# Patient Record
Sex: MALE | Race: WHITE | NOT HISPANIC OR LATINO | Employment: OTHER | URBAN - METROPOLITAN AREA
[De-identification: names, ages, dates, MRNs, and addresses within clinical notes are randomized per-mention and may not be internally consistent; named-entity substitution may affect disease eponyms.]

---

## 2017-01-09 ENCOUNTER — ALLSCRIPTS OFFICE VISIT (OUTPATIENT)
Dept: OTHER | Facility: OTHER | Age: 67
End: 2017-01-09

## 2017-02-01 ENCOUNTER — ALLSCRIPTS OFFICE VISIT (OUTPATIENT)
Dept: OTHER | Facility: OTHER | Age: 67
End: 2017-02-01

## 2017-02-01 DIAGNOSIS — R10.13 EPIGASTRIC PAIN: ICD-10-CM

## 2017-02-01 DIAGNOSIS — K76.89 OTHER SPECIFIED DISEASES OF LIVER: ICD-10-CM

## 2017-02-09 ENCOUNTER — HOSPITAL ENCOUNTER (OUTPATIENT)
Dept: RADIOLOGY | Facility: HOSPITAL | Age: 67
Discharge: HOME | End: 2017-02-09
Attending: INTERNAL MEDICINE
Payer: MEDICARE

## 2017-02-09 DIAGNOSIS — R10.13 EPIGASTRIC PAIN: ICD-10-CM

## 2017-02-09 PROCEDURE — 74177 CT ABD & PELVIS W/CONTRAST: CPT

## 2017-02-09 RX ADMIN — IOHEXOL 100 ML: 350 INJECTION, SOLUTION INTRAVENOUS at 13:29

## 2017-02-14 ENCOUNTER — GENERIC CONVERSION - ENCOUNTER (OUTPATIENT)
Dept: OTHER | Facility: OTHER | Age: 67
End: 2017-02-14

## 2017-02-28 RX ORDER — FINASTERIDE 5 MG/1
5 TABLET, FILM COATED ORAL
COMMUNITY
End: 2020-04-21 | Stop reason: SDUPTHER

## 2017-02-28 RX ORDER — PANTOPRAZOLE SODIUM 40 MG/1
40 TABLET, DELAYED RELEASE ORAL DAILY
COMMUNITY
End: 2018-06-20 | Stop reason: ALTCHOICE

## 2017-02-28 RX ORDER — ATORVASTATIN CALCIUM 20 MG/1
20 TABLET, FILM COATED ORAL
COMMUNITY
End: 2018-03-19 | Stop reason: SDUPTHER

## 2017-03-01 ENCOUNTER — ANESTHESIA (OUTPATIENT)
Dept: GASTROENTEROLOGY | Facility: HOSPITAL | Age: 67
End: 2017-03-01
Payer: MEDICARE

## 2017-03-01 ENCOUNTER — HOSPITAL ENCOUNTER (OUTPATIENT)
Facility: HOSPITAL | Age: 67
Setting detail: OUTPATIENT SURGERY
Discharge: HOME | End: 2017-03-01
Attending: INTERNAL MEDICINE | Admitting: INTERNAL MEDICINE
Payer: MEDICARE

## 2017-03-01 ENCOUNTER — ANESTHESIA EVENT (OUTPATIENT)
Dept: GASTROENTEROLOGY | Facility: HOSPITAL | Age: 67
End: 2017-03-01
Payer: MEDICARE

## 2017-03-01 ENCOUNTER — GENERIC CONVERSION - ENCOUNTER (OUTPATIENT)
Dept: OTHER | Facility: OTHER | Age: 67
End: 2017-03-01

## 2017-03-01 VITALS
RESPIRATION RATE: 16 BRPM | HEART RATE: 61 BPM | BODY MASS INDEX: 33.34 KG/M2 | DIASTOLIC BLOOD PRESSURE: 72 MMHG | TEMPERATURE: 98 F | HEIGHT: 68 IN | OXYGEN SATURATION: 96 % | WEIGHT: 220 LBS | SYSTOLIC BLOOD PRESSURE: 137 MMHG

## 2017-03-01 DIAGNOSIS — Z12.11 ENCOUNTER FOR SCREENING FOR MALIGNANT NEOPLASM OF COLON: ICD-10-CM

## 2017-03-01 DIAGNOSIS — R10.13 INTERMITTENT EPIGASTRIC ABDOMINAL PAIN: ICD-10-CM

## 2017-03-01 PROCEDURE — 88342 IMHCHEM/IMCYTCHM 1ST ANTB: CPT | Performed by: INTERNAL MEDICINE

## 2017-03-01 PROCEDURE — 88305 TISSUE EXAM BY PATHOLOGIST: CPT | Performed by: INTERNAL MEDICINE

## 2017-03-01 RX ORDER — SODIUM CHLORIDE 9 MG/ML
50 INJECTION, SOLUTION INTRAVENOUS CONTINUOUS
Status: DISCONTINUED | OUTPATIENT
Start: 2017-03-01 | End: 2017-03-01 | Stop reason: HOSPADM

## 2017-03-01 RX ORDER — PROPOFOL 10 MG/ML
INJECTION, EMULSION INTRAVENOUS AS NEEDED
Status: DISCONTINUED | OUTPATIENT
Start: 2017-03-01 | End: 2017-03-01 | Stop reason: SURG

## 2017-03-01 RX ADMIN — PROPOFOL 50 MG: 10 INJECTION, EMULSION INTRAVENOUS at 09:56

## 2017-03-01 RX ADMIN — PROPOFOL 30 MG: 10 INJECTION, EMULSION INTRAVENOUS at 10:20

## 2017-03-01 RX ADMIN — PROPOFOL 50 MG: 10 INJECTION, EMULSION INTRAVENOUS at 10:02

## 2017-03-01 RX ADMIN — SODIUM CHLORIDE: 0.9 INJECTION, SOLUTION INTRAVENOUS at 10:15

## 2017-03-01 RX ADMIN — PROPOFOL 40 MG: 10 INJECTION, EMULSION INTRAVENOUS at 10:25

## 2017-03-01 RX ADMIN — PROPOFOL 30 MG: 10 INJECTION, EMULSION INTRAVENOUS at 10:15

## 2017-03-01 RX ADMIN — SODIUM CHLORIDE 50 ML/HR: 0.9 INJECTION, SOLUTION INTRAVENOUS at 09:36

## 2017-03-01 RX ADMIN — PROPOFOL 30 MG: 10 INJECTION, EMULSION INTRAVENOUS at 10:10

## 2017-03-01 RX ADMIN — PROPOFOL 100 MG: 10 INJECTION, EMULSION INTRAVENOUS at 09:52

## 2017-03-01 RX ADMIN — PROPOFOL 20 MG: 10 INJECTION, EMULSION INTRAVENOUS at 10:06

## 2017-03-01 RX ADMIN — PROPOFOL 50 MG: 10 INJECTION, EMULSION INTRAVENOUS at 10:05

## 2017-03-10 ENCOUNTER — GENERIC CONVERSION - ENCOUNTER (OUTPATIENT)
Dept: OTHER | Facility: OTHER | Age: 67
End: 2017-03-10

## 2017-03-10 DIAGNOSIS — K85.90 ACUTE PANCREATITIS WITHOUT INFECTION OR NECROSIS: ICD-10-CM

## 2017-03-10 DIAGNOSIS — K91.89 OTHER POSTPROCEDURAL COMPLICATIONS AND DISORDERS OF DIGESTIVE SYSTEM: ICD-10-CM

## 2017-03-10 DIAGNOSIS — R10.13 EPIGASTRIC PAIN: ICD-10-CM

## 2017-03-10 DIAGNOSIS — Z01.818 ENCOUNTER FOR OTHER PREPROCEDURAL EXAMINATION: ICD-10-CM

## 2017-03-10 DIAGNOSIS — Z98.890 OTHER SPECIFIED POSTPROCEDURAL STATES: ICD-10-CM

## 2017-03-10 DIAGNOSIS — K80.20 CALCULUS OF GALLBLADDER WITHOUT CHOLECYSTITIS WITHOUT OBSTRUCTION: ICD-10-CM

## 2017-03-13 ENCOUNTER — ALLSCRIPTS OFFICE VISIT (OUTPATIENT)
Dept: OTHER | Facility: OTHER | Age: 67
End: 2017-03-13

## 2017-03-13 ENCOUNTER — APPOINTMENT (OUTPATIENT)
Dept: LAB | Facility: HOSPITAL | Age: 67
End: 2017-03-13
Attending: INTERNAL MEDICINE
Payer: MEDICARE

## 2017-03-13 ENCOUNTER — TRANSCRIBE ORDERS (OUTPATIENT)
Dept: LAB | Facility: HOSPITAL | Age: 67
End: 2017-03-13

## 2017-03-13 DIAGNOSIS — R10.13 EPIGASTRIC PAIN: ICD-10-CM

## 2017-03-13 PROCEDURE — 83516 IMMUNOASSAY NONANTIBODY: CPT

## 2017-03-13 PROCEDURE — 82784 ASSAY IGA/IGD/IGG/IGM EACH: CPT

## 2017-03-13 PROCEDURE — 86255 FLUORESCENT ANTIBODY SCREEN: CPT

## 2017-03-13 PROCEDURE — 36415 COLL VENOUS BLD VENIPUNCTURE: CPT

## 2017-03-15 LAB
ENDOMYSIUM IGA SER QL: POSITIVE
GLIADIN PEPTIDE IGA SER-ACNC: >133 UNITS (ref 0–19)
GLIADIN PEPTIDE IGG SER-ACNC: 65 UNITS (ref 0–19)
IGA SERPL-MCNC: 346 MG/DL (ref 61–437)
TTG IGA SER-ACNC: >100 U/ML (ref 0–3)
TTG IGG SER-ACNC: <2 U/ML (ref 0–5)

## 2017-03-17 ENCOUNTER — TRANSCRIBE ORDERS (OUTPATIENT)
Dept: LAB | Facility: HOSPITAL | Age: 67
End: 2017-03-17

## 2017-03-17 ENCOUNTER — HOSPITAL ENCOUNTER (OUTPATIENT)
Dept: RADIOLOGY | Facility: HOSPITAL | Age: 67
Discharge: HOME | End: 2017-03-17
Attending: SURGERY
Payer: MEDICARE

## 2017-03-17 ENCOUNTER — LAB (OUTPATIENT)
Dept: LAB | Facility: HOSPITAL | Age: 67
End: 2017-03-17
Attending: SURGERY
Payer: MEDICARE

## 2017-03-17 DIAGNOSIS — K80.20 CALCULUS OF GALLBLADDER WITHOUT CHOLECYSTITIS WITHOUT OBSTRUCTION: ICD-10-CM

## 2017-03-17 DIAGNOSIS — Z01.818 ENCOUNTER FOR OTHER PREPROCEDURAL EXAMINATION: ICD-10-CM

## 2017-03-17 DIAGNOSIS — Z01.818 OTHER SPECIFIED PRE-OPERATIVE EXAMINATION: ICD-10-CM

## 2017-03-17 DIAGNOSIS — K80.20 CALCULUS OF GALLBLADDER WITHOUT CHOLECYSTITIS WITHOUT OBSTRUCTION: Primary | ICD-10-CM

## 2017-03-17 LAB
ABO GROUP BLD: NORMAL
ALBUMIN SERPL BCP-MCNC: 3.7 G/DL (ref 3.5–5)
ALP SERPL-CCNC: 100 U/L (ref 46–116)
ALT SERPL W P-5'-P-CCNC: 37 U/L (ref 12–78)
ANION GAP SERPL CALCULATED.3IONS-SCNC: 7 MMOL/L (ref 4–13)
APTT PPP: 31 SECONDS (ref 24–36)
AST SERPL W P-5'-P-CCNC: 15 U/L (ref 5–45)
ATRIAL RATE: 60 BPM
BASOPHILS # BLD AUTO: 0.02 THOUSANDS/ΜL (ref 0–0.1)
BASOPHILS NFR BLD AUTO: 0 % (ref 0–1)
BILIRUB DIRECT SERPL-MCNC: 0.18 MG/DL (ref 0–0.2)
BILIRUB SERPL-MCNC: 0.7 MG/DL (ref 0.2–1)
BLD GP AB SCN SERPL QL: NEGATIVE
BUN SERPL-MCNC: 19 MG/DL (ref 5–25)
CALCIUM SERPL-MCNC: 9 MG/DL (ref 8.3–10.1)
CHLORIDE SERPL-SCNC: 106 MMOL/L (ref 100–108)
CO2 SERPL-SCNC: 29 MMOL/L (ref 21–32)
CREAT SERPL-MCNC: 0.93 MG/DL (ref 0.6–1.3)
EOSINOPHIL # BLD AUTO: 0.07 THOUSAND/ΜL (ref 0–0.61)
EOSINOPHIL NFR BLD AUTO: 1 % (ref 0–6)
ERYTHROCYTE [DISTWIDTH] IN BLOOD BY AUTOMATED COUNT: 13.2 % (ref 11.6–15.1)
GFR SERPL CREATININE-BSD FRML MDRD: >60 ML/MIN/1.73SQ M
GLUCOSE SERPL-MCNC: 148 MG/DL (ref 65–140)
HCT VFR BLD AUTO: 42.3 % (ref 36.5–49.3)
HGB BLD-MCNC: 14.7 G/DL (ref 12–17)
INR PPP: 1.03 (ref 0.86–1.16)
LYMPHOCYTES # BLD AUTO: 1.99 THOUSANDS/ΜL (ref 0.6–4.47)
LYMPHOCYTES NFR BLD AUTO: 31 % (ref 14–44)
MCH RBC QN AUTO: 31.6 PG (ref 26.8–34.3)
MCHC RBC AUTO-ENTMCNC: 34.8 G/DL (ref 31.4–37.4)
MCV RBC AUTO: 91 FL (ref 82–98)
MONOCYTES # BLD AUTO: 0.54 THOUSAND/ΜL (ref 0.17–1.22)
MONOCYTES NFR BLD AUTO: 8 % (ref 4–12)
NEUTROPHILS # BLD AUTO: 3.85 THOUSANDS/ΜL (ref 1.85–7.62)
NEUTS SEG NFR BLD AUTO: 60 % (ref 43–75)
NRBC BLD AUTO-RTO: 0 /100 WBCS
P AXIS: 30 DEGREES
PLATELET # BLD AUTO: 281 THOUSANDS/UL (ref 149–390)
PMV BLD AUTO: 9.9 FL (ref 8.9–12.7)
POTASSIUM SERPL-SCNC: 4.3 MMOL/L (ref 3.5–5.3)
PR INTERVAL: 164 MS
PROT SERPL-MCNC: 7.4 G/DL (ref 6.4–8.2)
PROTHROMBIN TIME: 13.6 SECONDS (ref 12–14.3)
QRS AXIS: 21 DEGREES
QRSD INTERVAL: 150 MS
QT INTERVAL: 452 MS
QTC INTERVAL: 452 MS
RBC # BLD AUTO: 4.65 MILLION/UL (ref 3.88–5.62)
RH BLD: POSITIVE
SODIUM SERPL-SCNC: 142 MMOL/L (ref 136–145)
T WAVE AXIS: 29 DEGREES
VENTRICULAR RATE: 60 BPM
WBC # BLD AUTO: 6.48 THOUSAND/UL (ref 4.31–10.16)

## 2017-03-17 PROCEDURE — 85730 THROMBOPLASTIN TIME PARTIAL: CPT

## 2017-03-17 PROCEDURE — 82248 BILIRUBIN DIRECT: CPT

## 2017-03-17 PROCEDURE — 85610 PROTHROMBIN TIME: CPT

## 2017-03-17 PROCEDURE — 86900 BLOOD TYPING SEROLOGIC ABO: CPT

## 2017-03-17 PROCEDURE — 71020 HB CHEST X-RAY 2VW FRONTAL&LATL: CPT

## 2017-03-17 PROCEDURE — 86901 BLOOD TYPING SEROLOGIC RH(D): CPT

## 2017-03-17 PROCEDURE — 93005 ELECTROCARDIOGRAM TRACING: CPT

## 2017-03-17 PROCEDURE — 86850 RBC ANTIBODY SCREEN: CPT

## 2017-03-17 PROCEDURE — 85025 COMPLETE CBC W/AUTO DIFF WBC: CPT

## 2017-03-17 PROCEDURE — 36415 COLL VENOUS BLD VENIPUNCTURE: CPT

## 2017-03-17 PROCEDURE — 80053 COMPREHEN METABOLIC PANEL: CPT

## 2017-03-21 ENCOUNTER — GENERIC CONVERSION - ENCOUNTER (OUTPATIENT)
Dept: OTHER | Facility: OTHER | Age: 67
End: 2017-03-21

## 2017-03-22 ENCOUNTER — ANESTHESIA EVENT (OUTPATIENT)
Dept: PERIOP | Facility: HOSPITAL | Age: 67
DRG: 356 | End: 2017-03-22
Payer: MEDICARE

## 2017-03-22 RX ORDER — CHOLECALCIFEROL (VITAMIN D3) 25 MCG
CAPSULE ORAL DAILY
COMMUNITY
End: 2018-09-14 | Stop reason: SDUPTHER

## 2017-03-29 ENCOUNTER — ANESTHESIA (OUTPATIENT)
Dept: PERIOP | Facility: HOSPITAL | Age: 67
DRG: 356 | End: 2017-03-29
Payer: MEDICARE

## 2017-03-29 ENCOUNTER — HOSPITAL ENCOUNTER (OUTPATIENT)
Facility: HOSPITAL | Age: 67
Setting detail: OUTPATIENT SURGERY
Discharge: HOME | DRG: 356 | End: 2017-03-30
Attending: SURGERY | Admitting: SURGERY
Payer: MEDICARE

## 2017-03-29 DIAGNOSIS — K80.20 CALCULUS OF GALLBLADDER WITHOUT CHOLECYSTITIS WITHOUT OBSTRUCTION: ICD-10-CM

## 2017-03-29 PROCEDURE — 0FT44ZZ RESECTION OF GALLBLADDER, PERCUTANEOUS ENDOSCOPIC APPROACH: ICD-10-PCS | Performed by: SURGERY

## 2017-03-29 PROCEDURE — 88304 TISSUE EXAM BY PATHOLOGIST: CPT | Performed by: SURGERY

## 2017-03-29 RX ORDER — BUPIVACAINE HYDROCHLORIDE 5 MG/ML
INJECTION, SOLUTION PERINEURAL AS NEEDED
Status: DISCONTINUED | OUTPATIENT
Start: 2017-03-29 | End: 2017-03-29 | Stop reason: HOSPADM

## 2017-03-29 RX ORDER — OXYCODONE HYDROCHLORIDE 5 MG/1
5 TABLET ORAL EVERY 4 HOURS PRN
Status: DISCONTINUED | OUTPATIENT
Start: 2017-03-29 | End: 2017-03-29

## 2017-03-29 RX ORDER — ATORVASTATIN CALCIUM 20 MG/1
20 TABLET, FILM COATED ORAL
Status: DISCONTINUED | OUTPATIENT
Start: 2017-03-29 | End: 2017-03-30 | Stop reason: HOSPADM

## 2017-03-29 RX ORDER — LIDOCAINE HYDROCHLORIDE 10 MG/ML
INJECTION, SOLUTION INFILTRATION; PERINEURAL AS NEEDED
Status: DISCONTINUED | OUTPATIENT
Start: 2017-03-29 | End: 2017-03-29 | Stop reason: SURG

## 2017-03-29 RX ORDER — ROCURONIUM BROMIDE 10 MG/ML
INJECTION, SOLUTION INTRAVENOUS AS NEEDED
Status: DISCONTINUED | OUTPATIENT
Start: 2017-03-29 | End: 2017-03-29 | Stop reason: SURG

## 2017-03-29 RX ORDER — ONDANSETRON 2 MG/ML
INJECTION INTRAMUSCULAR; INTRAVENOUS AS NEEDED
Status: DISCONTINUED | OUTPATIENT
Start: 2017-03-29 | End: 2017-03-29 | Stop reason: SURG

## 2017-03-29 RX ORDER — HEPARIN SODIUM 5000 [USP'U]/ML
5000 INJECTION, SOLUTION INTRAVENOUS; SUBCUTANEOUS EVERY 8 HOURS SCHEDULED
Status: DISCONTINUED | OUTPATIENT
Start: 2017-03-29 | End: 2017-03-30 | Stop reason: HOSPADM

## 2017-03-29 RX ORDER — SODIUM CHLORIDE, SODIUM LACTATE, POTASSIUM CHLORIDE, CALCIUM CHLORIDE 600; 310; 30; 20 MG/100ML; MG/100ML; MG/100ML; MG/100ML
50 INJECTION, SOLUTION INTRAVENOUS CONTINUOUS
Status: DISCONTINUED | OUTPATIENT
Start: 2017-03-29 | End: 2017-03-29

## 2017-03-29 RX ORDER — MULTIVITAMIN
1 TABLET ORAL DAILY
COMMUNITY

## 2017-03-29 RX ORDER — GLYCOPYRROLATE 0.2 MG/ML
INJECTION INTRAMUSCULAR; INTRAVENOUS AS NEEDED
Status: DISCONTINUED | OUTPATIENT
Start: 2017-03-29 | End: 2017-03-29 | Stop reason: SURG

## 2017-03-29 RX ORDER — ONDANSETRON 2 MG/ML
4 INJECTION INTRAMUSCULAR; INTRAVENOUS EVERY 4 HOURS PRN
Status: DISCONTINUED | OUTPATIENT
Start: 2017-03-29 | End: 2017-03-29 | Stop reason: HOSPADM

## 2017-03-29 RX ORDER — ONDANSETRON 2 MG/ML
4 INJECTION INTRAMUSCULAR; INTRAVENOUS EVERY 6 HOURS PRN
Status: DISCONTINUED | OUTPATIENT
Start: 2017-03-29 | End: 2017-03-30 | Stop reason: HOSPADM

## 2017-03-29 RX ORDER — DEXAMETHASONE SODIUM PHOSPHATE 4 MG/ML
INJECTION, SOLUTION INTRA-ARTICULAR; INTRALESIONAL; INTRAMUSCULAR; INTRAVENOUS; SOFT TISSUE
Status: DISCONTINUED
Start: 2017-03-29 | End: 2017-03-30

## 2017-03-29 RX ORDER — ACETAMINOPHEN 325 MG/1
650 TABLET ORAL EVERY 6 HOURS PRN
Status: DISCONTINUED | OUTPATIENT
Start: 2017-03-29 | End: 2017-03-29

## 2017-03-29 RX ORDER — FINASTERIDE 5 MG/1
5 TABLET, FILM COATED ORAL
Status: DISCONTINUED | OUTPATIENT
Start: 2017-03-29 | End: 2017-03-30 | Stop reason: HOSPADM

## 2017-03-29 RX ORDER — LABETALOL HYDROCHLORIDE 5 MG/ML
10 INJECTION, SOLUTION INTRAVENOUS
Status: COMPLETED | OUTPATIENT
Start: 2017-03-29 | End: 2017-03-29

## 2017-03-29 RX ORDER — PANTOPRAZOLE SODIUM 40 MG/1
40 TABLET, DELAYED RELEASE ORAL DAILY
Status: DISCONTINUED | OUTPATIENT
Start: 2017-03-30 | End: 2017-03-30 | Stop reason: HOSPADM

## 2017-03-29 RX ORDER — FENTANYL CITRATE 50 UG/ML
INJECTION, SOLUTION INTRAMUSCULAR; INTRAVENOUS AS NEEDED
Status: DISCONTINUED | OUTPATIENT
Start: 2017-03-29 | End: 2017-03-29 | Stop reason: SURG

## 2017-03-29 RX ORDER — METOCLOPRAMIDE HYDROCHLORIDE 5 MG/ML
10 INJECTION INTRAMUSCULAR; INTRAVENOUS ONCE
Status: COMPLETED | OUTPATIENT
Start: 2017-03-29 | End: 2017-03-29

## 2017-03-29 RX ORDER — SODIUM CHLORIDE 9 MG/ML
75 INJECTION, SOLUTION INTRAVENOUS CONTINUOUS
Status: DISCONTINUED | OUTPATIENT
Start: 2017-03-29 | End: 2017-03-30

## 2017-03-29 RX ORDER — OXYCODONE HYDROCHLORIDE AND ACETAMINOPHEN 5; 325 MG/1; MG/1
TABLET ORAL
Status: COMPLETED
Start: 2017-03-29 | End: 2017-03-29

## 2017-03-29 RX ORDER — OXYCODONE HYDROCHLORIDE AND ACETAMINOPHEN 5; 325 MG/1; MG/1
1 TABLET ORAL EVERY 4 HOURS PRN
Status: DISCONTINUED | OUTPATIENT
Start: 2017-03-29 | End: 2017-03-30 | Stop reason: HOSPADM

## 2017-03-29 RX ORDER — OXYCODONE HYDROCHLORIDE 10 MG/1
5 TABLET ORAL EVERY 4 HOURS PRN
Qty: 20 TABLET | Refills: 0 | Status: SHIPPED | OUTPATIENT
Start: 2017-03-29 | End: 2017-03-30 | Stop reason: HOSPADM

## 2017-03-29 RX ORDER — FENTANYL CITRATE/PF 50 MCG/ML
50 SYRINGE (ML) INJECTION
Status: DISCONTINUED | OUTPATIENT
Start: 2017-03-29 | End: 2017-03-29 | Stop reason: HOSPADM

## 2017-03-29 RX ORDER — PROPOFOL 10 MG/ML
INJECTION, EMULSION INTRAVENOUS AS NEEDED
Status: DISCONTINUED | OUTPATIENT
Start: 2017-03-29 | End: 2017-03-29 | Stop reason: SURG

## 2017-03-29 RX ORDER — OXYCODONE HYDROCHLORIDE AND ACETAMINOPHEN 5; 325 MG/1; MG/1
2 TABLET ORAL EVERY 4 HOURS PRN
Status: DISCONTINUED | OUTPATIENT
Start: 2017-03-29 | End: 2017-03-30 | Stop reason: HOSPADM

## 2017-03-29 RX ADMIN — FENTANYL CITRATE 25 MCG: 50 INJECTION, SOLUTION INTRAMUSCULAR; INTRAVENOUS at 12:06

## 2017-03-29 RX ADMIN — CEFAZOLIN SODIUM 2000 MG: 2 SOLUTION INTRAVENOUS at 11:20

## 2017-03-29 RX ADMIN — OXYCODONE HYDROCHLORIDE AND ACETAMINOPHEN 2 TABLET: 5; 325 TABLET ORAL at 20:33

## 2017-03-29 RX ADMIN — GLYCOPYRROLATE 0.6 MG: 0.2 INJECTION INTRAMUSCULAR; INTRAVENOUS at 12:33

## 2017-03-29 RX ADMIN — FENTANYL CITRATE 100 MCG: 50 INJECTION, SOLUTION INTRAMUSCULAR; INTRAVENOUS at 11:13

## 2017-03-29 RX ADMIN — ONDANSETRON 4 MG: 2 INJECTION INTRAMUSCULAR; INTRAVENOUS at 12:24

## 2017-03-29 RX ADMIN — LABETALOL HYDROCHLORIDE 10 MG: 5 INJECTION, SOLUTION INTRAVENOUS at 12:53

## 2017-03-29 RX ADMIN — LABETALOL HYDROCHLORIDE 10 MG: 5 INJECTION, SOLUTION INTRAVENOUS at 13:17

## 2017-03-29 RX ADMIN — FENTANYL CITRATE 50 MCG: 50 INJECTION INTRAMUSCULAR; INTRAVENOUS at 13:07

## 2017-03-29 RX ADMIN — OXYCODONE HYDROCHLORIDE AND ACETAMINOPHEN 1 TABLET: 5; 325 TABLET ORAL at 22:46

## 2017-03-29 RX ADMIN — FENTANYL CITRATE 50 MCG: 50 INJECTION, SOLUTION INTRAMUSCULAR; INTRAVENOUS at 12:00

## 2017-03-29 RX ADMIN — ONDANSETRON 4 MG: 2 INJECTION INTRAMUSCULAR; INTRAVENOUS at 12:58

## 2017-03-29 RX ADMIN — NEOSTIGMINE METHYLSULFATE 3.5 MG: 1 INJECTION INTRAMUSCULAR; INTRAVENOUS; SUBCUTANEOUS at 12:33

## 2017-03-29 RX ADMIN — SODIUM CHLORIDE, SODIUM LACTATE, POTASSIUM CHLORIDE, AND CALCIUM CHLORIDE 50 ML/HR: .6; .31; .03; .02 INJECTION, SOLUTION INTRAVENOUS at 09:41

## 2017-03-29 RX ADMIN — ROCURONIUM BROMIDE 10 MG: 10 INJECTION, SOLUTION INTRAVENOUS at 11:32

## 2017-03-29 RX ADMIN — PROPOFOL 200 MG: 10 INJECTION, EMULSION INTRAVENOUS at 11:13

## 2017-03-29 RX ADMIN — GLYCOPYRROLATE 0.1 MG: 0.2 INJECTION INTRAMUSCULAR; INTRAVENOUS at 11:39

## 2017-03-29 RX ADMIN — FENTANYL CITRATE 50 MCG: 50 INJECTION, SOLUTION INTRAMUSCULAR; INTRAVENOUS at 11:32

## 2017-03-29 RX ADMIN — HYDROMORPHONE HYDROCHLORIDE 0.5 MG: 1 INJECTION, SOLUTION INTRAMUSCULAR; INTRAVENOUS; SUBCUTANEOUS at 19:24

## 2017-03-29 RX ADMIN — METRONIDAZOLE 500 MG: 500 INJECTION, SOLUTION INTRAVENOUS at 11:23

## 2017-03-29 RX ADMIN — ATORVASTATIN CALCIUM 20 MG: 20 TABLET, FILM COATED ORAL at 21:05

## 2017-03-29 RX ADMIN — HEPARIN SODIUM 5000 UNITS: 5000 INJECTION, SOLUTION INTRAVENOUS; SUBCUTANEOUS at 22:45

## 2017-03-29 RX ADMIN — FENTANYL CITRATE 50 MCG: 50 INJECTION INTRAMUSCULAR; INTRAVENOUS at 12:57

## 2017-03-29 RX ADMIN — DEXAMETHASONE SODIUM PHOSPHATE 10 MG: 10 INJECTION INTRAMUSCULAR; INTRAVENOUS at 15:47

## 2017-03-29 RX ADMIN — LIDOCAINE HYDROCHLORIDE 50 MG: 10 INJECTION, SOLUTION INFILTRATION; PERINEURAL at 11:13

## 2017-03-29 RX ADMIN — OXYCODONE HYDROCHLORIDE 5 MG: 5 TABLET ORAL at 16:32

## 2017-03-29 RX ADMIN — FENTANYL CITRATE 25 MCG: 50 INJECTION, SOLUTION INTRAMUSCULAR; INTRAVENOUS at 12:10

## 2017-03-29 RX ADMIN — ROCURONIUM BROMIDE 40 MG: 10 INJECTION, SOLUTION INTRAVENOUS at 11:13

## 2017-03-29 RX ADMIN — SODIUM CHLORIDE 75 ML/HR: 0.9 INJECTION, SOLUTION INTRAVENOUS at 21:05

## 2017-03-29 RX ADMIN — METOCLOPRAMIDE 10 MG: 5 INJECTION, SOLUTION INTRAMUSCULAR; INTRAVENOUS at 13:33

## 2017-03-29 RX ADMIN — FINASTERIDE 5 MG: 5 TABLET, FILM COATED ORAL at 21:05

## 2017-03-30 VITALS
WEIGHT: 235 LBS | BODY MASS INDEX: 35.61 KG/M2 | DIASTOLIC BLOOD PRESSURE: 67 MMHG | SYSTOLIC BLOOD PRESSURE: 158 MMHG | OXYGEN SATURATION: 91 % | HEIGHT: 68 IN | RESPIRATION RATE: 20 BRPM | TEMPERATURE: 97.7 F | HEART RATE: 66 BPM

## 2017-03-30 RX ORDER — OXYCODONE HYDROCHLORIDE 5 MG/1
5-10 TABLET ORAL EVERY 4 HOURS PRN
Qty: 36 TABLET | Refills: 0 | Status: SHIPPED | OUTPATIENT
Start: 2017-03-30 | End: 2020-04-21 | Stop reason: ALTCHOICE

## 2017-03-30 RX ORDER — ACETAMINOPHEN 325 MG/1
650 TABLET ORAL EVERY 4 HOURS PRN
Qty: 30 TABLET | Refills: 0 | Status: SHIPPED | OUTPATIENT
Start: 2017-03-30 | End: 2017-04-29

## 2017-03-30 RX ADMIN — PANTOPRAZOLE SODIUM 40 MG: 40 TABLET, DELAYED RELEASE ORAL at 10:21

## 2017-03-30 RX ADMIN — OXYCODONE HYDROCHLORIDE AND ACETAMINOPHEN 1 TABLET: 5; 325 TABLET ORAL at 10:32

## 2017-03-30 RX ADMIN — OXYCODONE HYDROCHLORIDE AND ACETAMINOPHEN 2 TABLET: 5; 325 TABLET ORAL at 03:06

## 2017-03-30 RX ADMIN — OXYCODONE HYDROCHLORIDE AND ACETAMINOPHEN 1 TABLET: 5; 325 TABLET ORAL at 07:02

## 2017-03-30 RX ADMIN — HEPARIN SODIUM 5000 UNITS: 5000 INJECTION, SOLUTION INTRAVENOUS; SUBCUTANEOUS at 07:02

## 2017-03-30 RX ADMIN — HYDROMORPHONE HYDROCHLORIDE 0.5 MG: 1 INJECTION, SOLUTION INTRAMUSCULAR; INTRAVENOUS; SUBCUTANEOUS at 01:01

## 2017-03-31 ENCOUNTER — HOSPITAL ENCOUNTER (INPATIENT)
Facility: HOSPITAL | Age: 67
LOS: 5 days | Discharge: HOME | DRG: 356 | End: 2017-04-05
Attending: SURGERY | Admitting: SURGERY
Payer: MEDICARE

## 2017-03-31 ENCOUNTER — APPOINTMENT (EMERGENCY)
Dept: RADIOLOGY | Facility: HOSPITAL | Age: 67
DRG: 356 | End: 2017-03-31
Payer: MEDICARE

## 2017-03-31 DIAGNOSIS — K83.8 BILE LEAK, POSTOPERATIVE: Primary | ICD-10-CM

## 2017-03-31 DIAGNOSIS — K91.89 BILE LEAK, POSTOPERATIVE: Primary | ICD-10-CM

## 2017-03-31 LAB
ALBUMIN SERPL BCP-MCNC: 3.3 G/DL (ref 3.5–5)
ALP SERPL-CCNC: 109 U/L (ref 46–116)
ALT SERPL W P-5'-P-CCNC: 75 U/L (ref 12–78)
ANION GAP SERPL CALCULATED.3IONS-SCNC: 9 MMOL/L (ref 4–13)
AST SERPL W P-5'-P-CCNC: 43 U/L (ref 5–45)
BACTERIA UR QL AUTO: NORMAL /HPF
BASOPHILS # BLD AUTO: 0.01 THOUSANDS/ΜL (ref 0–0.1)
BASOPHILS NFR BLD AUTO: 0 % (ref 0–1)
BILIRUB SERPL-MCNC: 1.6 MG/DL (ref 0.2–1)
BILIRUB UR QL STRIP: NEGATIVE
BUN SERPL-MCNC: 17 MG/DL (ref 5–25)
CALCIUM SERPL-MCNC: 9.2 MG/DL (ref 8.3–10.1)
CHLORIDE SERPL-SCNC: 97 MMOL/L (ref 100–108)
CLARITY UR: CLEAR
CO2 SERPL-SCNC: 28 MMOL/L (ref 21–32)
COLOR UR: YELLOW
CREAT SERPL-MCNC: 0.94 MG/DL (ref 0.6–1.3)
EOSINOPHIL # BLD AUTO: 0.02 THOUSAND/ΜL (ref 0–0.61)
EOSINOPHIL NFR BLD AUTO: 0 % (ref 0–6)
ERYTHROCYTE [DISTWIDTH] IN BLOOD BY AUTOMATED COUNT: 13 % (ref 11.6–15.1)
GFR SERPL CREATININE-BSD FRML MDRD: >60 ML/MIN/1.73SQ M
GLUCOSE SERPL-MCNC: 157 MG/DL (ref 65–140)
GLUCOSE UR STRIP-MCNC: NEGATIVE MG/DL
HCT VFR BLD AUTO: 41 % (ref 36.5–49.3)
HGB BLD-MCNC: 14.6 G/DL (ref 12–17)
HGB UR QL STRIP.AUTO: ABNORMAL
HYALINE CASTS #/AREA URNS LPF: NORMAL /LPF
KETONES UR STRIP-MCNC: NEGATIVE MG/DL
LEUKOCYTE ESTERASE UR QL STRIP: NEGATIVE
LYMPHOCYTES # BLD AUTO: 1.8 THOUSANDS/ΜL (ref 0.6–4.47)
LYMPHOCYTES NFR BLD AUTO: 11 % (ref 14–44)
MCH RBC QN AUTO: 32.3 PG (ref 26.8–34.3)
MCHC RBC AUTO-ENTMCNC: 35.6 G/DL (ref 31.4–37.4)
MCV RBC AUTO: 91 FL (ref 82–98)
MONOCYTES # BLD AUTO: 1.77 THOUSAND/ΜL (ref 0.17–1.22)
MONOCYTES NFR BLD AUTO: 11 % (ref 4–12)
NEUTROPHILS # BLD AUTO: 12.29 THOUSANDS/ΜL (ref 1.85–7.62)
NEUTS SEG NFR BLD AUTO: 78 % (ref 43–75)
NITRITE UR QL STRIP: NEGATIVE
NON-SQ EPI CELLS URNS QL MICRO: NORMAL /HPF
NRBC BLD AUTO-RTO: 0 /100 WBCS
PH UR STRIP.AUTO: 6 [PH] (ref 4.5–8)
PLATELET # BLD AUTO: 291 THOUSANDS/UL (ref 149–390)
PMV BLD AUTO: 9.5 FL (ref 8.9–12.7)
POTASSIUM SERPL-SCNC: 3.9 MMOL/L (ref 3.5–5.3)
PROT SERPL-MCNC: 7.6 G/DL (ref 6.4–8.2)
PROT UR STRIP-MCNC: NEGATIVE MG/DL
RBC # BLD AUTO: 4.52 MILLION/UL (ref 3.88–5.62)
RBC #/AREA URNS AUTO: NORMAL /HPF
SODIUM SERPL-SCNC: 134 MMOL/L (ref 136–145)
SP GR UR STRIP.AUTO: 1.01 (ref 1–1.03)
UROBILINOGEN UR QL STRIP.AUTO: 1 E.U./DL
WBC # BLD AUTO: 15.95 THOUSAND/UL (ref 4.31–10.16)
WBC #/AREA URNS AUTO: NORMAL /HPF

## 2017-03-31 PROCEDURE — 85025 COMPLETE CBC W/AUTO DIFF WBC: CPT | Performed by: SURGERY

## 2017-03-31 PROCEDURE — 76705 ECHO EXAM OF ABDOMEN: CPT

## 2017-03-31 PROCEDURE — 81001 URINALYSIS AUTO W/SCOPE: CPT

## 2017-03-31 PROCEDURE — 36415 COLL VENOUS BLD VENIPUNCTURE: CPT | Performed by: SURGERY

## 2017-03-31 PROCEDURE — 87086 URINE CULTURE/COLONY COUNT: CPT

## 2017-03-31 PROCEDURE — 80053 COMPREHEN METABOLIC PANEL: CPT | Performed by: SURGERY

## 2017-03-31 PROCEDURE — 99285 EMERGENCY DEPT VISIT HI MDM: CPT

## 2017-03-31 RX ORDER — FINASTERIDE 5 MG/1
5 TABLET, FILM COATED ORAL
Status: DISCONTINUED | OUTPATIENT
Start: 2017-03-31 | End: 2017-04-05 | Stop reason: HOSPADM

## 2017-03-31 RX ORDER — ATORVASTATIN CALCIUM 20 MG/1
20 TABLET, FILM COATED ORAL
Status: DISCONTINUED | OUTPATIENT
Start: 2017-03-31 | End: 2017-04-05 | Stop reason: HOSPADM

## 2017-03-31 RX ORDER — OXYCODONE HYDROCHLORIDE 10 MG/1
10 TABLET ORAL EVERY 4 HOURS PRN
Status: DISCONTINUED | OUTPATIENT
Start: 2017-03-31 | End: 2017-04-05 | Stop reason: HOSPADM

## 2017-03-31 RX ORDER — ONDANSETRON 2 MG/ML
4 INJECTION INTRAMUSCULAR; INTRAVENOUS EVERY 6 HOURS PRN
Status: DISCONTINUED | OUTPATIENT
Start: 2017-03-31 | End: 2017-04-05 | Stop reason: HOSPADM

## 2017-03-31 RX ORDER — HEPARIN SODIUM 5000 [USP'U]/ML
5000 INJECTION, SOLUTION INTRAVENOUS; SUBCUTANEOUS EVERY 8 HOURS SCHEDULED
Status: DISCONTINUED | OUTPATIENT
Start: 2017-03-31 | End: 2017-03-31

## 2017-03-31 RX ORDER — PANTOPRAZOLE SODIUM 40 MG/1
40 TABLET, DELAYED RELEASE ORAL DAILY
Status: DISCONTINUED | OUTPATIENT
Start: 2017-04-01 | End: 2017-04-05 | Stop reason: HOSPADM

## 2017-03-31 RX ORDER — ONDANSETRON 2 MG/ML
4 INJECTION INTRAMUSCULAR; INTRAVENOUS EVERY 6 HOURS PRN
Status: DISCONTINUED | OUTPATIENT
Start: 2017-03-31 | End: 2017-03-31

## 2017-03-31 RX ORDER — ACETAMINOPHEN 325 MG/1
650 TABLET ORAL EVERY 6 HOURS PRN
Status: DISCONTINUED | OUTPATIENT
Start: 2017-03-31 | End: 2017-04-05 | Stop reason: HOSPADM

## 2017-03-31 RX ORDER — SODIUM CHLORIDE 9 MG/ML
100 INJECTION, SOLUTION INTRAVENOUS CONTINUOUS
Status: DISCONTINUED | OUTPATIENT
Start: 2017-03-31 | End: 2017-03-31

## 2017-03-31 RX ORDER — OXYCODONE HYDROCHLORIDE 5 MG/1
5 TABLET ORAL EVERY 4 HOURS PRN
Status: DISCONTINUED | OUTPATIENT
Start: 2017-03-31 | End: 2017-04-05 | Stop reason: HOSPADM

## 2017-03-31 RX ORDER — HEPARIN SODIUM 5000 [USP'U]/ML
5000 INJECTION, SOLUTION INTRAVENOUS; SUBCUTANEOUS EVERY 8 HOURS SCHEDULED
Status: DISCONTINUED | OUTPATIENT
Start: 2017-03-31 | End: 2017-04-05 | Stop reason: HOSPADM

## 2017-03-31 RX ADMIN — FINASTERIDE 5 MG: 5 TABLET, FILM COATED ORAL at 21:06

## 2017-03-31 RX ADMIN — OXYCODONE HYDROCHLORIDE 5 MG: 5 TABLET ORAL at 21:06

## 2017-03-31 RX ADMIN — HEPARIN SODIUM 5000 UNITS: 5000 INJECTION, SOLUTION INTRAVENOUS; SUBCUTANEOUS at 21:10

## 2017-03-31 RX ADMIN — ATORVASTATIN CALCIUM 20 MG: 20 TABLET, FILM COATED ORAL at 21:06

## 2017-04-01 ENCOUNTER — APPOINTMENT (INPATIENT)
Dept: RADIOLOGY | Facility: HOSPITAL | Age: 67
DRG: 356 | End: 2017-04-01
Payer: MEDICARE

## 2017-04-01 PROBLEM — E80.6 HYPERBILIRUBINEMIA: Status: ACTIVE | Noted: 2017-04-01

## 2017-04-01 LAB
ALBUMIN SERPL BCP-MCNC: 2.9 G/DL (ref 3.5–5)
ALP SERPL-CCNC: 108 U/L (ref 46–116)
ALT SERPL W P-5'-P-CCNC: 60 U/L (ref 12–78)
ANION GAP SERPL CALCULATED.3IONS-SCNC: 10 MMOL/L (ref 4–13)
AST SERPL W P-5'-P-CCNC: 33 U/L (ref 5–45)
BACTERIA UR CULT: NORMAL
BASOPHILS # BLD AUTO: 0.01 THOUSANDS/ΜL (ref 0–0.1)
BASOPHILS NFR BLD AUTO: 0 % (ref 0–1)
BILIRUB SERPL-MCNC: 1.68 MG/DL (ref 0.2–1)
BUN SERPL-MCNC: 15 MG/DL (ref 5–25)
CALCIUM SERPL-MCNC: 8.7 MG/DL (ref 8.3–10.1)
CHLORIDE SERPL-SCNC: 99 MMOL/L (ref 100–108)
CO2 SERPL-SCNC: 26 MMOL/L (ref 21–32)
CREAT SERPL-MCNC: 0.81 MG/DL (ref 0.6–1.3)
EOSINOPHIL # BLD AUTO: 0.08 THOUSAND/ΜL (ref 0–0.61)
EOSINOPHIL NFR BLD AUTO: 1 % (ref 0–6)
ERYTHROCYTE [DISTWIDTH] IN BLOOD BY AUTOMATED COUNT: 13 % (ref 11.6–15.1)
GFR SERPL CREATININE-BSD FRML MDRD: >60 ML/MIN/1.73SQ M
GLUCOSE SERPL-MCNC: 125 MG/DL (ref 65–140)
HCT VFR BLD AUTO: 39.3 % (ref 36.5–49.3)
HGB BLD-MCNC: 13.7 G/DL (ref 12–17)
LYMPHOCYTES # BLD AUTO: 1.67 THOUSANDS/ΜL (ref 0.6–4.47)
LYMPHOCYTES NFR BLD AUTO: 16 % (ref 14–44)
MCH RBC QN AUTO: 31.6 PG (ref 26.8–34.3)
MCHC RBC AUTO-ENTMCNC: 34.9 G/DL (ref 31.4–37.4)
MCV RBC AUTO: 91 FL (ref 82–98)
MONOCYTES # BLD AUTO: 1.2 THOUSAND/ΜL (ref 0.17–1.22)
MONOCYTES NFR BLD AUTO: 12 % (ref 4–12)
NEUTROPHILS # BLD AUTO: 7.45 THOUSANDS/ΜL (ref 1.85–7.62)
NEUTS SEG NFR BLD AUTO: 71 % (ref 43–75)
NRBC BLD AUTO-RTO: 0 /100 WBCS
PLATELET # BLD AUTO: 283 THOUSANDS/UL (ref 149–390)
PMV BLD AUTO: 10.2 FL (ref 8.9–12.7)
POTASSIUM SERPL-SCNC: 3.6 MMOL/L (ref 3.5–5.3)
PROT SERPL-MCNC: 7.1 G/DL (ref 6.4–8.2)
RBC # BLD AUTO: 4.34 MILLION/UL (ref 3.88–5.62)
SODIUM SERPL-SCNC: 135 MMOL/L (ref 136–145)
WBC # BLD AUTO: 10.42 THOUSAND/UL (ref 4.31–10.16)

## 2017-04-01 PROCEDURE — A9537 TC99M MEBROFENIN: HCPCS

## 2017-04-01 PROCEDURE — 78226 HEPATOBILIARY SYSTEM IMAGING: CPT

## 2017-04-01 PROCEDURE — 80053 COMPREHEN METABOLIC PANEL: CPT | Performed by: SURGERY

## 2017-04-01 PROCEDURE — 85025 COMPLETE CBC W/AUTO DIFF WBC: CPT | Performed by: SURGERY

## 2017-04-01 RX ORDER — POLYETHYLENE GLYCOL 3350 17 G/17G
17 POWDER, FOR SOLUTION ORAL ONCE
Status: COMPLETED | OUTPATIENT
Start: 2017-04-01 | End: 2017-04-01

## 2017-04-01 RX ORDER — SODIUM CHLORIDE 9 MG/ML
125 INJECTION, SOLUTION INTRAVENOUS CONTINUOUS
Status: DISCONTINUED | OUTPATIENT
Start: 2017-04-01 | End: 2017-04-02

## 2017-04-01 RX ADMIN — HEPARIN SODIUM 5000 UNITS: 5000 INJECTION, SOLUTION INTRAVENOUS; SUBCUTANEOUS at 21:44

## 2017-04-01 RX ADMIN — SODIUM CHLORIDE 125 ML/HR: 0.9 INJECTION, SOLUTION INTRAVENOUS at 10:40

## 2017-04-01 RX ADMIN — HEPARIN SODIUM 5000 UNITS: 5000 INJECTION, SOLUTION INTRAVENOUS; SUBCUTANEOUS at 15:02

## 2017-04-01 RX ADMIN — POLYETHYLENE GLYCOL 3350 17 G: 17 POWDER, FOR SOLUTION ORAL at 22:22

## 2017-04-01 RX ADMIN — HEPARIN SODIUM 5000 UNITS: 5000 INJECTION, SOLUTION INTRAVENOUS; SUBCUTANEOUS at 04:47

## 2017-04-01 RX ADMIN — PANTOPRAZOLE SODIUM 40 MG: 40 TABLET, DELAYED RELEASE ORAL at 10:40

## 2017-04-01 RX ADMIN — OXYCODONE HYDROCHLORIDE 5 MG: 5 TABLET ORAL at 04:48

## 2017-04-01 RX ADMIN — OXYCODONE HYDROCHLORIDE 5 MG: 5 TABLET ORAL at 10:44

## 2017-04-01 RX ADMIN — SODIUM CHLORIDE 125 ML/HR: 0.9 INJECTION, SOLUTION INTRAVENOUS at 21:43

## 2017-04-01 RX ADMIN — ATORVASTATIN CALCIUM 20 MG: 20 TABLET, FILM COATED ORAL at 17:32

## 2017-04-01 RX ADMIN — FINASTERIDE 5 MG: 5 TABLET, FILM COATED ORAL at 17:32

## 2017-04-01 RX ADMIN — OXYCODONE HYDROCHLORIDE 5 MG: 5 TABLET ORAL at 17:36

## 2017-04-02 ENCOUNTER — APPOINTMENT (INPATIENT)
Dept: RADIOLOGY | Facility: HOSPITAL | Age: 67
DRG: 356 | End: 2017-04-02
Payer: MEDICARE

## 2017-04-02 LAB
ALBUMIN SERPL BCP-MCNC: 2.6 G/DL (ref 3.5–5)
ALP SERPL-CCNC: 136 U/L (ref 46–116)
ALT SERPL W P-5'-P-CCNC: 53 U/L (ref 12–78)
ANION GAP SERPL CALCULATED.3IONS-SCNC: 10 MMOL/L (ref 4–13)
AST SERPL W P-5'-P-CCNC: 30 U/L (ref 5–45)
BILIRUB DIRECT SERPL-MCNC: 0.5 MG/DL (ref 0–0.2)
BILIRUB SERPL-MCNC: 1.33 MG/DL (ref 0.2–1)
BUN SERPL-MCNC: 13 MG/DL (ref 5–25)
CALCIUM SERPL-MCNC: 8.8 MG/DL (ref 8.3–10.1)
CHLORIDE SERPL-SCNC: 104 MMOL/L (ref 100–108)
CO2 SERPL-SCNC: 24 MMOL/L (ref 21–32)
CREAT SERPL-MCNC: 0.79 MG/DL (ref 0.6–1.3)
GFR SERPL CREATININE-BSD FRML MDRD: >60 ML/MIN/1.73SQ M
GLUCOSE SERPL-MCNC: 129 MG/DL (ref 65–140)
POTASSIUM SERPL-SCNC: 3.5 MMOL/L (ref 3.5–5.3)
PROT SERPL-MCNC: 6.8 G/DL (ref 6.4–8.2)
SODIUM SERPL-SCNC: 138 MMOL/L (ref 136–145)

## 2017-04-02 PROCEDURE — 80048 BASIC METABOLIC PNL TOTAL CA: CPT | Performed by: SURGERY

## 2017-04-02 PROCEDURE — 74177 CT ABD & PELVIS W/CONTRAST: CPT

## 2017-04-02 PROCEDURE — 80076 HEPATIC FUNCTION PANEL: CPT | Performed by: SURGERY

## 2017-04-02 RX ORDER — DOCUSATE SODIUM 100 MG/1
100 CAPSULE, LIQUID FILLED ORAL 2 TIMES DAILY
Status: DISCONTINUED | OUTPATIENT
Start: 2017-04-02 | End: 2017-04-05 | Stop reason: HOSPADM

## 2017-04-02 RX ORDER — POTASSIUM CHLORIDE 14.9 MG/ML
20 INJECTION INTRAVENOUS ONCE
Status: COMPLETED | OUTPATIENT
Start: 2017-04-02 | End: 2017-04-03

## 2017-04-02 RX ORDER — DEXTROSE, SODIUM CHLORIDE, AND POTASSIUM CHLORIDE 5; .45; .15 G/100ML; G/100ML; G/100ML
125 INJECTION INTRAVENOUS CONTINUOUS
Status: DISCONTINUED | OUTPATIENT
Start: 2017-04-02 | End: 2017-04-02

## 2017-04-02 RX ORDER — SODIUM CHLORIDE 9 MG/ML
125 INJECTION, SOLUTION INTRAVENOUS CONTINUOUS
Status: DISCONTINUED | OUTPATIENT
Start: 2017-04-03 | End: 2017-04-05

## 2017-04-02 RX ORDER — POTASSIUM CHLORIDE 14.9 MG/ML
20 INJECTION INTRAVENOUS
Status: DISPENSED | OUTPATIENT
Start: 2017-04-02 | End: 2017-04-02

## 2017-04-02 RX ADMIN — HEPARIN SODIUM 5000 UNITS: 5000 INJECTION, SOLUTION INTRAVENOUS; SUBCUTANEOUS at 13:31

## 2017-04-02 RX ADMIN — ATORVASTATIN CALCIUM 20 MG: 20 TABLET, FILM COATED ORAL at 17:25

## 2017-04-02 RX ADMIN — FINASTERIDE 5 MG: 5 TABLET, FILM COATED ORAL at 17:25

## 2017-04-02 RX ADMIN — POTASSIUM CHLORIDE 20 MEQ: 200 INJECTION, SOLUTION INTRAVENOUS at 17:25

## 2017-04-02 RX ADMIN — POTASSIUM CHLORIDE 20 MEQ: 200 INJECTION, SOLUTION INTRAVENOUS at 13:16

## 2017-04-02 RX ADMIN — PANTOPRAZOLE SODIUM 40 MG: 40 TABLET, DELAYED RELEASE ORAL at 08:59

## 2017-04-02 RX ADMIN — IOHEXOL 100 ML: 350 INJECTION, SOLUTION INTRAVENOUS at 12:23

## 2017-04-02 RX ADMIN — SODIUM CHLORIDE 125 ML/HR: 0.9 INJECTION, SOLUTION INTRAVENOUS at 06:30

## 2017-04-02 RX ADMIN — HEPARIN SODIUM 5000 UNITS: 5000 INJECTION, SOLUTION INTRAVENOUS; SUBCUTANEOUS at 06:30

## 2017-04-02 RX ADMIN — DOCUSATE SODIUM 100 MG: 100 CAPSULE, LIQUID FILLED ORAL at 17:25

## 2017-04-02 RX ADMIN — HEPARIN SODIUM 5000 UNITS: 5000 INJECTION, SOLUTION INTRAVENOUS; SUBCUTANEOUS at 21:38

## 2017-04-03 ENCOUNTER — APPOINTMENT (INPATIENT)
Dept: RADIOLOGY | Facility: HOSPITAL | Age: 67
DRG: 356 | End: 2017-04-03
Attending: INTERNAL MEDICINE
Payer: MEDICARE

## 2017-04-03 ENCOUNTER — ANESTHESIA (INPATIENT)
Dept: GASTROENTEROLOGY | Facility: HOSPITAL | Age: 67
DRG: 356 | End: 2017-04-03
Payer: MEDICARE

## 2017-04-03 ENCOUNTER — ANESTHESIA EVENT (INPATIENT)
Dept: GASTROENTEROLOGY | Facility: HOSPITAL | Age: 67
DRG: 356 | End: 2017-04-03
Payer: MEDICARE

## 2017-04-03 PROBLEM — K83.9 BILE LEAK: Status: ACTIVE | Noted: 2017-04-03

## 2017-04-03 LAB
ALBUMIN SERPL BCP-MCNC: 2.5 G/DL (ref 3.5–5)
ALP SERPL-CCNC: 202 U/L (ref 46–116)
ALT SERPL W P-5'-P-CCNC: 68 U/L (ref 12–78)
ANION GAP SERPL CALCULATED.3IONS-SCNC: 8 MMOL/L (ref 4–13)
AST SERPL W P-5'-P-CCNC: 44 U/L (ref 5–45)
BASOPHILS # BLD AUTO: 0.02 THOUSANDS/ΜL (ref 0–0.1)
BASOPHILS NFR BLD AUTO: 0 % (ref 0–1)
BILIRUB SERPL-MCNC: 1.07 MG/DL (ref 0.2–1)
BUN SERPL-MCNC: 14 MG/DL (ref 5–25)
CALCIUM SERPL-MCNC: 8.7 MG/DL (ref 8.3–10.1)
CHLORIDE SERPL-SCNC: 107 MMOL/L (ref 100–108)
CO2 SERPL-SCNC: 23 MMOL/L (ref 21–32)
CREAT SERPL-MCNC: 0.83 MG/DL (ref 0.6–1.3)
EOSINOPHIL # BLD AUTO: 0.15 THOUSAND/ΜL (ref 0–0.61)
EOSINOPHIL NFR BLD AUTO: 2 % (ref 0–6)
ERYTHROCYTE [DISTWIDTH] IN BLOOD BY AUTOMATED COUNT: 12.8 % (ref 11.6–15.1)
GFR SERPL CREATININE-BSD FRML MDRD: >60 ML/MIN/1.73SQ M
GLUCOSE SERPL-MCNC: 135 MG/DL (ref 65–140)
HCT VFR BLD AUTO: 36.3 % (ref 36.5–49.3)
HGB BLD-MCNC: 12.8 G/DL (ref 12–17)
LYMPHOCYTES # BLD AUTO: 1.54 THOUSANDS/ΜL (ref 0.6–4.47)
LYMPHOCYTES NFR BLD AUTO: 21 % (ref 14–44)
MCH RBC QN AUTO: 31.9 PG (ref 26.8–34.3)
MCHC RBC AUTO-ENTMCNC: 35.3 G/DL (ref 31.4–37.4)
MCV RBC AUTO: 91 FL (ref 82–98)
MONOCYTES # BLD AUTO: 0.71 THOUSAND/ΜL (ref 0.17–1.22)
MONOCYTES NFR BLD AUTO: 10 % (ref 4–12)
NEUTROPHILS # BLD AUTO: 4.98 THOUSANDS/ΜL (ref 1.85–7.62)
NEUTS SEG NFR BLD AUTO: 67 % (ref 43–75)
NRBC BLD AUTO-RTO: 0 /100 WBCS
PLATELET # BLD AUTO: 315 THOUSANDS/UL (ref 149–390)
PMV BLD AUTO: 9.9 FL (ref 8.9–12.7)
POTASSIUM SERPL-SCNC: 3.6 MMOL/L (ref 3.5–5.3)
PROT SERPL-MCNC: 6.8 G/DL (ref 6.4–8.2)
RBC # BLD AUTO: 4.01 MILLION/UL (ref 3.88–5.62)
SODIUM SERPL-SCNC: 138 MMOL/L (ref 136–145)
WBC # BLD AUTO: 7.44 THOUSAND/UL (ref 4.31–10.16)

## 2017-04-03 PROCEDURE — C2617 STENT, NON-COR, TEM W/O DEL: HCPCS | Performed by: INTERNAL MEDICINE

## 2017-04-03 PROCEDURE — 0F798DZ DILATION OF COMMON BILE DUCT WITH INTRALUMINAL DEVICE, VIA NATURAL OR ARTIFICIAL OPENING ENDOSCOPIC: ICD-10-PCS | Performed by: INTERNAL MEDICINE

## 2017-04-03 PROCEDURE — 74328 X-RAY BILE DUCT ENDOSCOPY: CPT

## 2017-04-03 PROCEDURE — 80053 COMPREHEN METABOLIC PANEL: CPT | Performed by: SURGERY

## 2017-04-03 PROCEDURE — 85025 COMPLETE CBC W/AUTO DIFF WBC: CPT | Performed by: SURGERY

## 2017-04-03 PROCEDURE — C2625 STENT, NON-COR, TEM W/DEL SY: HCPCS | Performed by: INTERNAL MEDICINE

## 2017-04-03 PROCEDURE — C1769 GUIDE WIRE: HCPCS | Performed by: INTERNAL MEDICINE

## 2017-04-03 PROCEDURE — 0F7D8DZ DILATION OF PANCREATIC DUCT WITH INTRALUMINAL DEVICE, VIA NATURAL OR ARTIFICIAL OPENING ENDOSCOPIC: ICD-10-PCS | Performed by: INTERNAL MEDICINE

## 2017-04-03 DEVICE — BILIARY STENT
Type: IMPLANTABLE DEVICE | Site: BILE DUCT | Status: FUNCTIONAL
Brand: ADVANIX™ BILIARY

## 2017-04-03 DEVICE — PANCREATIC STENT
Type: IMPLANTABLE DEVICE | Site: ABDOMEN | Status: FUNCTIONAL
Brand: ADVANIX™ PANCREATIC STENT

## 2017-04-03 RX ORDER — LIDOCAINE HYDROCHLORIDE 10 MG/ML
INJECTION, SOLUTION INFILTRATION; PERINEURAL AS NEEDED
Status: DISCONTINUED | OUTPATIENT
Start: 2017-04-03 | End: 2017-04-03 | Stop reason: SURG

## 2017-04-03 RX ORDER — SUCCINYLCHOLINE CHLORIDE 20 MG/ML
INJECTION INTRAMUSCULAR; INTRAVENOUS AS NEEDED
Status: DISCONTINUED | OUTPATIENT
Start: 2017-04-03 | End: 2017-04-03 | Stop reason: SURG

## 2017-04-03 RX ORDER — PROPOFOL 10 MG/ML
INJECTION, EMULSION INTRAVENOUS AS NEEDED
Status: DISCONTINUED | OUTPATIENT
Start: 2017-04-03 | End: 2017-04-03 | Stop reason: SURG

## 2017-04-03 RX ORDER — ONDANSETRON 2 MG/ML
INJECTION INTRAMUSCULAR; INTRAVENOUS AS NEEDED
Status: DISCONTINUED | OUTPATIENT
Start: 2017-04-03 | End: 2017-04-03 | Stop reason: SURG

## 2017-04-03 RX ADMIN — SODIUM CHLORIDE 125 ML/HR: 0.9 INJECTION, SOLUTION INTRAVENOUS at 00:13

## 2017-04-03 RX ADMIN — SODIUM CHLORIDE 125 ML/HR: 0.9 INJECTION, SOLUTION INTRAVENOUS at 06:15

## 2017-04-03 RX ADMIN — PROPOFOL 200 MG: 10 INJECTION, EMULSION INTRAVENOUS at 10:19

## 2017-04-03 RX ADMIN — ONDANSETRON 4 MG: 2 INJECTION INTRAMUSCULAR; INTRAVENOUS at 10:09

## 2017-04-03 RX ADMIN — SODIUM CHLORIDE 75 ML/HR: 0.9 INJECTION, SOLUTION INTRAVENOUS at 21:26

## 2017-04-03 RX ADMIN — ATORVASTATIN CALCIUM 20 MG: 20 TABLET, FILM COATED ORAL at 17:35

## 2017-04-03 RX ADMIN — SUCCINYLCHOLINE CHLORIDE 100 MG: 20 INJECTION, SOLUTION INTRAMUSCULAR; INTRAVENOUS at 10:19

## 2017-04-03 RX ADMIN — IOHEXOL 16 ML: 240 INJECTION, SOLUTION INTRATHECAL; INTRAVASCULAR; INTRAVENOUS; ORAL at 10:50

## 2017-04-03 RX ADMIN — FINASTERIDE 5 MG: 5 TABLET, FILM COATED ORAL at 17:35

## 2017-04-03 RX ADMIN — HEPARIN SODIUM 5000 UNITS: 5000 INJECTION, SOLUTION INTRAVENOUS; SUBCUTANEOUS at 21:26

## 2017-04-03 RX ADMIN — DOCUSATE SODIUM 100 MG: 100 CAPSULE, LIQUID FILLED ORAL at 17:35

## 2017-04-03 RX ADMIN — SODIUM CHLORIDE 125 ML/HR: 0.9 INJECTION, SOLUTION INTRAVENOUS at 13:20

## 2017-04-03 RX ADMIN — LIDOCAINE HYDROCHLORIDE 25 MG: 10 INJECTION, SOLUTION INFILTRATION; PERINEURAL at 10:19

## 2017-04-04 PROBLEM — K85.90 ACUTE PANCREATITIS: Status: ACTIVE | Noted: 2017-04-04

## 2017-04-04 LAB
ALBUMIN SERPL BCP-MCNC: 2.7 G/DL (ref 3.5–5)
ALP SERPL-CCNC: 250 U/L (ref 46–116)
ALT SERPL W P-5'-P-CCNC: 76 U/L (ref 12–78)
ANION GAP SERPL CALCULATED.3IONS-SCNC: 8 MMOL/L (ref 4–13)
AST SERPL W P-5'-P-CCNC: 44 U/L (ref 5–45)
BASOPHILS # BLD AUTO: 0.01 THOUSANDS/ΜL (ref 0–0.1)
BASOPHILS NFR BLD AUTO: 0 % (ref 0–1)
BILIRUB SERPL-MCNC: 0.94 MG/DL (ref 0.2–1)
BUN SERPL-MCNC: 13 MG/DL (ref 5–25)
CALCIUM SERPL-MCNC: 8.7 MG/DL (ref 8.3–10.1)
CHLORIDE SERPL-SCNC: 105 MMOL/L (ref 100–108)
CO2 SERPL-SCNC: 23 MMOL/L (ref 21–32)
CREAT SERPL-MCNC: 0.78 MG/DL (ref 0.6–1.3)
EOSINOPHIL # BLD AUTO: 0.16 THOUSAND/ΜL (ref 0–0.61)
EOSINOPHIL NFR BLD AUTO: 2 % (ref 0–6)
ERYTHROCYTE [DISTWIDTH] IN BLOOD BY AUTOMATED COUNT: 12.7 % (ref 11.6–15.1)
GFR SERPL CREATININE-BSD FRML MDRD: >60 ML/MIN/1.73SQ M
GLUCOSE SERPL-MCNC: 143 MG/DL (ref 65–140)
HCT VFR BLD AUTO: 38.1 % (ref 36.5–49.3)
HGB BLD-MCNC: 13.5 G/DL (ref 12–17)
LIPASE SERPL-CCNC: 5605 U/L (ref 73–393)
LYMPHOCYTES # BLD AUTO: 1.54 THOUSANDS/ΜL (ref 0.6–4.47)
LYMPHOCYTES NFR BLD AUTO: 15 % (ref 14–44)
MCH RBC QN AUTO: 32.1 PG (ref 26.8–34.3)
MCHC RBC AUTO-ENTMCNC: 35.4 G/DL (ref 31.4–37.4)
MCV RBC AUTO: 91 FL (ref 82–98)
MONOCYTES # BLD AUTO: 0.84 THOUSAND/ΜL (ref 0.17–1.22)
MONOCYTES NFR BLD AUTO: 8 % (ref 4–12)
NEUTROPHILS # BLD AUTO: 7.78 THOUSANDS/ΜL (ref 1.85–7.62)
NEUTS SEG NFR BLD AUTO: 75 % (ref 43–75)
NRBC BLD AUTO-RTO: 0 /100 WBCS
PLATELET # BLD AUTO: 312 THOUSANDS/UL (ref 149–390)
PMV BLD AUTO: 9.7 FL (ref 8.9–12.7)
POTASSIUM SERPL-SCNC: 3.7 MMOL/L (ref 3.5–5.3)
PROT SERPL-MCNC: 7.2 G/DL (ref 6.4–8.2)
RBC # BLD AUTO: 4.21 MILLION/UL (ref 3.88–5.62)
SODIUM SERPL-SCNC: 136 MMOL/L (ref 136–145)
WBC # BLD AUTO: 10.35 THOUSAND/UL (ref 4.31–10.16)

## 2017-04-04 PROCEDURE — 83690 ASSAY OF LIPASE: CPT | Performed by: SURGERY

## 2017-04-04 PROCEDURE — 85025 COMPLETE CBC W/AUTO DIFF WBC: CPT | Performed by: SURGERY

## 2017-04-04 PROCEDURE — 80053 COMPREHEN METABOLIC PANEL: CPT | Performed by: SURGERY

## 2017-04-04 RX ADMIN — HEPARIN SODIUM 5000 UNITS: 5000 INJECTION, SOLUTION INTRAVENOUS; SUBCUTANEOUS at 13:34

## 2017-04-04 RX ADMIN — OXYCODONE HYDROCHLORIDE 5 MG: 5 TABLET ORAL at 19:35

## 2017-04-04 RX ADMIN — FINASTERIDE 5 MG: 5 TABLET, FILM COATED ORAL at 18:31

## 2017-04-04 RX ADMIN — PANTOPRAZOLE SODIUM 40 MG: 40 TABLET, DELAYED RELEASE ORAL at 09:33

## 2017-04-04 RX ADMIN — OXYCODONE HYDROCHLORIDE 10 MG: 10 TABLET ORAL at 00:09

## 2017-04-04 RX ADMIN — OXYCODONE HYDROCHLORIDE 10 MG: 10 TABLET ORAL at 05:47

## 2017-04-04 RX ADMIN — ATORVASTATIN CALCIUM 20 MG: 20 TABLET, FILM COATED ORAL at 18:31

## 2017-04-04 RX ADMIN — OXYCODONE HYDROCHLORIDE 10 MG: 10 TABLET ORAL at 15:37

## 2017-04-04 RX ADMIN — HEPARIN SODIUM 5000 UNITS: 5000 INJECTION, SOLUTION INTRAVENOUS; SUBCUTANEOUS at 21:59

## 2017-04-04 RX ADMIN — HEPARIN SODIUM 5000 UNITS: 5000 INJECTION, SOLUTION INTRAVENOUS; SUBCUTANEOUS at 05:46

## 2017-04-04 RX ADMIN — DOCUSATE SODIUM 100 MG: 100 CAPSULE, LIQUID FILLED ORAL at 09:33

## 2017-04-04 RX ADMIN — OXYCODONE HYDROCHLORIDE 10 MG: 10 TABLET ORAL at 09:33

## 2017-04-04 RX ADMIN — DOCUSATE SODIUM 100 MG: 100 CAPSULE, LIQUID FILLED ORAL at 18:31

## 2017-04-04 RX ADMIN — SODIUM CHLORIDE 125 ML/HR: 0.9 INJECTION, SOLUTION INTRAVENOUS at 19:36

## 2017-04-05 VITALS
SYSTOLIC BLOOD PRESSURE: 156 MMHG | HEART RATE: 83 BPM | OXYGEN SATURATION: 95 % | DIASTOLIC BLOOD PRESSURE: 79 MMHG | BODY MASS INDEX: 33.55 KG/M2 | RESPIRATION RATE: 20 BRPM | WEIGHT: 221.34 LBS | TEMPERATURE: 97.9 F | HEIGHT: 68 IN

## 2017-04-05 LAB
ALBUMIN SERPL BCP-MCNC: 2.5 G/DL (ref 3.5–5)
ALP SERPL-CCNC: 224 U/L (ref 46–116)
ALT SERPL W P-5'-P-CCNC: 59 U/L (ref 12–78)
ANION GAP SERPL CALCULATED.3IONS-SCNC: 11 MMOL/L (ref 4–13)
AST SERPL W P-5'-P-CCNC: 33 U/L (ref 5–45)
BASOPHILS # BLD AUTO: 0.02 THOUSANDS/ΜL (ref 0–0.1)
BASOPHILS NFR BLD AUTO: 0 % (ref 0–1)
BILIRUB SERPL-MCNC: 1.23 MG/DL (ref 0.2–1)
BUN SERPL-MCNC: 12 MG/DL (ref 5–25)
CALCIUM SERPL-MCNC: 8.7 MG/DL (ref 8.3–10.1)
CHLORIDE SERPL-SCNC: 102 MMOL/L (ref 100–108)
CO2 SERPL-SCNC: 22 MMOL/L (ref 21–32)
CREAT SERPL-MCNC: 0.67 MG/DL (ref 0.6–1.3)
EOSINOPHIL # BLD AUTO: 0.16 THOUSAND/ΜL (ref 0–0.61)
EOSINOPHIL NFR BLD AUTO: 1 % (ref 0–6)
ERYTHROCYTE [DISTWIDTH] IN BLOOD BY AUTOMATED COUNT: 12.6 % (ref 11.6–15.1)
GFR SERPL CREATININE-BSD FRML MDRD: >60 ML/MIN/1.73SQ M
GLUCOSE SERPL-MCNC: 87 MG/DL (ref 65–140)
HCT VFR BLD AUTO: 36 % (ref 36.5–49.3)
HGB BLD-MCNC: 12.7 G/DL (ref 12–17)
LIPASE SERPL-CCNC: 1160 U/L (ref 73–393)
LYMPHOCYTES # BLD AUTO: 1.31 THOUSANDS/ΜL (ref 0.6–4.47)
LYMPHOCYTES NFR BLD AUTO: 10 % (ref 14–44)
MCH RBC QN AUTO: 31.6 PG (ref 26.8–34.3)
MCHC RBC AUTO-ENTMCNC: 35.3 G/DL (ref 31.4–37.4)
MCV RBC AUTO: 90 FL (ref 82–98)
MONOCYTES # BLD AUTO: 1.25 THOUSAND/ΜL (ref 0.17–1.22)
MONOCYTES NFR BLD AUTO: 10 % (ref 4–12)
NEUTROPHILS # BLD AUTO: 10.11 THOUSANDS/ΜL (ref 1.85–7.62)
NEUTS SEG NFR BLD AUTO: 79 % (ref 43–75)
NRBC BLD AUTO-RTO: 0 /100 WBCS
PLATELET # BLD AUTO: 296 THOUSANDS/UL (ref 149–390)
PMV BLD AUTO: 9.9 FL (ref 8.9–12.7)
POTASSIUM SERPL-SCNC: 3.4 MMOL/L (ref 3.5–5.3)
PROT SERPL-MCNC: 6.8 G/DL (ref 6.4–8.2)
RBC # BLD AUTO: 4.02 MILLION/UL (ref 3.88–5.62)
SODIUM SERPL-SCNC: 135 MMOL/L (ref 136–145)
WBC # BLD AUTO: 12.89 THOUSAND/UL (ref 4.31–10.16)

## 2017-04-05 PROCEDURE — 85025 COMPLETE CBC W/AUTO DIFF WBC: CPT | Performed by: SURGERY

## 2017-04-05 PROCEDURE — 80053 COMPREHEN METABOLIC PANEL: CPT | Performed by: SURGERY

## 2017-04-05 PROCEDURE — 83690 ASSAY OF LIPASE: CPT | Performed by: SURGERY

## 2017-04-05 RX ORDER — DOCUSATE SODIUM 100 MG/1
100 CAPSULE, LIQUID FILLED ORAL 2 TIMES DAILY
Qty: 60 CAPSULE | Refills: 0 | Status: SHIPPED | OUTPATIENT
Start: 2017-04-05 | End: 2018-06-20 | Stop reason: ALTCHOICE

## 2017-04-05 RX ADMIN — PANTOPRAZOLE SODIUM 40 MG: 40 TABLET, DELAYED RELEASE ORAL at 08:51

## 2017-04-05 RX ADMIN — HEPARIN SODIUM 5000 UNITS: 5000 INJECTION, SOLUTION INTRAVENOUS; SUBCUTANEOUS at 05:53

## 2017-04-05 RX ADMIN — SODIUM CHLORIDE 125 ML/HR: 0.9 INJECTION, SOLUTION INTRAVENOUS at 02:07

## 2017-04-05 RX ADMIN — DOCUSATE SODIUM 100 MG: 100 CAPSULE, LIQUID FILLED ORAL at 08:51

## 2017-04-05 RX ADMIN — ONDANSETRON 4 MG: 2 INJECTION INTRAMUSCULAR; INTRAVENOUS at 08:51

## 2017-04-10 ENCOUNTER — APPOINTMENT (OUTPATIENT)
Dept: NUTRITION | Facility: HOSPITAL | Age: 67
End: 2017-04-10
Payer: MEDICARE

## 2017-04-10 DIAGNOSIS — K90.0 CELIAC DISEASE: ICD-10-CM

## 2017-04-11 ENCOUNTER — TRANSCRIBE ORDERS (OUTPATIENT)
Dept: ADMINISTRATIVE | Facility: HOSPITAL | Age: 67
End: 2017-04-11

## 2017-04-11 ENCOUNTER — ALLSCRIPTS OFFICE VISIT (OUTPATIENT)
Dept: OTHER | Facility: OTHER | Age: 67
End: 2017-04-11

## 2017-04-11 ENCOUNTER — TRANSCRIBE ORDERS (OUTPATIENT)
Dept: LAB | Facility: CLINIC | Age: 67
End: 2017-04-11

## 2017-04-11 ENCOUNTER — APPOINTMENT (OUTPATIENT)
Dept: LAB | Facility: CLINIC | Age: 67
End: 2017-04-11
Payer: MEDICARE

## 2017-04-11 ENCOUNTER — HOSPITAL ENCOUNTER (OUTPATIENT)
Dept: RADIOLOGY | Facility: HOSPITAL | Age: 67
Discharge: HOME | End: 2017-04-11
Payer: MEDICARE

## 2017-04-11 DIAGNOSIS — Z98.890 STATUS POST LAPAROSCOPIC SURGERY: ICD-10-CM

## 2017-04-11 DIAGNOSIS — K85.90 ACUTE PANCREATITIS, UNSPECIFIED COMPLICATION STATUS, UNSPECIFIED PANCREATITIS TYPE: ICD-10-CM

## 2017-04-11 DIAGNOSIS — Z98.890 OTHER SPECIFIED POSTPROCEDURAL STATES: ICD-10-CM

## 2017-04-11 DIAGNOSIS — K85.90 ACUTE PANCREATITIS WITHOUT INFECTION OR NECROSIS: ICD-10-CM

## 2017-04-11 DIAGNOSIS — K80.20 CALCULUS OF GALLBLADDER WITHOUT CHOLECYSTITIS WITHOUT OBSTRUCTION: ICD-10-CM

## 2017-04-11 DIAGNOSIS — K91.89 BILIARY ANASTOMOTIC LEAK: Primary | ICD-10-CM

## 2017-04-11 DIAGNOSIS — K91.89 OTHER POSTPROCEDURAL COMPLICATIONS AND DISORDERS OF DIGESTIVE SYSTEM: ICD-10-CM

## 2017-04-11 LAB
ALBUMIN SERPL BCP-MCNC: 3.4 G/DL (ref 3.5–5)
ALP SERPL-CCNC: 210 U/L (ref 46–116)
ALT SERPL W P-5'-P-CCNC: 48 U/L (ref 12–78)
ANION GAP SERPL CALCULATED.3IONS-SCNC: 8 MMOL/L (ref 4–13)
AST SERPL W P-5'-P-CCNC: 31 U/L (ref 5–45)
BASOPHILS # BLD AUTO: 0.03 THOUSANDS/ΜL (ref 0–0.1)
BASOPHILS NFR BLD AUTO: 0 % (ref 0–1)
BILIRUB SERPL-MCNC: 0.6 MG/DL (ref 0.2–1)
BUN SERPL-MCNC: 22 MG/DL (ref 5–25)
CALCIUM SERPL-MCNC: 9.3 MG/DL (ref 8.3–10.1)
CHLORIDE SERPL-SCNC: 103 MMOL/L (ref 100–108)
CO2 SERPL-SCNC: 30 MMOL/L (ref 21–32)
CREAT SERPL-MCNC: 1.24 MG/DL (ref 0.6–1.3)
EOSINOPHIL # BLD AUTO: 0.14 THOUSAND/ΜL (ref 0–0.61)
EOSINOPHIL NFR BLD AUTO: 2 % (ref 0–6)
ERYTHROCYTE [DISTWIDTH] IN BLOOD BY AUTOMATED COUNT: 12.3 % (ref 11.6–15.1)
GFR SERPL CREATININE-BSD FRML MDRD: 58.1 ML/MIN/1.73SQ M
GLUCOSE SERPL-MCNC: 128 MG/DL (ref 65–140)
HCT VFR BLD AUTO: 40.8 % (ref 36.5–49.3)
HGB BLD-MCNC: 14.1 G/DL (ref 12–17)
LIPASE SERPL-CCNC: 143 U/L (ref 73–393)
LYMPHOCYTES # BLD AUTO: 2.01 THOUSANDS/ΜL (ref 0.6–4.47)
LYMPHOCYTES NFR BLD AUTO: 24 % (ref 14–44)
MCH RBC QN AUTO: 31.3 PG (ref 26.8–34.3)
MCHC RBC AUTO-ENTMCNC: 34.6 G/DL (ref 31.4–37.4)
MCV RBC AUTO: 91 FL (ref 82–98)
MONOCYTES # BLD AUTO: 0.57 THOUSAND/ΜL (ref 0.17–1.22)
MONOCYTES NFR BLD AUTO: 7 % (ref 4–12)
NEUTROPHILS # BLD AUTO: 5.63 THOUSANDS/ΜL (ref 1.85–7.62)
NEUTS SEG NFR BLD AUTO: 67 % (ref 43–75)
PLATELET # BLD AUTO: 465 THOUSANDS/UL (ref 149–390)
PMV BLD AUTO: 9.3 FL (ref 8.9–12.7)
POTASSIUM SERPL-SCNC: 5 MMOL/L (ref 3.5–5.3)
PROT SERPL-MCNC: 8 G/DL (ref 6.4–8.2)
RBC # BLD AUTO: 4.5 MILLION/UL (ref 3.88–5.62)
SODIUM SERPL-SCNC: 141 MMOL/L (ref 136–145)
WBC # BLD AUTO: 8.38 THOUSAND/UL (ref 4.31–10.16)

## 2017-04-11 PROCEDURE — 36415 COLL VENOUS BLD VENIPUNCTURE: CPT

## 2017-04-11 PROCEDURE — 85025 COMPLETE CBC W/AUTO DIFF WBC: CPT

## 2017-04-11 PROCEDURE — 80053 COMPREHEN METABOLIC PANEL: CPT

## 2017-04-11 PROCEDURE — 74000 HB X-RAY EXAM OF ABDOMEN (SINGLE ANTEROPOSTERIOR VIEW): CPT

## 2017-04-11 PROCEDURE — 83690 ASSAY OF LIPASE: CPT

## 2017-04-12 ENCOUNTER — GENERIC CONVERSION - ENCOUNTER (OUTPATIENT)
Dept: OTHER | Facility: OTHER | Age: 67
End: 2017-04-12

## 2017-04-13 ENCOUNTER — ALLSCRIPTS OFFICE VISIT (OUTPATIENT)
Dept: OTHER | Facility: OTHER | Age: 67
End: 2017-04-13

## 2017-04-24 ENCOUNTER — GENERIC CONVERSION - ENCOUNTER (OUTPATIENT)
Dept: OTHER | Facility: OTHER | Age: 67
End: 2017-04-24

## 2017-06-01 DIAGNOSIS — E78.5 HYPERLIPIDEMIA: ICD-10-CM

## 2017-06-01 DIAGNOSIS — E55.9 VITAMIN D DEFICIENCY: ICD-10-CM

## 2017-06-05 ENCOUNTER — HOSPITAL ENCOUNTER (OUTPATIENT)
Facility: HOSPITAL | Age: 67
Setting detail: OUTPATIENT SURGERY
Discharge: HOME | End: 2017-06-05
Attending: INTERNAL MEDICINE | Admitting: INTERNAL MEDICINE
Payer: MEDICARE

## 2017-06-05 ENCOUNTER — ANESTHESIA (OUTPATIENT)
Dept: GASTROENTEROLOGY | Facility: HOSPITAL | Age: 67
End: 2017-06-05
Payer: MEDICARE

## 2017-06-05 ENCOUNTER — GENERIC CONVERSION - ENCOUNTER (OUTPATIENT)
Dept: OTHER | Facility: OTHER | Age: 67
End: 2017-06-05

## 2017-06-05 ENCOUNTER — ANESTHESIA EVENT (OUTPATIENT)
Dept: GASTROENTEROLOGY | Facility: HOSPITAL | Age: 67
End: 2017-06-05
Payer: MEDICARE

## 2017-06-05 VITALS
OXYGEN SATURATION: 100 % | WEIGHT: 204 LBS | SYSTOLIC BLOOD PRESSURE: 158 MMHG | HEIGHT: 68 IN | TEMPERATURE: 97.1 F | RESPIRATION RATE: 18 BRPM | DIASTOLIC BLOOD PRESSURE: 69 MMHG | HEART RATE: 51 BPM | BODY MASS INDEX: 30.92 KG/M2

## 2017-06-05 DIAGNOSIS — K83.8 BILE LEAK, POSTOPERATIVE: ICD-10-CM

## 2017-06-05 DIAGNOSIS — K85.90 ACUTE PANCREATITIS WITHOUT INFECTION OR NECROSIS: ICD-10-CM

## 2017-06-05 DIAGNOSIS — R10.13 EPIGASTRIC PAIN: ICD-10-CM

## 2017-06-05 DIAGNOSIS — Z98.890 OTHER SPECIFIED POSTPROCEDURAL STATES: ICD-10-CM

## 2017-06-05 DIAGNOSIS — K91.89 BILE LEAK, POSTOPERATIVE: ICD-10-CM

## 2017-06-05 PROCEDURE — 88305 TISSUE EXAM BY PATHOLOGIST: CPT | Performed by: INTERNAL MEDICINE

## 2017-06-05 RX ORDER — PROPOFOL 10 MG/ML
INJECTION, EMULSION INTRAVENOUS AS NEEDED
Status: DISCONTINUED | OUTPATIENT
Start: 2017-06-05 | End: 2017-06-05 | Stop reason: SURG

## 2017-06-05 RX ORDER — SODIUM CHLORIDE 9 MG/ML
INJECTION, SOLUTION INTRAVENOUS CONTINUOUS PRN
Status: DISCONTINUED | OUTPATIENT
Start: 2017-06-05 | End: 2017-06-05 | Stop reason: SURG

## 2017-06-05 RX ORDER — SODIUM CHLORIDE 9 MG/ML
125 INJECTION, SOLUTION INTRAVENOUS CONTINUOUS
Status: CANCELLED | OUTPATIENT
Start: 2017-06-05

## 2017-06-05 RX ADMIN — PROPOFOL 30 MG: 10 INJECTION, EMULSION INTRAVENOUS at 14:02

## 2017-06-05 RX ADMIN — SODIUM CHLORIDE: 0.9 INJECTION, SOLUTION INTRAVENOUS at 13:35

## 2017-06-05 RX ADMIN — PROPOFOL 50 MG: 10 INJECTION, EMULSION INTRAVENOUS at 14:00

## 2017-06-05 RX ADMIN — PROPOFOL 50 MG: 10 INJECTION, EMULSION INTRAVENOUS at 13:55

## 2017-06-05 RX ADMIN — PROPOFOL 50 MG: 10 INJECTION, EMULSION INTRAVENOUS at 13:52

## 2017-06-09 ENCOUNTER — GENERIC CONVERSION - ENCOUNTER (OUTPATIENT)
Dept: OTHER | Facility: OTHER | Age: 67
End: 2017-06-09

## 2017-06-19 ENCOUNTER — APPOINTMENT (OUTPATIENT)
Dept: LAB | Facility: CLINIC | Age: 67
End: 2017-06-19
Payer: MEDICARE

## 2017-06-19 ENCOUNTER — ALLSCRIPTS OFFICE VISIT (OUTPATIENT)
Dept: OTHER | Facility: OTHER | Age: 67
End: 2017-06-19

## 2017-06-19 DIAGNOSIS — E55.9 VITAMIN D DEFICIENCY: ICD-10-CM

## 2017-06-19 DIAGNOSIS — E78.5 HYPERLIPIDEMIA: ICD-10-CM

## 2017-06-19 LAB
25(OH)D3 SERPL-MCNC: 20.1 NG/ML (ref 30–100)
ALBUMIN SERPL BCP-MCNC: 3.7 G/DL (ref 3.5–5)
ALP SERPL-CCNC: 101 U/L (ref 46–116)
ALT SERPL W P-5'-P-CCNC: 36 U/L (ref 12–78)
ANION GAP SERPL CALCULATED.3IONS-SCNC: 9 MMOL/L (ref 4–13)
AST SERPL W P-5'-P-CCNC: 20 U/L (ref 5–45)
BILIRUB SERPL-MCNC: 0.6 MG/DL (ref 0.2–1)
BUN SERPL-MCNC: 14 MG/DL (ref 5–25)
CALCIUM SERPL-MCNC: 9.2 MG/DL (ref 8.3–10.1)
CHLORIDE SERPL-SCNC: 103 MMOL/L (ref 100–108)
CHOLEST SERPL-MCNC: 148 MG/DL (ref 50–200)
CO2 SERPL-SCNC: 28 MMOL/L (ref 21–32)
CREAT SERPL-MCNC: 0.91 MG/DL (ref 0.6–1.3)
GFR SERPL CREATININE-BSD FRML MDRD: >60 ML/MIN/1.73SQ M
GLUCOSE P FAST SERPL-MCNC: 170 MG/DL (ref 65–99)
HDLC SERPL-MCNC: 67 MG/DL (ref 40–60)
LDLC SERPL CALC-MCNC: 61 MG/DL (ref 0–100)
POTASSIUM SERPL-SCNC: 4.5 MMOL/L (ref 3.5–5.3)
PROT SERPL-MCNC: 7.6 G/DL (ref 6.4–8.2)
SODIUM SERPL-SCNC: 140 MMOL/L (ref 136–145)
TRIGL SERPL-MCNC: 100 MG/DL
TSH SERPL DL<=0.05 MIU/L-ACNC: 2.48 UIU/ML (ref 0.36–3.74)

## 2017-06-19 PROCEDURE — 36415 COLL VENOUS BLD VENIPUNCTURE: CPT

## 2017-06-19 PROCEDURE — 80053 COMPREHEN METABOLIC PANEL: CPT

## 2017-06-19 PROCEDURE — 84443 ASSAY THYROID STIM HORMONE: CPT

## 2017-06-19 PROCEDURE — 80061 LIPID PANEL: CPT

## 2017-06-19 PROCEDURE — 82306 VITAMIN D 25 HYDROXY: CPT

## 2017-06-21 ENCOUNTER — GENERIC CONVERSION - ENCOUNTER (OUTPATIENT)
Dept: OTHER | Facility: OTHER | Age: 67
End: 2017-06-21

## 2018-01-10 NOTE — RESULT NOTES
Verified Results  * XR ABDOMEN 1 VIEW KUB [id number] 01:29PM Vincent Paiz Order Number: [id number]     Test Name Result Flag Reference   XR ABDOMEN 1 VIEW KUB (Report)     ABDOMEN     INDICATION: Evaluate for stent in CBD  COMPARISON: CT April 2, 2017  VIEWS: AP supine     IMAGES: 2     FINDINGS:     There is a biliary stent noted in place however it has a vertical line adjacent to the lumbar spine on the right  There are multiple surgical clips seen within the right upper quadrant  No abnormal calcifications are seen     Nonobstructive bowel gas pattern  Osseous structures appear intact  IMPRESSION:     The patient's biliary stent is seen within the right upper quadrant however its exact location is unclear  Its position is vertical in orientation  If clinically warranted further evaluation with CT scanning could be performed         Workstation performed: [id number]     Signed by:   Eldon Mtz MD   4/12/17

## 2018-01-10 NOTE — CONSULTS
Assessment    1  Cholelithiasis (224 20) (K34 20)    Discussion/Summary  Discussion Summary:   77 yo M with large 2 5 cm gallstone, epigastric pain  Plan for outpatient laparoscopic cholecystectomy  Discussed increased risk of malignancy with large gallstones  Discussed risks and benefits of surgery, including potential that symptoms may not be improved  Patient is undergoing further GI workup at this time, including tests for celiac disease  Chief Complaint  Chief Complaint Free Text Note Form: epigastric pain      History of Present Illness  HPI: 77 yo M with epigastric pain presents for evaluation of gallstones found on CT  He recently underwent EGD and colonoscopy on 3/1/17 with GI, which showed mild duodenitis  He reports postprandial epigastric pain that is moderately improved with protonix  Is not worse with fatty foods  Denies past abdominal surgeries  Hospital Based Practices Required Assessment:   Pain Assessment   the patient states they do not have pain  (on a scale of 0 to 10, the patient rates the pain at 0 )    Prefered Language is  english  Primary Language is  english  Review of Systems  Complete-Male:   Constitutional: No fever or chills, feels well, no tiredness, no recent weight gain or weight loss, no fever and no chills  Eyes: No complaints of eye pain, no red eyes, no discharge from eyes, no itchy eyes  ENT: no complaints of earache, no hearing loss, no nosebleeds, no nasal discharge, no sore throat, no hoarseness  Cardiovascular: No complaints of slow heart rate, no fast heart rate, no chest pain, no palpitations, no leg claudication, no lower extremity  Respiratory: No complaints of shortness of breath, no wheezing, no cough, no SOB on exertion, no orthopnea or PND     Gastrointestinal: No complaints of abdominal pain, no constipation, no nausea or vomiting, no diarrhea or bloody stools    The patient presents with complaints of mild epigastric abdominal pain, described as sharp  Genitourinary: No complaints of dysuria, no incontinence, no hesitancy, no nocturia, no genital lesion, no testicular pain  Musculoskeletal: No complaints of arthralgia, no myalgias, no joint swelling or stiffness, no limb pain or swelling  Integumentary: No complaints of skin rash or skin lesions, no itching, no skin wound, no dry skin  Neurological: No compliants of headache, no confusion, no convulsions, no numbness or tingling, no dizziness or fainting, no limb weakness, no difficulty walking  Psychiatric: Is not suicidal, no sleep disturbances, no anxiety or depression, no change in personality, no emotional problems  Endocrine: No complaints of proptosis, no hot flashes, no muscle weakness, no erectile dysfunction, no deepening of the voice, no feelings of weakness  ROS Reviewed:   ROS reviewed  Active Problems    1  Abdominal adhesions (568 0) (K66 0)   2  Acute Tear Of Right Rotator Cuff Tendon (726 19)   3  Aftercare following surgery of the musculoskeletal system (V58 78) (Z47 89)   4  Benign prostatic hypertrophy (600 00) (N40 0)   5  Elevated blood pressure reading (796 2) (R03 0)   6  Elevated prostate specific antigen (PSA) (790 93) (R97 20)   7  Gallstones (574 20) (K80 20)   8  History of colon polyps (V12 72) (Z86 010)   9  Hyperlipidemia (272 4) (E78 5)   10  Impacted cerumen of left ear (380 4) (H61 22)   11  Intermittent epigastric abdominal pain (789 06) (R10 13)   12  Liver cyst (573 8) (K76 89)   13  Mitral valve disorder (424 0) (I05 9)   14  Need for pneumococcal vaccination (V03 82) (Z23)   15  Need for prophylactic vaccination and inoculation against influenza (V04 81) (Z23)   16  Obesity (278 00) (E66 9)   17  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)   18  Pulmonary nodule seen on imaging study (793 11) (R91 1)   19  Screening for colon cancer (V76 51) (Z12 11)   20  Skin lesion (709 9) (L98 9)   21   Vitamin D deficiency (268 9) (E55 9)    Past Medical History    1  History of Cough, persistent (786 2) (R05)   2  History of Muscle ache (729 1) (M79 1)   3  Need for prophylactic vaccination and inoculation against influenza (V04 81) (Z23)   4  History of Sarcoidosis (135) (D86 9)  Active Problems And Past Medical History Reviewed: The active problems and past medical history were reviewed and updated today  Surgical History    1  History of Shoulder Surgery  Surgical History Reviewed: The surgical history was reviewed and updated today  Family History  Mother    1  Family history of Diabetes Mellitus (V18 0)   2  Family history of Hypertension (V17 49)   3  Family history of Ovarian Cancer (V16 41)  Father    4  Family history of Diabetes Mellitus (V18 0)   5  Family history of Heart Disease (V17 49)   6  Family history of Hypertension (V15 29)  Sister    9  Family history of Diabetes Mellitus (V18 0)   8  Family history of H  pylori infection   9  Family history of Hypertension (V17 49)  Brother    8  Family history of H  pylori infection    Social History    · Being A Social Drinker   · Daily Coffee Consumption (2  Cups/Day)   · Denied: History of Drug Use   · Marital History - Currently    · Never A Smoker   · Occupation: Retired    Current Meds   1  Atorvastatin Calcium 20 MG Oral Tablet; Take 1 tablet daily as directed; Therapy: 74XWN0729 to (Evaluate:41Drw1151)  Requested for: 80LDQ5720; Last   Rx:30Jan2017 Ordered   2  Finasteride 5 MG Oral Tablet; TAKE ONE TABLET EVERY DAY; Therapy: 50OFG2338 to (Evaluate:70Tku2466) Recorded   3  Pantoprazole Sodium 40 MG Oral Tablet Delayed Release; take 1 tablet every twelve   hours; Therapy: 55YYX9598 to (Lynn Driscoll)  Requested for: 24EUN8370; Last   Rx:30Fup9582 Ordered   4  Pantoprazole Sodium 40 MG Oral Tablet Delayed Release; TAKE 1 TABLET TWICE DAILY   30 MINUTES BEFORE BREAKFAST AND DINNER;    Therapy: 14AAH0660 to (Evaluate:60Zgn6750)  Requested for: 56LAP8205; Last   Rx:53Ubk8910 Ordered   5  Vitamin D3 1000 UNIT Oral Capsule; TAKE 2 CAPSULE Daily; Therapy: 50SHI9776 to (Evaluate:78Dzi8804) Recorded    Allergies    1  No Known Drug Allergies    Vitals  Vital Signs    Recorded: 77OGH6822 09:27AM   Temperature 97 5 F   Heart Rate 72   Systolic 760   Diastolic 80   Height 5 ft 7 in   Weight 227 lb 1 15 oz   BMI Calculated 35 56   BSA Calculated 2 13     Physical Exam    Constitutional   General appearance: No acute distress, well appearing and well nourished  Eyes   Conjunctiva and lids: No swelling, erythema, or discharge  Ears, Nose, Mouth, and Throat   External inspection of ears and nose: Normal     Neck   Supple, symmetric, trachea midline, no masses   Pulmonary   Respiratory effort: No increased work of breathing or signs of respiratory distress  Cardiovascular   Auscultation of heart: Normal rate and rhythm, normal S1 and S2, without murmurs  Abdomen   Abdomen: Non-tender, no masses  No epigastric or RUQ tenderness  Lymphatic   Palpation of lymph nodes in neck: No lymphadenopathy  Musculoskeletal   Gait and station: Normal     Skin   Skin and subcutaneous tissue: Normal without rashes or lesions  Neurologic   Cranial nerves: Cranial nerves 2-12 intact  Results/Data  Diagnostic Studies Reviewed: I personally reviewed the films/images/results in the office today  My interpretation follows  CT Scan Review 2 5 cm calcified gallstone in gallbladder fundus without cholecystitis, sigmoid diverticulosis  Attending Note  Attending Note: Attending Note: I interviewed and examined the patient, the staff discussed the patient on the day of the visit, I discussed the case with the Resident and reviewed the Resident's note, I supervised the Resident, I supervised the procedure performed by the Resident and I agree with the Resident management plan as it was presented to me   Level of Participation: I was present in clinic and examined the patient  Comments/Additional Findings: symptomatic cholelithiasis -- needs lap choly -- rbas explained and patient agrees to proceed  I agree with the Resident's note  Future Appointments    Date/Time Provider Specialty Site   06/19/2017 08:30 AM Tracey Gale MD Internal Medicine [facility] INTERNAL MED   04/05/2017 09:00 AM JENIFFER Salcido   Gastroenterology Adult [facility] 48     Signatures  Electronically signed by : Ramona Pope MD; Mar 13 2017 10:44AM EST                       (Author)

## 2018-01-11 NOTE — RESULT NOTES
Message   *Please call pt's cell phone* -> blood test results are back  overall results look fine -> blood count, urine test, liver/gallbladder & kidney test results look fine  Non-fasting blood sugar is bit elevated at 159  Recommend to reduce sugar/sugary foods in diet, but otherwise continue with current treatment plan  We can discuss blood sugar results further at follow up appt     Verified Results  (1) CBC/PLT/DIFF 88ZYQ5598 10:36AM Yudy Has   TW Order Number: [id number]     Test Name Result Flag Reference   WBC COUNT 6 52 Thousand/uL  4 31-10 16   RBC COUNT 4 70 Million/uL  3 88-5 62   HEMOGLOBIN 14 9 g/dL  12 0-17 0   HEMATOCRIT 42 3 %  36 5-49 3   MCV 90 fL  82-98   MCH 31 7 pg  26 8-34 3   MCHC 35 2 g/dL  31 4-37 4   RDW 12 8 %  11 6-15 1   MPV 9 7 fL  8 9-12 7   PLATELET COUNT 719 Thousands/uL  149-390   NEUTROPHILS RELATIVE PERCENT 61 %  43-75   LYMPHOCYTES RELATIVE PERCENT 28 %  14-44   MONOCYTES RELATIVE PERCENT 9 %  4-12   EOSINOPHILS RELATIVE PERCENT 2 %  0-6   BASOPHILS RELATIVE PERCENT 0 %  0-1   NEUTROPHILS ABSOLUTE COUNT 4 00 Thousands/?L  1 85-7 62   LYMPHOCYTES ABSOLUTE COUNT 1 79 Thousands/?L  0 60-4 47   MONOCYTES ABSOLUTE COUNT 0 61 Thousand/?L  0 17-1 22   EOSINOPHILS ABSOLUTE COUNT 0 10 Thousand/?L  0 00-0 61   BASOPHILS ABSOLUTE COUNT 0 02 Thousands/?L  0 00-0 10   - Patient Instructions: This bloodwork is non-fasting  Please drink two glasses of water morning of bloodwork  - Patient Instructions: This bloodwork is non-fasting  Please drink two glasses of water morning of bloodwork       (1) URINALYSIS w URINE C/S REFLEX (will reflex a microscopy if leukocytes, occult blood, or nitrites are not within normal limits) 01XOU9514 10:36AM Yudy Has   TW Order Number: [id number]     Test Name Result Flag Reference   COLOR Yellow     CLARITY Clear     PH UA 5 5  4 5-8 0   LEUKOCYTE ESTERASE UA Negative  Negative   NITRITE UA Negative  Negative   PROTEIN UA Negative mg/dl  Negative   GLUCOSE UA Negative mg/dl  Negative   KETONES UA Negative mg/dl  Negative   UROBILINOGEN UA 0 2 E U /dl  0 2, 1 0 E U /dl   BILIRUBIN UA Negative  Negative   BLOOD UA Negative  Negative   SPECIFIC GRAVITY UA >=1 030  1 003-1 030     (1) COMPREHENSIVE METABOLIC PANEL 60ALD1120 30:66AJ Pavan [facility] Order Number: [id number]     Test Name Result Flag Reference   GLUCOSE,RANDM 159 mg/dL H    If the patient is fasting, the ADA then defines impaired fasting glucose as > 100 mg/dL and diabetes as > or equal to 123 mg/dL  SODIUM 140 mmol/L  136-145   POTASSIUM 3 8 mmol/L  3 5-5 3   CHLORIDE 104 mmol/L  100-108   CARBON DIOXIDE 26 mmol/L  21-32   ANION GAP (CALC) 10 mmol/L  4-13   BLOOD UREA NITROGEN 25 mg/dL  5-25   CREATININE 0 93 mg/dL  0 60-1 30   Standardized to IDMS reference method   CALCIUM 8 8 mg/dL  8 3-10 1   BILI, TOTAL 0 80 mg/dL  0 20-1 00   ALK PHOSPHATAS 87 U/L     ALT (SGPT) 35 U/L  12-78   AST(SGOT) 23 U/L  5-45   ALBUMIN 3 7 g/dL  3 5-5 0   TOTAL PROTEIN 7 5 g/dL  6 4-8 2   eGFR Non-African American      >60 0 ml/min/1 73sq [facility] Disease Education Program recommendations are as follows:  GFR calculation is accurate only with a steady state creatinine  Chronic Kidney disease less than 60 ml/min/1 73 sq  meters  Kidney failure less than 15 ml/min/1 73 sq  meters

## 2018-01-11 NOTE — RESULT NOTES
Verified Results  (1) COMPREHENSIVE METABOLIC PANEL 02ZGE4358 49:72EB Luisa Rhodes    Order Number: [id number]     Test Name Result Flag Reference   SODIUM 140 mmol/L  136-145   POTASSIUM 4 5 mmol/L  3 5-5 3   CHLORIDE 103 mmol/L  100-108   CARBON DIOXIDE 28 mmol/L  21-32   ANION GAP (CALC) 9 mmol/L  4-13   BLOOD UREA NITROGEN 14 mg/dL  5-25   CREATININE 0 91 mg/dL  0 60-1 30   Standardized to IDMS reference method   CALCIUM 9 2 mg/dL  8 3-10 1   BILI, TOTAL 0 60 mg/dL  0 20-1 00   ALK PHOSPHATAS 101 U/L     ALT (SGPT) 36 U/L  12-78   AST(SGOT) 20 U/L  5-45   ALBUMIN 3 7 g/dL  3 5-5 0   TOTAL PROTEIN 7 6 g/dL  6 4-8 2   eGFR Non-African American      >60 0 ml/min/1 73sq [facility] Disease Education Program recommendations are as follows:  GFR calculation is accurate only with a steady state creatinine  Chronic Kidney disease less than 60 ml/min/1 73 sq  meters  Kidney failure less than 15 ml/min/1 73 sq  meters  GLUCOSE FASTING 170 mg/dL H 65-99     (1) LIPID PANEL, FASTING 19Jun2017 08:54AM Luisa Rhodes    Order Number: [id number]     Test Name Result Flag Reference   CHOLESTEROL 148 mg/dL     HDL,DIRECT 67 mg/dL H 40-60   Specimen collection should occur prior to Metamizole administration due to the potential for falsely depressed results  LDL CHOLESTEROL CALCULATED 61 mg/dL  0-100   - Patient Instructions:  This is a fasting blood test  Water,black tea or black  coffee only after 9:00pm the night before test   Drink 2 glasses of water the morning of test       Triglyceride:         Normal              <150 mg/dl       Borderline High    150-199 mg/dl       High               200-499 mg/dl       Very High          >499 mg/dl  Cholesterol:         Desirable        <200 mg/dl      Borderline High  200-239 mg/dl      High             >239 mg/dl  HDL Cholesterol:        High    >59 mg/dL      Low     <41 mg/dL  LDL CALCULATED:    This screening LDL is a calculated result  It does not have the accuracy of the Direct Measured LDL in the monitoring of patients with hyperlipidemia and/or statin therapy  Direct Measure LDL (MYM028) must be ordered separately in these patients  TRIGLYCERIDES 100 mg/dL  <=150   Specimen collection should occur prior to N-Acetylcysteine or Metamizole administration due to the potential for falsely depressed results  (1) TSH 44CUU7981 08:54AM [facility]   TW Order Number: [id number]     Test Name Result Flag Reference   TSH 2 482 uIU/mL  0 358-3 740   - Patient Instructions: This bloodwork is non-fasting  Please drink two glasses of water morning of bloodwork  Patients undergoing fluorescein dye angiography may retain small amounts of fluorescein in the body for 48-72 hours post procedure  Samples containing fluorescein can produce falsely depressed TSH values  If the patient had this procedure,a specimen should be resubmitted post fluorescein clearance  (1) VITAMIN D 25-HYDROXY 24TKE7369 08:54AM [facility]   TW Order Number: [id number]     Test Name Result Flag Reference   VIT D 25-HYDROX 20 1 ng/mL L 30 0-100 0   This assay is a certified procedure of the CDC Vitamin D Standardization Certification Program (VDSCP)     Deficiency <20ng/ml   Insufficiency 20-30ng/ml   Sufficient  ng/ml     *Patients undergoing fluorescein dye angiography may retain small amounts of fluorescein in the body for 48-72 hours post procedure  Samples containing fluorescein can produce falsely elevated Vitamin D values  If the patient had this procedure, a specimen should be resubmitted post fluorescein clearance

## 2018-01-11 NOTE — RESULT NOTES
Message   EGD is suggestive of [id number] in the duodenum- could be celiac- hwoever will need blood test to confirm this, will order this, there is no bacteria or barretts'  She needs follow up in office in 4-6 weeks after blood tests  Colon: 1 precancerous polyp- however the prep was only fair- therefore will need repeat colonoscopy in 3 years  Verified Results  (1) TISSUE EXAM 05KII7222 09:56AM Ginny Yovani     Test Name Result Flag Reference   LAB AP CASE REPORT (Report)     Surgical Pathology Report             Case: [id number]                   Authorizing Provider: Jacob Gutierrez MD    Collected:      03/01/2017 0956        Ordering Location:   [address] [place] [address]   Received:      03/01/2017 [facility]                               Pathologist:      Yoon Riley MD                              Specimens:  A) - Duodenum, Duodenum bx-cold                                    B) - Stomach, Gastric bx-cold                                     C) - Esophagogastric junction, GE junction bx-cold                           D) - Esophagus, Esophagus at 17cm-r/o inlet patch bx                          E) - Polyp, Colorectal, Ascending colon polyp-cold bx                         F) - Polyp, Colorectal, Descending colon polyp #1-cold bx                       G) - Polyp, Colorectal, Descending colon polyp #2-cold bx                       H) - Polyp, Colorectal, Sigmoid polyp-hot snare   LAB AP FINAL DIAGNOSIS (Report)     A  Duodenum, biopsy:    - Duodenal mucosa with marked villous atrophy, crypt hyperplasia,   expansion of lamina propria by      lymphoplasmacytic infiltrate, and intraepithelial lymphocytosis;   suggestive for celiac disease (Modified     Marsh Classification: Marsh Type 3b-3c)  - No granulomata, infectious organisms, dysplasia or neoplasia   identified     - Correlation with clinical and endoscopic findings recommended      B  Stomach, biopsy:    - Antral and oxyntoantral mucosa with mild chronic inactive gastritis  - No Helicobacter pylori organisms identified on immunostaining     - No intestinal metaplasia, dysplasia or neoplasia identified  C  Esophagogastric junction, biopsy:    - Cardiac gastric and squamous junctional mucosa with mild chronic   inflammation     - No squamous intraepithelial eosinophils noted  - No intestinal metaplasia, dysplasia or neoplasia identified  D  Esophagus at 17 cm, biopsy:    - Predominantly squamous mucosa with single microscopic focus of   partially crushed glandular epithelium     - No squamous intraepithelial eosinophils noted  - No dysplasia or neoplasia identified  Comment: The differential for the glandular epithelium noted would   include esophageal subepithelial glands or inlet patch, however, scant   cellularity and artifactual changes preclude definitive classification  Correlation with clinical and endoscopic findings suggested  E  Ascending colon, polyp:    - Serrated polyp, NOS, fragments     - No high grade dysplasia or carcinoma identified  F  Descending colon, polyp #1:    - Colonic mucosa with lymphoid aggregate consistent with redundant   mucosal fold  - No dysplasia or neoplasia identified  G  Descending colon, polyp #2:    - Colonic mucosa with lymphoid aggregate consistent with redundant   mucosal fold  - No dysplasia or neoplasia identified  H  Sigmoid colon, polyp:    - Hyperplastic polyp     - No dysplasia or carcinoma identified  Electronically signed by Razia Ferguson MD on 3/6/2017 at 2:58 PM   LAB AP SURGICAL ADDITIONAL INFORMATION (Report)     These tests were developed and their performance characteristics   determined by [facility] or [facility]  They may not be cleared or approved by the [place]  Food and   Drug Administration  The FDA has determined that such clearance or   approval is not necessary   These tests are used for clinical purposes  They should not be regarded as investigational or for research  This   laboratory has been approved by [place] 88, designated as a high-complexity   laboratory and is qualified to perform these tests  - Interpretation performed at [facility] [place] [facility]   LAB AP GROSS DESCRIPTION (Report)     A  The specimen is received in formalin, labeled with the patient's name   and hospital number, and is designated duodenal biopsy cold, are   multiple irregularly shaped fragments of tan soft tissue measuring 0 5 x   0 5 x 0 1 cm in aggregate  Entirely submitted  One cassette  B  The specimen is received in formalin, labeled with the patient's name   and hospital number, and is designated gastric biopsy-cold, are multiple   irregularly shaped fragments of tan soft tissue measuring 0 6 x 0 5 x 0 1   cm in aggregate  Entirely submitted  One cassette  C  The specimen is received in formalin, labeled with the patient's name   and hospital number, and is designated GE junction biopsy cold, are   three irregularly shaped fragments of tan soft tissue each measuring 0 2   cm in greatest dimension  Entirely submitted  One cassette  D  The specimen is received in formalin, labeled with the patient's name   and hospital number, and is designated esophagus at 17 cm-rule out inlet   patch biopsy, is a single irregularly shaped fragment of tan soft tissue   measuring 0 6 cm in greatest dimension  Entirely submitted  One cassette  E  The specimen is received in formalin, labeled with the patient's name   and hospital number, and is designated ascending colon polyp-cold   biopsy, are two irregularly shaped fragments of tan soft tissue measuring   0 3 and 0 2 cm in greatest dimension  Entirely submitted  One cassette  F   The specimen is received in formalin, labeled with the patient's name   and hospital number, and is designated ascending colon polyp #1-cold biopsy, are three irregularly shaped fragments of tan soft tissue   measuring 0 5 x 0 3 x 0 1 cm in aggregate  Entirely submitted  One   cassette  G  The specimen is received in formalin, labeled with the patient's name   and hospital number, and is designated descending colon polyp #2-cold   biopsy, are multiple irregularly shaped fragments of tan soft tissue   measuring 0 5 x 0 3 x 0 1 cm in aggregate  Entirely submitted  One   cassette  H  The specimen is received in formalin, labeled with the patient's name   and hospital number, and is designated sigmoid polyp-hot snare, is a   tan-red polypoid structure measuring 0 3 cm in greatest dimension  Entirely submitted  One cassette  Note: The estimated total formalin fixation time based upon information   provided by the submitting clinician and the standard processing schedule   is 18 25 hours  RLR   LAB AP CLINICAL INFORMATION R/O Celiac disease     R/O Celiac disease       Plan  Intermittent epigastric abdominal pain    · (1) CELIAC DISEASE AB PROFILE; Status:Active;  Requested for:10Mar2017;

## 2018-01-11 NOTE — RESULT NOTES
Verified Results  (1) LIPASE 11Apr2017 01:46PM Arash Alberts Order Number: [id number]     Test Name Result Flag Reference   LIPASE 143 u/L       (1) CBC/PLT/DIFF 11Apr2017 01:46PM Arash Alberts Order Number: [id number]     Test Name Result Flag Reference   WBC COUNT 8 38 Thousand/uL  4 31-10 16   RBC COUNT 4 50 Million/uL  3 88-5 62   HEMOGLOBIN 14 1 g/dL  12 0-17 0   HEMATOCRIT 40 8 %  36 5-49 3   MCV 91 fL  82-98   MCH 31 3 pg  26 8-34 3   MCHC 34 6 g/dL  31 4-37 4   RDW 12 3 %  11 6-15 1   MPV 9 3 fL  8 9-12 7   PLATELET COUNT 972 Thousands/uL H 149-390   NEUTROPHILS RELATIVE PERCENT 67 %  43-75   LYMPHOCYTES RELATIVE PERCENT 24 %  14-44   MONOCYTES RELATIVE PERCENT 7 %  4-12   EOSINOPHILS RELATIVE PERCENT 2 %  0-6   BASOPHILS RELATIVE PERCENT 0 %  0-1   NEUTROPHILS ABSOLUTE COUNT 5 63 Thousands/? ??L  1 85-7 62   LYMPHOCYTES ABSOLUTE COUNT 2 01 Thousands/? ??L  0 60-4 47   MONOCYTES ABSOLUTE COUNT 0 57 Thousand/? ??L  0 17-1 22   EOSINOPHILS ABSOLUTE COUNT 0 14 Thousand/? ??L  0 00-0 61   BASOPHILS ABSOLUTE COUNT 0 03 Thousands/? ??L  0 00-0 10   - Patient Instructions: This bloodwork is non-fasting  Please drink two glasses of water morning of bloodwork  - Patient Instructions: This bloodwork is non-fasting  Please drink two glasses of water morning of bloodwork  (1) COMPREHENSIVE METABOLIC PANEL 98KBP8334 89:19MR Arash Alberts Order Number: [id number]     Test Name Result Flag Reference   GLUCOSE,RANDM 128 mg/dL     If the patient is fasting, the ADA then defines impaired fasting glucose as > 100 mg/dL and diabetes as > or equal to 123 mg/dL     SODIUM 141 mmol/L  136-145   POTASSIUM 5 0 mmol/L  3 5-5 3   CHLORIDE 103 mmol/L  100-108   CARBON DIOXIDE 30 mmol/L  21-32   ANION GAP (CALC) 8 mmol/L  4-13   BLOOD UREA NITROGEN 22 mg/dL  5-25   CREATININE 1 24 mg/dL  0 60-1 30   Standardized to IDMS reference method   CALCIUM 9 3 mg/dL 8  3-10 1   BILI, TOTAL 0 60 mg/dL  0 20-1 00   ALK PHOSPHATAS 210 U/L H    ALT (SGPT) 48 U/L  12-78   AST(SGOT) 31 U/L  5-45   ALBUMIN 3 4 g/dL L 3 5-5 0   TOTAL PROTEIN 8 0 g/dL  6 4-8 2   eGFR Non-African American 58 1 ml/min/1 73sq m     [facility] Disease Education Program recommendations are as follows:  GFR calculation is accurate only with a steady state creatinine  Chronic Kidney disease less than 60 ml/min/1 73 sq  meters  Kidney failure less than 15 ml/min/1 73 sq  meters

## 2018-01-13 VITALS
HEIGHT: 67 IN | DIASTOLIC BLOOD PRESSURE: 68 MMHG | HEART RATE: 60 BPM | BODY MASS INDEX: 32.71 KG/M2 | TEMPERATURE: 97.2 F | OXYGEN SATURATION: 98 % | WEIGHT: 208.38 LBS | SYSTOLIC BLOOD PRESSURE: 122 MMHG | RESPIRATION RATE: 18 BRPM

## 2018-01-13 VITALS
HEART RATE: 72 BPM | HEIGHT: 67 IN | BODY MASS INDEX: 35.64 KG/M2 | WEIGHT: 227.07 LBS | SYSTOLIC BLOOD PRESSURE: 130 MMHG | TEMPERATURE: 97.5 F | DIASTOLIC BLOOD PRESSURE: 80 MMHG

## 2018-01-13 VITALS
HEIGHT: 67 IN | DIASTOLIC BLOOD PRESSURE: 76 MMHG | RESPIRATION RATE: 18 BRPM | SYSTOLIC BLOOD PRESSURE: 128 MMHG | OXYGEN SATURATION: 97 % | WEIGHT: 210.25 LBS | BODY MASS INDEX: 33 KG/M2 | HEART RATE: 82 BPM

## 2018-01-13 VITALS
DIASTOLIC BLOOD PRESSURE: 60 MMHG | HEIGHT: 67 IN | BODY MASS INDEX: 33.14 KG/M2 | WEIGHT: 211.13 LBS | SYSTOLIC BLOOD PRESSURE: 104 MMHG

## 2018-01-13 VITALS
SYSTOLIC BLOOD PRESSURE: 136 MMHG | HEIGHT: 67 IN | OXYGEN SATURATION: 98 % | TEMPERATURE: 97.2 F | HEART RATE: 72 BPM | RESPIRATION RATE: 20 BRPM | DIASTOLIC BLOOD PRESSURE: 80 MMHG | WEIGHT: 227.8 LBS | BODY MASS INDEX: 35.75 KG/M2

## 2018-01-13 NOTE — MISCELLANEOUS
Assessment    1  Abnormal celiac antibody panel (795 79) (R89 4)   2  Cholelithiasis (574 20) (K80 20)   3  Bile leak, postoperative (997 49,576 8) (K91 89,K83 8)   4  Pancreatitis, acute (577 0) (K85 90)    Plan  Gallstones    · (1) CBC/PLT/DIFF; Status:Active; Requested for:21Yxc1733;    Perform:[facility]; Due:59Kiy3776; Ordered; For:Gallstones; Ordered By:Sandy Ridley;   · (1) COMPREHENSIVE METABOLIC PANEL; Status:Active; Requested for:57Qka3108;    Perform:[facility]; Due:79Cmg2292; Ordered; For:Gallstones; Ordered By:Sandy Ridley; Hyperlipidemia    · (1) COMPREHENSIVE METABOLIC PANEL; Status:Active; Requested MKS:12QZK4767;    Perform:[facility]; KIW:16IGX5885; Ordered;  Natha Hamper; Ordered By:Sandy Ridley;   · (1) LIPID PANEL, FASTING; Status:Active; Requested MARYCHUY:49AUY6515;    Perform:[facility]; RGX:53QZA2594; Ordered;  Natha Hamper; Ordered By:Sandy Ridley;   · (1) TSH; Status:Active; Requested SSO:70IYV6327;    Perform:[facility]; ADK:93JVI3710; Ordered;  Natha Hamper; Ordered By:Sandy Ridley; Intermittent epigastric abdominal pain    · Pantoprazole Sodium 40 MG Oral Tablet Delayed Release   Rx By: Jacklyn Sher; Dispense: 30 Days ; #:60 Tablet Delayed Release; Refill: 1; For: Intermittent epigastric abdominal pain; BERNARDA = N; Verified Transmission to [address] [place] [address] [address]; Last Updated By: Amos Michael; 12/19/2016 3:54:05 PM   · From  Pantoprazole Sodium 40 MG Oral Tablet Delayed Release take 1 tablet  every twelve hours To Pantoprazole Sodium 40 MG Oral Tablet Delayed Release  (Protonix) TAKE 1 TABLET Daily   Rx By: Amos Michael; Dispense: 30 Days ; #:30 Tablet Delayed Release; Refill: 2; For: Intermittent epigastric abdominal pain; BERNARDA = N; Record  Pancreatitis, acute    · (1) LIPASE; Status:Active;  Requested for:04Ybp8566;    Perform:[facility]; Due: 72UJW9482; Ordered; For:Pancreatitis, acute; Ordered By:Albert Ridley;  Vitamin D deficiency    · (1) VITAMIN D 25-HYDROXY; Status:Active; Requested BZY:93KEN4420;    Perform:[facility]; [facility]:25DIS4042; Ordered; For:Vitamin D deficiency; Ordered By:Albert Ridley;    Discussion/Summary  Discussion Summary:     1  s/p cholecystectomy complicated by bile leak and pancreatitis s/p stent placement  Currently asymptomatic  Repeat x-ray scheduled today, f/u with surgery later this month, stent removal in June  2  Epigastric discomfort, Celiac disease  He will change his diet  Maintain on daily PPI  3  Elevated liver enzymes and WBC, hypokalemia  Repeat labs  Counseling Documentation With [facility]: The patient was counseled regarding diagnostic results, instructions for management, impressions  History of Present Illness  TCM Communication [facility]: The patient is being contacted for follow-up after hospitalization and 4/11/17 @1pm  He was hospitalized at [place]  The date of admission: 3/31/17, date of discharge: 4/5/17  Diagnosis: post op complications  He was discharged to home  Medications reviewed and updated today  He scheduled a follow up appointment  Follow-up appointments with other specialists: General Sx 4/28/17  The patient is currently asymptomatic  Counseling was provided to the patient  Communication performed and completed by FELICITAS 4/6/17   HPI: Mr Tanner Miller is feeling better  Denies any abdominal pain or discomfort, no nausea or vomiting, moving his bowels regularly  His stent is scheduled to be removed in June  He has an x-ray scheduled, to make sure the "small stent popped off "    He feels his epigastric discomfort has not improved, still there occasionally  He is taking Protonix every morning  He went to a nutritionist yesterday, will change is diet  He complaints that all his fingertips have been sore since his surgery   He feels this is getting better, no pain, redness or tenderness  Cholelithiasis, Symptomatic (Brief): The patient is being seen for follow-up of a hospitalization for symptomatic cholelithiasis  Symptoms:  no dyspepsia, no nausea and no vomiting  No associated symptoms are reported  The patient is not currently being treated for this problem  Pancreatitis, Acute (Brief): The patient is being seen for follow-up of a hospitalization for acute pancreatitis  The patient is currently asymptomatic  No associated symptoms are reported  The patient is not currently being treated for this problem  Review of Systems  Complete-Male:   Constitutional: no fever, not feeling poorly and not feeling tired  Cardiovascular: no chest pain, no palpitations and no extremity edema  Respiratory: no shortness of breath and no cough  Gastrointestinal: as noted in HPI, no nausea, no vomiting, no constipation and no diarrhea  Genitourinary: no dysuria and no incontinence  Musculoskeletal: no arthralgias  Integumentary: no skin wound  Neurological: no headache  Endocrine: no muscle weakness  Hematologic/Lymphatic: no tendency for easy bruising  Active Problems    1  Abdominal adhesions (568 0) (K66 0)   2  Abnormal celiac antibody panel (795 79) (R89 4)   3  Acute Tear Of Right Rotator Cuff Tendon (726 19)   4  Adult celiac disease (579 0) (K90 0)   5  Aftercare following surgery of the musculoskeletal system (V58 78) (Z47 89)   6  Benign prostatic hypertrophy (600 00) (N40 0)   7  Bile leak, postoperative (997 49,576 8) (K91 89,K83 8)   8  Cholelithiasis (574 20) (K80 20)   9  Elevated blood pressure reading (796 2) (R03 0)   10  Elevated prostate specific antigen (PSA) (790 93) (R97 20)   11  Gallstones (574 20) (K80 20)   12  History of colon polyps (V12 72) (Z86 010)   13  Hyperlipidemia (272 4) (E78 5)   14  Impacted cerumen of left ear (380 4) (H61 22)   15  Intermittent epigastric abdominal pain (789 06) (R10 13)   16   Liver cyst (573 8) (K76 89)   17  Mitral valve disorder (424 0) (I05 9)   18  Need for pneumococcal vaccination (V03 82) (Z23)   19  Need for prophylactic vaccination and inoculation against influenza (V04 81) (Z23)   20  Obesity (278 00) (E66 9)   21  Pancreatitis, acute (577 0) (K85 90)   22  Preop testing (V72 84) (Z01 818)   23  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)   24  Pulmonary nodule seen on imaging study (793 11) (R91 1)   25  S/P ERCP (V45 89) (Z98 890)   26  Screening for colon cancer (V76 51) (Z12 11)   27  Skin lesion (709 9) (L98 9)   28  Vitamin D deficiency (268 9) (E55 9)    Past Medical History    1  History of Cough, persistent (786 2) (R05)   2  History of Muscle ache (729 1) (M79 1)   3  Need for prophylactic vaccination and inoculation against influenza (V04 81) (Z23)   4  History of Sarcoidosis (135) (D86 9)    Surgical History    1  History of Cholecystectomy Laparoscopic   2  History of Shoulder Surgery    Family History  Mother    1  Family history of Diabetes Mellitus (V18 0)   2  Family history of Hypertension (V17 49)   3  Family history of Ovarian Cancer (V16 41)  Father    4  Family history of Diabetes Mellitus (V18 0)   5  Family history of Heart Disease (V17 49)   6  Family history of Hypertension (V15 29)  Sister    9  Family history of Diabetes Mellitus (V18 0)   8  Family history of H  pylori infection   9  Family history of Hypertension (V17 49)  Brother    8  Family history of H  pylori infection    Social History    · Being A Social Drinker   · Daily Coffee Consumption (2  Cups/Day)   · Denied: History of Drug Use   · Marital History - Currently    · Never A Smoker   · Occupation: Retired  Social History Reviewed: The social history was reviewed and is unchanged  Current Meds   1  Atorvastatin Calcium 20 MG Oral Tablet; Take 1 tablet daily as directed; Therapy: 50GDG6298 to (Evaluate:09Xhb1909)  Requested for: 14LEZ8116; Last   Rx:30Jan2017 Ordered   2   Colace CAPS; Therapy: (Recorded:06Apr2017) to Recorded   3  Finasteride 5 MG Oral Tablet; TAKE ONE TABLET EVERY DAY; Therapy: 90BEF2501 to (Evaluate:46Usq2436) Recorded   4  Pantoprazole Sodium 40 MG Oral Tablet Delayed Release; take 1 tablet every twelve   hours; Therapy: 09WGQ8974 to (Daniel Medrano)  Requested for: 18FRN6636; Last   Rx:76Gzl3245 Ordered   5  Pantoprazole Sodium 40 MG Oral Tablet Delayed Release; TAKE 1 TABLET TWICE DAILY   30 MINUTES BEFORE BREAKFAST AND DINNER; Therapy: 44TBP5246 to (Evaluate:06Hyr1633)  Requested for: 85YLS8192; Last   Rx:75Hdc1049 Ordered   6  Vitamin D3 1000 UNIT Oral Capsule; TAKE 2 CAPSULE Daily; Therapy: 84VAU3454 to (Evaluate:96Pgr2526) Recorded  Medication List Reviewed: The medication list was reviewed and updated today  Allergies    1  No Known Drug Allergies    Vitals  Signs   Recorded: 11Apr2017 12:52PM   Heart Rate: 82  Respiration: 18  Systolic: 553  Diastolic: 76  Height: 5 ft 7 in  Weight: 210 lb 4 00 oz  BMI Calculated: 32 93  BSA Calculated: 2 07  O2 Saturation: 97    Physical Exam    Constitutional   General appearance: No acute distress, well appearing and well nourished  comfortable, clothing appropriate and well hydrated  Head and Face   Head and face: Normal     Eyes   Pupils and irises: Equal, round, reactive to light  Ears, Nose, Mouth, and Throat   External inspection of ears and nose: Normal     Pulmonary   Respiratory effort: No increased work of breathing or signs of respiratory distress  Auscultation of lungs: Clear to auscultation  Cardiovascular   Auscultation of heart: Normal rate and rhythm, normal S1 and S2, no murmurs  Examination of extremities for edema and/or varicosities: Normal     Abdomen   Abdomen: Non-tender, no masses  Bowel sounds were normal  Skin findings: a scar  The abdomen was soft and nontender  no masses palpated  The abdomen was normal to percussion  well healed surgical scars, no discharge, erythema  Musculoskeletal   Gait and station: Normal     Neurologic   Cortical function: Normal mental status  Psychiatric   Orientation to person, place and time: Normal     Mood and affect: Normal        Health Management  History of colon polyps   COLONOSCOPY; every 3 years; Last 31YVK7413; Next Due: 01Mar2020;  Active    Future Appointments    Date/Time Provider Specialty Site   06/19/2017 08:30 AM Supa Ridley MD Internal Medicine [facility] INTERNAL MED   04/28/2017 09:30 AM General Surgery, Schedule  [facility] SURGICAL [facility]     Signatures   Electronically signed by : Joanne Powers MD; Apr 11 2017  1:27PM EST                       (Author)

## 2018-01-13 NOTE — RESULT NOTES
Verified Results  (1) VITAMIN D 25-HYDROXY 77Ikx2588 08:34AM Omer Bhakta    Order Number: [id number]  TW Order Number: [id number]     Test Name Result Flag Reference   VIT D 25-HYDROX 29 7 ng/mL L 30 0-100 0   This assay is a certified procedure of the CDC Vitamin D Standardization Certification Program (VDSCP)     Deficiency <20ng/ml   Insufficiency 20-30ng/ml   Sufficient  ng/ml     *Patients undergoing fluorescein dye angiography may retain small amounts of fluorescein in the body for 48-72 hours post procedure  Samples containing fluorescein can produce falsely elevated Vitamin D values  If the patient had this procedure, a specimen should be resubmitted post fluorescein clearance  (1) COMPREHENSIVE METABOLIC PANEL 12HDQ9434 44:74BR Omer Bhakta    Order Number: [id number]  TW Order Number: [id number] Order Number: [id number] Order Number: [id number]     Test Name Result Flag Reference   GLUCOSE,RANDM 151 mg/dL H    If the patient is fasting, the ADA then defines impaired fasting glucose as > 100 mg/dL and diabetes as > or equal to 123 mg/dL     SODIUM 142 mmol/L  136-145   POTASSIUM 4 4 mmol/L  3 5-5 3   CHLORIDE 105 mmol/L  100-108   CARBON DIOXIDE 30 mmol/L  21-32   ANION GAP (CALC) 7 mmol/L  4-13   BLOOD UREA NITROGEN 22 mg/dL  5-25   CREATININE 0 96 mg/dL  0 60-1 30   Standardized to IDMS reference method   CALCIUM 9 0 mg/dL  8 3-10 1   BILI, TOTAL 0 90 mg/dL  0 20-1 00   ALK PHOSPHATAS 92 U/L     ALT (SGPT) 43 U/L  12-78   AST(SGOT) 22 U/L  5-45   ALBUMIN 3 9 g/dL  3 5-5 0   TOTAL PROTEIN 7 4 g/dL  6 4-8 2   eGFR Non-African American      >60 0 ml/min/1 73sq m   [facility] Disease Education Program recommendations are as follows:  GFR calculation is accurate only with a steady state creatinine  Chronic Kidney disease less than 60 ml/min/1 73 sq  meters  Kidney failure less than 15 ml/min/1 73 sq  meters  (1) LIPID PANEL, FASTING 21Smv3525 08:34AM Ofelia Wu    Order Number: [id number]  TW Order Number: [id number] Order Number: [id number] Order Number: [id number]     Test Name Result Flag Reference   CHOLESTEROL 129 mg/dL     HDL,DIRECT 60 mg/dL  40-60   Specimen collection should occur prior to Metamizole administration due to the potential for falsely depressed results  LDL CHOLESTEROL CALCULATED 52 mg/dL  0-100   Triglyceride:         Normal              <150 mg/dl       Borderline High    150-199 mg/dl       High               200-499 mg/dl       Very High          >499 mg/dl  Cholesterol:         Desirable        <200 mg/dl      Borderline High  200-239 mg/dl      High             >239 mg/dl  HDL Cholesterol:        High    >59 mg/dL      Low     <41 mg/dL  LDL CALCULATED:    This screening LDL is a calculated result  It does not have the accuracy of the Direct Measured LDL in the monitoring of patients with hyperlipidemia and/or statin therapy  Direct Measure LDL (NDB716) must be ordered separately in these patients  TRIGLYCERIDES 86 mg/dL  <=150   Specimen collection should occur prior to N-Acetylcysteine or Metamizole administration due to the potential for falsely depressed results  (1) TSH 22Jul2016 08:34AM Ofelia Wu    Order Number: [id number]  TW Order Number: [id number] Order Number: [id number] Order Number: [id number]     Test Name Result Flag Reference   TSH 1 830 uIU/mL  0 358-3 740   Patients undergoing fluorescein dye angiography may retain small amounts of fluorescein in the body for 48-72 hours post procedure  Samples containing fluorescein can produce falsely depressed TSH values  If the patient had this procedure,a specimen should be resubmitted post fluorescein clearance       (1) CBC/PLT/DIFF 40MDF4609 08:34AM Ofelia Hands    Order Number: [id number]   Order Number: [id number]     Test Name Result Flag Reference   WBC COUNT 5 91 Thousand/uL  4 31-10 16   RBC COUNT 4 71 Million/uL  3 88-5 62   HEMOGLOBIN 14 9 g/dL  12 0-17 0   HEMATOCRIT 42 8 %  36 5-49 3   MCV 91 fL  82-98   MCH 31 6 pg  26 8-34 3   MCHC 34 8 g/dL  31 4-37 4   RDW 12 9 %  11 6-15 1   MPV 9 4 fL  8 9-12 7   PLATELET COUNT 804 Thousands/uL  149-390   NEUTROPHILS RELATIVE PERCENT 58 %  43-75   LYMPHOCYTES RELATIVE PERCENT 33 %  14-44   MONOCYTES RELATIVE PERCENT 7 %  4-12   EOSINOPHILS RELATIVE PERCENT 2 %  0-6   BASOPHILS RELATIVE PERCENT 0 %  0-1   NEUTROPHILS ABSOLUTE COUNT 3 46 Thousands/?L  1 85-7 62   LYMPHOCYTES ABSOLUTE COUNT 1 92 Thousands/?L  0 60-4 47   MONOCYTES ABSOLUTE COUNT 0 42 Thousand/?L  0 17-1 22   EOSINOPHILS ABSOLUTE COUNT 0 09 Thousand/?L  0 00-0 61   BASOPHILS ABSOLUTE COUNT 0 02 Thousands/?L  0 00-0 10

## 2018-01-15 NOTE — RESULT NOTES
Verified Results  (1) TISSUE EXAM 26VLK9873 01:57PM Marlyn Buckner     Test Name Result Flag Reference   LAB AP CASE REPORT (Report)     Surgical Pathology Report             Case: [id number]                   Authorizing Provider: Ryan Jean Batpiste MD      Collected:      06/05/2017 1357        Ordering Location:   [address] [place] [address]   Received:      06/05/2017 [address] [facility]                               Pathologist:      Rena Moore MD                               Specimen:  Duodenum, Duodenum biopsy - history of Celiac disease   LAB AP FINAL DIAGNOSIS (Report)     Duodenum, biopsy:   - Mild to moderate villous blunting, increased intraepithelial   lymphocytes, and increased lamina propria lymphoplasmacytic infiltrate,   consistent with gluten sensitive enteropathy (see comment)    Comment: In a clinical context consistent with gluten sensitive   enteropathy (Celiac Disease), the Modified Marsh grade is 3a-3b  Electronically signed by Rena Moore MD on 6/7/2017 at 9:55 AM   LAB AP SURGICAL ADDITIONAL INFORMATION (Report)     These tests were developed and their performance characteristics   determined by [facility] or [address] [place] [address]  They may not be cleared or approved by the [place]  Food and   Drug Administration  The FDA has determined that such clearance or   approval is not necessary  These tests are used for clinical purposes  They should not be regarded as investigational or for research  This   laboratory has been approved by [place] [address], designated as a high-complexity   laboratory and is qualified to perform these tests  Interpretation performed at [address] [place] [address]   LAB [address] [place] [address] (Report)     A  The specimen is received in formalin, labeled with the patient's name   and hospital number, and is designated duodenum   The specimen consists   of 4 tan-red soft tissue fragments ranging from 0 2-0 4 cm in greatest   dimension  Entirely submitted  One cassette  Note: The estimated total formalin fixation time based upon information   provided by the submitted clinician in the stainer processing schedule is   6 75 hours      AEK

## 2018-01-15 NOTE — PROGRESS NOTES
Chief Complaint  pt here for BP check      History of Present Illness  HPI: pt here for bp re-check    checking bp at home and getting 140s/80s  declines to take bp medication - working on exercising and weight loss  has lots of salt in diet, knows he should cut back    abdominal pains are feeling better with medication/PPI  denies any other complaints      Active Problems    1  Abdominal adhesions (568 0) (K66 0)   2  Acute Tear Of Right Rotator Cuff Tendon (726 19)   3  Aftercare following surgery of the musculoskeletal system (V58 78) (Z47 89)   4  Benign prostatic hypertrophy (600 00) (N40 0)   5  Elevated blood pressure reading (796 2) (R03 0)   6  Elevated prostate specific antigen (PSA) (790 93) (R97 20)   7  Hyperlipidemia (272 4) (E78 5)   8  Impacted cerumen of left ear (380 4) (H61 22)   9  Mitral valve disorder (424 0) (I05 9)   10  Need for pneumococcal vaccination (V03 82) (Z23)   11  Need for prophylactic vaccination and inoculation against influenza (V04 81) (Z23)   12  Obesity (278 00) (E66 9)   13  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)   14  Pulmonary nodule seen on imaging study (793 11) (R91 1)   15  Screening for colon cancer (V76 51) (Z12 11)   16  Skin lesion (709 9) (L98 9)   17  Vitamin D deficiency (268 9) (E55 9)    Current Meds   1  Atorvastatin Calcium 20 MG Oral Tablet; Take 1 tablet daily as directed; Therapy: 78FKB6012 to (Evaluate:29Fhx6269)  Requested for: 75Oqc3846; Last   Rx:55Zhw3345 Ordered   2  Finasteride 5 MG Oral Tablet; TAKE ONE TABLET EVERY DAY; Therapy: 08HRG4482 to (Evaluate:91Lua9480) Recorded   3  Pantoprazole Sodium 40 MG Oral Tablet Delayed Release; TAKE 1 TABLET TWICE   DAILY 30 MINUTES BEFORE BREAKFAST AND DINNER; Therapy: 53GEH4354 to (Summer Spotted)  Requested for: 04KXD4292; Last   Rx:86Sjx9661 Ordered   4  Vitamin D3 1000 UNIT Oral Capsule; TAKE 2 CAPSULE Daily; Therapy: 83PXK2909 to (Evaluate:52Yve8376) Recorded    Allergies    1   No Known Drug Allergies    Vitals  Signs    Heart Rate: 80  Systolic: 645  Diastolic: 82   Heart Rate: 86  Systolic: 228  Diastolic: 72    Assessment    1  Elevated blood pressure reading (796 2) (R03 0)   2  History of Intermittent epigastric abdominal pain (789 06) (R10 13)    Discussion/Summary    Discussed risk/benefit of treatment optiosn for elevated BP - pt declines any/all medication for bp at this time and prefers to focus on lifestyle changes and re-check bp in office in 4 weeks    pt will continue to check bp at home and if has elevated readings, was advised to return to office sooner  Future Appointments    Date/Time Provider Specialty Site   06/19/2017 08:30 AM Stephanie Barahona MD Internal Medicine [facility] INTERNAL MED   02/01/2017 08:30 AM JENIFFER Floyd   Gastroenterology Adult [facility] GASTROENTEROLOGY SPECIAL   02/09/2017 02:00 PM José Chopra, Nurse Schedule  [facility] INTERNAL MED     Signatures   Electronically signed by : Devin Mullins DO; Jan 9 2017  5:20PM EST                       (Author)

## 2018-01-15 NOTE — RESULT NOTES
Message   I called the pt, however got a vm, i left him a message stating that he has gallstones and liver ? cyst- which will need follow up with Ct with liver protocal in the next 2-3 months  Also, will put in referral to surgery in regards to his gallstones  thanks     Verified Results  * CT ABDOMEN PELVIS W CONTRAST [id number] 12:58PM Francois Rahman Order Number: [id number]    - Patient Instructions: To schedule this appointment, please contact Central Scheduling at [phone]  Test Name Result Flag Reference   CT ABDOMEN PELVIS W CONTRAST (Report)     CT ABDOMEN AND PELVIS WITH IV CONTRAST     INDICATION: 70-year-old man with epigastric pain  COMPARISON: None  TECHNIQUE: CT examination of the abdomen and pelvis  Axial, sagittal and coronal reformatted projections were created  This examination, like all CT scans performed in the [facility], was performed utilizing techniques to    minimize radiation dose exposure, including the use of iterative reconstruction and automated exposure control  IV Contrast: iohexol (OMNIPAQUE) 350 MG/ML injection (MULTI-DOSE) 100 mL Note: (SINGLE DOSE/MULTI DOSE) information refers to the container from which the contrast was acquired  Contrast was injected one time intravenously without immediate    complication  Enteric Contrast: Enteric contrast was administered  FINDINGS:     ABDOMEN     LOWER CHEST: No significant abnormalities identified in the lower chest      LIVER/BILIARY TREE: 7 mm low-attenuation mass in the left lobe, most likely a cyst  Liver otherwise normal  Bile ducts normal in caliber  GALLBLADDER: Several large peripherally calcified gallstones, the largest of which is in the fundus, measuring 2 5 cm  Suggestion of small stone near the gallbladder neck  Gallbladder wall normal in thickness  No pericholecystic fluid  SPLEEN: Several small low-attenuation masses, most likely cysts   Spleen otherwise normal      PANCREAS: Atrophic  No mass  ADRENAL GLANDS: Unremarkable  KIDNEYS/URETERS: Several small right renal cysts  No other evidence of mass  No calculus, hydronephrosis or ureterectasis  STOMACH AND BOWEL: Stomach and small intestine normal in appearance  Sigmoid diverticulosis without evidence of diverticulitis  Colon otherwise unremarkable  APPENDIX: No findings to suggest appendicitis  ABDOMINOPELVIC CAVITY: No lymphadenopathy or mass  No ascites  No extraluminal gas  VESSELS: Unremarkable for patient's age  PELVIS     REPRODUCTIVE ORGANS: Prostatomegaly  URINARY BLADDER: Unremarkable  ABDOMINAL WALL/INGUINAL REGIONS: Unremarkable  OSSEOUS STRUCTURES: No acute fracture or destructive osseous lesion  IMPRESSION:     1  Cholelithiasis without evidence of acute cholecystitis  2  If there is clinical concern regarding cholecystitis, consider further evaluation with upper abdominal sonography or radionuclide hepatobiliary scan  3  Sigmoid diverticulosis without evidence of diverticulitis  4  Prostatomegaly  ##sigslh##sigslh     ##fuslh2##fuslh2        Workstation performed: [id number]     Signed by:   Maddison Mccormick MD   2/9/17       Plan  Gallstones    · *[address] [place] [address] Physician Referral  Consult Only: the expectation  is that the referring provider will communicate back to the patient on treatment options  Evaluation and Treatment: the expectation is that the referred to provider will  communicate back to the patient on treatment options    Status: Hold For - Scheduling   Requested for: [id number]  Care Summary provided  : Yes

## 2018-01-16 NOTE — RESULT NOTES
Message   I tried calling the patient multiple times to discuss results of blood work, unable to leave vm  Please inform rylie pt that blood work and bx are suggestive of celiac disease, i will put in nutrition consult and follow up in office in 4-6 weeks  thanks     Verified Results  (1) CELIAC DISEASE AB PROFILE [id number] 11:33AM Ana Santos Order Number: [id number]     Test Name Result Flag Reference   tTG IGG <2 U/mL  0 - 5   Negative        0 - 5                                Weak Positive   6 - 9                                Positive           >9   tTG IGA >100 U/mL H 0 - 3   Negative        0 -  3                                Weak Positive   4 - 10                                Positive           >10   Tissue Transglutaminase (tTG) has been identified   as the endomysial antigen  Studies have demonstr-   ated that endomysial IgA antibodies have over 99%   specificity for gluten sensitive enteropathy     GLIADA >133 units H 0 - 19   Negative                   0 - 19                     Weak Positive             20 - 30                     Moderate to Strong Positive   >30   GLIADG 65 units H 0 - 19   Negative                   0 - 19                     Weak Positive             20 - 30                     Moderate to Strong Positive   >30   ENDOMYSIAL AB IGA Positive A Negative   Performed at:  [facility] [address]  [id number]  : Nereida Braswell MD, Phone:  [phone]    mg/dL  61 - 808

## 2018-01-16 NOTE — RESULT NOTES
Verified Results  CT CHEST W CONTRAST [id number] 07:43PM Sejal Bae     Test Name Result Flag Reference   CT CHEST W CONTRAST (Report)     CT CHEST WITH IV CONTRAST     INDICATION: Lung nodule follow-up     COMPARISON: CT chest 3/3/2015     TECHNIQUE: CT examination of the chest was performed  85 mL of Omnipaque 350 was injected intravenously  Axial, sagittal and coronal reformatted images were submitted for interpretation  Coronal thick section MIP (maximal intensity projection) images    were also created  This examination, like all CT scans performed in the [facility], was performed utilizing techniques to minimize radiation dose exposure, including the use of iterative reconstruction and automated exposure control  FINDINGS:     LUNGS: Lungs are clear  There is no tracheal or endobronchial lesion  Stable 6 mm pulmonary nodule in the anterior aspect of the right middle lobe  No new pulmonary nodule  There is bibasilar dependent atelectasis is  No endobronchial lesions  PLEURA: Unremarkable  HEART/GREAT VESSELS: Unremarkable for patient's age  MEDIASTINUM AND ANTHONY: Unremarkable  CHEST WALL AND LOWER NECK: Unremarkable  VISUALIZED STRUCTURES IN THE UPPER ABDOMEN: Unremarkable  Stable subcentimeter hypodensity left lobe of the liver likely represents a cyst  Again noted is cholelithiasis  OSSEOUS STRUCTURES: No acute fracture  No destructive osseous lesion  IMPRESSION:   1  Stable 6 mm right middle lobe pulmonary nodule  This nodule has demonstrated 12 month stability  According to Fleischner criteria and additional 12 month follow-up study is recommended to confirm stability  2  Incidental note of cholelithiasis       ##fuslh12##fuslh12       Workstation performed: [id number]     Signed by:   Sole Prasad MD   3/11/16

## 2018-01-16 NOTE — RESULT NOTES
Message   let patient know - chest x-ray results are back and lungs appear clear  Chest x-rays are a less detailed image so Mr Amadeo Phoenix previous lung nodule may not be visible on this x-ray but was seen on previous CAT scan(better test)  Recommend to continue with current treatment plan & see lung specialist, thanks     Verified Results  * XR CHEST PA & LATERAL [id number] 10:01AM Harriet Abraham Order Number: [id number]   Performing Comments: 77 y/o M hx of sarcoidosis with persistent cough after treated for bronchitis     Test Name Result Flag Reference   XR CHEST PA & LATERAL (Report)     CHEST - DUAL ENERGY     INDICATION: Sarcoidosis  Cough  COMPARISON: None     VIEWS: PA (including soft tissue/bone algorithms) and lateral ; 4 images     FINDINGS:     The cardiomediastinal silhouette is unremarkable  The lungs are clear  No pleural effusions  Moderate degenerative changes, mid and lower thoracic spine  IMPRESSION:     No active pulmonary disease          Workstation performed: [id number]     Signed by:   Romayne Cross Carnella Pipe, MD   11/15/16

## 2018-01-22 VITALS — HEART RATE: 80 BPM | SYSTOLIC BLOOD PRESSURE: 146 MMHG | DIASTOLIC BLOOD PRESSURE: 82 MMHG

## 2018-03-19 DIAGNOSIS — E78.2 MIXED HYPERLIPIDEMIA: Primary | ICD-10-CM

## 2018-03-19 RX ORDER — ATORVASTATIN CALCIUM 20 MG/1
TABLET, FILM COATED ORAL
Qty: 90 TABLET | Refills: 0 | Status: SHIPPED | OUTPATIENT
Start: 2018-03-19 | End: 2018-05-08 | Stop reason: SDUPTHER

## 2018-04-05 ENCOUNTER — APPOINTMENT (OUTPATIENT)
Dept: LAB | Facility: CLINIC | Age: 68
End: 2018-04-05
Payer: MEDICARE

## 2018-04-05 ENCOUNTER — TRANSCRIBE ORDERS (OUTPATIENT)
Dept: LAB | Facility: CLINIC | Age: 68
End: 2018-04-05

## 2018-04-05 DIAGNOSIS — N13.8 ENLARGED PROSTATE WITH URINARY OBSTRUCTION: ICD-10-CM

## 2018-04-05 DIAGNOSIS — N13.8 ENLARGED PROSTATE WITH URINARY OBSTRUCTION: Primary | ICD-10-CM

## 2018-04-05 DIAGNOSIS — N40.1 ENLARGED PROSTATE WITH URINARY OBSTRUCTION: Primary | ICD-10-CM

## 2018-04-05 DIAGNOSIS — N40.1 ENLARGED PROSTATE WITH URINARY OBSTRUCTION: ICD-10-CM

## 2018-04-05 LAB — PSA SERPL-MCNC: 1.7 NG/ML (ref 0–4)

## 2018-04-05 PROCEDURE — 36415 COLL VENOUS BLD VENIPUNCTURE: CPT

## 2018-04-05 PROCEDURE — G0103 PSA SCREENING: HCPCS

## 2018-05-08 DIAGNOSIS — E78.2 MIXED HYPERLIPIDEMIA: ICD-10-CM

## 2018-05-08 RX ORDER — ATORVASTATIN CALCIUM 20 MG/1
20 TABLET, FILM COATED ORAL DAILY
Qty: 90 TABLET | Refills: 1 | Status: SHIPPED | OUTPATIENT
Start: 2018-05-08 | End: 2019-01-31 | Stop reason: SDUPTHER

## 2018-06-11 ENCOUNTER — TELEPHONE (OUTPATIENT)
Dept: INTERNAL MEDICINE CLINIC | Facility: CLINIC | Age: 68
End: 2018-06-11

## 2018-06-11 DIAGNOSIS — E78.49 OTHER HYPERLIPIDEMIA: ICD-10-CM

## 2018-06-11 DIAGNOSIS — E55.9 VITAMIN D DEFICIENCY: Primary | ICD-10-CM

## 2018-06-15 ENCOUNTER — APPOINTMENT (OUTPATIENT)
Dept: LAB | Facility: CLINIC | Age: 68
End: 2018-06-15
Payer: MEDICARE

## 2018-06-15 PROBLEM — R10.13 DYSPEPSIA: Status: ACTIVE | Noted: 2017-06-23

## 2018-06-15 PROBLEM — K76.89 LIVER CYST: Status: ACTIVE | Noted: 2017-02-24

## 2018-06-15 PROBLEM — K90.0 ADULT CELIAC DISEASE: Status: ACTIVE | Noted: 2017-03-21

## 2018-06-15 LAB
25(OH)D3 SERPL-MCNC: 25.6 NG/ML (ref 30–100)
ALBUMIN SERPL BCP-MCNC: 3.7 G/DL (ref 3.5–5)
ALP SERPL-CCNC: 83 U/L (ref 46–116)
ALT SERPL W P-5'-P-CCNC: 37 U/L (ref 12–78)
ANION GAP SERPL CALCULATED.3IONS-SCNC: 7 MMOL/L (ref 4–13)
AST SERPL W P-5'-P-CCNC: 20 U/L (ref 5–45)
BASOPHILS # BLD AUTO: 0.03 THOUSANDS/ΜL (ref 0–0.1)
BASOPHILS NFR BLD AUTO: 1 % (ref 0–1)
BILIRUB SERPL-MCNC: 0.9 MG/DL (ref 0.2–1)
BUN SERPL-MCNC: 20 MG/DL (ref 5–25)
CALCIUM SERPL-MCNC: 8.6 MG/DL (ref 8.3–10.1)
CHLORIDE SERPL-SCNC: 105 MMOL/L (ref 100–108)
CHOLEST SERPL-MCNC: 143 MG/DL (ref 50–200)
CO2 SERPL-SCNC: 28 MMOL/L (ref 21–32)
CREAT SERPL-MCNC: 0.89 MG/DL (ref 0.6–1.3)
EOSINOPHIL # BLD AUTO: 0.09 THOUSAND/ΜL (ref 0–0.61)
EOSINOPHIL NFR BLD AUTO: 2 % (ref 0–6)
ERYTHROCYTE [DISTWIDTH] IN BLOOD BY AUTOMATED COUNT: 12.8 % (ref 11.6–15.1)
GFR SERPL CREATININE-BSD FRML MDRD: 87 ML/MIN/1.73SQ M
GLUCOSE P FAST SERPL-MCNC: 190 MG/DL (ref 65–99)
HCT VFR BLD AUTO: 41.3 % (ref 36.5–49.3)
HDLC SERPL-MCNC: 67 MG/DL (ref 40–60)
HGB BLD-MCNC: 14.3 G/DL (ref 12–17)
LDLC SERPL CALC-MCNC: 62 MG/DL (ref 0–100)
LYMPHOCYTES # BLD AUTO: 2 THOUSANDS/ΜL (ref 0.6–4.47)
LYMPHOCYTES NFR BLD AUTO: 33 % (ref 14–44)
MCH RBC QN AUTO: 31.8 PG (ref 26.8–34.3)
MCHC RBC AUTO-ENTMCNC: 34.6 G/DL (ref 31.4–37.4)
MCV RBC AUTO: 92 FL (ref 82–98)
MONOCYTES # BLD AUTO: 0.5 THOUSAND/ΜL (ref 0.17–1.22)
MONOCYTES NFR BLD AUTO: 8 % (ref 4–12)
NEUTROPHILS # BLD AUTO: 3.49 THOUSANDS/ΜL (ref 1.85–7.62)
NEUTS SEG NFR BLD AUTO: 57 % (ref 43–75)
NONHDLC SERPL-MCNC: 76 MG/DL
PLATELET # BLD AUTO: 266 THOUSANDS/UL (ref 149–390)
PMV BLD AUTO: 9.2 FL (ref 8.9–12.7)
POTASSIUM SERPL-SCNC: 4.2 MMOL/L (ref 3.5–5.3)
PROT SERPL-MCNC: 7.1 G/DL (ref 6.4–8.2)
RBC # BLD AUTO: 4.5 MILLION/UL (ref 3.88–5.62)
SODIUM SERPL-SCNC: 140 MMOL/L (ref 136–145)
TRIGL SERPL-MCNC: 70 MG/DL
TSH SERPL DL<=0.05 MIU/L-ACNC: 2.61 UIU/ML (ref 0.36–3.74)
WBC # BLD AUTO: 6.11 THOUSAND/UL (ref 4.31–10.16)

## 2018-06-15 PROCEDURE — 85025 COMPLETE CBC W/AUTO DIFF WBC: CPT

## 2018-06-15 PROCEDURE — 80061 LIPID PANEL: CPT

## 2018-06-15 PROCEDURE — 80053 COMPREHEN METABOLIC PANEL: CPT

## 2018-06-15 PROCEDURE — 82306 VITAMIN D 25 HYDROXY: CPT

## 2018-06-15 PROCEDURE — 36415 COLL VENOUS BLD VENIPUNCTURE: CPT

## 2018-06-15 PROCEDURE — 84443 ASSAY THYROID STIM HORMONE: CPT

## 2018-06-20 ENCOUNTER — APPOINTMENT (OUTPATIENT)
Dept: LAB | Facility: CLINIC | Age: 68
End: 2018-06-20
Payer: MEDICARE

## 2018-06-20 ENCOUNTER — TRANSCRIBE ORDERS (OUTPATIENT)
Dept: LAB | Facility: CLINIC | Age: 68
End: 2018-06-20

## 2018-06-20 ENCOUNTER — OFFICE VISIT (OUTPATIENT)
Dept: INTERNAL MEDICINE CLINIC | Facility: CLINIC | Age: 68
End: 2018-06-20
Payer: MEDICARE

## 2018-06-20 VITALS
BODY MASS INDEX: 33.56 KG/M2 | TEMPERATURE: 98.1 F | OXYGEN SATURATION: 95 % | DIASTOLIC BLOOD PRESSURE: 68 MMHG | WEIGHT: 221.4 LBS | SYSTOLIC BLOOD PRESSURE: 130 MMHG | RESPIRATION RATE: 18 BRPM | HEART RATE: 59 BPM | HEIGHT: 68 IN

## 2018-06-20 DIAGNOSIS — R73.01 ABNORMAL FASTING GLUCOSE: ICD-10-CM

## 2018-06-20 DIAGNOSIS — Z00.00 HEALTH MAINTENANCE EXAMINATION: ICD-10-CM

## 2018-06-20 DIAGNOSIS — N40.0 BENIGN PROSTATIC HYPERPLASIA, UNSPECIFIED WHETHER LOWER URINARY TRACT SYMPTOMS PRESENT: ICD-10-CM

## 2018-06-20 DIAGNOSIS — E78.49 OTHER HYPERLIPIDEMIA: ICD-10-CM

## 2018-06-20 DIAGNOSIS — Z23 NEED FOR SHINGLES VACCINE: ICD-10-CM

## 2018-06-20 DIAGNOSIS — M25.50 ARTHRALGIA, UNSPECIFIED JOINT: Primary | ICD-10-CM

## 2018-06-20 DIAGNOSIS — E55.9 VITAMIN D DEFICIENCY: ICD-10-CM

## 2018-06-20 PROBLEM — R10.13 DYSPEPSIA: Status: RESOLVED | Noted: 2017-06-23 | Resolved: 2018-06-20

## 2018-06-20 PROBLEM — K83.9 BILE LEAK: Status: RESOLVED | Noted: 2017-04-03 | Resolved: 2018-06-20

## 2018-06-20 PROBLEM — K85.90 ACUTE PANCREATITIS: Status: RESOLVED | Noted: 2017-04-04 | Resolved: 2018-06-20

## 2018-06-20 LAB
CRP SERPL QL: <3 MG/L
ERYTHROCYTE [SEDIMENTATION RATE] IN BLOOD: 10 MM/HOUR (ref 0–10)
EST. AVERAGE GLUCOSE BLD GHB EST-MCNC: 163 MG/DL
HBA1C MFR BLD: 7.3 % (ref 4.2–6.3)

## 2018-06-20 PROCEDURE — 86430 RHEUMATOID FACTOR TEST QUAL: CPT | Performed by: INTERNAL MEDICINE

## 2018-06-20 PROCEDURE — G0439 PPPS, SUBSEQ VISIT: HCPCS | Performed by: INTERNAL MEDICINE

## 2018-06-20 PROCEDURE — 86431 RHEUMATOID FACTOR QUANT: CPT | Performed by: INTERNAL MEDICINE

## 2018-06-20 PROCEDURE — 99214 OFFICE O/P EST MOD 30 MIN: CPT | Performed by: INTERNAL MEDICINE

## 2018-06-20 PROCEDURE — 36415 COLL VENOUS BLD VENIPUNCTURE: CPT | Performed by: INTERNAL MEDICINE

## 2018-06-20 PROCEDURE — 86200 CCP ANTIBODY: CPT | Performed by: INTERNAL MEDICINE

## 2018-06-20 PROCEDURE — 85652 RBC SED RATE AUTOMATED: CPT | Performed by: INTERNAL MEDICINE

## 2018-06-20 PROCEDURE — 86038 ANTINUCLEAR ANTIBODIES: CPT | Performed by: INTERNAL MEDICINE

## 2018-06-20 PROCEDURE — 83036 HEMOGLOBIN GLYCOSYLATED A1C: CPT | Performed by: INTERNAL MEDICINE

## 2018-06-20 PROCEDURE — 86140 C-REACTIVE PROTEIN: CPT | Performed by: INTERNAL MEDICINE

## 2018-06-20 NOTE — PATIENT INSTRUCTIONS
Basic Carbohydrate Counting   AMBULATORY CARE:   Carbohydrate counting  is a way to plan your meals by counting the amount of carbohydrate in foods  Carbohydrates are the sugars, starches, and fiber found in fruit, grains, vegetables, and milk products  Carbohydrates increase your blood sugar levels  Carbohydrate counting can help you eat the right amount of carbohydrate to keep your blood sugar levels under control  What you need to know about planning meals using carbohydrate counting:  · A dietitian or healthcare provider will help you develop a healthy meal plan that works best for you  You will be taught how much carbohydrate to eat or drink for each meal and snack  Your meal plan will be based on your age, weight, usual food intake, and physical activity level  If you have diabetes, it will also include your blood sugar levels and diabetes medicine  Once you know how much carbohydrate you should eat, you can decide what type of food you want to eat  · You will need to know what foods contain carbohydrate and how much they contain  Keep track of the amount of carbohydrate in meals and snacks in order to follow your meal plan  Do not avoid carbohydrates or skip meals  Your blood sugar may fall too low if you do not eat enough carbohydrate or you skip meals  Foods that contain carbohydrate:   · Breads:  Each serving of food listed below contains about 15 g of carbohydrate   ¨ 1 slice of bread (1 ounce) or 1 flour or corn tortilla (6 inch)    ¨ ½ of a hamburger bun or ¼ of a large bagel (about 1 ounce)    ¨ 1 pancake (about 4 inches across and ¼ inch thick)    · Cereals and grains:  Serving sizes of ready-to-eat cereals vary  Look at the serving size and the total carbohydrate amount listed on the food label  Each serving of food listed below contains about 15 g of carbohydrate       ¨ ¾ cup of dry, unsweetened, ready-to-eat cereal or ¼ cup of low-fat granola     ¨ ½ cup of oatmeal or other cooked cereal ¨ ? cup of cooked rice or pasta    · Starchy vegetables and beans:  Each serving of food listed below contains about 15 g of carbohydrate   ¨ ½ cup of corn, green peas, sweet potatoes, or mashed potatoes    ¨ ¼ of a large baked potato    ¨ ½ cup of beans, lentils, and peas (garbanzo, laws, kidney, white, split, black-eyed)    · Crackers and snacks:  Each serving of food listed below contains about 15 g of carbohydrate   ¨ 3 vikki cracker squares or 8 animal crackers     ¨ 6 saltine-type crackers    ¨ 3 cups of popcorn or ¾ ounce of pretzels, potato chips, or tortilla chips    · Fruit:  Each serving of food listed below contains about 15 g of carbohydrate   ¨ 1 small (4 ounce) piece of fresh fruit or ¾ to 1 cup of fresh fruit    ¨ ½ cup of canned or frozen fruit, packed in natural juice    ¨ ½ cup (4 ounces) of unsweetened fruit juice    ¨ 2 tablespoons of dried fruit    · Desserts or sugary foods:  Each serving of food listed below contains about 15 g of carbohydrate   ¨ 2-inch square unfrosted cake or brownie     ¨ 2 small cookies    ¨ ½ cup of ice cream, frozen yogurt, or nondairy frozen yogurt    ¨ ¼ cup of sherbet or sorbet    ¨ 1 tablespoon of regular syrup, jam, or jelly    ¨ 2 tablespoons of light syrup    · Milk and yogurt:  Foods from the milk group contain about 12 g of carbohydrate per serving  ¨ 1 cup of fat-free or low-fat milk    ¨ 1 cup of soy milk    ¨ ? cup of fat-free, yogurt sweetened with artificial sweetener    · Non-starchy vegetables:  Each serving contains about 5 g of carbohydrate   Three servings of non-starch vegetables count as 1 carbohydrate serving  ¨ ½ cup of cooked vegetables or 1 cup of raw vegetables  This includes beets, broccoli, cabbage, cauliflower, cucumber, mushrooms, tomatoes, and zucchini    ¨ ½ cup of vegetable juice  How to use carbohydrate counting to plan meals:   · Count carbohydrate amounts using serving sizes:      ¨ Pasta dinner example:   You plan to have pasta, tossed salad, and an 8-ounce glass of milk  Your healthcare provider tells you that you may have 4 carbohydrate servings for dinner  One carbohydrate serving of pasta is ? cup  One cup of pasta will equal 3 carbohydrate servings  An 8-ounce glass of milk will count as 1 carbohydrate serving  These amounts of food would equal 4 carbohydrate servings  One cup of tossed salad does not count toward your carbohydrate servings  · Count carbohydrate amounts using food labels:  Find the total amount of carbohydrate in a packaged food by reading the food label  Food labels tell you the serving size of the food and the total carbohydrate amount in each serving  Find the serving size on the food label and then decide how many servings you will eat  Multiply the number of servings you plan to eat by the carbohydrate amount per serving  ¨ Granola bar snack example: Your meal plan allows you to have 2 carbohydrate servings (30 grams) of carbohydrate for a snack  You plan to eat 1 package of granola bars, which contains 2 bars  According to the food label, the serving size of food in this package is 1 bar  Each serving (1 bar) contains 25 grams of carbohydrate  The total amount of carbohydrate in this package of granola bars would be 50 g  Based on your meal plan, you should eat only 1 bar  Follow up with your healthcare provider as directed:  Write down your questions so you remember to ask them during your visits  © 2017 [address] [employer] Information is for End User's use only and may not be sold, redistributed or otherwise used for commercial purposes  All illustrations and images included in CareNotes® are the copyrighted property of [employer] , [employer]  or [employer]  The above information is an  only  It is not intended as medical advice for individual conditions or treatments   Talk to your doctor, nurse or pharmacist before following any medical regimen to see if it is safe and effective for you

## 2018-06-20 NOTE — PROGRESS NOTES
Assessment/Plan:    Abnormal fasting glucose  Fasting glucose was 190, check A1c  Discussed low carb diet  Hyperlipidemia  Lipids at goal, on statin  Benign prostatic hyperplasia  Sees Urology yearly, on finasteride  Vitamin D deficiency  Increase D3 during fall/winter months  Adult celiac disease  On gluten free diet  Off PPI  Diagnoses and all orders for this visit:    Arthralgia, unspecified joint  Comments:  Bilateral hand swelling and pain, screen for RA  Orders:  -     BRIDGETT Screen w/ Reflex to Titer/Pattern  -     C-reactive protein  -     Cyclic citrul peptide antibody, IgG  -     RF Screen w/ Reflex to Titer  -     Sedimentation rate, automated    Need for shingles vaccine  -     Zoster Vac Recomb Adjuvanted 50 MCG SUSR; Inject 1 mL into the shoulder, thigh, or buttocks once for 1 dose Repeat in 2-6 months IM    Abnormal fasting glucose  -     Hemoglobin A1C    Health maintenance examination    Other hyperlipidemia    Benign prostatic hyperplasia, unspecified whether lower urinary tract symptoms present    Vitamin D deficiency      Follow up in 1 year or as needed  Subjective:      Patient ID: Ryne Pérez is a 70 y o  male  Mr  Terrance Cruz is concerned about his sugars  His urologist reports that he has more glucose in his urine in the past 2 years  He has some family members that are prediabetic, non are on medication  He is also concerned of joint pains  He noticed that his knuckles get sore and swollen in the morning, improves later in the day  He also complains of intermittent ankle pain  He thinks he hurt his left shoulder, feels a little bit stiff  He has intermittent neck stiffness, feels his range of motion is not like it was before  Denies any recent falls or trauma  He has not taken or applied any medication for his symptoms  His mother had rheumatoid arthritis and is concerned about this  He was diagnosed with celiac, has been limiting green    However, he noticed there is more sugar and carbohydrates with gluten free bread  The following portions of the patient's history were reviewed and updated as appropriate: allergies, current medications, past medical history, past social history and problem list     Review of Systems   Constitutional: Negative for appetite change and fatigue  HENT: Negative for congestion, hearing loss and postnasal drip  Eyes: Negative  Respiratory: Negative for cough, chest tightness and shortness of breath  Cardiovascular: Negative for chest pain, palpitations and leg swelling  Gastrointestinal: Negative for abdominal pain and constipation  Genitourinary: Negative for dysuria, frequency and urgency  Musculoskeletal: Negative for arthralgias  Skin: Negative for rash and wound  Neurological: Negative for dizziness, numbness and headaches  Hematological: Negative for adenopathy  Does not bruise/bleed easily  Psychiatric/Behavioral: Negative for sleep disturbance  The patient is not nervous/anxious  Objective:      /68   Pulse 59   Temp 98 1 °F (36 7 °C)   Resp 18   Ht 5' 8" (1 727 m)   Wt 100 kg (221 lb 6 4 oz)   SpO2 95%   BMI 33 66 kg/m²          Physical Exam   Constitutional: He is oriented to person, place, and time  He appears well-developed and well-nourished  HENT:   Head: Normocephalic and atraumatic  Mouth/Throat: Mucous membranes are normal    Eyes: Conjunctivae are normal  Pupils are equal, round, and reactive to light  Neck: Neck supple  Cardiovascular: Normal rate, regular rhythm and normal heart sounds  No edema  Pulmonary/Chest: Effort normal  He has no wheezes  He has no rhonchi  Abdominal: Soft  Bowel sounds are normal    Neurological: He is alert and oriented to person, place, and time  Skin: Skin is warm  No rash noted  Psychiatric: He has a normal mood and affect  His behavior is normal    Nursing note and vitals reviewed          Lab results reviewed with patient

## 2018-06-20 NOTE — PROGRESS NOTES
Assessment and Plan:    Problem List Items Addressed This Visit        Endocrine    Abnormal fasting glucose     Fasting glucose was 190, check A1c  Discussed low carb diet  Relevant Orders    Hemoglobin A1C       Genitourinary    Benign prostatic hyperplasia     Sees Urology yearly, on finasteride  Other    Hyperlipidemia     Lipids at goal, on statin  Vitamin D deficiency     Increase D3 during fall/winter months  Other Visit Diagnoses     Arthralgia, unspecified joint    -  Primary    Bilateral hand swelling and pain, screen for RA  Relevant Orders    BRIDGETT Screen w/ Reflex to Titer/Pattern    C-reactive protein (Completed)    Cyclic citrul peptide antibody, IgG    RF Screen w/ Reflex to Titer    Sedimentation rate, automated    Health maintenance examination        Repeat colonoscopy 2022  Need for shingles vaccine        Relevant Medications    Zoster Vac Recomb Adjuvanted 50 MCG SUSR        Health Maintenance Due   Topic Date Due    Hepatitis C Screening  1950    Depression Screening PHQ-9  1950    Fall Risk  04/22/2014    GLAUCOMA SCREENING 67+ YR  04/22/2016         HPI:  Clearance Ez is a 70 y o  male here for his Subsequent Wellness Visit      Patient Active Problem List   Diagnosis    Symptomatic cholelithiasis    Hyperbilirubinemia    Hyperlipidemia    Class 1 obesity due to excess calories without serious comorbidity with body mass index (BMI) of 33 0 to 33 9 in adult    Pulmonary nodule seen on imaging study    Vitamin D deficiency    Mitral valve disorder    Abdominal adhesions    Other specified disorders of rotator cuff syndrome of shoulder and allied disorders    Adult celiac disease    Benign prostatic hyperplasia    Elevated prostate specific antigen (PSA)    Liver cyst    Abnormal fasting glucose     Past Medical History:   Diagnosis Date    Acute pancreatitis     last assessed - [id number]    BPH (benign prostatic hypertrophy)     Celiac disease     current GI w/u to r/o celiac    Cholelithiasis     Elevated blood pressure reading     last assessed - [id number]    Gallstones     last assessed - [id number]    High cholesterol     Hx of mitral valve disorder     Sarcoidosis     1990's Diagnosed 15 years ago by biopsy, not active; last assessed - [id number]     Past Surgical History:   Procedure Laterality Date    ERCP N/A 4/3/2017    Procedure: ENDOSCOPIC RETROGRADE CHOLANGIOPANCREATOGRAPHY (ERCP); Surgeon: Jose Juan Valentin MD;  [facility]; Service:     VT COLONOSCOPY FLX DX W/COLLJ SPEC WHEN PFRMD N/A 3/1/2017    Procedure: EGD AND COLONOSCOPY;  Surgeon: Ladarius Dwyer MD;  [facility]; Service: Gastroenterology    VT ESOPHAGOGASTRODUODENOSCOPY TRANSORAL DIAGNOSTIC N/A 6/5/2017    Procedure: ESOPHAGOGASTRODUODENOSCOPY (EGD); Surgeon: Jose Juan Valentin MD;  [facility];   Service: Gastroenterology    VT LAP,CHOLECYSTECTOMY N/A 3/29/2017    Procedure: CHOLECYSTECTOMY LAPAROSCOPIC; POSSIBLE OPEN;  Surgeon: Ricardo Maria MD;  [facility];  Service: General; complicated by bile leak and pancreatitis s/p stent     ROTATOR CUFF REPAIR Right 04/29/2015    for rotator cuff tear, Onset: 4/14     Family History   Problem Relation Age of Onset    Diabetes Mother         Diabetes Mellitus    Hypertension Mother     Ovarian cancer Mother     Rheumatologic disease Mother     Diabetes Father         Diabetes Mellitus    Heart disease Father     Hypertension Father     Diabetes Sister         Diabetes Mellitus    Other Sister         H  pylori infection    Hypertension Sister     Other Brother         H  pylori infection    Alcohol abuse Neg Hx     Substance Abuse Neg Hx     Mental illness Neg Hx     Depression Neg Hx      History   Smoking Status    Never Smoker   Smokeless Tobacco    Never Used     History   Alcohol Use    Yes     Comment: wine, 3-4x/week; social drinker      History Drug Use No     Comment: Denied history of drug use       Review of Systems   Constitutional: Negative for appetite change and fatigue  HENT: Negative for congestion, hearing loss and postnasal drip  Eyes: Negative  Respiratory: Negative for cough, chest tightness and shortness of breath  Cardiovascular: Negative for chest pain, palpitations and leg swelling  Gastrointestinal: Negative for abdominal pain and constipation  Genitourinary: Negative for dysuria, frequency and urgency  Musculoskeletal: Negative for arthralgias  Skin: Negative for rash and wound  Neurological: Negative for dizziness, numbness and headaches  Hematological: Negative for adenopathy  Does not bruise/bleed easily  Psychiatric/Behavioral: Negative for sleep disturbance  The patient is not nervous/anxious  /68   Pulse 59   Temp 98 1 °F (36 7 °C)   Resp 18   Ht 5' 8" (1 727 m)   Wt 100 kg (221 lb 6 4 oz)   SpO2 95%   BMI 33 66 kg/m²       Current Outpatient Prescriptions   Medication Sig Dispense Refill    atorvastatin (LIPITOR) 20 mg tablet Take 1 tablet (20 mg total) by mouth daily 90 tablet 1    Cholecalciferol (VITAMIN D-3) 1000 units CAPS Take by mouth daily      finasteride (PROSCAR) 5 mg tablet Take 5 mg by mouth daily after dinner        Glucosamine-Chondroitin (MOVE FREE PO) Take 2 tablets by mouth daily      Multiple Vitamin (MULTIVITAMIN) tablet Take 1 tablet by mouth daily      Omega-3 Fatty Acids (OMEGA 3 PO) Take 1 capsule by mouth daily      Zoster Vac Recomb Adjuvanted 50 MCG SUSR Inject 1 mL into the shoulder, thigh, or buttocks once for 1 dose Repeat in 2-6 months IM 1 each 1     No current facility-administered medications for this visit        No Known Allergies  Immunization History   Administered Date(s) Administered    Influenza Split High Dose Preservative Free IM 12/11/2015    Influenza TIV (IM) 12/04/2013, 10/14/2016    Pneumococcal Conjugate 13-Valent 06/11/2015    Pneumococcal Polysaccharide PPV23 06/04/2014    Tdap 03/08/2013    Zoster 02/22/2013       Patient Care Team:  Shelby Zambrano MD as PCP - General  Florence Banuelos MD      Medicare Screening Tests and Risk Assessments:  AWV Clinical     ISAR:   Previous hospitalizations?:  No       Once in a Lifetime Medicare Screening:   AAA screening performed? (if performed, please add date to Health Maintenance):  No       Medicare Screening Tests and Risk Assessment:   AAA Risk Assessment    Age over 72 (males only):  Yes    Osteoporosis Risk Assessment    :  Yes :  No   Age over 48:  Yes Low body weight (<127lbs):  No    PMHX of fractures:  No   HIV Risk Assessment        Drug and Alcohol Use:   Tobacco use    Cigarettes:  never smoker    Tobacco use duration    Tobacco Cessation Readiness    Alcohol use    Alcohol use:  occasional use    Amount of alcohol consumed:  2 per week    Concern about alcohol use:  No    Alcohol Treatment Readiness   Illicit Drug Use    Drug use:  never        Diet & Exercise:   Diet   What is your diet?:  Regular   How many servings a day of the following:   Fruits and Vegetables:  1-2 Meat:  1-2   Whole Grains:  1    Exercise    Do you currently exercise?:  yes    Frequency:  occasional    Type of exercise:  walking   golf        Cognitive Impairment Screening:   Depression screening preformed:  Yes Depression screen score:  0   Cognitive Impairment Screening    Do you have difficulty learning or retaining new information?:  No Do you have difficulty handling new tasks?:  No   Do you have difficulty with reasoning?:  No Do you have difficulty with spatial ability and orientation?:  No   Do you have difficulty with language?:  No Do you have difficulty with behavior?:  No       Functional Ability/Level of Safety:   Hearing    Hearing difficulties:  Yes Bilateral:  slightly decreased   Hearing Impairment Assessment    Current Activities    Status:  unlimited ADL's, unlimited driving, unlimited IADL's, unlimited social activities   Help needed with the folllowing:    Using the phone:  No Transportation:  No   Shopping:  No Preparing Meals:  No   Doing Housework:  No Doing Laundry:  No   Managing Medications:  No Managing Money:  No   ADL    Fall Risk   Have you fallen in the last 12 months?:  No Are you unsteady on your feet?:  No   Injury History       Home Safety:   Are there hazards in your environment?:  No   Home Safety Risk Factors   Unfamilar with surroundings:  No Uneven floors:  No   Stairs or handrail saftey risk:  No Loose rugs:  No   Household clutter:  No Poor household lighting:  No   No grab bars in bathroom:  Yes Further evaluation needed:  No       Advanced Directives:   Advanced Directives    Living Will:  Yes Durable POA for healthcare: Yes   Advanced directive:  Yes    Patient's End of Life Decisions    Reviewed with patient:  Yes        Urinary Incontinence:   Do you have urinary incontinence?:  No    Do you urinate frequently?:  No        Glaucoma:            Provider Screening     Preventative Screening/Counseling:   Cardiovascular Screening/Counseling:   (Labs Q5 years, EKG optional one-time)   General:  Screening Current           Diabetes Screening/Counseling:   (2 tests/year if Pre-Diabetes or 1 test/year if no Diabetes)   General:  Screening Current           Colorectal Cancer Screening/Counseling:   (FOBT Q1 yr; Flex Sig Q4 yrs or Q10 yrs after Screening Colonoscopy; Screening Colonoscpy Q2 yrs High Risk or Q10 yrs Low Risk; Barium Enema Q2 yrs High Risk or Q4 yrs Low Risk)   General:  Screening Current           Prostate Cancer Screening/Counseling:   (Annual)    General:  Screening Current          Breast Cancer Screening/Counseling:   (Baseline Age 28 - 43; Annual Age 36+)         Cervical Cancer Screening/Counseling:   (Annual for High Risk or Childbearing Age with Abnormal Pap in Last 3 yrs;  Every 2 all others)         Osteoporosis Screening/Counseling: (Every 2 Yrs if at risk or more if medically necessary)   General:  Screening Not Indicated Counseling:  Regular Weightbearing Exercise, Calcium and Vitamin D Intake          AAA Screening/Counseling:   (Once per Lifetime with risk factors)     Age over 72 (males only):  Yes          Glaucoma Screening/Counseling:   (Annual)   General:  Screening Current          HIV Screening/Counseling:   (Voluntary; Once annually for high risk OR 3 times for Pregnancy at diagnosis of IUP; 3rd trimester; and at Labor         Hepatitis C Screening:             Immunizations:   Influenza (annual): Influenza UTD This Year   Pneumococcal (Once in a Lifetime):  Lifetime Vaccine Completed   Hepatitis B Series (medium to high risk patients scheduled series):  Vaccine Status Unknown   Zostavax (Medicare D Coverage, Pt >66 yo):  Zostavax Vaccine UTD   TD (Non-Medicare Wellness  Visit required):  Vaccine Status Unknown   Tdap (Non-Medicare Wellness Visit required):   Tdap Vaccine UTD       Other Preventative Couseling (Non-Medicare Wellness Visit Required):   nutrition counseling performed, weight reduction was discussed       Referrals (Non-Medicare Wellness Visit Required):   Urology       Medical Equipment/Suppliers:

## 2018-06-21 LAB
CRYOGLOB RF SER-ACNC: ABNORMAL [IU]/ML
RHEUMATOID FACT SER QL LA: POSITIVE

## 2018-06-22 LAB
CCP IGA+IGG SERPL IA-ACNC: 7 UNITS (ref 0–19)
RYE IGE QN: NEGATIVE

## 2018-06-25 ENCOUNTER — TELEPHONE (OUTPATIENT)
Dept: INTERNAL MEDICINE CLINIC | Facility: CLINIC | Age: 68
End: 2018-06-25

## 2018-06-25 DIAGNOSIS — R76.8 RHEUMATOID FACTOR POSITIVE: Primary | ICD-10-CM

## 2018-06-25 DIAGNOSIS — E11.9 TYPE 2 DIABETES MELLITUS WITHOUT COMPLICATION, WITHOUT LONG-TERM CURRENT USE OF INSULIN (HCC): ICD-10-CM

## 2018-06-25 NOTE — TELEPHONE ENCOUNTER
No rheumatologist in campus, referred to one in [place] area  Can try diet, need to limit carbs (rice, bread, pasta, pizza, potatoes)  Need to start exercising daily  Can send info if you'd like  Would like to recheck labs in a few months, if no improvement, need to start medication  Printed

## 2018-06-25 NOTE — TELEPHONE ENCOUNTER
Lab results: rheumatoid factor is positive  Would like you to see rheumatology    Checked your sugars, you are diabetic  Would like to start medication such as metformin

## 2018-06-25 NOTE — TELEPHONE ENCOUNTER
Patient notified  States he would like referral to Rheumatology (near [address] [place] [address] or [facility])  Patient would like to know if he can start a diet before starting metformin medication  Apple Macias

## 2018-06-27 ENCOUNTER — TELEPHONE (OUTPATIENT)
Dept: INTERNAL MEDICINE CLINIC | Facility: CLINIC | Age: 68
End: 2018-06-27

## 2018-06-27 DIAGNOSIS — E11.9 TYPE 2 DIABETES MELLITUS WITHOUT COMPLICATION, WITHOUT LONG-TERM CURRENT USE OF INSULIN (HCC): Primary | ICD-10-CM

## 2018-06-27 NOTE — TELEPHONE ENCOUNTER
PT  CONTACTED TG BECAUSE HE WANTS NUTRITION CONSELLING FOR DIABETES  WANTS TO TRY DIET BEFORE HE TRIES MED  CAN YOU PUT IN AN ORDER FOR THAT? TG STATES IT IS [facility] NUTRITION COUNSELLING  IF YOU NEED HELP WITH THE ORDER YOU CAN EMAIL TG WAGNER AND SHE WILL SEND YOU A SCREEN SHOT

## 2018-07-13 ENCOUNTER — TELEPHONE (OUTPATIENT)
Dept: INTERNAL MEDICINE CLINIC | Facility: CLINIC | Age: 68
End: 2018-07-13

## 2018-07-13 DIAGNOSIS — R76.8 RHEUMATOID FACTOR POSITIVE: Primary | ICD-10-CM

## 2018-07-13 NOTE — TELEPHONE ENCOUNTER
PT  DIDN'T LIKE THE RATING OF THE TWO RHEUMATOLOGIST YOU GAVE HIM, SO HE SCHEDULED WITH DR Usman Eldrideg  HE WOULD LIKE YOU TO PUT IN A DOCTOR TO DOCTOR REFERRAL FOR HER AND NEEDS HIS LABS AND THE REFERRAL FAXED  [phone] [phone]

## 2018-08-08 ENCOUNTER — CLINICAL SUPPORT (OUTPATIENT)
Dept: NUTRITION | Facility: HOSPITAL | Age: 68
End: 2018-08-08
Payer: MEDICARE

## 2018-08-08 VITALS — BODY MASS INDEX: 32.63 KG/M2 | WEIGHT: 207.9 LBS | HEIGHT: 67 IN

## 2018-08-08 DIAGNOSIS — E11.9 TYPE 2 DIABETES MELLITUS WITHOUT COMPLICATION, WITHOUT LONG-TERM CURRENT USE OF INSULIN (HCC): ICD-10-CM

## 2018-08-08 PROCEDURE — 97802 MEDICAL NUTRITION INDIV IN: CPT

## 2018-08-08 NOTE — PROGRESS NOTES
Initial Nutrition Assessment Form    Patient Name: Royal Merino    YOB: 1950    Sex: Male     Assessment Date: 8/8/2018  Start Time: 10:15am Stop Time: 11:15am Total Minutes: 60min     Data:  Present at session: self   Patient Concerns: " I am here because of my sugar level and I want to have a good quality of life"   Medical Dx/Reason for Referral: E11 9   Past Medical History:   Diagnosis Date    Acute pancreatitis     last assessed - [id number]    BPH (benign prostatic hypertrophy)     Celiac disease     current GI w/u to r/o celiac    Cholelithiasis     Elevated blood pressure reading     last assessed - [id number]    Gallstones     last assessed - [id number]    High cholesterol     Hx of mitral valve disorder     Sarcoidosis     1990's Diagnosed 15 years ago by biopsy, not active; last assessed - [id number]    Diagnosed with T2DM about 6 weeks ago per patient   Current Outpatient Prescriptions   Medication Sig Dispense Refill    atorvastatin (LIPITOR) 20 mg tablet Take 1 tablet (20 mg total) by mouth daily 90 tablet 1    Cholecalciferol (VITAMIN D-3) 1000 units CAPS Take by mouth daily      finasteride (PROSCAR) 5 mg tablet Take 5 mg by mouth daily after dinner        Glucosamine-Chondroitin (MOVE FREE PO) Take 2 tablets by mouth daily      metFORMIN (GLUCOPHAGE) 500 mg tablet Take 1 tablet (500 mg total) by mouth 2 (two) times a day with meals 180 tablet 0    Multiple Vitamin (MULTIVITAMIN) tablet Take 1 tablet by mouth daily      Omega-3 Fatty Acids (OMEGA 3 PO) Take 1 capsule by mouth daily       No current facility-administered medications for this visit        Lab Results   Component Value Date    HGBA1C 7 3 (H) 06/20/2018     Vit D, 25-Hydroxy 30 0 - 100 0 ng/mL 25 6   L         Additional Meds/Supplements: reviewed   Special Learning Needs: None   Height: HC Readings from Last 3 Encounters:   No data found for [facility] [place]       Weight: Wt Readings from Last 3 Encounters:   06/20/18 100 kg (221 lb 6 4 oz)   06/19/17 94 5 kg (208 lb 6 1 oz)   06/05/17 92 5 kg (204 lb)   Estimated body mass index is 33 66 kg/m² as calculated from the following:    Height as of 6/20/18: 5' 8" (1 727 m)  Weight as of 6/20/18: 100 kg (221 lb 6 4 oz)  Recent Weight Change: [x]Yes     []No  Amount: 4lb weight loss in >12 months      Energy Needs: No calculation needed   No Known Allergies    History   Alcohol Use    Yes     Comment: wine, 3-4x/week; social drinker       History   Smoking Status    Never Smoker   Smokeless Tobacco    Never Used       Who shops? spouse   Who cooks? spouse   Exercise: Walking, 2-5x week   Prior Counseling? [x]Yes     []No  When: April 2017 with TANNER OLIVAREZ RD Celiac/Gluten    Why: Celiac/Gluten free        Diet Hx:  Breakfast: Energy Bar (24grm pro, 24grm CHO)  12 oz Coffee and half n half    Lunch:  8oz Iced Tea  4 slices lunch meat(deli ham)         Dinner: 4 Chicken Strips(breaded0 with sauce and mozzarella  Cauliflower Casserole  6oz Red Wine          Snacks: Healthy Choice Ice Cream Bar,water        Nutrition Diagnosis:   Excess carbohydrate intake  related to Food and nutrition related knowledge deficit concerning appropriate amount of carbohydrate intake as  evidenced by Hemoglobin A1C >6%       Medical Nutrition Therapy Intervention:  [x]Individualized Meal Plan 45-60 grm CHO breakfast/lunch, 45-75 CHO grm Dinner, 15-30 CHO grm Snacks []Understanding Lab Values   [x]Basic Pathophysiology of Disease []Food/Medication Interactions   [x]Food Diary []Exercise   [x]Lifestyle/Behavior Modification Techniques []Medication, Mechanism of Action   [x]Label Reading []Self Blood Glucose Monitoring-Pt states was not told to check blood sugars    [x]Weight/BMI Goals-10%(187lb) [x]Other - Provided AND T2DM Nutrition Therapy , Provided Contact information for [address] [place] [address] for DSMT Classes   Other Notes:Noted Vit D <30, reviewed Vitamin D 3 to take 2000 IU daily   Miguel Murphy had nutrition education with this RD 4/2017 for Gluten Free Nutrition Plan and reviewed how to incorporate this into his CHO Counting        Comprehension: []Excellent  []Very Good  [x]Good  []Fair   []Poor    Receptivity: []Excellent  []Very Good  [x]Good  []Fair   []Poor    Expected Compliance: []Excellent  []Very Good  [x]Good  []Fair   []Poor        Goals: 1  Acheive HgbA1C <7 2 in 3 months(11/18)   2  Patient will be able to recall CHO Meal Plan to RD at next encounter   3  Patient will have 1-2 lb weight loss/week until goal weight achieved (187lb)        Labs:  CMP  Lab Results   Component Value Date     06/15/2018    K 4 2 06/15/2018     06/15/2018    CO2 28 06/15/2018    ANIONGAP 7 06/15/2018    BUN 20 06/15/2018    CREATININE 0 89 06/15/2018    GLUCOSE 128 04/11/2017    GLUF 190 (H) 06/15/2018    CALCIUM 8 6 06/15/2018    AST 20 06/15/2018    ALT 37 06/15/2018    ALKPHOS 83 06/15/2018    PROT 7 1 06/15/2018    BILITOT 0 90 06/15/2018    EGFR 87 06/15/2018       BMP  Lab Results   Component Value Date    GLUCOSE 128 04/11/2017    CALCIUM 8 6 06/15/2018     06/15/2018    K 4 2 06/15/2018    CO2 28 06/15/2018     06/15/2018    BUN 20 06/15/2018    CREATININE 0 89 06/15/2018       Lipids  Lab Results   Component Value Date    CHOL 143 06/15/2018    CHOL 148 06/19/2017    CHOL 129 07/22/2016     Lab Results   Component Value Date    HDL 67 (H) 06/15/2018    HDL 67 (H) 06/19/2017    HDL 60 07/22/2016     Lab Results   Component Value Date    LDLCALC 62 06/15/2018    LDLCALC 61 06/19/2017    LDLCALC 52 07/22/2016     Lab Results   Component Value Date    TRIG 70 06/15/2018    TRIG 100 06/19/2017    TRIG 86 07/22/2016     No components found for: CHOLHDL    Hemoglobin A1C  Lab Results   Component Value Date    HGBA1C 7 3 (H) 06/20/2018       Fasting Glucose  Lab Results   Component Value Date    GLUF 190 (H) 06/15/2018       Insulin     Thyroid  No results found for: TSH, H6WBIJN, V4GJHVZ, [facility]    Hepatic Function Panel  Lab Results   Component Value Date    ALT 37 06/15/2018    AST 20 06/15/2018    ALKPHOS 83 06/15/2018    BILITOT 0 90 06/15/2018       Celiac Disease Antibody Panel  Endomysial IgA   Date Value Ref Range Status   03/13/2017 Positive (A) Negative Final     Gliadin IgA   Date Value Ref Range Status   03/13/2017 >133 (H) 0 - 19 units Final     Comment:                        Negative                   0 - 19                     Weak Positive             20 - 30                     Moderate to Strong Positive   >30     Gliadin IgG   Date Value Ref Range Status   03/13/2017 65 (H) 0 - 19 units Final     Comment:                        Negative                   0 - 19                     Weak Positive             20 - 30                     Moderate to Strong Positive   >30     IgA   Date Value Ref Range Status   03/13/2017 346 61 - 437 mg/dL Final     Tissue Transglut Ab IGG   Date Value Ref Range Status   03/13/2017 <2 0 - 5 U/mL Final     Comment:                                   Negative        0 - 5                                Weak Positive   6 - 9                                Positive           >9     TISSUE TRANSGLUTAMINASE IGA   Date Value Ref Range Status   03/13/2017 >100 (H) 0 - 3 U/mL Final     Comment:                                   Negative        0 -  3                                Weak Positive   4 - 10                                Positive           >10   Tissue Transglutaminase (tTG) has been identified   as the endomysial antigen  Studies have demonstr-   ated that endomysial IgA antibodies have over 99%   specificity for gluten sensitive enteropathy        Iron  No results found for: IRON, TIBC, FERRITIN    Vitamins  No results found for: VITAMIN B2   No results found for: NICOTINAMIDE, NICOTINIC ACID   No results found for: VITAMINB6  No results found for: MAIMQXIF89  No results found for: VITB5  No results found for: T8CNRAZX  No results found for: THYROGLB  No results found for: VITAMIN K   No results found for: 25-HYDROXY VIT D   No results found for: [address] [facility], [place] [id number]

## 2018-08-10 DIAGNOSIS — E11.9 TYPE 2 DIABETES MELLITUS WITHOUT COMPLICATION, WITHOUT LONG-TERM CURRENT USE OF INSULIN (HCC): Primary | ICD-10-CM

## 2018-08-13 ENCOUNTER — OFFICE VISIT (OUTPATIENT)
Dept: DIABETES SERVICES | Facility: CLINIC | Age: 68
End: 2018-08-13
Payer: MEDICARE

## 2018-08-13 DIAGNOSIS — E11.9 TYPE 2 DIABETES MELLITUS WITHOUT COMPLICATION, WITHOUT LONG-TERM CURRENT USE OF INSULIN (HCC): ICD-10-CM

## 2018-08-13 DIAGNOSIS — E11.9 DIABETES MELLITUS WITHOUT COMPLICATION (HCC): Primary | ICD-10-CM

## 2018-08-13 PROCEDURE — G0108 DIAB MANAGE TRN  PER INDIV: HCPCS

## 2018-08-13 NOTE — PROGRESS NOTES
Type 2 Diabetes Class Assessment    HPI: Met with Yuki Wells for DSME Initial visit  Geena Crews has Type 2 Diabetes  Diabetes Assessment  Visit Type: Initial visit  Present at Session: patient   Special Learning Needs: No  Barriers to Learning: no barriers    How do you feel about making lifestyle changes at this time? Limiting carbs  How would you rate your current knowledge of diabetes? fair  How confident are you that will be able to take better control of your diabetes?: excellent    How long have you had diabetes? 2 months ago  Have you had diabetes education in the past?: No  Do you have any family members with diabetes?: No  Do you monitor your blood sugar? no  Type of blood sugar monitor:none  How old is your meter?:   How often do you test your blood sugars?:  Do you keep a written record of your blood sugars? Blood sugar log with patient today and reviewed by educator?:   Blood Sugar ranges:    Fasting:    Before meals:   2 hours after meals:   Any financial concerns pertaining to your diabetes supplies, medication or care?: No  Have you ever experienced hypoglycemia?:  No  Have you ever been hospitalized or gone to the ER due to your blood sugars?: No  How do you treat low blood sugars?:  How do you treat high blood sugars? Do you wear a Diabetes I D ?: no  Where do you dispose of your sharps (needles,lancetes)?:    Ht Readings from Last 1 Encounters:   08/08/18 5' 7" (1 702 m)     Wt Readings from Last 3 Encounters:   08/08/18 94 3 kg (207 lb 14 4 oz)   06/20/18 100 kg (221 lb 6 4 oz)   06/19/17 94 5 kg (208 lb 6 1 oz)        There is no height or weight on file to calculate BMI       Lab Results   Component Value Date    HGBA1C 7 3 (H) 06/20/2018       Lab Results   Component Value Date    CHOL 143 06/15/2018    CHOL 148 06/19/2017    CHOL 129 07/22/2016     Lab Results   Component Value Date    HDL 67 (H) 06/15/2018    HDL 67 (H) 06/19/2017    HDL 60 07/22/2016     Lab Results   Component Value Date    LDLCALC 62 06/15/2018    LDLCALC 61 06/19/2017    LDLCALC 52 07/22/2016     Lab Results   Component Value Date    TRIG 70 06/15/2018    TRIG 100 06/19/2017    TRIG 86 07/22/2016     No components found for: CHOLHDL  No results found for: Jack Gutiérreztheresa    Weight Change:Yes yes    Diet Assessment    Do you follow any special diet presently?: Yes  Who cooks at home?:  spouse  Who does the grocery shopping?: spouse   How frequently do you eat out?: 2 -3 times a week    Activity Assessment    Exercise: golfs 2-3 times a week, averages 22 000 miles a week    Lifestyle/Social Assessment    Racial/ethnic group:                                       Primary Language: English  Marital Status:   Education Level: Some College (No Degree)  Work status: Retired  Type of job and hours:   Who lives in your household?: spouse and children  Who is you primary support person(s): spouse   Describe your quality of life currently?: excellent  Any concerns for your safety?: No  Any Sikhism or cultural practices that may affect your diabetes care: No  Do you have a decrease or loss of hearing?: No  Do you have a decrease or loss of vision?: No  When was the last time you had an ophthalmology exam?: 6 months ago  When was the last time you had dental exam?: scheduled in 1 week  Do you check your feet for cracks, sores, debris?: No  When was the last time you had podiatry or foot exam?: never  Last flu shot?: February 2018   Pneumonia shot?: No      The patient's history was reviewed and updated as appropriate: allergies, current medications  Intervention    Diabetes Overview :   Jed Valenzuela was instructed on basic concepts of diabetes, including identifying role of diabetes self management, basic pathophysiology and types of diabetes, A1c and blood sugar targets  Jed Valenzuela has good understanding of material covered  Taking Medications:  Instructed patient on action, side effects, efficacy, prescribed dosage and appropriate timing and frequency of administration of his diabetes medication  Johanna Yan has good understanding of material covered  Monitoring Blood Sugars  Instructions for Meter Teaching- Patient instructed in the following:  Site selection and skin preparation, Loading strips and lancet device, meter activation, obtaining blood sample, test strip and lancet disposal and recording log book entries  Patient has good understanding of material covered and was able to test their own blood sugar in office today  Comments: Gave and instructed on  Contour next one meter  I demonstrated the meter use and application of a control solutions  There were no test strips available  I will request his doctor to sent a script to his pharmacy  Testing frequency: Encouraged pair testing  Test sugars before a meal and 2hr after the same meal, rotating between breakfast, lunch, and dinner  Test sugars twice a day (3 days a week, 7 days a week)  Goal Blood Sugars:   Premeal , even better <110  2hr after a meal <180, even better <140  A1C <7%, even better <6 5%  Hypoglycemia: Instructed patient on definition/risk of hypoglycemia, treatment, causes/symptoms, when to notify provider of lows, prevention of hypoglycemia and exercise precautions  Comments: Max Schaumann understanding of hypoglycemia concepts      Physical Activity: Discussed benefits of physical activity to optimize blood glucose control, encouraged activity at patient is physically able   Always consult a physician prior to starting an exercise program   Comments: Max Schaumann understanding of hypoglycemia concepts        Diabetes Education Record  Johanna Yan received the following handouts: hypo/hyperglycemia, diabetes guidelines       Patient response to instruction    Comprehensionexcellent  Motivationexcellent  Expected Complianceexcellent    Thank you for referring your patient to [facility], it was a pleasure working with them today  Please feel free to call with any questions or concerns      Seema Adler RN  [address] [place] [address],  [address] [facility] [phone]

## 2018-08-13 NOTE — PATIENT INSTRUCTIONS
Attend Living well classes in September  Test glucose at different times before and after meals, fasting and a beddtime  Do every other day

## 2018-08-14 DIAGNOSIS — E11.9 TYPE 2 DIABETES MELLITUS WITHOUT COMPLICATION, WITHOUT LONG-TERM CURRENT USE OF INSULIN (HCC): Primary | ICD-10-CM

## 2018-08-14 RX ORDER — LANCETS
EACH MISCELLANEOUS DAILY
Qty: 100 EACH | Refills: 1 | Status: SHIPPED | OUTPATIENT
Start: 2018-08-14 | End: 2019-06-11 | Stop reason: ALTCHOICE

## 2018-08-18 DIAGNOSIS — E11.9 TYPE 2 DIABETES MELLITUS WITHOUT COMPLICATION, WITHOUT LONG-TERM CURRENT USE OF INSULIN (HCC): ICD-10-CM

## 2018-09-04 ENCOUNTER — OFFICE VISIT (OUTPATIENT)
Dept: DIABETES SERVICES | Facility: CLINIC | Age: 68
End: 2018-09-04
Payer: MEDICARE

## 2018-09-04 DIAGNOSIS — E11.9 DIABETES MELLITUS WITHOUT COMPLICATION (HCC): Primary | ICD-10-CM

## 2018-09-04 PROCEDURE — G0109 DIAB MANAGE TRN IND/GROUP: HCPCS

## 2018-09-05 ENCOUNTER — APPOINTMENT (OUTPATIENT)
Dept: RADIOLOGY | Age: 68
End: 2018-09-05
Payer: MEDICARE

## 2018-09-05 ENCOUNTER — TRANSCRIBE ORDERS (OUTPATIENT)
Dept: ADMINISTRATIVE | Age: 68
End: 2018-09-05

## 2018-09-05 ENCOUNTER — APPOINTMENT (OUTPATIENT)
Dept: LAB | Age: 68
End: 2018-09-05
Payer: MEDICARE

## 2018-09-05 ENCOUNTER — CLINICAL SUPPORT (OUTPATIENT)
Dept: NUTRITION | Facility: HOSPITAL | Age: 68
End: 2018-09-05
Payer: MEDICARE

## 2018-09-05 VITALS — HEIGHT: 68 IN | BODY MASS INDEX: 31.02 KG/M2 | WEIGHT: 204.7 LBS

## 2018-09-05 DIAGNOSIS — E11.9 TYPE 2 DIABETES MELLITUS WITHOUT COMPLICATION, WITHOUT LONG-TERM CURRENT USE OF INSULIN (HCC): ICD-10-CM

## 2018-09-05 DIAGNOSIS — M05.79 SEROPOSITIVE RHEUMATOID ARTHRITIS OF MULTIPLE SITES (HCC): ICD-10-CM

## 2018-09-05 DIAGNOSIS — M05.79 SEROPOSITIVE RHEUMATOID ARTHRITIS OF MULTIPLE SITES (HCC): Primary | ICD-10-CM

## 2018-09-05 LAB
ALBUMIN SERPL BCP-MCNC: 3.9 G/DL (ref 3.5–5)
ALP SERPL-CCNC: 86 U/L (ref 46–116)
ALT SERPL W P-5'-P-CCNC: 29 U/L (ref 12–78)
ANION GAP SERPL CALCULATED.3IONS-SCNC: 8 MMOL/L (ref 4–13)
AST SERPL W P-5'-P-CCNC: 19 U/L (ref 5–45)
BASOPHILS # BLD AUTO: 0.03 THOUSANDS/ΜL (ref 0–0.1)
BASOPHILS NFR BLD AUTO: 1 % (ref 0–1)
BILIRUB SERPL-MCNC: 0.94 MG/DL (ref 0.2–1)
BUN SERPL-MCNC: 24 MG/DL (ref 5–25)
CALCIUM SERPL-MCNC: 9.2 MG/DL (ref 8.3–10.1)
CHLORIDE SERPL-SCNC: 104 MMOL/L (ref 100–108)
CO2 SERPL-SCNC: 26 MMOL/L (ref 21–32)
CREAT SERPL-MCNC: 0.89 MG/DL (ref 0.6–1.3)
CRP SERPL QL: <3 MG/L
EOSINOPHIL # BLD AUTO: 0.08 THOUSAND/ΜL (ref 0–0.61)
EOSINOPHIL NFR BLD AUTO: 2 % (ref 0–6)
ERYTHROCYTE [DISTWIDTH] IN BLOOD BY AUTOMATED COUNT: 12.7 % (ref 11.6–15.1)
GFR SERPL CREATININE-BSD FRML MDRD: 87 ML/MIN/1.73SQ M
GLUCOSE SERPL-MCNC: 158 MG/DL (ref 65–140)
HCT VFR BLD AUTO: 42.1 % (ref 36.5–49.3)
HCV AB SER QL: NORMAL
HGB BLD-MCNC: 14.1 G/DL (ref 12–17)
IMM GRANULOCYTES # BLD AUTO: 0.02 THOUSAND/UL (ref 0–0.2)
IMM GRANULOCYTES NFR BLD AUTO: 0 % (ref 0–2)
LYMPHOCYTES # BLD AUTO: 1.3 THOUSANDS/ΜL (ref 0.6–4.47)
LYMPHOCYTES NFR BLD AUTO: 27 % (ref 14–44)
MCH RBC QN AUTO: 31.5 PG (ref 26.8–34.3)
MCHC RBC AUTO-ENTMCNC: 33.5 G/DL (ref 31.4–37.4)
MCV RBC AUTO: 94 FL (ref 82–98)
MONOCYTES # BLD AUTO: 0.43 THOUSAND/ΜL (ref 0.17–1.22)
MONOCYTES NFR BLD AUTO: 9 % (ref 4–12)
NEUTROPHILS # BLD AUTO: 2.96 THOUSANDS/ΜL (ref 1.85–7.62)
NEUTS SEG NFR BLD AUTO: 61 % (ref 43–75)
NRBC BLD AUTO-RTO: 0 /100 WBCS
PLATELET # BLD AUTO: 276 THOUSANDS/UL (ref 149–390)
PMV BLD AUTO: 10 FL (ref 8.9–12.7)
POTASSIUM SERPL-SCNC: 4.1 MMOL/L (ref 3.5–5.3)
PROT SERPL-MCNC: 8 G/DL (ref 6.4–8.2)
RBC # BLD AUTO: 4.47 MILLION/UL (ref 3.88–5.62)
SODIUM SERPL-SCNC: 138 MMOL/L (ref 136–145)
WBC # BLD AUTO: 4.82 THOUSAND/UL (ref 4.31–10.16)

## 2018-09-05 PROCEDURE — 73130 X-RAY EXAM OF HAND: CPT

## 2018-09-05 PROCEDURE — 86480 TB TEST CELL IMMUN MEASURE: CPT

## 2018-09-05 PROCEDURE — 36415 COLL VENOUS BLD VENIPUNCTURE: CPT

## 2018-09-05 PROCEDURE — 86803 HEPATITIS C AB TEST: CPT

## 2018-09-05 PROCEDURE — 86140 C-REACTIVE PROTEIN: CPT

## 2018-09-05 PROCEDURE — 97803 MED NUTRITION INDIV SUBSEQ: CPT

## 2018-09-05 PROCEDURE — 80053 COMPREHEN METABOLIC PANEL: CPT

## 2018-09-05 PROCEDURE — 86707 HEPATITIS BE ANTIBODY: CPT

## 2018-09-05 PROCEDURE — 85025 COMPLETE CBC W/AUTO DIFF WBC: CPT

## 2018-09-05 PROCEDURE — 73630 X-RAY EXAM OF FOOT: CPT

## 2018-09-05 NOTE — PROGRESS NOTES
Follow-Up Nutrition Assessment Form    Patient Name: Ankita Schmitz    YOB: 1950    Sex: Male      Follow Up Date: 9/5/2018  Start Time: 2:00pm Stop Time: 2:30pm Total Minutes: 30min     Data:  Present at session: self   Parent Concerns: "I am finding myfitnesspal helpful and I am doing better with my carbohydrate portions"   Medical Dx/Reason for Referral: E11 9   Past Medical History:   Diagnosis Date    Acute pancreatitis     last assessed - [id number]    BPH (benign prostatic hypertrophy)     Celiac disease     current GI w/u to r/o celiac    Cholelithiasis     Elevated blood pressure reading     last assessed - [id number]    Gallstones     last assessed - [id number]    High cholesterol     Hx of mitral valve disorder     Sarcoidosis     1990's Diagnosed 15 years ago by biopsy, not active; last assessed - [id number]       Current Outpatient Prescriptions   Medication Sig Dispense Refill    atorvastatin (LIPITOR) 20 mg tablet Take 1 tablet (20 mg total) by mouth daily 90 tablet 1    Cholecalciferol (VITAMIN D-3) 1000 units CAPS Take by mouth daily      finasteride (PROSCAR) 5 mg tablet Take 5 mg by mouth daily after dinner        Glucosamine-Chondroitin (MOVE FREE PO) Take 2 tablets by mouth daily      glucose blood (CONTOUR NEXT TEST) test strip Check sugars daily or as instructed 100 each 1    metFORMIN (GLUCOPHAGE) 500 mg tablet TAKE 1 TABLET BY MOUTH TWO  TIMES DAILY WITH MEALS 180 tablet 0    MICROLET LANCETS MISC by Does not apply route daily 100 each 1    Multiple Vitamin (MULTIVITAMIN) tablet Take 1 tablet by mouth daily      Omega-3 Fatty Acids (OMEGA 3 PO) Take 1 capsule by mouth daily       No current facility-administered medications for this visit           Additional Meds/Supplements: reviewed   Barriers to Learning: None   Labs: HGBA1C 7 3 (H) 06/20/2018        Height: Ht Readings from Last 3 Encounters:   09/05/18 5' 8" (1 727 m)   08/08/18 5' 7" (1 702 m)   06/20/18 5' 8" (1 727 m)      Weight: Wt Readings from Last 3 Encounters:   09/05/18 92 9 kg (204 lb 11 2 oz)   08/08/18 94 3 kg (207 lb 14 4 oz)   06/20/18 100 kg (221 lb 6 4 oz)     Body mass index is 31 12 kg/m²  Wt  Change Since Last Visit: [x]Yes     []No  Amount: -14 lb past 10 weeks      Energy Needs: No calculation needed   Pain Screen: Are you having pain now? No      Goals Achieved:     New Goals: 1  Acheive HgbA1C <7 2 in 3 months(11/18)   2  Patient will limit fat intake to 50 grams daily or less  3 Patient will have 1-2 lb weight loss/week until goal weight achieved (187lb)       Initial PES: Excess carbohydrate intake  related to Food and nutrition related knowledge deficit concerning appropriate amount of carbohydrate intake as  evidenced by Hemoglobin A1C >6%      New PES:N/A No Change      New Problem List:  1  Noted high fat intake 60-90 grams daily via food journal       Assessment:  Valentino Thomson attended DSMT Class as requested   FSBS 135-175 mg/dl  Myfitnesspal reviewed with patient         Medical Nutrition Therapy Intervention:  [x]Individualized Meal Plan-45-60 grm CHO breakfast/lunch, 45-75 CHO grm Dinner, 15-30 CHO grm Snacks []Understanding Lab Values   []Basic Pathophysiology of Disease []Food/Medication Interactions   [x]Food Diary []Exercise   []Lifestyle/Behavior Modification Techniques []Medication, Mechanism of Action   [x]Label Reading [x]Self Blood Glucose Monitoring- FSBS 135-175 mg/dl   Weight Goal- 10%(187lb)    Other Notes:        Comprehension: []Excellent  []Very Good  [x]Good  []Fair   []Poor    Receptivity: []Excellent  []Very Good  [x]Good  []Fair   []Poor    Expected Compliance: []Excellent  []Very Good  [x]Good  []Fair   []Poor      Labs:  CMP  Lab Results   Component Value Date     09/05/2018    K 4 1 09/05/2018     09/05/2018    CO2 26 09/05/2018    ANIONGAP 3 (L) 02/24/2015    BUN 24 09/05/2018    CREATININE 0 89 09/05/2018    GLUCOSE 139 02/24/2015    GLUF 190 (H) 06/15/2018    CALCIUM 9 2 09/05/2018    AST 19 09/05/2018    ALT 29 09/05/2018    ALKPHOS 86 09/05/2018    PROT 7 7 02/24/2015    BILITOT 1 0 02/24/2015    EGFR 87 09/05/2018       BMP  Lab Results   Component Value Date    GLUCOSE 139 02/24/2015    CALCIUM 9 2 09/05/2018     09/05/2018    K 4 1 09/05/2018    CO2 26 09/05/2018     09/05/2018    BUN 24 09/05/2018    CREATININE 0 89 09/05/2018       Lipids  Lab Results   Component Value Date    CHOL 140 02/24/2015    CHOL 140 12/17/2013     Lab Results   Component Value Date    HDL 67 (H) 06/15/2018    HDL 67 (H) 06/19/2017    HDL 60 07/22/2016     Lab Results   Component Value Date    LDLCALC 62 06/15/2018    LDLCALC 61 06/19/2017    LDLCALC 52 07/22/2016     Lab Results   Component Value Date    TRIG 70 06/15/2018    TRIG 100 06/19/2017    TRIG 86 07/22/2016     No components found for: CHOLHDL    Hemoglobin A1C  Lab Results   Component Value Date    HGBA1C 7 3 (H) 06/20/2018       Fasting Glucose  Lab Results   Component Value Date    GLUF 190 (H) 06/15/2018       Insulin     Thyroid  No results found for: TSH, U4KIRSC, O8RJUDC, THYROIDAB    Hepatic Function Panel  Lab Results   Component Value Date    ALT 29 09/05/2018    AST 19 09/05/2018    ALKPHOS 86 09/05/2018    BILITOT 1 0 02/24/2015       Celiac Disease Antibody Panel  Endomysial IgA   Date Value Ref Range Status   03/13/2017 Positive (A) Negative Final     Gliadin IgA   Date Value Ref Range Status   03/13/2017 >133 (H) 0 - 19 units Final     Comment:                        Negative                   0 - 19                     Weak Positive             20 - 30                     Moderate to Strong Positive   >30     Gliadin IgG   Date Value Ref Range Status   03/13/2017 65 (H) 0 - 19 units Final     Comment:                        Negative                   0 - 19                     Weak Positive             20 - 30                     Moderate to Strong Positive   >30     IgA   Date Value Ref Range Status   03/13/2017 346 61 - 437 mg/dL Final     Tissue Transglut Ab IGG   Date Value Ref Range Status   03/13/2017 <2 0 - 5 U/mL Final     Comment:                                   Negative        0 - 5                                Weak Positive   6 - 9                                Positive           >9     TISSUE TRANSGLUTAMINASE IGA   Date Value Ref Range Status   03/13/2017 >100 (H) 0 - 3 U/mL Final     Comment:                                   Negative        0 -  3                                Weak Positive   4 - 10                                Positive           >10   Tissue Transglutaminase (tTG) has been identified   as the endomysial antigen  Studies have demonstr-   ated that endomysial IgA antibodies have over 99%   specificity for gluten sensitive enteropathy        Iron  No results found for: IRON, TIBC, FERRITIN    Vitamins  No results found for: VITAMIN B2   No results found for: NICOTINAMIDE, NICOTINIC ACID   No results found for: VITAMINB6  No results found for: [id number]  No results found for: VITB5  No results found for: E0LURIEY  No results found for: THYROGLB  No results found for: VITAMIN K   No results found for: 25-HYDROXY VIT D   No results found for: VITAMINE     Follow Up October 10,2018    Minnie Floyd  [address] [place] [phone]

## 2018-09-05 NOTE — PROGRESS NOTES
Living Well with Diabetes Group Class #1    Jonas Amanuel attended the Living Well with Diabetes Group Class #1  Topics Covered in class today include: What is diabetes; Types of Diabetes; How Diabetes is diagnosed; Management skills; the role of exercise in blood sugar managements, Home glucose monitoring, and target ranges  Cailin Farley participated in group activities    The patient's history was reviewed and updated as appropriate: allergies, current medications  Lab Results   Component Value Date    HGBA1C 7 3 (H) 06/20/2018       Diabetes Education Record  Oscartarsha Farley was provided Living Well with Diabetes Class #1 book      Patient response to instruction    Comprehensiongood  Motivationgood  Expected Compliancegood    Begin Time: 6 pm  End Time: 8 pm  Referring Provider: Dr Sophy Soto    Thank you for referring your patient to [facility], it was a pleasure working with them today  Please feel free to call with any questions or concerns      Ad Sanchez, RN  [facility] [id number]

## 2018-09-07 LAB
ANNOTATION COMMENT IMP: NORMAL
GAMMA INTERFERON BACKGROUND BLD IA-ACNC: 0.13 IU/ML
HBV E AB SERPL QL IA: NEGATIVE
M TB IFN-G BLD-IMP: NEGATIVE
M TB IFN-G CD4+ BCKGRND COR BLD-ACNC: 0.18 IU/ML
M TB IFN-G CD4+ T-CELLS BLD-ACNC: 0.31 IU/ML
MITOGEN IGNF BLD-ACNC: 7.49 IU/ML
QUANTIFERON-TB GOLD IN TUBE: NORMAL
SERVICE CMNT-IMP: NORMAL

## 2018-09-12 ENCOUNTER — OFFICE VISIT (OUTPATIENT)
Dept: DIABETES SERVICES | Facility: CLINIC | Age: 68
End: 2018-09-12
Payer: MEDICARE

## 2018-09-12 DIAGNOSIS — E11.9 TYPE 2 DIABETES MELLITUS WITHOUT COMPLICATION, WITHOUT LONG-TERM CURRENT USE OF INSULIN (HCC): Primary | ICD-10-CM

## 2018-09-12 PROCEDURE — G0109 DIAB MANAGE TRN IND/GROUP: HCPCS | Performed by: DIETITIAN, REGISTERED

## 2018-09-13 NOTE — PROGRESS NOTES
Living Well with Diabetes Group Class #2    Izabel Nicolas attended the Living Well with Diabetes Group Class #2  During class, Tristian Siddiqi was instructed on the following topics: Macronutrients, Carbohydrate sources, What one serving of carbohydrate equals, effects of diet on blood glucose levels, effect of carbohydrates on blood glucose levels, basics of meal planning: balance, portions, meal times, measurements, reading food labels to determine carbohydrates  Tristian Siddiqi participated in group activities of reading labels together  Tristian Siddiqi will follow up with class #3  Will call with any questions or concerns prior to next session  The patient's history was reviewed and updated as appropriate: allergies, current medications  Lab Results   Component Value Date    HGBA1C 7 3 (H) 06/20/2018       Diabetes Education Record  Tristian Siddiqi was provided Living Well with Diabetes Class #2 book, portions booklet, fiber handout      Patient response to instruction    Comprehensiongood  Motivationgood  Expected Compliancegood    Start- Stop: 1:00-3:00  Total Minutes: 120 Minutes  Group or Individual Instruction: DSMT-G2  Other: Boo Lawler MD    Thank you for referring your patient to [facility], it was a pleasure working with them today  Please feel free to call with any questions or concerns      Zaida Alcocer  [address] [facility], [address] [facility] [phone]

## 2018-09-14 ENCOUNTER — OFFICE VISIT (OUTPATIENT)
Dept: FAMILY MEDICINE CLINIC | Facility: CLINIC | Age: 68
End: 2018-09-14
Payer: MEDICARE

## 2018-09-14 VITALS
SYSTOLIC BLOOD PRESSURE: 120 MMHG | RESPIRATION RATE: 16 BRPM | BODY MASS INDEX: 32 KG/M2 | DIASTOLIC BLOOD PRESSURE: 70 MMHG | HEART RATE: 68 BPM | HEIGHT: 67 IN | WEIGHT: 203.9 LBS

## 2018-09-14 DIAGNOSIS — E11.9 TYPE 2 DIABETES MELLITUS WITHOUT COMPLICATION, WITHOUT LONG-TERM CURRENT USE OF INSULIN (HCC): Primary | ICD-10-CM

## 2018-09-14 DIAGNOSIS — E55.9 VITAMIN D DEFICIENCY: ICD-10-CM

## 2018-09-14 DIAGNOSIS — R76.8 RHEUMATOID FACTOR POSITIVE: ICD-10-CM

## 2018-09-14 DIAGNOSIS — K90.0 ADULT CELIAC DISEASE: ICD-10-CM

## 2018-09-14 DIAGNOSIS — L30.8 OTHER ECZEMA: ICD-10-CM

## 2018-09-14 DIAGNOSIS — E78.2 MIXED HYPERLIPIDEMIA: ICD-10-CM

## 2018-09-14 DIAGNOSIS — N40.1 BENIGN PROSTATIC HYPERPLASIA WITH URINARY FREQUENCY: ICD-10-CM

## 2018-09-14 DIAGNOSIS — Z23 NEED FOR PROPHYLACTIC VACCINATION AND INOCULATION AGAINST INFLUENZA: ICD-10-CM

## 2018-09-14 DIAGNOSIS — R35.0 BENIGN PROSTATIC HYPERPLASIA WITH URINARY FREQUENCY: ICD-10-CM

## 2018-09-14 DIAGNOSIS — R97.20 ELEVATED PROSTATE SPECIFIC ANTIGEN (PSA): ICD-10-CM

## 2018-09-14 PROBLEM — L30.9 ECZEMA: Status: ACTIVE | Noted: 2018-09-14

## 2018-09-14 PROCEDURE — 99205 OFFICE O/P NEW HI 60 MIN: CPT | Performed by: FAMILY MEDICINE

## 2018-09-14 PROCEDURE — G0008 ADMIN INFLUENZA VIRUS VAC: HCPCS

## 2018-09-14 PROCEDURE — 90662 IIV NO PRSV INCREASED AG IM: CPT

## 2018-09-14 RX ORDER — CHOLECALCIFEROL (VITAMIN D3) 25 MCG
CAPSULE ORAL
Refills: 0
Start: 2018-09-14

## 2018-09-14 RX ORDER — BETAMETHASONE DIPROPIONATE 0.5 MG/G
CREAM TOPICAL DAILY
Qty: 30 G | Refills: 1 | Status: SHIPPED | OUTPATIENT
Start: 2018-09-14 | End: 2019-02-22 | Stop reason: SDUPTHER

## 2018-09-14 NOTE — PROGRESS NOTES
ASSESSMENT/PLAN:     type 2 diabetes  Patient currently on metformin 500 twice a day and has been on it less than 3 months   He is currently in diabetic teaching classes  He will continue his excellent weight loss already down 18 lb   I will see him in 2 months and he will have an A1c done prior and we will go from there     celiac disease  Patient now knows that he cannot substitute gluten fee products in for wheat as these are high in simple carbs and will make his sugars go high   Patient had any GI symptoms until the gluten was taking care of  Now he has none     Hyperlipidemia  Patient is on atorvastatin and diet  He also needs this because of his diabetes     Elevated PSA / BPH  Patient follows with Urology  He is on Proscar so we need to double his PSA whenever we see it     Patient has rheumatic factor positive/osteoarthritis  Reviewed with patient all his hand x-rays and foot x-rays none of which show inflammation   He also has gelling in the morning and no signs either clinically or physically of rheumatoid arthritis  He will follow up with rheumatoid tall just most likely not be diagnosed with  Rheumatoid arthritis  I am not really sure     eczema elbows  Patient complaining of this for a few months  He will put dip lean on it every day after washing and once the bumps go away he will not use it except if it comes back      vitamin-D deficiency   Continue under to vitamin-D we will check this value next visit    On the way out patient was reminded about back pain  Ask him to reschedule in a week or so so we can properly check this        Patient to come back in 2 months   A1c urine for microalbumin and vitamin-D prior  Flu shot today       Health Maintenance   Topic Date Due    Diabetic Foot Exam  04/22/1960    DM Eye Exam  04[birth year]22/1959    URINE MICROALBUMIN  04/22/1959    INFLUENZA VACCINE  09/01/2018    HEMOGLOBIN A1C  12/20/2018    Fall Risk  06/20/2019    Depression Screening PHQ  06/20/2019    Medicare Annual Wellness Visit (AWV)  06/20/2019    CRC Screening: Colonoscopy  03/01/2020    DTaP,Tdap,and Td Vaccines (2 - Td) 03/08/2023    Pneumococcal PPSV23/PCV13 65+ Years / Low and Medium Risk  Completed         Problem List as of 9/14/2018 Reviewed: 9/14/2018  9:38 AM by Inez Chou DO    Adult celiac disease    Last Assessment & Plan 6/20/2018 Office Visit Written 6/20/2018  9:02 AM by Pa Rene MD     On gluten free diet  Off PPI  Benign prostatic hyperplasia with urinary frequency    Last Assessment & Plan 6/20/2018 Office Visit Written 6/20/2018  9:01 AM by Pa Rene MD     Sees Urology yearly, on finasteride  Class 1 obesity due to excess calories with serious comorbidity and body mass index (BMI) of 31 0 to 31 9 in adult    Elevated prostate specific antigen (PSA)    Hyperbilirubinemia    Liver cyst    Mitral valve disorder    Mixed hyperlipidemia    Last Assessment & Plan 6/20/2018 Office Visit Written 6/20/2018  9:01 AM by Pa Rene MD     Lipids at goal, on statin  Other specified disorders of rotator cuff syndrome of shoulder and allied disorders    Pulmonary nodule seen on imaging study    Rheumatoid factor positive    Symptomatic cholelithiasis    Type 2 diabetes mellitus without complication, without long-term current use of insulin [facility])    Last Assessment & Plan 6/20/2018 Office Visit Written 6/20/2018  9:01 AM by Pa Rene MD     Fasting glucose was 190, check A1c  Discussed low carb diet  Vitamin D deficiency    Last Assessment & Plan 6/20/2018 Office Visit Written 6/20/2018  9:01 AM by Pa Rene MD     Increase D3 during fall/winter months  Subjective:   Chief Complaint   Patient presents with    Np to be established    Lower back pain and leg pain     Patient is presenting here as a new patient    He was recently diagnosed less than 3 months ago but type 2 diabetes and started on metformin 500 mg twice daily  Once he found his diagnosis he started losing weight on his own and is already down 18 lb! He has celiac disease so he has cut out wheat but he has used the substitute which he soon found out caused his sugars to go high  He has already attended to diabetic class  His 1st A1c was 7 3  Patient had no symptoms of uncontrolled diabetes    Decides to celiac disease which was causing GI distress, now the GI distress is gone since he cut blue and now he also had a positive rheumatoid factor    He has recently seen the rheumatologist who did x-rays and will see him back  Patient has morning stiffness and pain but it only last a few minutes    Patient also follows with Urology for elevated PSA and BPH    He has high cholesterol and is on atorvastatin for that  Also has low vitamin-D intake          patient ID: Barron Juarez is a 70 y o  male  Past Medical History:   Diagnosis Date    Acute pancreatitis     last assessed - [id number]    BPH (benign prostatic hypertrophy)     Celiac disease     current GI w/u to r/o celiac    Cholelithiasis     Elevated blood pressure reading     last assessed - [id number]    Gallstones     last assessed - [id number]    High cholesterol     Hx of mitral valve disorder     Sarcoidosis     1990's Diagnosed 15 years ago by biopsy, not active; last assessed - [id number]     Past Surgical History:   Procedure Laterality Date    ERCP N/A 4/3/2017    Procedure: ENDOSCOPIC RETROGRADE CHOLANGIOPANCREATOGRAPHY (ERCP); Surgeon: Makayla Rosario MD;  [facility]; Service:     TX COLONOSCOPY FLX DX W/COLLJ SPEC WHEN PFRMD N/A 3/1/2017    Procedure: EGD AND COLONOSCOPY;  Surgeon: Anahi Lundberg MD;  [facility]; Service: Gastroenterology    TX ESOPHAGOGASTRODUODENOSCOPY TRANSORAL DIAGNOSTIC N/A 6/5/2017    Procedure: ESOPHAGOGASTRODUODENOSCOPY (EGD); Surgeon: Makayla Rosario MD;  [facility];   Service: Gastroenterology    TX LAP,CHOLECYSTECTOMY N/A 3/29/2017    Procedure: CHOLECYSTECTOMY LAPAROSCOPIC; POSSIBLE OPEN;  Surgeon: Jose Kevin MD;  [facility];  Service: General; complicated by bile leak and pancreatitis s/p stent     ROTATOR CUFF REPAIR Right 04/29/2015    for rotator cuff tear, Onset: 4/14     Family History   Problem Relation Age of Onset    Diabetes Mother         Diabetes Mellitus    Hypertension Mother     Ovarian cancer Mother     Rheumatologic disease Mother     Diabetes Father         Diabetes Mellitus    Heart disease Father     Hypertension Father     Diabetes Sister         Diabetes Mellitus    Other Sister         H  pylori infection    Hypertension Sister     Other Brother         H  pylori infection    Alcohol abuse Neg Hx     Substance Abuse Neg Hx     Mental illness Neg Hx     Depression Neg Hx      Social History   Substance Use Topics    Smoking status: Never Smoker    Smokeless tobacco: Never Used    Alcohol use Yes      Comment: wine, 3-4x/week; social drinker     History   Smoking Status    Never Smoker   Smokeless Tobacco    Never Used        MED LIST WAS REVIEWED AND UPDATED       ROS    Constitutional  No fever chills no fatigue no weight loss no weight gain    Mental status  No anxiety or depression and no sleep disturbances no changes in personality or emotional problems no suicidal or homicidal ideations    Eyes  No eye pain no red eyes no visual disturbances no discharge no eye itch    ENT  No earache no hearing loss nasal discharge no sore throat no hoarseness no postnasal drip     Cardio  No chest pain no palpitations no leg edema no claudication no dyspnea on exertion no nocturnal dyspnea    Respiratory  No shortness of breath or wheeze no cough no orthopnea no dyspnea on exertion no hemoptysis no sputum production    GI  No abdominal pain no nausea no vomiting no diarrhea or constipation no bloody stools no change in bowel habits no change in weight      no dysuria hematuria no pyuria no incontinence no pelvic pain    Musculoskeletal  No myalgias arthralgias no joint swelling or stiffness no limb pain or swelling    Skin  No rashes or lesions no itchiness and no wounds    Neuro  No headache dizziness lightheadedness syncope numbness paresthesias or confusion    Heme  No swollen glands no easy bruising          Objective:      VITALS:  Wt Readings from Last 3 Encounters:   09/14/18 92 5 kg (203 lb 14 4 oz)   09/05/18 92 9 kg (204 lb 11 2 oz)   08/08/18 94 3 kg (207 lb 14 4 oz)     BP Readings from Last 3 Encounters:   09/14/18 120/70   06/20/18 130/68   06/19/17 122/68     Pulse Readings from Last 3 Encounters:   09/14/18 68   06/20/18 59   06/19/17 60     Body mass index is 31 7 kg/m²      Laboratory Results:   Lab Results   Component Value Date    WBC 4 82 09/05/2018    WBC 6 11 06/15/2018    WBC 8 38 04/11/2017    HGB 14 1 09/05/2018    HGB 14 3 06/15/2018    HGB 14 1 04/11/2017    HCT 42 1 09/05/2018    HCT 41 3 06/15/2018    HCT 40 8 04/11/2017    MCV 94 09/05/2018    MCV 92 06/15/2018    MCV 91 04/11/2017     09/05/2018     06/15/2018     (H) 04/11/2017     Lab Results   Component Value Date     09/05/2018     06/15/2018     06/19/2017    K 4 1 09/05/2018    K 4 2 06/15/2018    K 4 5 06/19/2017     09/05/2018     06/15/2018     06/19/2017    CO2 26 09/05/2018    CO2 28 06/15/2018    CO2 28 06/19/2017    BUN 24 09/05/2018    BUN 20 06/15/2018    BUN 14 06/19/2017     Lab Results   Component Value Date    GLUCOSE 139 02/24/2015    ALT 29 09/05/2018    AST 19 09/05/2018    ALKPHOS 86 09/05/2018    EGFR 87 09/05/2018    CALCIUM 9 2 09/05/2018     No results found for: TSH    Lipid Profile:   Lab Results   Component Value Date    CHOL 140 02/24/2015    CHOL 140 12/17/2013     Lab Results   Component Value Date    HDL 67 (H) 06/15/2018    HDL 67 (H) 06/19/2017    HDL 60 07/22/2016     Lab Results   Component Value Date    LDLCALC 62 06/15/2018    LDLCALC 61 06/19/2017    LDLCALC 52 07/22/2016     Lab Results   Component Value Date    TRIG 70 06/15/2018    TRIG 100 06/19/2017    TRIG 86 07/22/2016       Diabetic labs (if applicable)  Lab Results   Component Value Date    HGBA1C 7 3 (H) 06/20/2018     Lab Results   Component Value Date    GLUF 190 (H) 06/15/2018    LDLCALC 62 06/15/2018    CREATININE 0 89 09/05/2018          Physical Exam    Gen  No acute distress well-appearing well-nourished appears stated age    Mental status  Good judgment and insight oriented to time person and place, recent and remote memory intact mood and affect normal cooperative and patient is reasonable    HEENT  PERRLA 3 mm, EOMI without nystagmus, TMs clear, turbinates open pink no exudate, pharynx benign, tongue midline    Neck   supple no masses trachea midline positive click normal carotid upstrokes with no bruits    Cor  Regular rhythm without ectopy or murmur, no S3-S4, normal palpation that is no heave lift or thrill    Vascular  No edema, good pedal pulses    Lungs  CTA bilaterally in no respiratory distress no wheezes rhonchi or rales, normal to palpation no tactile fremitus    Abdomen  Soft, no palpable masses, no hepatosplenomegaly, normal bowel sounds, nontender    Lymphatics  No palpable nodes in the neck, supraclavicular area, axilla, or groin     Musculoskeletal  No clubbing cyanosis or edema muscle tone normal    Skin  no rashes or abnormal appearing lesions    Neuro  Normal ambulation, cranial nerves 2-12 grossly intact, higher functioning with reasoning intact

## 2018-09-14 NOTE — PATIENT INSTRUCTIONS
Summary for today:    1  Finish her diabetes classes    2  Continue metformin 500 with breakfast and 500 with evening meal, this cannot make your sugar go too low    3  Follow with Rheumatology but chances are you do not have rheumatoid arthritis which is really good    4  Please see me for your back problems and week or so    5  Otherwise I will see you in 2 months with nonfasting blood work 1 week prior    6  For her elbows rub the Diprolene cream in once a day after washing and this should clear out the bump should go away  He could stop it when they go away and started when the itch comes back

## 2018-09-17 ENCOUNTER — OFFICE VISIT (OUTPATIENT)
Dept: FAMILY MEDICINE CLINIC | Facility: CLINIC | Age: 68
End: 2018-09-17
Payer: MEDICARE

## 2018-09-17 VITALS
BODY MASS INDEX: 31.97 KG/M2 | WEIGHT: 203.7 LBS | HEART RATE: 72 BPM | RESPIRATION RATE: 16 BRPM | HEIGHT: 67 IN | SYSTOLIC BLOOD PRESSURE: 138 MMHG | DIASTOLIC BLOOD PRESSURE: 70 MMHG

## 2018-09-17 DIAGNOSIS — M54.41 ACUTE RIGHT-SIDED LOW BACK PAIN WITH RIGHT-SIDED SCIATICA: Primary | ICD-10-CM

## 2018-09-17 DIAGNOSIS — Z00.00 MEDICARE ANNUAL WELLNESS VISIT, SUBSEQUENT: ICD-10-CM

## 2018-09-17 PROCEDURE — G0438 PPPS, INITIAL VISIT: HCPCS | Performed by: FAMILY MEDICINE

## 2018-09-17 PROCEDURE — 99214 OFFICE O/P EST MOD 30 MIN: CPT | Performed by: FAMILY MEDICINE

## 2018-09-17 NOTE — PROGRESS NOTES
Assessment and Plan:    Problem List Items Addressed This Visit     None        Health Maintenance Due   Topic Date Due    Diabetic Foot Exam  04/22/1960    DM Eye Exam  04[birth year]22/1960    URINE MICROALBUMIN  04/22/1959         HPI:  Wiley Luis is a 70 y o  male here for his Initial Wellness Visit  Patient Active Problem List   Diagnosis    Symptomatic cholelithiasis    Hyperbilirubinemia    Mixed hyperlipidemia    Class 1 obesity due to excess calories with serious comorbidity and body mass index (BMI) of 31 0 to 31 9 in adult    Pulmonary nodule seen on imaging study    Vitamin D deficiency    Mitral valve disorder    Other specified disorders of rotator cuff syndrome of shoulder and allied disorders    Adult celiac disease    Benign prostatic hyperplasia with urinary frequency    Elevated prostate specific antigen (PSA)    Liver cyst    Type 2 diabetes mellitus without complication, without long-term current use of insulin (HCC)    Rheumatoid factor positive    Eczema     Past Medical History:   Diagnosis Date    Acute pancreatitis     last assessed - [id number]    BPH (benign prostatic hypertrophy)     Celiac disease     current GI w/u to r/o celiac    Cholelithiasis     Elevated blood pressure reading     last assessed - [id number]    Gallstones     last assessed - [id number]    High cholesterol     Hx of mitral valve disorder     Sarcoidosis     1990's Diagnosed 15 years ago by biopsy, not active; last assessed - [id number]     Past Surgical History:   Procedure Laterality Date    ERCP N/A 4/3/2017    Procedure: ENDOSCOPIC RETROGRADE CHOLANGIOPANCREATOGRAPHY (ERCP); Surgeon: Porfirio Pulido MD;  [facility]; Service:     AL COLONOSCOPY FLX DX W/COLLJ SPEC WHEN PFRMD N/A 3/1/2017    Procedure: EGD AND COLONOSCOPY;  Surgeon: Clara Cruz MD;  [facility];   Service: Gastroenterology    AL ESOPHAGOGASTRODUODENOSCOPY TRANSORAL DIAGNOSTIC N/A 6/5/2017    Procedure: ESOPHAGOGASTRODUODENOSCOPY (EGD); Surgeon: Ashwin Julian MD;  [facility];   Service: Gastroenterology    RI LAP,CHOLECYSTECTOMY N/A 3/29/2017    Procedure: CHOLECYSTECTOMY LAPAROSCOPIC; POSSIBLE OPEN;  Surgeon: Lauren Aparicio MD;  [facility];  Service: General; complicated by bile leak and pancreatitis s/p stent     ROTATOR CUFF REPAIR Right 04/29/2015    for rotator cuff tear, Onset: 4/14     Family History   Problem Relation Age of Onset    Diabetes Mother         Diabetes Mellitus    Hypertension Mother     Ovarian cancer Mother     Rheumatologic disease Mother     Diabetes Father         Diabetes Mellitus    Heart disease Father     Hypertension Father     Diabetes Sister         Diabetes Mellitus    Other Sister         H  pylori infection    Hypertension Sister     Other Brother         H  pylori infection    Alcohol abuse Neg Hx     Substance Abuse Neg Hx     Mental illness Neg Hx     Depression Neg Hx      History   Smoking Status    Never Smoker   Smokeless Tobacco    Never Used     History   Alcohol Use    Yes     Comment: wine, 3-4x/week; social drinker      History   Drug Use No     Comment: Denied history of drug use       Current Outpatient Prescriptions   Medication Sig Dispense Refill    atorvastatin (LIPITOR) 20 mg tablet Take 1 tablet (20 mg total) by mouth daily 90 tablet 1    betamethasone dipropionate (DIPROSONE) 0 05 % cream Apply topically daily To your elbows and rub in well 30 g 1    Cholecalciferol (VITAMIN D-3) 1000 units CAPS 2 daily  0    finasteride (PROSCAR) 5 mg tablet Take 5 mg by mouth daily after dinner        Glucosamine-Chondroitin (MOVE FREE PO) Take 2 tablets by mouth daily      glucose blood (CONTOUR NEXT TEST) test strip Check sugars daily or as instructed 100 each 1    metFORMIN (GLUCOPHAGE) 500 mg tablet TAKE 1 TABLET BY MOUTH TWO  TIMES DAILY WITH MEALS 180 tablet 0    MICROLET LANCETS MISC by Does not apply route daily 100 each 1    Multiple Vitamin (MULTIVITAMIN) tablet Take 1 tablet by mouth daily      Omega-3 Fatty Acids (OMEGA 3 PO) Take 1 capsule by mouth daily       No current facility-administered medications for this visit  No Known Allergies  Immunization History   Administered Date(s) Administered    Influenza Split High Dose Preservative Free IM 12/11/2015    Influenza TIV (IM) 12/04/2013, 10/14/2016    Influenza, high dose seasonal 0 5 mL 09/14/2018    Pneumococcal Conjugate 13-Valent 06/11/2015    Pneumococcal Polysaccharide PPV23 06/04/2014    Tdap 03/08/2013    Zoster 02/22/2013       Patient Care Team:  Dulce Maria Banuelos DO as PCP - General (Family Medicine)  MD Dulce Maria Linda DO (Family Medicine)    Medicare Screening Tests and Risk Assessments:  Jonny Sanches is here for his Subsequent Wellness visit  Health Risk Assessment:  Patient rates overall health as very good  Patient feels that their physical health rating is Same  Eyesight was rated as Same  Hearing was rated as Same  Patient feels that their emotional and mental health rating is Same  Pain experienced by patient in the last 7 days has been A lot  Patient's pain rating has been 5/10  Patient states that he has experienced no weight loss or gain in last 6 months  Emotional/Mental Health:  Patient has been feeling nervous/anxious  PHQ-9 Depression Screening:    Frequency of the following problems over the past two weeks:      1  Little interest or pleasure in doing things: 0 - not at all      2  Feeling down, depressed, or hopeless: 0 - not at all  PHQ-2 Score: 0          Broken Bones/Falls: Fall Risk Assessment:    In the past year, patient has experienced: No history of falling in past year          Bladder/Bowel:  Patient has not leaked urine accidently in the last six months  Patient reports no loss of bowel control  Immunizations:  Patient has had a flu vaccination within the last year  Patient has received a pneumonia shot  Patient has received a shingles shot  Patient has received tetanus/diphtheria shot  Home Safety:  Patient does not have trouble with stairs inside or outside of their home  Patient currently reports that there are no safety hazards present in home, working smoke alarms, no working carbon monoxide detectors  Preventative Screenings:   prostate cancer screen performed, colon cancer screen completed, cholesterol screen completed, glaucoma eye exam completed,     Nutrition:  Current diet: Regular and Limited junk food with servings of the following:    Medications:  Patient is currently taking over-the-counter supplements  List of OTC medications includes: Vitamins and minerals  Patient is able to manage medications  Lifestyle Choices:  Patient reports no tobacco use  Patient has not smoked or used tobacco in the past   Patient reports alcohol use  Alcohol use per week: 1-2  Patient drives a vehicle  Patient wears seat belt  Current level of exercise of physical activity described by patient as: yes  Activities of Daily Living:  Can get out of bed by his or her self, able to dress self, able to make own meals, able to do own shopping, able to bathe self, can do own laundry/housekeeping, can manage own money, pay bills and track expenses    Previous Hospitalizations:  No hospitalization or ED visit in past 12 months        Advanced Directives:  Patient has decided on a power of   Patient has spoken to designated power of   Patient has completed advanced directive          Preventative Screening/Counseling:      Cardiovascular:      General: Screening Current          Diabetes:      General: Screening Current          Colorectal Cancer:      General: Screening Current          Prostate Cancer:      General: Screening Current          Osteoporosis:      General: Screening Not Indicated          AAA:      General: Screening Not Indicated          HIV:      General: Screening Not Indicated          Hepatitis C:      General: Screening Not Indicated        Advanced Directives:   Patient has living will for healthcare, End of life assessment reviewed with patient  Additional Comments: Patient will bring it in so we can copy    Immunizations:      Influenza: Influenza UTD This Year      Pneumococcal: Lifetime Vaccine Completed      Shingrix: Risks & Benefits Discussed      Hepatitis B (Low risk patients): Series Not Indicated      Zostavax: Zostavax Vaccine UTD      TD: Risks & Benefits Discussed      TDAP: Risks & Benefits Discussed      Other Preventative Counseling (Non-Medicare):   Fall Prevention

## 2018-09-17 NOTE — PROGRESS NOTES
Assessment/Plan:    Low back pain with right-sided sciatica  Patient will see physical therapy for evaluation and treatment  It is not that bothersome but just coming in the last month  Subjective:   Kim Garcia is a 70 y o male  Chief Complaint   Patient presents with    Right leg pain    Medicare Wellness Visit     For about the last month patient gets pain that seems to change and is never consistent  Is always on his right leg, sometimes it is medial thigh, sometimes when he sits radiates down his back of his thigh to his calf  Sometimes it stabbing sometimes it is numb in the buttocks  Sometimes it is stabbing in the leg  Sometimes he feels weakness when he goes up the steps  He has not been seen for anybody for this  He has no trauma to the area  It never bothers him when he is golfing any golfed a lot    Seems to happen after Iftikhar Gonzalez        Past Medical History:   Diagnosis Date    Acute pancreatitis     last assessed - [id number]    BPH (benign prostatic hypertrophy)     Celiac disease     current GI w/u to r/o celiac    Cholelithiasis     Elevated blood pressure reading     last assessed - [id number]    Gallstones     last assessed - [id number]    High cholesterol     Hx of mitral valve disorder     Sarcoidosis     1990's Diagnosed 15 years ago by biopsy, not active; last assessed - [id number]     Social History   Substance Use Topics    Smoking status: Never Smoker    Smokeless tobacco: Never Used    Alcohol use Yes      Comment: wine, 3-4x/week; social drinker     Family History   Problem Relation Age of Onset    Diabetes Mother         Diabetes Mellitus    Hypertension Mother     Ovarian cancer Mother     Rheumatologic disease Mother     Diabetes Father         Diabetes Mellitus    Heart disease Father     Hypertension Father     Diabetes Sister         Diabetes Mellitus    Other Sister         H  pylori infection    Hypertension Sister     Other Brother         H  pylori infection    Alcohol abuse Neg Hx     Substance Abuse Neg Hx     Mental illness Neg Hx     Depression Neg Hx        MEDICATIONS REVIEWED AND UPDATED    Rest Of 10 Point Review Of System Negative    Objective:    Vitals:    09/17/18 1403   BP: 138/70   Pulse: 72   Resp: 16     Body mass index is 31 9 kg/m²  Physical Exam    General  Patient in no acute distress, well appearing, well nourished and appears stated age    Mental status  Good judgment and insight, oriented to time person and place, recent and remote memory is intact, mood and affect are normal, cooperative, and patient is reasonable      Lumbar spine  Straighten lordosis  Full range of motion  Good heel walking good toe walking  Motor equal bilaterally +5/5  DTRs equal Bless 2/4 no focal sensory signs

## 2018-09-18 ENCOUNTER — OFFICE VISIT (OUTPATIENT)
Dept: DIABETES SERVICES | Facility: CLINIC | Age: 68
End: 2018-09-18
Payer: MEDICARE

## 2018-09-18 DIAGNOSIS — E11.8 TYPE 2 DIABETES MELLITUS WITH COMPLICATION, WITHOUT LONG-TERM CURRENT USE OF INSULIN (HCC): Primary | ICD-10-CM

## 2018-09-18 PROCEDURE — G0109 DIAB MANAGE TRN IND/GROUP: HCPCS | Performed by: DIETITIAN, REGISTERED

## 2018-09-19 ENCOUNTER — EVALUATION (OUTPATIENT)
Dept: PHYSICAL THERAPY | Age: 68
End: 2018-09-19
Payer: MEDICARE

## 2018-09-19 DIAGNOSIS — M54.41 ACUTE RIGHT-SIDED LOW BACK PAIN WITH RIGHT-SIDED SCIATICA: Primary | ICD-10-CM

## 2018-09-19 PROCEDURE — 97161 PT EVAL LOW COMPLEX 20 MIN: CPT | Performed by: PHYSICAL THERAPIST

## 2018-09-19 PROCEDURE — G8991 OTHER PT/OT GOAL STATUS: HCPCS | Performed by: PHYSICAL THERAPIST

## 2018-09-19 PROCEDURE — G8990 OTHER PT/OT CURRENT STATUS: HCPCS | Performed by: PHYSICAL THERAPIST

## 2018-09-19 NOTE — PROGRESS NOTES
PT Evaluation     Today's date: 2018  Patient name: Armando Berry  : 1950  MRN: [id number]  Referring provider: Elieser Durbin DO  Dx:   Encounter Diagnosis     ICD-10-CM    1  Acute right-sided low back pain with right-sided sciatica M54 41                   Assessment  Impairments: abnormal or restricted ROM, impaired physical strength, lacks appropriate home exercise program, pain with function and poor posture   Functional limitations: IADLs; Recreational activitiesPatient presents with symptom irritability no  Assessment details: Patient is a 70 y o  y/o male referred to PT by Dr Ole Cox with a dx of low back pain with sciatica  Patient reports onset of pain complaints occurred approximately 1 month ago for no apparent reason  The pain complaints are aggravated by prolonged sitting and forward benidng  He denies bowel and bladder abnormalities and displays no evidence of neurologically mediated weakness  No other referral appears necessary at this time  Understanding of Dx/Px/POC: good   Prognosis: good    Goals  Short Term goals - 4 weeks  1  Patient will be independent and compliant with a HEP  2   Patient will report a 50% decrease in pain complaints  Long Term goals - 8 weeks  1  Patient will report elimination of pain complaints  2   Patient will return to all IADLs without restriction  3   Patient will return to all recreational activities without restriction      Plan  Patient would benefit from: skilled physical therapy  Referral necessary: No  Planned modality interventions: cryotherapy and traction  Planned therapy interventions: joint mobilization, manual therapy, patient education, postural training, neuromuscular re-education, therapeutic exercise and home exercise program  Frequency: 2x week  Duration in visits: 8  Duration in weeks: 4  Plan of Care beginning date: 2018  Plan of Care expiration date: 10/17/2018  Treatment plan discussed with: patient        Subjective Evaluation    History of Present Illness  Date of onset: 2018  Mechanism of injury: Insidious onset of R LB/LE pain to calf  Not a recurrent problem   Quality of life: good    Pain  Current pain ratin  At best pain ratin  At worst pain ratin  Location: R LB/LE L5/S1 pattern  Quality: discomfort  Relieving factors: change in position (Walking)  Aggravating factors: sitting  Progression: no change    Social Support    Employment status: not working  Treatments  Previous treatment: medication  Current treatment: physical therapy  Patient Goals  Patient goals for therapy: independence with ADLs/IADLs, increased strength, return to sport/leisure activities, decreased pain and increased motion          Objective     Special Questions  Positive for disturbed sleep  Negative for night pain, bladder dysfunction, bowel dysfunction and saddle (S4) numbness    Neurological Testing     Sensation     Lumbar   Left   Intact: light touch    Right   Intact: light touch    Reflexes   Left   Clonus sign: negative    Right   Clonus sign: negative    Active Range of Motion     Lumbar   Flexion: Active lumbar flexion: 50% limited  Extension: Active lumbar extension: 50% limited  with pain    Additional Active Range of Motion Details  R side glide 25% limited    Strength/Myotome Testing     Left Hip   Planes of Motion   Flexion: 5    Right Hip   Planes of Motion   Flexion: 5    Left Knee   Flexion: 5  Extension: 5    Right Knee   Flexion: 4  Extension: 5    Left Ankle/Foot   Dorsiflexion: 5  Plantar flexion: 5    Right Ankle/Foot   Dorsiflexion: 5  Plantar flexion: 5    Tests       Thoracic   Negative slump  Lumbar   Negative slump  Left   Negative crossed SLR and passive SLR  Right   Negative crossed SLR and passive SLR  Right Pelvic Girdle/Sacrum   Negative: sacrum compression, gapping and sacral spring         Flowsheet Rows      Most Recent Value   PT/OT G-Codes   Current Score  57   Projected Score  70   FOTO information reviewed  Yes   Assessment Type  Evaluation   G code set  Other PT/OT Primary   Other PT Primary Current Status ()  CK   Other PT Primary Goal Status ()  CJ          Precautions: R RTC repair    Daily Treatment Diary     Manual                                                                                   Exercise Diary              Treadmill             R side glide             Press up hips shifted L                                                                                                                                                                                                                                              Modalities

## 2018-09-20 NOTE — PROGRESS NOTES
Living Well with Diabetes Group Class #3    Lucian Buck attended the Living Well with Diabetes Group Class #3  During class, Flores Arnold was instructed on the following topics: Oral and injectable medications, short term complications of diabetes, long term complications of diabetes, prevention of complications, foot care, sick day management, stress management, and traveling with diabetes  Sergogia Arnold participated in group activities  Flores Arnold will follow up with class #4  Will call with any questions or concerns prior to next session  The patient's history was reviewed and updated as appropriate: allergies, current medications  Lab Results   Component Value Date    HGBA1C 7 3 (H) 06/20/2018       Diabetes Education Record  Flores Arnold was provided Living Well with Diabetes Class #3 book      Patient response to instruction    Comprehensionvery good  Motivationvery good  Expected Compliancevery good    Begin Time: 6pm  End Time: 8pm  Referring Provider:Dr Darcel Brunner MD  Thank you for referring your patient to [facility], it was a pleasure working with them today  Please feel free to call with any questions or concerns      Huey Thao  [address] [address]

## 2018-09-20 NOTE — PATIENT INSTRUCTIONS
Class Assessment AVS    You are scheduled to attend Living Well with Diabetes Classes startinpm at [address]  Please bring a copy of your blood sugar log and pen with you to class  Testing frequency: as per PCP    Goal Blood Sugars:   Premeal , even better <110  2hr after a meal <180, even better <140  A1C <7%, even better <6 5%  Thank you for coming to the [facility] for education today  Please feel free to call with any questions or concerns      Huey Thao  [address] [facility] [address]

## 2018-09-24 ENCOUNTER — OFFICE VISIT (OUTPATIENT)
Dept: PHYSICAL THERAPY | Age: 68
End: 2018-09-24
Payer: MEDICARE

## 2018-09-24 DIAGNOSIS — M54.41 ACUTE RIGHT-SIDED LOW BACK PAIN WITH RIGHT-SIDED SCIATICA: Primary | ICD-10-CM

## 2018-09-24 PROCEDURE — 97110 THERAPEUTIC EXERCISES: CPT | Performed by: PHYSICAL THERAPIST

## 2018-09-24 PROCEDURE — 97140 MANUAL THERAPY 1/> REGIONS: CPT | Performed by: PHYSICAL THERAPIST

## 2018-09-24 NOTE — PROGRESS NOTES
Daily Note     Today's date: 2018  Patient name: Cam Bosworth  : 1950  MRN: [id number]  Referring provider: Kaity Ferrell DO  Dx: No diagnosis found  Subjective: Patient reports he has had less pain in his legs      Objective: See treatment diary below  Precautions none    Specialty Daily Treatment Diary     Manual         R L4/5 upa Gr IV                                           Exercise Diary         Recumbent bike 8m       R side glide 10x2       Press up 10x2                                                                                                                                                   Modalities                                        Assessment: Tolerated treatment well  Patient exhibited good technique with therapeutic exercises      Plan: Continue per plan of care

## 2018-09-26 ENCOUNTER — OFFICE VISIT (OUTPATIENT)
Dept: DIABETES SERVICES | Facility: CLINIC | Age: 68
End: 2018-09-26
Payer: MEDICARE

## 2018-09-26 DIAGNOSIS — E11.9 TYPE 2 DIABETES MELLITUS WITHOUT COMPLICATION, WITHOUT LONG-TERM CURRENT USE OF INSULIN (HCC): Primary | ICD-10-CM

## 2018-09-26 PROCEDURE — G0109 DIAB MANAGE TRN IND/GROUP: HCPCS | Performed by: DIETITIAN, REGISTERED

## 2018-09-28 ENCOUNTER — APPOINTMENT (OUTPATIENT)
Dept: LAB | Age: 68
End: 2018-09-28
Payer: MEDICARE

## 2018-09-28 ENCOUNTER — OFFICE VISIT (OUTPATIENT)
Dept: PHYSICAL THERAPY | Age: 68
End: 2018-09-28
Payer: MEDICARE

## 2018-09-28 DIAGNOSIS — M05.79 SEROPOSITIVE RHEUMATOID ARTHRITIS OF MULTIPLE SITES (HCC): ICD-10-CM

## 2018-09-28 DIAGNOSIS — M54.41 ACUTE RIGHT-SIDED LOW BACK PAIN WITH RIGHT-SIDED SCIATICA: Primary | ICD-10-CM

## 2018-09-28 LAB
HBV CORE AB SER QL: NORMAL
HBV SURFACE AG SER QL: NORMAL

## 2018-09-28 PROCEDURE — 86704 HEP B CORE ANTIBODY TOTAL: CPT

## 2018-09-28 PROCEDURE — 36415 COLL VENOUS BLD VENIPUNCTURE: CPT

## 2018-09-28 PROCEDURE — 97110 THERAPEUTIC EXERCISES: CPT | Performed by: PHYSICAL THERAPIST

## 2018-09-28 PROCEDURE — 87340 HEPATITIS B SURFACE AG IA: CPT

## 2018-09-28 NOTE — PROGRESS NOTES
Daily Note     Today's date: 2018  Patient name: Natasha Parson  : 1950  MRN: [id number]  Referring provider: Isaura Bosch DO  Dx:   Encounter Diagnosis     ICD-10-CM    1  Acute right-sided low back pain with right-sided sciatica M54 41                   Subjective: Reports continued improvement in LB and LE pain complaints  Objective: See treatment diary below  Precautions none    Specialty Daily Treatment Diary     Manual        R L4/5 upa Gr IV Grade IV                                          Exercise Diary        Recumbent bike 8m 12m      R side glide 10x2 10x2      Press up 10x2 10x2      bridges  5s x20      Bridge with march  2x10      Wall squats  5s 2x10                                                                                                                          Modalities                                        Assessment: Able to progress to strengthening without incidence of LB or leg pain  Plan: Continue per plan of care

## 2018-10-08 ENCOUNTER — OFFICE VISIT (OUTPATIENT)
Dept: PHYSICAL THERAPY | Age: 68
End: 2018-10-08
Payer: MEDICARE

## 2018-10-08 DIAGNOSIS — M54.41 ACUTE RIGHT-SIDED LOW BACK PAIN WITH RIGHT-SIDED SCIATICA: Primary | ICD-10-CM

## 2018-10-08 PROCEDURE — 97110 THERAPEUTIC EXERCISES: CPT

## 2018-10-08 NOTE — PROGRESS NOTES
Daily Note     Today's date: 10/8/2018  Patient name: Oscar Ferris  : 1950  MRN: [id number]  Referring provider: Sindi Mcghee DO  Dx:   Encounter Diagnosis     ICD-10-CM    1  Acute right-sided low back pain with right-sided sciatica M54 41                   Subjective: Reports he had bout of R LE pain due to poor sitting from buttock issue while on vacation but pain has improved since return  Does have some R radicular pain present in R LE to start tx      Objective: See treatment diary below  Precautions none    Specialty Daily Treatment Diary     Manual  9/24 9/28 10/8     R L4/5 upa Gr IV Grade IV                                          Exercise Diary  9/24 9/28 10/8     Recumbent bike 8m 12m 12m     R side glide 10x2 10x2 2x10     Press up 10x2 10x2 3x10     bridges  5s x20 [id number]     Bridge with march  2x10 2x10     Wall squats  5s 2x10 2x10     Prone single &alt UE/LE lifts   2x15                                                                                                                 Modalities                                        Assessment: Able to progress to strengthening without incidence of LB or leg pain  Abolished pain felt in posterior leg on R with press ups/mobs    Plan: Continue per plan of care

## 2018-10-10 ENCOUNTER — OFFICE VISIT (OUTPATIENT)
Dept: PHYSICAL THERAPY | Age: 68
End: 2018-10-10
Payer: MEDICARE

## 2018-10-10 ENCOUNTER — CLINICAL SUPPORT (OUTPATIENT)
Dept: NUTRITION | Facility: HOSPITAL | Age: 68
End: 2018-10-10
Payer: MEDICARE

## 2018-10-10 VITALS — HEIGHT: 67 IN | WEIGHT: 200.3 LBS | BODY MASS INDEX: 31.44 KG/M2

## 2018-10-10 DIAGNOSIS — M54.41 ACUTE RIGHT-SIDED LOW BACK PAIN WITH RIGHT-SIDED SCIATICA: Primary | ICD-10-CM

## 2018-10-10 DIAGNOSIS — E11.9 TYPE 2 DIABETES MELLITUS WITHOUT COMPLICATION, WITHOUT LONG-TERM CURRENT USE OF INSULIN (HCC): ICD-10-CM

## 2018-10-10 PROCEDURE — 97803 MED NUTRITION INDIV SUBSEQ: CPT

## 2018-10-10 PROCEDURE — 97110 THERAPEUTIC EXERCISES: CPT

## 2018-10-10 NOTE — PROGRESS NOTES
Follow-Up Nutrition Assessment Form    Patient Name: Rebecca Chino    YOB: 1950    Sex: Male      Follow Up Date: 10/10/2018  Start Time: 9:30am Stop Time: 10:00am Total Minutes: 30min     Data:  Present at session: self   Parent Concerns: "I am enjoying the Diabetes Classes, I find tracking my food very helpful"   Medical Dx/Reason for Referral: E11 9,   (also Gluten Free)   Past Medical History:   Diagnosis Date    Acute pancreatitis     last assessed - [id number]    BPH (benign prostatic hypertrophy)     Celiac disease     current GI w/u to r/o celiac    Cholelithiasis     Elevated blood pressure reading     last assessed - [id number]    Gallstones     last assessed - [id number]    High cholesterol     Hx of mitral valve disorder     Sarcoidosis     1990's Diagnosed 15 years ago by biopsy, not active; last assessed - [id number]       Current Outpatient Prescriptions   Medication Sig Dispense Refill    atorvastatin (LIPITOR) 20 mg tablet Take 1 tablet (20 mg total) by mouth daily 90 tablet 1    betamethasone dipropionate (DIPROSONE) 0 05 % cream Apply topically daily To your elbows and rub in well 30 g 1    Cholecalciferol (VITAMIN D-3) 1000 units CAPS 2 daily  0    finasteride (PROSCAR) 5 mg tablet Take 5 mg by mouth daily after dinner        Glucosamine-Chondroitin (MOVE FREE PO) Take 2 tablets by mouth daily      glucose blood (CONTOUR NEXT TEST) test strip Check sugars daily or as instructed 100 each 1    metFORMIN (GLUCOPHAGE) 500 mg tablet TAKE 1 TABLET BY MOUTH TWO  TIMES DAILY WITH MEALS 180 tablet 0    MICROLET LANCETS MISC by Does not apply route daily 100 each 1    Multiple Vitamin (MULTIVITAMIN) tablet Take 1 tablet by mouth daily      Omega-3 Fatty Acids (OMEGA 3 PO) Take 1 capsule by mouth daily       No current facility-administered medications for this visit           Additional Meds/Supplements: reviewed   Barriers to Learning: None   Labs: Lab Results   Component Value Date    HGBA1C 7 3 (H) 06/20/2018      Height: Ht Readings from Last 3 Encounters:   09/17/18 5' 7" (1 702 m)   09/14/18 5' 7 25" (1 708 m)   09/05/18 5' 8" (1 727 m)      Weight: Wt Readings from Last 3 Encounters:   09/17/18 92 4 kg (203 lb 11 2 oz)   09/14/18 92 5 kg (203 lb 14 4 oz)   09/05/18 92 9 kg (204 lb 11 2 oz)        Wt  Change Since Last Visit: [x]Yes     []No  Amount: -18 lb in 14 weeks      Energy Needs: No calculations performed for this visit   Pain Screen: Are you having pain now? No      Goals Achieved:Continued weight loss, improved FSBS,attendign DSMT Classes     New Goals: 1  Maintain tools for weight loss and carb counting       Initial PES: Excess carbohydrate intake  related to Food and nutrition related knowledge deficit concerning appropriate amount of carbohydrate intake as  evidenced by Hemoglobin A1C >6%      New PES:N/A          Assessment:  Javno Clark has been attending DSMT Classes and states he is learning a lot  He continues to lose weight with goal 187 lb which will be 10% of starting weight  He feels Carb Counting and tracking his food has now become a habit for him  Medical Nutrition Therapy Intervention:  [x]Individualized Meal Plan-45-60 grm CHO breakfast/lunch, 45-75 CHO grm Dinner, 15-30 CHO grm Snacks []Understanding Lab Values   []Basic Pathophysiology of Disease []Food/Medication Interactions   [x]Food Diary-Myfitnesspal []Exercise   []Lifestyle/Behavior Modification Techniques []Medication, Mechanism of Action   []Label Reading [x]Self Blood Glucose Monitoring-uses Contour FSBS Average 6 2, FSBS 115-148     [x]Weight/BMI Goals-10%(187lb []Other -    Other Notes:        Comprehension: []Excellent  [x]Very Good  []Good  []Fair   []Poor    Receptivity: []Excellent  [x]Very Good  []Good  []Fair   []Poor    Expected Compliance: []Excellent  [x]Very Good  []Good  []Fair   []Poor      Labs:  CMP  Lab Results   Component Value Date     09/05/2018    K 4 1 09/05/2018  09/05/2018    CO2 26 09/05/2018    ANIONGAP 3 (L) 02/24/2015    BUN 24 09/05/2018    CREATININE 0 89 09/05/2018    GLUCOSE 139 02/24/2015    GLUF 190 (H) 06/15/2018    CALCIUM 9 2 09/05/2018    AST 19 09/05/2018    ALT 29 09/05/2018    ALKPHOS 86 09/05/2018    PROT 7 7 02/24/2015    BILITOT 1 0 02/24/2015    EGFR 87 09/05/2018       BMP  Lab Results   Component Value Date    GLUCOSE 139 02/24/2015    CALCIUM 9 2 09/05/2018     09/05/2018    K 4 1 09/05/2018    CO2 26 09/05/2018     09/05/2018    BUN 24 09/05/2018    CREATININE 0 89 09/05/2018       Lipids  Lab Results   Component Value Date    CHOL 140 02/24/2015    CHOL 140 12/17/2013     Lab Results   Component Value Date    HDL 67 (H) 06/15/2018    HDL 67 (H) 06/19/2017    HDL 60 07/22/2016     Lab Results   Component Value Date    LDLCALC 62 06/15/2018    LDLCALC 61 06/19/2017    LDLCALC 52 07/22/2016     Lab Results   Component Value Date    TRIG 70 06/15/2018    TRIG 100 06/19/2017    TRIG 86 07/22/2016     No components found for: CHOLHDL    Hemoglobin A1C  Lab Results   Component Value Date    HGBA1C 7 3 (H) 06/20/2018       Fasting Glucose  Lab Results   Component Value Date    GLUF 190 (H) 06/15/2018       Insulin     Thyroid  No results found for: TSH, P7EJGNS, E1ONFMT, THYROIDAB    Hepatic Function Panel  Lab Results   Component Value Date    ALT 29 09/05/2018    AST 19 09/05/2018    ALKPHOS 86 09/05/2018    BILITOT 1 0 02/24/2015       Celiac Disease Antibody Panel  Endomysial IgA   Date Value Ref Range Status   03/13/2017 Positive (A) Negative Final     Gliadin IgA   Date Value Ref Range Status   03/13/2017 >133 (H) 0 - 19 units Final     Comment:                        Negative                   0 - 19                     Weak Positive             20 - 30                     Moderate to Strong Positive   >30     Gliadin IgG   Date Value Ref Range Status   03/13/2017 65 (H) 0 - 19 units Final     Comment: Negative                   0 - 19                     Weak Positive             20 - 30                     Moderate to Strong Positive   >30     IgA   Date Value Ref Range Status   03/13/2017 346 61 - 437 mg/dL Final     Tissue Transglut Ab IGG   Date Value Ref Range Status   03/13/2017 <2 0 - 5 U/mL Final     Comment:                                   Negative        0 - 5                                Weak Positive   6 - 9                                Positive           >9     TISSUE TRANSGLUTAMINASE IGA   Date Value Ref Range Status   03/13/2017 >100 (H) 0 - 3 U/mL Final     Comment:                                   Negative        0 -  3                                Weak Positive   4 - 10                                Positive           >10   Tissue Transglutaminase (tTG) has been identified   as the endomysial antigen  Studies have demonstr-   ated that endomysial IgA antibodies have over 99%   specificity for gluten sensitive enteropathy        Iron  No results found for: IRON, TIBC, FERRITIN    Vitamins  No results found for: VITAMIN B2   No results found for: NICOTINAMIDE, NICOTINIC ACID   No results found for: VITAMINB6  No results found for: [id number]  No results found for: VITB5  No results found for: R4QPPRQR  No results found for: THYROGLB  No results found for: VITAMIN K   No results found for: 25-HYDROXY VIT D   No results found for: [address] [facility] [address]

## 2018-10-10 NOTE — PROGRESS NOTES
Daily Note     Today's date: 10/10/2018  Patient name: Dale Perdomo  : 1950  MRN: [id number]  Referring provider: Bridget Rousseau DO  Dx:   Encounter Diagnosis     ICD-10-CM    1  Acute right-sided low back pain with right-sided sciatica M54 41                   Subjective: Reports no LBP or R LE pain as he has felt better since last tx    Objective: See treatment diary below  Precautions none    Specialty Daily Treatment Diary     Manual  9/24 9/28 10/8 10/10    R L4/5 upa Gr IV Grade IV                                          Exercise Diary  9/24 9/28 10/8 10/10    Recumbent bike 8m 12m 12m 12m    R side glide 10x2 10x2 2x10 2x10    Press up 10x2 10x2 3x10 3x10    bridges  5s x20 [id number]    Bridge with march  2x10 2x10 2x10    Wall squats  5s 2x10 2x10     Prone single &alt UE/LE lifts   2x15 2x15    Supine alt UE/LE lifts    11/# 3x10    Leg press    # 75 3x10                                                                                                Modalities                                        Assessment: Able to progress to strengthening without incidence of LB or leg pain  Will continue to pursue more core strengthening to aid with prevention of further LBP    Plan: Continue per plan of care

## 2018-10-15 ENCOUNTER — OFFICE VISIT (OUTPATIENT)
Dept: PHYSICAL THERAPY | Age: 68
End: 2018-10-15
Payer: MEDICARE

## 2018-10-15 DIAGNOSIS — M54.41 ACUTE RIGHT-SIDED LOW BACK PAIN WITH RIGHT-SIDED SCIATICA: Primary | ICD-10-CM

## 2018-10-15 PROCEDURE — 97110 THERAPEUTIC EXERCISES: CPT | Performed by: PHYSICAL THERAPIST

## 2018-10-15 NOTE — PROGRESS NOTES
Daily Note     Today's date: 10/15/2018  Patient name: Camacho Peres  : 1950  MRN: [id number]  Referring provider: Kendy Rodriguez DO  Dx:   No diagnosis found  Subjective:  Continues to be pain free  Objective: See treatment diary below  Precautions none    Specialty Daily Treatment Diary     Manual  9/24 9/28 10/8 10/10 10/15   R L4/5 upa Gr IV Grade IV   Gr IV                                       Exercise Diary  9/24 9/28 10/8 10/10 10/15   Recumbent bike 8m 12m 12m 12m 12 m   R side glide 10x2 10x2 2x10 2x10 2x10   Press up 10x2 10x2 3x10 3x10 3x10   bridges  5s x20 [id number] 5s x20   Bridge with march  2x10 2x10 2x10 2x10   Wall squats  5s 2x10 2x10  5s 2x10   Prone single &alt UE/LE lifts   2x15 2x15 2x15   Supine alt UE/LE lifts    11/2# 3x10 1 5# 3x10   Leg press    # 75 3x10 75# 3x10                                                                                               Modalities                                        Assessment: If remains consistently improved by next appt will d/c to hEP  Plan: Continue per plan of care

## 2018-10-19 ENCOUNTER — OFFICE VISIT (OUTPATIENT)
Dept: PHYSICAL THERAPY | Age: 68
End: 2018-10-19
Payer: MEDICARE

## 2018-10-19 DIAGNOSIS — M54.41 ACUTE RIGHT-SIDED LOW BACK PAIN WITH RIGHT-SIDED SCIATICA: Primary | ICD-10-CM

## 2018-10-19 PROCEDURE — 97110 THERAPEUTIC EXERCISES: CPT | Performed by: PHYSICAL THERAPIST

## 2018-10-19 PROCEDURE — G8991 OTHER PT/OT GOAL STATUS: HCPCS | Performed by: PHYSICAL THERAPIST

## 2018-10-19 PROCEDURE — G8990 OTHER PT/OT CURRENT STATUS: HCPCS

## 2018-10-19 PROCEDURE — 97140 MANUAL THERAPY 1/> REGIONS: CPT | Performed by: PHYSICAL THERAPIST

## 2018-10-19 PROCEDURE — G8992 OTHER PT/OT  D/C STATUS: HCPCS | Performed by: PHYSICAL THERAPIST

## 2018-10-19 PROCEDURE — G8991 OTHER PT/OT GOAL STATUS: HCPCS

## 2018-10-19 NOTE — PROGRESS NOTES
Discharge summary    Today's date: 10/19/2018  Patient name: Dale Perdomo  : 1950  MRN: [id number]  Referring provider: Bridget Rousseau DO  Dx:   Encounter Diagnosis     ICD-10-CM    1  Acute right-sided low back pain with right-sided sciatica M54 41                   Subjective:  No subjective complaints    Objective: All l/spine ROM has normalized  See treatment diary below  Precautions none    Specialty Daily Treatment Diary     Manual  9/24 9/28 10/8 10/10 10/19   R L4/5 upa Gr IV Grade IV   Gr IV                                       Exercise Diary  9/24 9/28 10/8 10/10 10/1   Recumbent bike 8m 12m 12m 12m 12 m   R side glide 10x2 10x2 2x10 2x10 2x10   Press up 10x2 10x2 3x10 3x10 3x10   bridges  5s x20 [id number] 5sx20    Bridge with march  2x10 2x10 2x10    Wall squats  5s 2x10 2x10     Prone single &alt UE/LE lifts   2x15 2x15    Supine alt UE/LE lifts    # 3x10    Leg press    # 75 3x10                                                                                                Modalities                                        Assessment: All formal PT goals have been achieved  Patient to continue with HEP  Plan: D/C to [facility]

## 2018-10-24 ENCOUNTER — TRANSCRIBE ORDERS (OUTPATIENT)
Dept: ADMINISTRATIVE | Age: 68
End: 2018-10-24

## 2018-10-24 ENCOUNTER — APPOINTMENT (OUTPATIENT)
Dept: LAB | Age: 68
End: 2018-10-24
Payer: MEDICARE

## 2018-10-24 DIAGNOSIS — M05.79 SEROPOSITIVE RHEUMATOID ARTHRITIS OF MULTIPLE SITES (HCC): ICD-10-CM

## 2018-10-24 DIAGNOSIS — M05.79 SEROPOSITIVE RHEUMATOID ARTHRITIS OF MULTIPLE SITES (HCC): Primary | ICD-10-CM

## 2018-10-24 LAB
ALBUMIN SERPL BCP-MCNC: 4.1 G/DL (ref 3.5–5)
ALP SERPL-CCNC: 82 U/L (ref 46–116)
ALT SERPL W P-5'-P-CCNC: 29 U/L (ref 12–78)
ANION GAP SERPL CALCULATED.3IONS-SCNC: 5 MMOL/L (ref 4–13)
AST SERPL W P-5'-P-CCNC: 17 U/L (ref 5–45)
BASOPHILS # BLD AUTO: 0.02 THOUSANDS/ΜL (ref 0–0.1)
BASOPHILS NFR BLD AUTO: 0 % (ref 0–1)
BILIRUB SERPL-MCNC: 0.81 MG/DL (ref 0.2–1)
BUN SERPL-MCNC: 21 MG/DL (ref 5–25)
CALCIUM SERPL-MCNC: 9.4 MG/DL (ref 8.3–10.1)
CHLORIDE SERPL-SCNC: 106 MMOL/L (ref 100–108)
CO2 SERPL-SCNC: 29 MMOL/L (ref 21–32)
CREAT SERPL-MCNC: 0.87 MG/DL (ref 0.6–1.3)
CRP SERPL QL: <3 MG/L
EOSINOPHIL # BLD AUTO: 0.07 THOUSAND/ΜL (ref 0–0.61)
EOSINOPHIL NFR BLD AUTO: 1 % (ref 0–6)
ERYTHROCYTE [DISTWIDTH] IN BLOOD BY AUTOMATED COUNT: 13.1 % (ref 11.6–15.1)
GFR SERPL CREATININE-BSD FRML MDRD: 88 ML/MIN/1.73SQ M
GLUCOSE SERPL-MCNC: 110 MG/DL (ref 65–140)
HCT VFR BLD AUTO: 43.2 % (ref 36.5–49.3)
HGB BLD-MCNC: 14.3 G/DL (ref 12–17)
IMM GRANULOCYTES # BLD AUTO: 0.01 THOUSAND/UL (ref 0–0.2)
IMM GRANULOCYTES NFR BLD AUTO: 0 % (ref 0–2)
LYMPHOCYTES # BLD AUTO: 1.73 THOUSANDS/ΜL (ref 0.6–4.47)
LYMPHOCYTES NFR BLD AUTO: 32 % (ref 14–44)
MCH RBC QN AUTO: 31.5 PG (ref 26.8–34.3)
MCHC RBC AUTO-ENTMCNC: 33.1 G/DL (ref 31.4–37.4)
MCV RBC AUTO: 95 FL (ref 82–98)
MONOCYTES # BLD AUTO: 0.42 THOUSAND/ΜL (ref 0.17–1.22)
MONOCYTES NFR BLD AUTO: 8 % (ref 4–12)
NEUTROPHILS # BLD AUTO: 3.13 THOUSANDS/ΜL (ref 1.85–7.62)
NEUTS SEG NFR BLD AUTO: 59 % (ref 43–75)
NRBC BLD AUTO-RTO: 0 /100 WBCS
PLATELET # BLD AUTO: 296 THOUSANDS/UL (ref 149–390)
PMV BLD AUTO: 9.9 FL (ref 8.9–12.7)
POTASSIUM SERPL-SCNC: 4.9 MMOL/L (ref 3.5–5.3)
PROT SERPL-MCNC: 8 G/DL (ref 6.4–8.2)
RBC # BLD AUTO: 4.54 MILLION/UL (ref 3.88–5.62)
SODIUM SERPL-SCNC: 140 MMOL/L (ref 136–145)
WBC # BLD AUTO: 5.38 THOUSAND/UL (ref 4.31–10.16)

## 2018-10-24 PROCEDURE — 85025 COMPLETE CBC W/AUTO DIFF WBC: CPT

## 2018-10-24 PROCEDURE — 36415 COLL VENOUS BLD VENIPUNCTURE: CPT

## 2018-10-24 PROCEDURE — 80053 COMPREHEN METABOLIC PANEL: CPT

## 2018-10-24 PROCEDURE — 86140 C-REACTIVE PROTEIN: CPT

## 2018-11-03 DIAGNOSIS — E11.9 TYPE 2 DIABETES MELLITUS WITHOUT COMPLICATION, WITHOUT LONG-TERM CURRENT USE OF INSULIN (HCC): ICD-10-CM

## 2018-11-07 ENCOUNTER — CLINICAL SUPPORT (OUTPATIENT)
Dept: NUTRITION | Facility: HOSPITAL | Age: 68
End: 2018-11-07
Payer: MEDICARE

## 2018-11-07 VITALS — WEIGHT: 198.8 LBS | HEIGHT: 67 IN | BODY MASS INDEX: 31.2 KG/M2

## 2018-11-07 DIAGNOSIS — E11.9 TYPE 2 DIABETES MELLITUS WITHOUT COMPLICATION, WITHOUT LONG-TERM CURRENT USE OF INSULIN (HCC): ICD-10-CM

## 2018-11-07 PROCEDURE — 97803 MED NUTRITION INDIV SUBSEQ: CPT

## 2018-11-07 NOTE — PROGRESS NOTES
Follow-Up Nutrition Assessment Form    Patient Name: Varsha Black    YOB: 1950    Sex: Male      Follow Up Date: 11/7/2018  Start Time: 2:00pm Stop Time: 2:30pm Total Minutes: 30min     Data:  Present at session: self   Patient Concerns: "I had a bunch of family functions and was not watching my carb portions"   Medical Dx/Reason for Referral: E11 9   Past Medical History:   Diagnosis Date    Acute pancreatitis     last assessed - [id number]    BPH (benign prostatic hypertrophy)     Celiac disease     current GI w/u to r/o celiac    Cholelithiasis     Elevated blood pressure reading     last assessed - [id number]    Gallstones     last assessed - [id number]    High cholesterol     Hx of mitral valve disorder     Sarcoidosis     1990's Diagnosed 15 years ago by biopsy, not active; last assessed - [id number]    Celiac Disease/Gluten Free   Current Outpatient Prescriptions   Medication Sig Dispense Refill    atorvastatin (LIPITOR) 20 mg tablet Take 1 tablet (20 mg total) by mouth daily 90 tablet 1    betamethasone dipropionate (DIPROSONE) 0 05 % cream Apply topically daily To your elbows and rub in well 30 g 1    Cholecalciferol (VITAMIN D-3) 1000 units CAPS 2 daily  0    finasteride (PROSCAR) 5 mg tablet Take 5 mg by mouth daily after dinner        Glucosamine-Chondroitin (MOVE FREE PO) Take 2 tablets by mouth daily      glucose blood (CONTOUR NEXT TEST) test strip Check sugars daily or as instructed 100 each 1    metFORMIN (GLUCOPHAGE) 500 mg tablet TAKE 1 TABLET BY MOUTH TWO  TIMES DAILY WITH MEALS 180 tablet 0    MICROLET LANCETS MISC by Does not apply route daily 100 each 1    Multiple Vitamin (MULTIVITAMIN) tablet Take 1 tablet by mouth daily      Omega-3 Fatty Acids (OMEGA 3 PO) Take 1 capsule by mouth daily       No current facility-administered medications for this visit           Additional Meds/Supplements: reviewed   Barriers to Learning: None   Labs: Lab Results   Component Value Date     HGBA1C 7 3 (H) 06/20/2018        Height: Ht Readings from Last 3 Encounters:   10/10/18 5' 7" (1 702 m)   09/17/18 5' 7" (1 702 m)   09/14/18 5' 7 25" (1 708 m)      Weight: Wt Readings from Last 3 Encounters:   10/10/18 90 9 kg (200 lb 4 8 oz)   09/17/18 92 4 kg (203 lb 11 2 oz)   09/14/18 92 5 kg (203 lb 14 4 oz)     Estimated body mass index is 31 14 kg/m² as calculated from the following:    Height as of this encounter: 5' 7" (1 702 m)  Weight as of this encounter: 90 2 kg (198 lb 12 8 oz)  Wt  Change Since Last Visit: [x]Yes     []No  Amount: -20 lb in 18 weeks      Energy Needs: No calculations performed for this visit   Pain Screen: Are you having pain now? No      Goals Achieved:Continued weight loss, improved FSBS,attended all scheduled DSMT Classes     New Goals:   1 1lb weight loss/week until achieves 187 lb (10%of initial)  2 Consume 45 gram breakfast daily and pack 15-30 grm CHO snack when planning on strenuous activity in morning    3  Acheive HgBA1C < 7 6 at next blood level  Initial PES: Excess carbohydrate intake  related to Food and nutrition related knowledge deficit concerning appropriate amount of carbohydrate intake as  evidenced by Hemoglobin A1C >6%      New PES:N/A       New Problem List:  1  Holiday eating compliance reviewed  Assessment:  Angelina Vazquez completed all DSMT classes  He describes an event where is felt light headed and ill most likely low blood sugar, when golfing one morning on a warm day  After review it is estimated her only had 15-20 gram CHO breakfast   He had a  KIND bar with him of 21 grm CHO, Discussed with patient need for 45 gram CHO breakfast and continued planning on having a CHO snack especially with any activity planned   He is hopeful to have his HgBA1C improved and is motivated to reach his weight goal      Medical Nutrition Therapy Intervention:  [x]Individualized Meal Plan-45-60 grm CHO breakfast/lunch, 45-75 CHO grm Dinner, 15-30 CHO grm Snacks []Understanding Lab Values   []Basic Pathophysiology of Disease []Food/Medication Interactions   [x]Food Diary-myfitnesspal []Exercise   [x]Lifestyle/Behavior Modification Techniques []Medication, Mechanism of Action   []Label Reading [x]Self Blood Glucose Monitoring-uses Contour FSBS Average 6 2, FSBS 120-134     [x]Weight/BMI Goals-10%(187lb [x]Other - Reviewed planning and Holiday compliance   Other Notes:        Comprehension: []Excellent  [x]Very Good  []Good  []Fair   []Poor    Receptivity: []Excellent  [x]Very Good  []Good  []Fair   []Poor    Expected Compliance: []Excellent  [x]Very Good  []Good  []Fair   []Poor      Labs:  CMP  Lab Results   Component Value Date     02/24/2015    K 4 9 10/24/2018     10/24/2018    CO2 29 10/24/2018    ANIONGAP 3 (L) 02/24/2015    BUN 21 10/24/2018    CREATININE 0 87 10/24/2018    GLUCOSE 139 02/24/2015    GLUF 190 (H) 06/15/2018    CALCIUM 9 4 10/24/2018    AST 17 10/24/2018    ALT 29 10/24/2018    ALKPHOS 82 10/24/2018    PROT 7 7 02/24/2015    BILITOT 1 0 02/24/2015    EGFR 88 10/24/2018       BMP  Lab Results   Component Value Date    GLUCOSE 139 02/24/2015    CALCIUM 9 4 10/24/2018     02/24/2015    K 4 9 10/24/2018    CO2 29 10/24/2018     10/24/2018    BUN 21 10/24/2018    CREATININE 0 87 10/24/2018       Lipids  Lab Results   Component Value Date    CHOL 140 02/24/2015    CHOL 140 12/17/2013     Lab Results   Component Value Date    HDL 67 (H) 06/15/2018    HDL 67 (H) 06/19/2017    HDL 60 07/22/2016     Lab Results   Component Value Date    LDLCALC 62 06/15/2018    LDLCALC 61 06/19/2017    LDLCALC 52 07/22/2016     Lab Results   Component Value Date    TRIG 70 06/15/2018    TRIG 100 06/19/2017    TRIG 86 07/22/2016     No results found for: CHOLHDL    Hemoglobin A1C  Lab Results   Component Value Date    HGBA1C 7 3 (H) 06/20/2018       Fasting Glucose  Lab Results   Component Value Date    GLUF 190 (H) 06/15/2018       Insulin Thyroid  No results found for: TSH, [id number], P1BYUOO, THYROIDAB    Hepatic Function Panel  Lab Results   Component Value Date    ALT 29 10/24/2018    AST 17 10/24/2018    ALKPHOS 82 10/24/2018    BILITOT 1 0 02/24/2015       Celiac Disease Antibody Panel  Endomysial IgA   Date Value Ref Range Status   03/13/2017 Positive (A) Negative Final     Gliadin IgA   Date Value Ref Range Status   03/13/2017 >133 (H) 0 - 19 units Final     Comment:                        Negative                   0 - 19                     Weak Positive             20 - 30                     Moderate to Strong Positive   >30     Gliadin IgG   Date Value Ref Range Status   03/13/2017 65 (H) 0 - 19 units Final     Comment:                        Negative                   0 - 19                     Weak Positive             20 - 30                     Moderate to Strong Positive   >30     IgA   Date Value Ref Range Status   03/13/2017 346 61 - 437 mg/dL Final     TISSUE TRANSGLUTAMINASE IGA   Date Value Ref Range Status   03/13/2017 >100 (H) 0 - 3 U/mL Final     Comment:                                   Negative        0 -  3                                Weak Positive   4 - 10                                Positive           >10   Tissue Transglutaminase (tTG) has been identified   as the endomysial antigen  Studies have demonstr-   ated that endomysial IgA antibodies have over 99%   specificity for gluten sensitive enteropathy        Iron  No results found for: IRON, TIBC, FERRITIN    Vitamins  No results found for: VITAMIN B2   No results found for: NICOTINAMIDE, NICOTINIC ACID   No results found for: VITAMINB6  No results found for: GHOLDXAZ89  No results found for: VITB5  No results found for: Q6MEHLVZ  No results found for: THYROGLB  No results found for: VITAMIN K   No results found for: 25-HYDROXY VIT D   No components found for: [address] [place] [address] [address]  [place] [address]

## 2018-11-09 ENCOUNTER — TRANSCRIBE ORDERS (OUTPATIENT)
Dept: ADMINISTRATIVE | Age: 68
End: 2018-11-09

## 2018-11-09 ENCOUNTER — APPOINTMENT (OUTPATIENT)
Dept: LAB | Age: 68
End: 2018-11-09
Payer: MEDICARE

## 2018-11-09 DIAGNOSIS — E55.9 VITAMIN D DEFICIENCY: ICD-10-CM

## 2018-11-09 DIAGNOSIS — E11.9 TYPE 2 DIABETES MELLITUS WITHOUT COMPLICATION, WITHOUT LONG-TERM CURRENT USE OF INSULIN (HCC): ICD-10-CM

## 2018-11-09 LAB
25(OH)D3 SERPL-MCNC: 32.9 NG/ML (ref 30–100)
CREAT UR-MCNC: 88.8 MG/DL
EST. AVERAGE GLUCOSE BLD GHB EST-MCNC: 126 MG/DL
HBA1C MFR BLD: 6 % (ref 4.2–6.3)
MICROALBUMIN UR-MCNC: <5 MG/L (ref 0–20)
MICROALBUMIN/CREAT 24H UR: <6 MG/G CREATININE (ref 0–30)

## 2018-11-09 PROCEDURE — 36415 COLL VENOUS BLD VENIPUNCTURE: CPT

## 2018-11-09 PROCEDURE — 82570 ASSAY OF URINE CREATININE: CPT

## 2018-11-09 PROCEDURE — 83036 HEMOGLOBIN GLYCOSYLATED A1C: CPT

## 2018-11-09 PROCEDURE — 82306 VITAMIN D 25 HYDROXY: CPT

## 2018-11-09 PROCEDURE — 82043 UR ALBUMIN QUANTITATIVE: CPT

## 2018-11-16 DIAGNOSIS — E78.2 MIXED HYPERLIPIDEMIA: ICD-10-CM

## 2018-11-19 RX ORDER — ATORVASTATIN CALCIUM 20 MG/1
TABLET, FILM COATED ORAL
Qty: 90 TABLET | OUTPATIENT
Start: 2018-11-19

## 2018-12-12 ENCOUNTER — CLINICAL SUPPORT (OUTPATIENT)
Dept: NUTRITION | Facility: HOSPITAL | Age: 68
End: 2018-12-12
Payer: MEDICARE

## 2018-12-12 VITALS — BODY MASS INDEX: 30.35 KG/M2 | HEIGHT: 67 IN | WEIGHT: 193.4 LBS

## 2018-12-12 DIAGNOSIS — E11.9 TYPE 2 DIABETES MELLITUS WITHOUT COMPLICATION, WITHOUT LONG-TERM CURRENT USE OF INSULIN (HCC): ICD-10-CM

## 2018-12-12 PROCEDURE — 97803 MED NUTRITION INDIV SUBSEQ: CPT

## 2018-12-12 NOTE — PROGRESS NOTES
Follow-Up Nutrition Assessment Form    Patient Name: Delmy Soler    YOB: 1950    Sex: Male      Follow Up Date: 12/12/2018  Start Time: 9:25am Stop Time: 9:55am Total Minutes: 30min     Data:  Present at session: self   Patient Concerns: " Do I still have diabetes if my HgbA1c is 6 0"    Medical Dx/Reason for Referral: E11 9 Diabetes   Past Medical History:   Diagnosis Date    Acute pancreatitis     last assessed - [id number]    BPH (benign prostatic hypertrophy)     Celiac disease     current GI w/u to r/o celiac    Cholelithiasis     Elevated blood pressure reading     last assessed - [id number]    Gallstones     last assessed - [id number]    High cholesterol     Hx of mitral valve disorder     Sarcoidosis     1990's Diagnosed 15 years ago by biopsy, not active; last assessed - [id number]    Celiac Disease/Gluten Free   Current Outpatient Prescriptions   Medication Sig Dispense Refill    atorvastatin (LIPITOR) 20 mg tablet Take 1 tablet (20 mg total) by mouth daily 90 tablet 1    betamethasone dipropionate (DIPROSONE) 0 05 % cream Apply topically daily To your elbows and rub in well 30 g 1    Cholecalciferol (VITAMIN D-3) 1000 units CAPS 2 daily  0    finasteride (PROSCAR) 5 mg tablet Take 5 mg by mouth daily after dinner        Glucosamine-Chondroitin (MOVE FREE PO) Take 2 tablets by mouth daily      glucose blood (CONTOUR NEXT TEST) test strip Check sugars daily or as instructed 100 each 1    metFORMIN (GLUCOPHAGE) 500 mg tablet TAKE 1 TABLET BY MOUTH TWO  TIMES DAILY WITH MEALS 180 tablet 0    MICROLET LANCETS MISC by Does not apply route daily 100 each 1    Multiple Vitamin (MULTIVITAMIN) tablet Take 1 tablet by mouth daily      Omega-3 Fatty Acids (OMEGA 3 PO) Take 1 capsule by mouth daily       No current facility-administered medications for this visit           Additional Meds/Supplements: Reviewed, asking if can decrease Metformin-referred to PCP   Barriers to Learning: None   Labs: HgbA1c improved from 6/20/2018 7 7 to current 6 0  Height: Ht Readings from Last 3 Encounters:   11/07/18 5' 7" (1 702 m)   10/10/18 5' 7" (1 702 m)   09/17/18 5' 7" (1 702 m)      Weight: Wt Readings from Last 3 Encounters:   11/07/18 90 2 kg (198 lb 12 8 oz)   10/10/18 90 9 kg (200 lb 4 8 oz)   09/17/18 92 4 kg (203 lb 11 2 oz)     Estimated body mass index is 31 14 kg/m² as calculated from the following:    Height as of 11/7/18: 5' 7" (1 702 m)  Weight as of 11/7/18: 90 2 kg (198 lb 12 8 oz)  Wt  Change Since Last Visit: [x]Yes     []No  Amount: -27 4lb past 5 5 months      Energy Needs: [address] Equation:     Pain Screen: Are you having pain now? No      Goals Achieved:Weight Loss, Improved HgBA1C     New Goals:   1 1lb weight loss/week until achieves personal [id number] BMI <30   2  Consume 45 gram breakfast daily and pack 15-30 grm CHO snack when planning on strenuous activity in morning    3  Maintain HgbA1C 6 0 by next encounter        Initial PES: Excess carbohydrate intake  related to Food and nutrition related knowledge deficit concerning appropriate amount of carbohydrate intake as  evidenced by Hemoglobin A1C >6%      New PES:N/A No Change         Assessment:  Ericka Prajapati is pleased with his weight loss and HgbA1c  Explained that his diabetes diagnoses remioans however he is managing it with compliance to nutrition and metformin  He has been finding food items for his Gluten Free living  He remains focused and wants to achieve a personal weight goal of 180 lb and maintain a HgBA1c of 6 0 or even below       Medical Nutrition Therapy Intervention:  [x]Individualized Meal Plan-45-60 grm CHO breakfast/lunch, 45-75 CHO grm Dinner, 15-30 CHO grm Snacks []Understanding Lab Values   [x]Basic Pathophysiology of Disease []Food/Medication Interactions   [x]Food Diary-myfitnesspal []Exercise   [x]Lifestyle/Behavior Modification Techniques []Medication, Mechanism of Action   []Label Reading []Self Blood Glucose Monitoring   [x]Weight/BMI Goals -Personal Goal BMI [x]Other - support and encouragement   Other Notes:        Comprehension: []Excellent  [x]Very Good  [x]Good  []Fair   []Poor    Receptivity: []Excellent  [x]Very Good  []Good  []Fair   []Poor    Expected Compliance: []Excellent  [x]Very Good  []Good  []Fair   []Poor      Labs:  CMP  Lab Results   Component Value Date     02/24/2015    K 4 9 10/24/2018     10/24/2018    CO2 29 10/24/2018    ANIONGAP 3 (L) 02/24/2015    BUN 21 10/24/2018    CREATININE 0 87 10/24/2018    GLUCOSE 139 02/24/2015    GLUF 190 (H) 06/15/2018    CALCIUM 9 4 10/24/2018    AST 17 10/24/2018    ALT 29 10/24/2018    ALKPHOS 82 10/24/2018    PROT 7 7 02/24/2015    BILITOT 1 0 02/24/2015    EGFR 88 10/24/2018       BMP  Lab Results   Component Value Date    GLUCOSE 139 02/24/2015    CALCIUM 9 4 10/24/2018     02/24/2015    K 4 9 10/24/2018    CO2 29 10/24/2018     10/24/2018    BUN 21 10/24/2018    CREATININE 0 87 10/24/2018       Lipids  Lab Results   Component Value Date    CHOL 140 02/24/2015    CHOL 140 12/17/2013     Lab Results   Component Value Date    HDL 67 (H) 06/15/2018    HDL 67 (H) 06/19/2017    HDL 60 07/22/2016     Lab Results   Component Value Date    LDLCALC 62 06/15/2018    LDLCALC 61 06/19/2017    LDLCALC 52 07/22/2016     Lab Results   Component Value Date    TRIG 70 06/15/2018    TRIG 100 06/19/2017    TRIG 86 07/22/2016     No results found for: CHOLHDL    Hemoglobin A1C  Lab Results   Component Value Date    HGBA1C 6 0 11/09/2018       Fasting Glucose  Lab Results   Component Value Date    GLUF 190 (H) 06/15/2018       Insulin     Thyroid  No results found for: TSH, A4RQVDN, Q9YIFXZ, THYROIDAB    Hepatic Function Panel  Lab Results   Component Value Date    ALT 29 10/24/2018    AST 17 10/24/2018    ALKPHOS 82 10/24/2018    BILITOT 1 0 02/24/2015       Celiac Disease Antibody Panel  Endomysial IgA   Date Value Ref Range Status 03/13/2017 Positive (A) Negative Final     Gliadin IgA   Date Value Ref Range Status   03/13/2017 >133 (H) 0 - 19 units Final     Comment:                        Negative                   0 - 19                     Weak Positive             20 - 30                     Moderate to Strong Positive   >30     Gliadin IgG   Date Value Ref Range Status   03/13/2017 65 (H) 0 - 19 units Final     Comment:                        Negative                   0 - 19                     Weak Positive             20 - 30                     Moderate to Strong Positive   >30     IgA   Date Value Ref Range Status   03/13/2017 346 61 - 437 mg/dL Final     TISSUE TRANSGLUTAMINASE IGA   Date Value Ref Range Status   03/13/2017 >100 (H) 0 - 3 U/mL Final     Comment:                                   Negative        0 -  3                                Weak Positive   4 - 10                                Positive           >10   Tissue Transglutaminase (tTG) has been identified   as the endomysial antigen  Studies have demonstr-   ated that endomysial IgA antibodies have over 99%   specificity for gluten sensitive enteropathy        Iron  No results found for: IRON, TIBC, FERRITIN    Vitamins  No results found for: VITAMIN B2   No results found for: NICOTINAMIDE, NICOTINIC ACID   No results found for: VITAMINB6  No results found for: [id number]  No results found for: VITB5  No results found for: S7XHERYD  No results found for: THYROGLB  No results found for: VITAMIN K   No results found for: 25-HYDROXY VIT D   No components found for: VITAMINE     Follow up March 6, 2019    [address]  [facility] [id number]

## 2018-12-22 LAB
LEFT EYE DIABETIC RETINOPATHY: NORMAL
RIGHT EYE DIABETIC RETINOPATHY: NORMAL

## 2018-12-24 DIAGNOSIS — E78.2 MIXED HYPERLIPIDEMIA: ICD-10-CM

## 2018-12-24 RX ORDER — ATORVASTATIN CALCIUM 20 MG/1
TABLET, FILM COATED ORAL
Qty: 90 TABLET | OUTPATIENT
Start: 2018-12-24

## 2019-01-10 DIAGNOSIS — E11.9 TYPE 2 DIABETES MELLITUS WITHOUT COMPLICATION, WITHOUT LONG-TERM CURRENT USE OF INSULIN (HCC): ICD-10-CM

## 2019-01-31 DIAGNOSIS — E78.2 MIXED HYPERLIPIDEMIA: ICD-10-CM

## 2019-02-01 RX ORDER — ATORVASTATIN CALCIUM 20 MG/1
TABLET, FILM COATED ORAL
Qty: 90 TABLET | Refills: 1 | Status: SHIPPED | OUTPATIENT
Start: 2019-02-01 | End: 2019-06-20 | Stop reason: SDUPTHER

## 2019-02-11 ENCOUNTER — TELEPHONE (OUTPATIENT)
Dept: FAMILY MEDICINE CLINIC | Facility: CLINIC | Age: 69
End: 2019-02-11

## 2019-02-11 DIAGNOSIS — E11.9 TYPE 2 DIABETES MELLITUS WITHOUT COMPLICATION, WITHOUT LONG-TERM CURRENT USE OF INSULIN (HCC): Primary | ICD-10-CM

## 2019-02-11 NOTE — TELEPHONE ENCOUNTER
Angelcarlosning Nieves has an appt with you next week and wants to know if he needs bloodwork prior  Uses [facility]

## 2019-02-14 ENCOUNTER — APPOINTMENT (OUTPATIENT)
Dept: LAB | Age: 69
End: 2019-02-14
Payer: COMMERCIAL

## 2019-02-14 ENCOUNTER — TRANSCRIBE ORDERS (OUTPATIENT)
Dept: ADMINISTRATIVE | Age: 69
End: 2019-02-14

## 2019-02-14 DIAGNOSIS — E11.9 TYPE 2 DIABETES MELLITUS WITHOUT COMPLICATION, WITHOUT LONG-TERM CURRENT USE OF INSULIN (HCC): ICD-10-CM

## 2019-02-14 LAB
EST. AVERAGE GLUCOSE BLD GHB EST-MCNC: 123 MG/DL
HBA1C MFR BLD: 5.9 % (ref 4.2–6.3)

## 2019-02-14 PROCEDURE — 36415 COLL VENOUS BLD VENIPUNCTURE: CPT

## 2019-02-14 PROCEDURE — 83036 HEMOGLOBIN GLYCOSYLATED A1C: CPT

## 2019-02-17 NOTE — PROGRESS NOTES
ASSESSMENT/PLAN:    Type 2 diabetes  Excellent with A1c 5 9  We will take 1 of the metformin soften he will only take in the morning  We will recheck him in 3 months with A1c prior    Positive rheumatoid factor most likely secondary to  History of sarcoid in the past  Nothing more needs to be done with this    Celiac disease  Patient follows diet  Has been train by dietitian    Hyperlipidemia  Patient is on atorvastatin and diet  He also needs this because of his diabetes      Elevated PSA / BPH  Patient follows with Urology  He is on Proscar so we need to double his PSA whenever we see it    Eczema  Continue using steroid cream on elbows as needed      vitamin-D deficiency   Continue under to vitamin-D we will check this value next visit    History of lung nodule  Pap was followed by CT scan with Pulmonary for years  Felt to be left over sarcoid no need to follow anymore       Recheck in 3 months  We will recheck his A1c prior and see how dropping 1 of his metformin does  We will also see if we could drop another metformin     Health Maintenance   Topic Date Due    Diabetic Foot Exam  04/22/1959    BMI: Followup Plan  04/22/1967    HEPATITIS B VACCINES (1 of 3 - Risk 3-dose series) 04/22/1968    HEMOGLOBIN A1C  08/14/2019    Medicare Annual Wellness Visit (AWV)  09/17/2019    Fall Risk  09/19/2019    Depression Screening PHQ  09/19/2019    URINE MICROALBUMIN  11/09/2019    BMI: Adult  12/12/2019    DM Eye Exam  12/22/2019    CRC Screening: Colonoscopy  03/01/2020    DTaP,Tdap,and Td Vaccines (2 - Td) 03/08/2023    Hepatitis C Screening  Completed    INFLUENZA VACCINE  Completed    Pneumococcal PPSV23/PCV13 65+ Years / Low and Medium Risk  Completed         Problem List as of 2/22/2019 Reviewed: 9/19/2018  2:44 PM by Silvina Frazier PT    Adult celiac disease    Last Assessment & Plan 6/20/2018 Office Visit Written 6/20/2018  9:02 AM by Wing Alex MD     On gluten free diet  Off PPI  Benign prostatic hyperplasia with urinary frequency    Last Assessment & Plan 6/20/2018 Office Visit Written 6/20/2018  9:01 AM by Connee Rinne, MD     Sees Urology yearly, on finasteride  Class 1 obesity due to excess calories with serious comorbidity and body mass index (BMI) of 31 0 to 31 9 in adult    Eczema    Elevated prostate specific antigen (PSA)    Hyperbilirubinemia    Liver cyst    Mitral valve disorder    Mixed hyperlipidemia    Last Assessment & Plan 6/20/2018 Office Visit Written 6/20/2018  9:01 AM by Connee Rinne, MD     Lipids at goal, on statin  Other specified disorders of rotator cuff syndrome of shoulder and allied disorders    Pulmonary nodule seen on imaging study    Rheumatoid factor positive    Symptomatic cholelithiasis    Type 2 diabetes mellitus without complication, without long-term current use of insulin [facility])    Last Assessment & Plan 6/20/2018 Office Visit Written 6/20/2018  9:01 AM by Connee Rinne, MD     Fasting glucose was 190, check A1c  Discussed low carb diet  Vitamin D deficiency    Last Assessment & Plan 6/20/2018 Office Visit Written 6/20/2018  9:01 AM by Connee Rinne, MD     Increase D3 during fall/winter months  Subjective:   Chief Complaint   Patient presents with    Benign Prostatic Hypertrophy    Diabetes    Hyperlipidemia    Vitamin D Deficiency     Patient is here for his diabetes recheck  Since last June according patient's scale he is down about 34 lb according to our scale around 20 or more  Patient went to the nutritional list to help with his celiac disease and he is doing great by following her advice  He also with the diabetes classes and finished those and this helped him  His A1c went from a high of 7 3 down to 5 9 today  Since last visit he saw a rheumatologist who he was sent to because of the positive rheumatoid factor    Patient has had sarcoidosis in the past so most likely this is just a co-worker he has from the [employer] and has nothing to do with rheumatoid arthritis  He has elected not to follow up with this person even though she called him positive rheumatoid arthritis, he has absolutely no stigmata of rheumatoid arthritis in his joints or his body    Patient is using the steroid cream for his eczema it is working well  He would like some more        Diabetes   He has type 2 diabetes mellitus  No MedicAlert identification noted  The initial diagnosis of diabetes was made 9 months ago  His disease course has been improving  Pertinent negatives for hypoglycemia include no confusion, dizziness, headaches, hunger, mood changes, nervousness/anxiousness, pallor, seizures, sleepiness, speech difficulty, sweats or tremors  Associated symptoms include weight loss  Pertinent negatives for diabetes include no blurred vision, no chest pain, no fatigue, no foot paresthesias, no foot ulcerations, no polydipsia, no polyphagia, no polyuria, no visual change and no weakness  Pertinent negatives for hypoglycemia complications include no blackouts, no hospitalization, no nocturnal hypoglycemia, no required assistance and no required glucagon injection  Symptoms are improving  Pertinent negatives for diabetic complications include no CVA, heart disease, impotence, nephropathy, peripheral neuropathy, PVD or retinopathy  Risk factors for coronary artery disease include dyslipidemia and family history  Current diabetic treatment includes diet and oral agent (monotherapy)  He is compliant with treatment all of the time  His weight is decreasing steadily  He is following a generally healthy diet  Meal planning includes avoidance of concentrated sweets, calorie counting and carbohydrate counting  He has had a previous visit with a dietitian  He participates in exercise intermittently  He monitors blood glucose at home 1-2 x per week  There is no compliance with monitoring of blood glucose   His home blood glucose trend is decreasing steadily  His highest blood glucose is 140-180 mg/dl  His overall blood glucose range is 110-130 mg/dl  He does not see a podiatrist Eye exam is current  Benign Prostatic Hypertrophy     Hyperlipidemia   Pertinent negatives include no chest pain  patient ID: Varsha Black is a 70 y o  male  Past Medical History:   Diagnosis Date    Acute pancreatitis     last assessed - [id number]    BPH (benign prostatic hypertrophy)     Celiac disease     current GI w/u to r/o celiac    Cholelithiasis     Elevated blood pressure reading     last assessed - [id number]    Gallstones     last assessed - [id number]    High cholesterol     Hx of mitral valve disorder     Sarcoidosis     1990's Diagnosed 15 years ago by biopsy, not active; last assessed - [id number]     Past Surgical History:   Procedure Laterality Date    ERCP N/A 4/3/2017    Procedure: ENDOSCOPIC RETROGRADE CHOLANGIOPANCREATOGRAPHY (ERCP); Surgeon: Niraj Clark MD;  [facility]; Service:     RI COLONOSCOPY FLX DX W/COLLJ SPEC WHEN PFRMD N/A 3/1/2017    Procedure: EGD AND COLONOSCOPY;  Surgeon: Adina Akins MD;  [facility]; Service: Gastroenterology    RI ESOPHAGOGASTRODUODENOSCOPY TRANSORAL DIAGNOSTIC N/A 6/5/2017    Procedure: ESOPHAGOGASTRODUODENOSCOPY (EGD); Surgeon: Niraj Clark MD;  [facility];   Service: Gastroenterology    RI LAP,CHOLECYSTECTOMY N/A 3/29/2017    Procedure: CHOLECYSTECTOMY LAPAROSCOPIC; POSSIBLE OPEN;  Surgeon: Matthew Leyva MD;  [facility];  Service: General; complicated by bile leak and pancreatitis s/p stent     ROTATOR CUFF REPAIR Right 04/29/2015    for rotator cuff tear, Onset: 4/14     Family History   Problem Relation Age of Onset    Diabetes Mother         Diabetes Mellitus    Hypertension Mother     Ovarian cancer Mother     Rheumatologic disease Mother     Diabetes Father         Diabetes Mellitus    Heart disease Father    Ashia Cooper Hypertension Father     Diabetes Sister         Diabetes Mellitus    Other Sister         H  pylori infection    Hypertension Sister     Other Brother         H  pylori infection    Alcohol abuse Neg Hx     Substance Abuse Neg Hx     Mental illness Neg Hx     Depression Neg Hx      Social History     Tobacco Use    Smoking status: Never Smoker    Smokeless tobacco: Never Used   Substance Use Topics    Alcohol use: Yes     Comment: wine, 3-4x/week; social drinker    Drug use: No     Comment: Denied history of drug use     Social History     Tobacco Use   Smoking Status Never Smoker   Smokeless Tobacco Never Used        MED LIST WAS REVIEWED AND UPDATED       ROS    Constitutional  No fever chills no fatigue no weight loss no weight gain    Mental status  No anxiety or depression and no sleep disturbances no changes in personality or emotional problems no suicidal or homicidal ideations    Eyes  No eye pain no red eyes no visual disturbances no discharge no eye itch    ENT  No earache no hearing loss nasal discharge no sore throat no hoarseness no postnasal drip     Cardio  No chest pain no palpitations no leg edema no claudication no dyspnea on exertion no nocturnal dyspnea    Respiratory  No shortness of breath or wheeze no cough no orthopnea no dyspnea on exertion no hemoptysis no sputum production    GI  No abdominal pain no nausea no vomiting no diarrhea or constipation no bloody stools no change in bowel habits no change in weight      no dysuria hematuria no pyuria no incontinence no pelvic pain    Musculoskeletal  No myalgias arthralgias no joint swelling or stiffness no limb pain or swelling    Skin  Diprolene cream takes care of the rash but does not make it go away as we discussed  No rashes or lesions no itchiness and no wounds    Neuro  No headache dizziness lightheadedness syncope numbness paresthesias or confusion    Heme  No swollen glands no easy bruising          Objective:      VITALS:  Wt Readings from Last 3 Encounters:   02/22/19 85 4 kg (188 lb 3 2 oz)   12/12/18 87 7 kg (193 lb 6 4 oz)   11/07/18 90 2 kg (198 lb 12 8 oz)     BP Readings from Last 3 Encounters:   02/22/19 136/60   09/17/18 138/70   09/14/18 120/70     Pulse Readings from Last 3 Encounters:   02/22/19 68   09/17/18 72   09/14/18 68     Body mass index is 29 04 kg/m²      Laboratory Results:   Lab Results   Component Value Date    WBC 5 38 10/24/2018    WBC 4 82 09/05/2018    WBC 6 11 06/15/2018    HGB 14 3 10/24/2018    HGB 14 1 09/05/2018    HGB 14 3 06/15/2018    HCT 43 2 10/24/2018    HCT 42 1 09/05/2018    HCT 41 3 06/15/2018    MCV 95 10/24/2018    MCV 94 09/05/2018    MCV 92 06/15/2018     10/24/2018     09/05/2018     06/15/2018     Lab Results   Component Value Date     02/24/2015     04/17/2014     12/17/2013    K 4 9 10/24/2018    K 4 1 09/05/2018    K 4 2 06/15/2018     10/24/2018     09/05/2018     06/15/2018    CO2 29 10/24/2018    CO2 26 09/05/2018    CO2 28 06/15/2018    BUN 21 10/24/2018    BUN 24 09/05/2018    BUN 20 06/15/2018     Lab Results   Component Value Date    GLUCOSE 139 02/24/2015    ALT 29 10/24/2018    AST 17 10/24/2018    ALKPHOS 82 10/24/2018    EGFR 88 10/24/2018    CALCIUM 9 4 10/24/2018     No results found for: TSH    Lipid Profile:   Lab Results   Component Value Date    CHOL 140 02/24/2015    CHOL 140 12/17/2013     Lab Results   Component Value Date    HDL 67 (H) 06/15/2018    HDL 67 (H) 06/19/2017    HDL 60 07/22/2016     Lab Results   Component Value Date    LDLCALC 62 06/15/2018    LDLCALC 61 06/19/2017    LDLCALC 52 07/22/2016     Lab Results   Component Value Date    TRIG 70 06/15/2018    TRIG 100 06/19/2017    TRIG 86 07/22/2016       Diabetic labs (if applicable)  Lab Results   Component Value Date    HGBA1C 5 9 02/14/2019    HGBA1C 6 0 11/09/2018    HGBA1C 7 3 (H) 06/20/2018     Lab Results   Component Value Date    GLUF 190 (H) 06/15/2018    LDLCALC 62 06/15/2018    CREATININE 0 87 10/24/2018          Physical Exam  Constitutional  Appears healthy, Looks well, Appearance consistent with age    Mental Status  Alert, Oriented, Cooperative, Memory function normal , clean, and reasonable    Neck  No neck mass, No thyromegaly, Good carotid upstrokes bilaterally, trachea midline positive click    Respiratory  Breath sounds normal, No rales, No rhonchi, No wheezing, normal palpation    Cardiac   Regular rhythm without ectopy or murmur no S3-S4, no heave lift or thrill to palpation    Vascular  No leg edema, No pedal edema    Muscular skeletal  No clubbing cyanosis , muscle tone normal    Skin  No appreciable rashes or abnormal appearing lesions    Patient's shoes and socks removed  Right Foot/Ankle   Right Foot Inspection  Skin Exam: skin normal and skin intact no dry skin, no warmth, no callus, no erythema, no maceration, no abnormal color, no pre-ulcer, no ulcer and no callus                            Sensory       Monofilament testing: intact  Vascular    The right DP pulse is 2+  The right PT pulse is 2+  Left Foot/Ankle  Left Foot Inspection  Skin Exam: skin normal and skin intactno dry skin, no warmth, no erythema, no maceration, normal color, no pre-ulcer, no ulcer and no callus                                         Sensory       Monofilament: intact  Vascular    The left DP pulse is 2+  The left PT pulse is 2+  Assign Risk Category:  No deformity present; No loss of protective sensation;  No weak pulses       Risk: 0

## 2019-02-22 ENCOUNTER — OFFICE VISIT (OUTPATIENT)
Dept: FAMILY MEDICINE CLINIC | Facility: CLINIC | Age: 69
End: 2019-02-22
Payer: COMMERCIAL

## 2019-02-22 VITALS
WEIGHT: 188.2 LBS | SYSTOLIC BLOOD PRESSURE: 136 MMHG | BODY MASS INDEX: 28.52 KG/M2 | DIASTOLIC BLOOD PRESSURE: 60 MMHG | RESPIRATION RATE: 16 BRPM | HEIGHT: 68 IN | HEART RATE: 68 BPM

## 2019-02-22 DIAGNOSIS — K90.0 ADULT CELIAC DISEASE: ICD-10-CM

## 2019-02-22 DIAGNOSIS — E11.9 TYPE 2 DIABETES MELLITUS WITHOUT COMPLICATION, WITHOUT LONG-TERM CURRENT USE OF INSULIN (HCC): Primary | ICD-10-CM

## 2019-02-22 DIAGNOSIS — R76.8 RHEUMATOID FACTOR POSITIVE: ICD-10-CM

## 2019-02-22 DIAGNOSIS — R97.20 ELEVATED PROSTATE SPECIFIC ANTIGEN (PSA): ICD-10-CM

## 2019-02-22 DIAGNOSIS — D86.9 SARCOIDOSIS: ICD-10-CM

## 2019-02-22 DIAGNOSIS — R91.1 PULMONARY NODULE SEEN ON IMAGING STUDY: ICD-10-CM

## 2019-02-22 DIAGNOSIS — L30.8 OTHER ECZEMA: ICD-10-CM

## 2019-02-22 DIAGNOSIS — E55.9 VITAMIN D DEFICIENCY: ICD-10-CM

## 2019-02-22 PROBLEM — K80.20 SYMPTOMATIC CHOLELITHIASIS: Status: RESOLVED | Noted: 2017-03-29 | Resolved: 2019-02-22

## 2019-02-22 PROBLEM — E80.6 HYPERBILIRUBINEMIA: Status: RESOLVED | Noted: 2017-04-01 | Resolved: 2019-02-22

## 2019-02-22 PROBLEM — E11.8 TYPE 2 DIABETES MELLITUS WITH COMPLICATION, WITHOUT LONG-TERM CURRENT USE OF INSULIN (HCC): Status: RESOLVED | Noted: 2018-06-20 | Resolved: 2019-02-22

## 2019-02-22 PROBLEM — E11.8 TYPE 2 DIABETES MELLITUS WITH COMPLICATION, WITHOUT LONG-TERM CURRENT USE OF INSULIN (HCC): Status: ACTIVE | Noted: 2018-06-20

## 2019-02-22 PROCEDURE — 1160F RVW MEDS BY RX/DR IN RCRD: CPT | Performed by: FAMILY MEDICINE

## 2019-02-22 PROCEDURE — 99214 OFFICE O/P EST MOD 30 MIN: CPT | Performed by: FAMILY MEDICINE

## 2019-02-22 PROCEDURE — 3008F BODY MASS INDEX DOCD: CPT | Performed by: FAMILY MEDICINE

## 2019-02-22 PROCEDURE — 1036F TOBACCO NON-USER: CPT | Performed by: FAMILY MEDICINE

## 2019-02-22 RX ORDER — BETAMETHASONE DIPROPIONATE 0.5 MG/G
CREAM TOPICAL DAILY
Qty: 45 G | Refills: 3 | Status: SHIPPED | OUTPATIENT
Start: 2019-02-22 | End: 2019-02-22 | Stop reason: SDUPTHER

## 2019-02-22 RX ORDER — BETAMETHASONE DIPROPIONATE 0.5 MG/G
CREAM TOPICAL DAILY
Qty: 90 G | Refills: 3 | Status: SHIPPED | OUTPATIENT
Start: 2019-02-22 | End: 2019-06-25 | Stop reason: HOSPADM

## 2019-02-22 NOTE — PATIENT INSTRUCTIONS
Type 2 diabetes  Excellent with A1c 5 9  We will take 1 of the metformin soften he will only take in the morning  We will recheck him in 3 months with A1c prior    Positive rheumatoid factor most likely secondary to  History of sarcoid in the past  Nothing more needs to be done with this    Celiac disease  Patient follows diet  Has been train by dietitian    Hyperlipidemia  Patient is on atorvastatin and diet  He also needs this because of his diabetes      Elevated PSA / BPH  Patient follows with Urology  He is on Proscar so we need to double his PSA whenever we see it    Eczema  Continue using steroid cream on elbows as needed      vitamin-D deficiency   Continue under to vitamin-D we will check this value next visit    History of lung nodule  Pap was followed by CT scan with Pulmonary for years  Felt to be left over sarcoid no need to follow anymore       Recheck in 3 months  We will recheck his A1c prior and see how dropping 1 of his metformin does  We will also see if we could drop another metformin     Health Maintenance   Topic Date Due    Diabetic Foot Exam  04/22/1959    BMI: Followup Plan  04/22/1967    HEPATITIS B VACCINES (1 of 3 - Risk 3-dose series) 04/22/1968    HEMOGLOBIN A1C  08/14/2019    Medicare Annual Wellness Visit (AWV)  09/17/2019    Fall Risk  09/19/2019    Depression Screening PHQ  09/19/2019    URINE MICROALBUMIN  11/09/2019    BMI: Adult  12/12/2019    DM Eye Exam  12/22/2019    CRC Screening: Colonoscopy  03/01/2020    DTaP,Tdap,and Td Vaccines (2 - Td) 03/08/2023    Hepatitis C Screening  Completed    INFLUENZA VACCINE  Completed    Pneumococcal PPSV23/PCV13 65+ Years / Low and Medium Risk  Completed

## 2019-03-06 ENCOUNTER — CLINICAL SUPPORT (OUTPATIENT)
Dept: NUTRITION | Facility: HOSPITAL | Age: 69
End: 2019-03-06
Payer: COMMERCIAL

## 2019-03-06 VITALS — BODY MASS INDEX: 29.26 KG/M2 | HEIGHT: 67 IN | WEIGHT: 186.4 LBS

## 2019-03-06 DIAGNOSIS — E11.9 TYPE 2 DIABETES MELLITUS WITHOUT COMPLICATION, WITHOUT LONG-TERM CURRENT USE OF INSULIN (HCC): ICD-10-CM

## 2019-03-06 PROCEDURE — 97803 MED NUTRITION INDIV SUBSEQ: CPT

## 2019-03-06 NOTE — PROGRESS NOTES
Follow-Up Nutrition Assessment Form    Patient Name: Nathaniel Meade    YOB: 1950    Sex: Male      Follow Up Date: 3/6/2019  Start Time: 9:30am Stop Time: 10:00am Total Minutes: 30min     Data:  Present at session: self   Patient Concerns: "I think I didn't do so well this time  Holidays and in winter I am not as active"   Medical Dx/Reason for Referral: E11 9    Past Medical History:   Diagnosis Date    Acute pancreatitis     last assessed - [id number]    BPH (benign prostatic hypertrophy)     Celiac disease     current GI w/u to r/o celiac    Cholelithiasis     Elevated blood pressure reading     last assessed - [id number]    Gallstones     last assessed - [id number]    High cholesterol     Hx of mitral valve disorder     Sarcoidosis     1990's Diagnosed 15 years ago by biopsy, not active; last assessed - [id number]    Lab Results   Component Value Date    HGBA1C 5 9 02/14/2019   Celiac Disease/Gluten Free   Current Outpatient Medications   Medication Sig Dispense Refill    atorvastatin (LIPITOR) 20 mg tablet TAKE 1 TABLET BY MOUTH  DAILY 90 tablet 1    betamethasone dipropionate (DIPROSONE) 0 05 % cream Apply topically daily To your elbows and rub in well 90 g 3    Cholecalciferol (VITAMIN D-3) 1000 units CAPS 2 daily  0    finasteride (PROSCAR) 5 mg tablet Take 5 mg by mouth daily after dinner        Glucosamine-Chondroitin (MOVE FREE PO) Take 2 tablets by mouth daily      glucose blood (CONTOUR NEXT TEST) test strip Check sugars daily or as instructed 100 each 1    metFORMIN (GLUCOPHAGE) 500 mg tablet Take 1 tablet (500 mg total) by mouth daily with breakfast 90 tablet     MICROLET LANCETS MISC by Does not apply route daily 100 each 1    Multiple Vitamin (MULTIVITAMIN) tablet Take 1 tablet by mouth daily      Omega-3 Fatty Acids (OMEGA 3 PO) Take 1 capsule by mouth daily       No current facility-administered medications for this visit           Additional Meds/Supplements: Taking 3000 IU Vitamin D3 daily during winter   Barriers to Learning: None   Labs: HGBA1C 5 9 02/14/2019      Height: Ht Readings from Last 3 Encounters:   02/22/19 5' 7 5" (1 715 m)   12/12/18 5' 7" (1 702 m)   11/07/18 5' 7" (1 702 m)      Weight: Wt Readings from Last 3 Encounters:   02/22/19 85 4 kg (188 lb 3 2 oz)   12/12/18 87 7 kg (193 lb 6 4 oz)   11/07/18 90 2 kg (198 lb 12 8 oz)     Estimated body mass index is 29 19 kg/m² as calculated from the following:    Height as of this encounter: 5' 7" (1 702 m)  Weight as of this encounter: 84 6 kg (186 lb 6 4 oz)  Weight for BMI 15=052mc   Wt  Change Since Last Visit: [x]Yes     []No  Amount: -34 6 lb past 7 5 months      Energy Needs: [facility] Equation:   1600kcal   Pain Screen: Are you having pain now? No      Goals Achieved:      New Goals:   1 1lb weight loss/week until achieves personal [id number] lb BMI <30   2  Piero Ingles 5-1lb weight loss until achieve Personal Goal 180 lb or BMI 28   3  Continue to food journal with emphasis on Protein and Carbohydrates at meals and snacks  Initial PES:   Excess carbohydrate intake  related to Food and nutrition related knowledge deficit concerning appropriate amount of carbohydrate intake as  evidenced by Hemoglobin A1C >6%              New PES:N/A No Change      New Problem List:  1  Increase physical activity 10 150 min-200 min daily or at least have a plan in mind by next encounter  Assessment:  Gualbertobridger Jimmy states he is eating according to CHO Counting and not exceeding at meals  He has  had successful outcomes with improving his body weight from being obese to overweight,losing 16% in 7 5 months and improving HgBA1c from 7 3 to 5 9  past 7 5months  He remains motivated  Discussion focused on increasing physical activity for overall health but mostly for weight maintenance overtime       Medical Nutrition Therapy Intervention:  [x]Individualized Meal Plan 1600 kcal, 80 gram Protein []Understanding Lab Values   [x]Basic Pathophysiology of Disease []Food/Medication Interactions   [x]Food Diary-myfitnesspal [x]Exercise-Recommned  150 min-200 min weekly   [x]Lifestyle/Behavior Modification Techniques []Medication, Mechanism of Action   [x]Label Reading-Carbohydrate Grams []Self Blood Glucose Monitoring   [x]Weight/BMI Goals-Personal Goal 180 lb BMI 28 [x]Other - Provided Bibiana Simmons a 7 day Free [employer] for him and his wife     Other Notes:        Comprehension: []Excellent  [x]Very Good  []Good  []Fair   []Poor    Receptivity: []Excellent  [x]Very Good  []Good  []Fair   []Poor    Expected Compliance: []Excellent  [x]Very Good  []Good  []Fair   []Poor      Labs:  CMP  Lab Results   Component Value Date     02/24/2015    K 4 9 10/24/2018     10/24/2018    CO2 29 10/24/2018    ANIONGAP 3 (L) 02/24/2015    BUN 21 10/24/2018    CREATININE 0 87 10/24/2018    GLUCOSE 139 02/24/2015    GLUF 190 (H) 06/15/2018    CALCIUM 9 4 10/24/2018    AST 17 10/24/2018    ALT 29 10/24/2018    ALKPHOS 82 10/24/2018    PROT 7 7 02/24/2015    BILITOT 1 0 02/24/2015    EGFR 88 10/24/2018       BMP  Lab Results   Component Value Date    GLUCOSE 139 02/24/2015    CALCIUM 9 4 10/24/2018     02/24/2015    K 4 9 10/24/2018    CO2 29 10/24/2018     10/24/2018    BUN 21 10/24/2018    CREATININE 0 87 10/24/2018       Lipids  Lab Results   Component Value Date    CHOL 140 02/24/2015    CHOL 140 12/17/2013     Lab Results   Component Value Date    HDL 67 (H) 06/15/2018    HDL 67 (H) 06/19/2017    HDL 60 07/22/2016     Lab Results   Component Value Date    LDLCALC 62 06/15/2018    LDLCALC 61 06/19/2017    LDLCALC 52 07/22/2016     Lab Results   Component Value Date    TRIG 70 06/15/2018    TRIG 100 06/19/2017    TRIG 86 07/22/2016     No results found for: CHOLHDL    Hemoglobin A1C  Lab Results   Component Value Date    HGBA1C 5 9 02/14/2019       Fasting Glucose  Lab Results   Component Value Date    GLUF 190 (H) 06/15/2018       Insulin Thyroid  No results found for: TSH, C0EIMIA, E3HKVMI, THYROIDAB    Hepatic Function Panel  Lab Results   Component Value Date    ALT 29 10/24/2018    AST 17 10/24/2018    ALKPHOS 82 10/24/2018    BILITOT 1 0 02/24/2015       Celiac Disease Antibody Panel  Endomysial IgA   Date Value Ref Range Status   03/13/2017 Positive (A) Negative Final     Gliadin IgA   Date Value Ref Range Status   03/13/2017 >133 (H) 0 - 19 units Final     Comment:                        Negative                   0 - 19                     Weak Positive             20 - 30                     Moderate to Strong Positive   >30     Gliadin IgG   Date Value Ref Range Status   03/13/2017 65 (H) 0 - 19 units Final     Comment:                        Negative                   0 - 19                     Weak Positive             20 - 30                     Moderate to Strong Positive   >30     IgA   Date Value Ref Range Status   03/13/2017 346 61 - 437 mg/dL Final     TISSUE TRANSGLUTAMINASE IGA   Date Value Ref Range Status   03/13/2017 >100 (H) 0 - 3 U/mL Final     Comment:                                   Negative        0 -  3                                Weak Positive   4 - 10                                Positive           >10   Tissue Transglutaminase (tTG) has been identified   as the endomysial antigen  Studies have demonstr-   ated that endomysial IgA antibodies have over 99%   specificity for gluten sensitive enteropathy        Iron  No results found for: IRON, TIBC, FERRITIN    Vitamins  No results found for: VITAMIN B2   No results found for: NICOTINAMIDE, NICOTINIC ACID   No results found for: VITAMINB6  No results found for: [id number]  No results found for: VITB5  No results found for: A6PYKBKU  No results found for: THYROGLB  No results found for: VITAMIN K   No results found for: 25-HYDROXY VIT D   No components found for: [address] [address]

## 2019-03-06 NOTE — PATIENT INSTRUCTIONS
1  Maintain HgbA1c <6 0 mg/dl by May 2019   2   5-1lb weight loss until achieve Personal Goal 180 lb or BMI 28   3  Try the product [employer] Gluten Free Pastas(chickpea,lentil)  4  Continue to food journal with emphasis on Protein and Carbohydrates at meals and snacks  5 Increase physical activity to 150 min-200 min daily or atleast have a plan in mind by next encounter  Hope you can use the [employer]

## 2019-03-07 ENCOUNTER — TELEPHONE (OUTPATIENT)
Dept: FAMILY MEDICINE CLINIC | Facility: CLINIC | Age: 69
End: 2019-03-07

## 2019-03-07 ENCOUNTER — TELEPHONE (OUTPATIENT)
Dept: INTERNAL MEDICINE CLINIC | Facility: CLINIC | Age: 69
End: 2019-03-07

## 2019-03-07 DIAGNOSIS — K90.0 ADULT CELIAC DISEASE: ICD-10-CM

## 2019-03-07 DIAGNOSIS — E55.9 VITAMIN D DEFICIENCY: ICD-10-CM

## 2019-03-07 DIAGNOSIS — E78.2 MIXED HYPERLIPIDEMIA: ICD-10-CM

## 2019-03-07 DIAGNOSIS — E11.65 TYPE 2 DIABETES MELLITUS WITH HYPERGLYCEMIA, WITHOUT LONG-TERM CURRENT USE OF INSULIN (HCC): Primary | ICD-10-CM

## 2019-03-07 NOTE — TELEPHONE ENCOUNTER
Caller is Monica Carter from [facility] nutritional counseling  She is asking for an ambulatory referral put in the system  She states to follow the order that was put in the system by Cristina Hernandez on 8-10-18   Ximena's # [phone]

## 2019-03-07 NOTE — TELEPHONE ENCOUNTER
She needs to tell me exactly what the patient needs on the order for the nutritional counselor please

## 2019-03-08 DIAGNOSIS — E11.9 TYPE 2 DIABETES MELLITUS WITHOUT COMPLICATION, WITHOUT LONG-TERM CURRENT USE OF INSULIN (HCC): Primary | ICD-10-CM

## 2019-03-08 NOTE — TELEPHONE ENCOUNTER
Please enter the referral  I spoke to patient and he feels he is doing well with this and would like a few more visits  If you enter the referral let me know so I can contact Maribell Ryan and let her know patient will continue

## 2019-03-08 NOTE — TELEPHONE ENCOUNTER
Hi Dr Antony Tellez,  Medicare does cover 2 hours of nutrition education for Diabetes a year  Through nutrition counseling in 2018, Michele Sanchez was able to have positive outcomes  We can still see the patient with his diabetes as T2DM not requiring medication  He is very motivated and invested in managing his DM with diet and exercise  If you reconsider please let me know  I will speak with Michele Sanchez since we had future encounters scheduled  Thank you

## 2019-03-08 NOTE — TELEPHONE ENCOUNTER
I am sorry Yolanda Pritchard, but I do not understand why you need a referral     His A1c is 5 9 so he no longer has diabetes  I am taking ways medications   I did not order nutritional education for him as it certainly does not seem appropriate at this time

## 2019-03-08 NOTE — TELEPHONE ENCOUNTER
Carlos First, would you please call patient and ask him if he wants to see the nutritional list   If he does all just do the referral

## 2019-05-31 ENCOUNTER — TRANSCRIBE ORDERS (OUTPATIENT)
Dept: ADMINISTRATIVE | Age: 69
End: 2019-05-31

## 2019-05-31 ENCOUNTER — APPOINTMENT (OUTPATIENT)
Dept: LAB | Age: 69
End: 2019-05-31
Payer: COMMERCIAL

## 2019-05-31 DIAGNOSIS — E11.9 TYPE 2 DIABETES MELLITUS WITHOUT COMPLICATION, WITHOUT LONG-TERM CURRENT USE OF INSULIN (HCC): ICD-10-CM

## 2019-05-31 LAB
EST. AVERAGE GLUCOSE BLD GHB EST-MCNC: 114 MG/DL
HBA1C MFR BLD: 5.6 % (ref 4.2–6.3)

## 2019-05-31 PROCEDURE — 83036 HEMOGLOBIN GLYCOSYLATED A1C: CPT

## 2019-05-31 PROCEDURE — 36415 COLL VENOUS BLD VENIPUNCTURE: CPT

## 2019-06-11 ENCOUNTER — OFFICE VISIT (OUTPATIENT)
Dept: FAMILY MEDICINE CLINIC | Facility: CLINIC | Age: 69
End: 2019-06-11
Payer: COMMERCIAL

## 2019-06-11 VITALS
WEIGHT: 184.1 LBS | DIASTOLIC BLOOD PRESSURE: 60 MMHG | HEART RATE: 68 BPM | HEIGHT: 68 IN | RESPIRATION RATE: 16 BRPM | BODY MASS INDEX: 27.9 KG/M2 | SYSTOLIC BLOOD PRESSURE: 118 MMHG

## 2019-06-11 DIAGNOSIS — E11.9 TYPE 2 DIABETES MELLITUS WITHOUT COMPLICATION, WITHOUT LONG-TERM CURRENT USE OF INSULIN (HCC): Primary | ICD-10-CM

## 2019-06-11 DIAGNOSIS — E78.2 MIXED HYPERLIPIDEMIA: ICD-10-CM

## 2019-06-11 DIAGNOSIS — N40.1 BENIGN PROSTATIC HYPERPLASIA WITH URINARY FREQUENCY: ICD-10-CM

## 2019-06-11 DIAGNOSIS — R35.0 BENIGN PROSTATIC HYPERPLASIA WITH URINARY FREQUENCY: ICD-10-CM

## 2019-06-11 DIAGNOSIS — K90.0 ADULT CELIAC DISEASE: ICD-10-CM

## 2019-06-11 DIAGNOSIS — R76.8 RHEUMATOID FACTOR POSITIVE: ICD-10-CM

## 2019-06-11 DIAGNOSIS — D86.9 SARCOIDOSIS: ICD-10-CM

## 2019-06-11 DIAGNOSIS — E55.9 VITAMIN D DEFICIENCY: ICD-10-CM

## 2019-06-11 PROCEDURE — 3725F SCREEN DEPRESSION PERFORMED: CPT | Performed by: FAMILY MEDICINE

## 2019-06-11 PROCEDURE — 99214 OFFICE O/P EST MOD 30 MIN: CPT | Performed by: FAMILY MEDICINE

## 2019-06-14 ENCOUNTER — CLINICAL SUPPORT (OUTPATIENT)
Dept: NUTRITION | Facility: HOSPITAL | Age: 69
End: 2019-06-14
Payer: COMMERCIAL

## 2019-06-14 DIAGNOSIS — E11.65 TYPE 2 DIABETES MELLITUS WITH HYPERGLYCEMIA, WITHOUT LONG-TERM CURRENT USE OF INSULIN (HCC): ICD-10-CM

## 2019-06-14 DIAGNOSIS — K90.0 ADULT CELIAC DISEASE: ICD-10-CM

## 2019-06-14 DIAGNOSIS — E78.2 MIXED HYPERLIPIDEMIA: ICD-10-CM

## 2019-06-14 PROCEDURE — 97803 MED NUTRITION INDIV SUBSEQ: CPT

## 2019-06-20 DIAGNOSIS — E78.2 MIXED HYPERLIPIDEMIA: ICD-10-CM

## 2019-06-20 RX ORDER — ATORVASTATIN CALCIUM 20 MG/1
TABLET, FILM COATED ORAL
Qty: 90 TABLET | Refills: 1 | Status: SHIPPED | OUTPATIENT
Start: 2019-06-20 | End: 2020-01-06

## 2019-06-22 ENCOUNTER — APPOINTMENT (EMERGENCY)
Dept: CT IMAGING | Facility: HOSPITAL | Age: 69
DRG: 864 | End: 2019-06-22
Payer: COMMERCIAL

## 2019-06-22 ENCOUNTER — APPOINTMENT (OUTPATIENT)
Dept: RADIOLOGY | Age: 69
DRG: 864 | End: 2019-06-22
Payer: COMMERCIAL

## 2019-06-22 ENCOUNTER — HOSPITAL ENCOUNTER (INPATIENT)
Facility: HOSPITAL | Age: 69
LOS: 3 days | Discharge: HOME | DRG: 864 | End: 2019-06-25
Attending: EMERGENCY MEDICINE | Admitting: INTERNAL MEDICINE
Payer: COMMERCIAL

## 2019-06-22 ENCOUNTER — APPOINTMENT (EMERGENCY)
Dept: RADIOLOGY | Facility: HOSPITAL | Age: 69
DRG: 864 | End: 2019-06-22
Payer: COMMERCIAL

## 2019-06-22 ENCOUNTER — OFFICE VISIT (OUTPATIENT)
Dept: URGENT CARE | Age: 69
End: 2019-06-22
Payer: COMMERCIAL

## 2019-06-22 VITALS
DIASTOLIC BLOOD PRESSURE: 58 MMHG | HEIGHT: 68 IN | RESPIRATION RATE: 18 BRPM | SYSTOLIC BLOOD PRESSURE: 126 MMHG | WEIGHT: 180 LBS | OXYGEN SATURATION: 98 % | TEMPERATURE: 100.2 F | HEART RATE: 80 BPM | BODY MASS INDEX: 27.28 KG/M2

## 2019-06-22 DIAGNOSIS — R50.9 FUO (FEVER OF UNKNOWN ORIGIN): ICD-10-CM

## 2019-06-22 DIAGNOSIS — R50.9 FEVER, UNSPECIFIED FEVER CAUSE: Primary | ICD-10-CM

## 2019-06-22 DIAGNOSIS — R50.9 FEVER, UNSPECIFIED FEVER CAUSE: ICD-10-CM

## 2019-06-22 DIAGNOSIS — R50.9 FEVER: Primary | ICD-10-CM

## 2019-06-22 LAB
ALBUMIN SERPL BCP-MCNC: 3.2 G/DL (ref 3.5–5)
ALP SERPL-CCNC: 210 U/L (ref 46–116)
ALT SERPL W P-5'-P-CCNC: 143 U/L (ref 12–78)
ANION GAP SERPL CALCULATED.3IONS-SCNC: 8 MMOL/L (ref 4–13)
APTT PPP: 36 SECONDS (ref 26–38)
AST SERPL W P-5'-P-CCNC: 102 U/L (ref 5–45)
BACTERIA UR QL AUTO: ABNORMAL /HPF
BASOPHILS # BLD AUTO: 0.02 THOUSANDS/ΜL (ref 0–0.1)
BASOPHILS NFR BLD AUTO: 0 % (ref 0–1)
BILIRUB SERPL-MCNC: 1.3 MG/DL (ref 0.2–1)
BILIRUB UR QL STRIP: ABNORMAL
BUN SERPL-MCNC: 26 MG/DL (ref 5–25)
CALCIUM SERPL-MCNC: 8.9 MG/DL (ref 8.3–10.1)
CHLORIDE SERPL-SCNC: 99 MMOL/L (ref 100–108)
CLARITY UR: CLEAR
CO2 SERPL-SCNC: 27 MMOL/L (ref 21–32)
COLOR UR: YELLOW
CREAT SERPL-MCNC: 1.11 MG/DL (ref 0.6–1.3)
EOSINOPHIL # BLD AUTO: 0 THOUSAND/ΜL (ref 0–0.61)
EOSINOPHIL NFR BLD AUTO: 0 % (ref 0–6)
ERYTHROCYTE [DISTWIDTH] IN BLOOD BY AUTOMATED COUNT: 12.1 % (ref 11.6–15.1)
GFR SERPL CREATININE-BSD FRML MDRD: 67 ML/MIN/1.73SQ M
GLUCOSE SERPL-MCNC: 151 MG/DL (ref 65–140)
GLUCOSE UR STRIP-MCNC: NEGATIVE MG/DL
HCT VFR BLD AUTO: 38.6 % (ref 36.5–49.3)
HGB BLD-MCNC: 13.6 G/DL (ref 12–17)
HGB UR QL STRIP.AUTO: ABNORMAL
IMM GRANULOCYTES # BLD AUTO: 0.05 THOUSAND/UL (ref 0–0.2)
IMM GRANULOCYTES NFR BLD AUTO: 1 % (ref 0–2)
INR PPP: 1.27 (ref 0.86–1.17)
KETONES UR STRIP-MCNC: NEGATIVE MG/DL
LACTATE SERPL-SCNC: 1.5 MMOL/L (ref 0.5–2)
LACTATE SERPL-SCNC: 2.3 MMOL/L (ref 0.5–2)
LEUKOCYTE ESTERASE UR QL STRIP: ABNORMAL
LYMPHOCYTES # BLD AUTO: 0.88 THOUSANDS/ΜL (ref 0.6–4.47)
LYMPHOCYTES NFR BLD AUTO: 9 % (ref 14–44)
MCH RBC QN AUTO: 33.4 PG (ref 26.8–34.3)
MCHC RBC AUTO-ENTMCNC: 35.2 G/DL (ref 31.4–37.4)
MCV RBC AUTO: 95 FL (ref 82–98)
MONOCYTES # BLD AUTO: 0.82 THOUSAND/ΜL (ref 0.17–1.22)
MONOCYTES NFR BLD AUTO: 9 % (ref 4–12)
NEUTROPHILS # BLD AUTO: 7.91 THOUSANDS/ΜL (ref 1.85–7.62)
NEUTS SEG NFR BLD AUTO: 81 % (ref 43–75)
NITRITE UR QL STRIP: NEGATIVE
NON-SQ EPI CELLS URNS QL MICRO: ABNORMAL /HPF
NRBC BLD AUTO-RTO: 0 /100 WBCS
PH UR STRIP.AUTO: 5.5 [PH]
PLATELET # BLD AUTO: 218 THOUSANDS/UL (ref 149–390)
PMV BLD AUTO: 9.7 FL (ref 8.9–12.7)
POTASSIUM SERPL-SCNC: 4.5 MMOL/L (ref 3.5–5.3)
PROT SERPL-MCNC: 7.5 G/DL (ref 6.4–8.2)
PROT UR STRIP-MCNC: ABNORMAL MG/DL
PROTHROMBIN TIME: 15.5 SECONDS (ref 11.8–14.2)
RBC # BLD AUTO: 4.07 MILLION/UL (ref 3.88–5.62)
RBC #/AREA URNS AUTO: ABNORMAL /HPF
SL AMB  POCT GLUCOSE, UA: ABNORMAL
SL AMB LEUKOCYTE ESTERASE,UA: ABNORMAL
SL AMB POCT BILIRUBIN,UA: ABNORMAL
SL AMB POCT BLOOD,UA: ABNORMAL
SL AMB POCT CLARITY,UA: CLEAR
SL AMB POCT COLOR,UA: YELLOW
SL AMB POCT KETONES,UA: ABNORMAL
SL AMB POCT NITRITE,UA: ABNORMAL
SL AMB POCT PH,UA: 6
SL AMB POCT SPECIFIC GRAVITY,UA: 1.02
SL AMB POCT URINE PROTEIN: ABNORMAL
SL AMB POCT UROBILINOGEN: 0.2
SODIUM SERPL-SCNC: 134 MMOL/L (ref 136–145)
SP GR UR STRIP.AUTO: 1.02 (ref 1–1.03)
UROBILINOGEN UR QL STRIP.AUTO: 2 E.U./DL
WBC # BLD AUTO: 9.68 THOUSAND/UL (ref 4.31–10.16)
WBC #/AREA URNS AUTO: ABNORMAL /HPF

## 2019-06-22 PROCEDURE — 96365 THER/PROPH/DIAG IV INF INIT: CPT

## 2019-06-22 PROCEDURE — 83605 ASSAY OF LACTIC ACID: CPT | Performed by: EMERGENCY MEDICINE

## 2019-06-22 PROCEDURE — 87476 LYME DIS DNA AMP PROBE: CPT | Performed by: EMERGENCY MEDICINE

## 2019-06-22 PROCEDURE — 87040 BLOOD CULTURE FOR BACTERIA: CPT | Performed by: EMERGENCY MEDICINE

## 2019-06-22 PROCEDURE — 93005 ELECTROCARDIOGRAM TRACING: CPT

## 2019-06-22 PROCEDURE — 71046 X-RAY EXAM CHEST 2 VIEWS: CPT

## 2019-06-22 PROCEDURE — 99213 OFFICE O/P EST LOW 20 MIN: CPT | Performed by: FAMILY MEDICINE

## 2019-06-22 PROCEDURE — 80053 COMPREHEN METABOLIC PANEL: CPT | Performed by: EMERGENCY MEDICINE

## 2019-06-22 PROCEDURE — 85610 PROTHROMBIN TIME: CPT | Performed by: EMERGENCY MEDICINE

## 2019-06-22 PROCEDURE — 81001 URINALYSIS AUTO W/SCOPE: CPT | Performed by: EMERGENCY MEDICINE

## 2019-06-22 PROCEDURE — 99223 1ST HOSP IP/OBS HIGH 75: CPT | Performed by: INTERNAL MEDICINE

## 2019-06-22 PROCEDURE — 81002 URINALYSIS NONAUTO W/O SCOPE: CPT | Performed by: NURSE PRACTITIONER

## 2019-06-22 PROCEDURE — 36415 COLL VENOUS BLD VENIPUNCTURE: CPT | Performed by: EMERGENCY MEDICINE

## 2019-06-22 PROCEDURE — 99285 EMERGENCY DEPT VISIT HI MDM: CPT

## 2019-06-22 PROCEDURE — 99284 EMERGENCY DEPT VISIT MOD MDM: CPT | Performed by: EMERGENCY MEDICINE

## 2019-06-22 PROCEDURE — 85025 COMPLETE CBC W/AUTO DIFF WBC: CPT | Performed by: EMERGENCY MEDICINE

## 2019-06-22 PROCEDURE — 84145 PROCALCITONIN (PCT): CPT | Performed by: EMERGENCY MEDICINE

## 2019-06-22 PROCEDURE — S9083 URGENT CARE CENTER GLOBAL: HCPCS | Performed by: FAMILY MEDICINE

## 2019-06-22 PROCEDURE — 96361 HYDRATE IV INFUSION ADD-ON: CPT

## 2019-06-22 PROCEDURE — 74177 CT ABD & PELVIS W/CONTRAST: CPT

## 2019-06-22 PROCEDURE — 85730 THROMBOPLASTIN TIME PARTIAL: CPT | Performed by: EMERGENCY MEDICINE

## 2019-06-22 PROCEDURE — 86308 HETEROPHILE ANTIBODY SCREEN: CPT | Performed by: EMERGENCY MEDICINE

## 2019-06-22 RX ORDER — FINASTERIDE 5 MG/1
5 TABLET, FILM COATED ORAL
Status: DISCONTINUED | OUTPATIENT
Start: 2019-06-22 | End: 2019-06-25 | Stop reason: HOSPADM

## 2019-06-22 RX ORDER — 0.9 % SODIUM CHLORIDE 0.9 %
3 VIAL (ML) INJECTION AS NEEDED
Status: DISCONTINUED | OUTPATIENT
Start: 2019-06-22 | End: 2019-06-25 | Stop reason: HOSPADM

## 2019-06-22 RX ORDER — ACETAMINOPHEN 325 MG/1
650 TABLET ORAL ONCE
Status: COMPLETED | OUTPATIENT
Start: 2019-06-22 | End: 2019-06-22

## 2019-06-22 RX ORDER — CHLORAL HYDRATE 500 MG
1000 CAPSULE ORAL DAILY
Status: DISCONTINUED | OUTPATIENT
Start: 2019-06-23 | End: 2019-06-25 | Stop reason: HOSPADM

## 2019-06-22 RX ORDER — HEPARIN SODIUM 5000 [USP'U]/ML
5000 INJECTION, SOLUTION INTRAVENOUS; SUBCUTANEOUS EVERY 8 HOURS SCHEDULED
Status: DISCONTINUED | OUTPATIENT
Start: 2019-06-22 | End: 2019-06-25 | Stop reason: HOSPADM

## 2019-06-22 RX ORDER — ATORVASTATIN CALCIUM 20 MG/1
20 TABLET, FILM COATED ORAL DAILY
Status: DISCONTINUED | OUTPATIENT
Start: 2019-06-23 | End: 2019-06-25 | Stop reason: HOSPADM

## 2019-06-22 RX ADMIN — HEPARIN SODIUM 5000 UNITS: 5000 INJECTION INTRAVENOUS; SUBCUTANEOUS at 21:27

## 2019-06-22 RX ADMIN — SODIUM CHLORIDE 1000 ML: 0.9 INJECTION, SOLUTION INTRAVENOUS at 15:07

## 2019-06-22 RX ADMIN — IBUPROFEN 200 MG: 100 SUSPENSION ORAL at 21:26

## 2019-06-22 RX ADMIN — CEFTRIAXONE SODIUM 2000 MG: 10 INJECTION, POWDER, FOR SOLUTION INTRAVENOUS at 15:50

## 2019-06-22 RX ADMIN — SODIUM CHLORIDE 1000 ML: 0.9 INJECTION, SOLUTION INTRAVENOUS at 16:30

## 2019-06-22 RX ADMIN — SODIUM CHLORIDE 1000 ML: 0.9 INJECTION, SOLUTION INTRAVENOUS at 14:30

## 2019-06-22 RX ADMIN — IOHEXOL 100 ML: 350 INJECTION, SOLUTION INTRAVENOUS at 17:20

## 2019-06-22 RX ADMIN — ACETAMINOPHEN 650 MG: 325 TABLET, FILM COATED ORAL at 17:31

## 2019-06-22 NOTE — ED PROVIDER NOTES
History  Chief Complaint   Patient presents with    Fever - 9 weeks to 74 years     Patient c/o having fever x 4 days; highest 100 2  Sent in by urgent care "because they can't find out what's wrong " Bodyaches present  29-year-old gentleman comes in for 4 days of fever  Patient states that temperature was 100 2 at [facility] this morning  Was not taking at home but felt warm and cold had chills and all over body aches  Patient states that 4 days ago when he was working out at [employer] he just felt tired and could not finish his full workout that he does 4 times a week  He has been much more tired and has an upset stomach but no vomiting or diarrhea  Patient denies any previous similar episodes  Patient had a chest x-ray at urgent care that was within normal limits any urine dip that showed blood and leuks  Patient was sent here for further evaluation  Patient denies any UTI symptoms      History provided by:  Parent   used: No    Fever - 9 weeks to 74 years   Max temp prior to arrival:  100 2  Temp source:  Oral  Severity:  Moderate  Onset quality:  Sudden  Duration:  4 days  Timing:  Constant  Progression:  Waxing and waning  Chronicity:  New  Ineffective treatments:  None tried  Associated symptoms: chills, myalgias and nausea    Associated symptoms: no chest pain, no confusion, no congestion, no cough, no headaches, no rash, no rhinorrhea and no vomiting    Risk factors: no contaminated food, no occupational exposure, no recent travel and no sick contacts        Prior to Admission Medications   Prescriptions Last Dose Informant Patient Reported? Taking?    Cholecalciferol (VITAMIN D-3) 1000 units CAPS   No No   Si daily   Glucosamine-Chondroitin (MOVE FREE PO)  Self Yes No   Sig: Take 2 tablets by mouth daily   Multiple Vitamin (MULTIVITAMIN) tablet  Self Yes No   Sig: Take 1 tablet by mouth daily   Omega-3 Fatty Acids (OMEGA 3 PO)  Self Yes No   Sig: Take 1 capsule by mouth daily   atorvastatin (LIPITOR) 20 mg tablet   No Yes   Sig: TAKE 1 TABLET BY MOUTH  DAILY   betamethasone dipropionate (DIPROSONE) 0 05 % cream   No No   Sig: Apply topically daily To your elbows and rub in well   finasteride (PROSCAR) 5 mg tablet  Self Yes No   Sig: Take 5 mg by mouth daily after dinner        Facility-Administered Medications: None       Past Medical History:   Diagnosis Date    Acute pancreatitis     last assessed - [id number]    BPH (benign prostatic hypertrophy)     Celiac disease     current GI w/u to r/o celiac    Cholelithiasis     Elevated blood pressure reading     last assessed - [id number]    Gallstones     last assessed - [id number]    High cholesterol     Hx of mitral valve disorder     Sarcoidosis     1990's Diagnosed 15 years ago by biopsy, not active; last assessed - [id number]       Past Surgical History:   Procedure Laterality Date    ERCP N/A 4/3/2017    Procedure: ENDOSCOPIC RETROGRADE CHOLANGIOPANCREATOGRAPHY (ERCP); Surgeon: Ana M Somers MD;  [facility]: [facility]; Service:     DE COLONOSCOPY FLX DX W/COLLJ SPEC WHEN PFRMD N/A 3/1/2017    Procedure: EGD AND COLONOSCOPY;  Surgeon: Ramon May MD;  [facility]; Service: Gastroenterology    DE ESOPHAGOGASTRODUODENOSCOPY TRANSORAL DIAGNOSTIC N/A 6/5/2017    Procedure: ESOPHAGOGASTRODUODENOSCOPY (EGD); Surgeon: Ana M Somers MD;  [facility];   Service: Gastroenterology    DE LAP,CHOLECYSTECTOMY N/A 3/29/2017    Procedure: CHOLECYSTECTOMY LAPAROSCOPIC; POSSIBLE OPEN;  Surgeon: Clemencia Luna MD;  [facility];  Service: General; complicated by bile leak and pancreatitis s/p stent     ROTATOR CUFF REPAIR Right 04/29/2015    for rotator cuff tear, Onset: 4/14       Family History   Problem Relation Age of Onset    Diabetes Mother         Diabetes Mellitus    Hypertension Mother     Ovarian cancer Mother     Rheumatologic disease Mother     Diabetes Father         Diabetes Mellitus    Heart disease Father     Hypertension Father     Diabetes Sister         Diabetes Mellitus    Other Sister         H  pylori infection    Hypertension Sister     Other Brother         H  pylori infection    Alcohol abuse Neg Hx     Substance Abuse Neg Hx     Mental illness Neg Hx     Depression Neg Hx      I have reviewed and agree with the history as documented  Social History     Tobacco Use    Smoking status: Never Smoker    Smokeless tobacco: Never Used   Substance Use Topics    Alcohol use: Yes     Comment: wine, 3-4x/week; social drinker    Drug use: No     Comment: Denied history of drug use        Review of Systems   Constitutional: Positive for chills and fever  Negative for activity change and appetite change  HENT: Negative for congestion, facial swelling and rhinorrhea  Eyes: Negative for discharge and redness  Respiratory: Negative for cough and wheezing  Cardiovascular: Negative for chest pain and leg swelling  Gastrointestinal: Positive for nausea  Negative for abdominal distention, abdominal pain, blood in stool and vomiting  Endocrine: Negative for cold intolerance and polydipsia  Genitourinary: Negative for difficulty urinating and hematuria  Musculoskeletal: Positive for myalgias  Negative for arthralgias and gait problem  Skin: Negative for color change and rash  Allergic/Immunologic: Negative for food allergies and immunocompromised state  Neurological: Negative for dizziness, seizures and headaches  Hematological: Negative for adenopathy  Does not bruise/bleed easily  Psychiatric/Behavioral: Negative for agitation, confusion and decreased concentration  All other systems reviewed and are negative  Physical Exam  Physical Exam   Constitutional: He is oriented to person, place, and time  He appears well-developed and well-nourished  Non-toxic appearance  HENT:   Head: Normocephalic and atraumatic     Right Ear: Tympanic membrane normal    Left Ear: Tympanic membrane normal    Nose: Nose normal    Mouth/Throat: Oropharynx is clear and moist    Eyes: Pupils are equal, round, and reactive to light  Conjunctivae, EOM and lids are normal    Neck: Trachea normal and normal range of motion  Neck supple  No JVD present  Carotid bruit is not present  Cardiovascular: Normal rate, regular rhythm, normal heart sounds and intact distal pulses  No extrasystoles are present  Pulmonary/Chest: Effort normal  He has no decreased breath sounds  He has no wheezes  He has no rhonchi  He has no rales  He exhibits no tenderness and no deformity  Abdominal: Soft  Bowel sounds are normal  There is no tenderness  There is no rebound, no guarding and no CVA tenderness  Musculoskeletal:        Right shoulder: He exhibits normal range of motion, no tenderness, no swelling and no deformity  Cervical back: Normal  He exhibits normal range of motion, no tenderness, no bony tenderness and no deformity  Lymphadenopathy:     He has no cervical adenopathy  He has no axillary adenopathy  Neurological: He is alert and oriented to person, place, and time  He has normal strength and normal reflexes  No cranial nerve deficit or sensory deficit  He displays a negative Romberg sign  Skin: Skin is warm and dry  Psychiatric: He has a normal mood and affect  His speech is normal and behavior is normal  Judgment and thought content normal  Cognition and memory are normal    Nursing note and vitals reviewed        Vital Signs  ED Triage Vitals   Temperature Pulse Respirations Blood Pressure SpO2   06/22/19 1349 06/22/19 1348 06/22/19 1348 06/22/19 1348 06/22/19 1348   99 9 °F (37 7 °C) 78 18 160/68 98 %      Temp Source Heart Rate Source Patient Position - Orthostatic VS BP Location FiO2 (%)   06/22/19 1349 06/22/19 1348 06/22/19 1348 06/22/19 1348 --   Oral Monitor Sitting Left arm       Pain Score       06/22/19 1348       No Pain           Vitals:    06/22/19 1500 06/22/19 1600 06/22/19 1700 06/22/19 1715   BP: 139/99 147/66  155/70   Pulse: 84 82 82    Patient Position - Orthostatic VS:  Sitting           Visual Acuity      ED Medications  Medications   sodium chloride (PF) 0 9 % injection 3 mL (has no administration in time range)   sodium chloride 0 9 % bolus 1,000 mL (0 mL Intravenous Stopped 6/22/19 1545)     Followed by   sodium chloride 0 9 % bolus 1,000 mL (0 mL Intravenous Stopped 6/22/19 1555)     Followed by   sodium chloride 0 9 % bolus 1,000 mL (1,000 mL Intravenous New Bag 6/22/19 1630)   cefTRIAXone (ROCEPHIN) 2,000 mg in dextrose 5 % 50 mL IVPB (0 mg Intravenous Stopped 6/22/19 1701)   acetaminophen (TYLENOL) tablet 650 mg (650 mg Oral Given 6/22/19 1731)   iohexol (OMNIPAQUE) 350 MG/ML injection (MULTI-DOSE) 100 mL (100 mL Intravenous Given 6/22/19 1720)       Diagnostic Studies  Results Reviewed     Procedure Component Value Units Date/Time    Lactic Acid x2 [[id number]]  (Normal) Collected:  06/22/19 1650    Lab Status:  Final result Specimen:  Blood from Arm, Right Updated:  06/22/19 1718     LACTIC ACID 1 5 mmol/L     Narrative:       Result may be elevated if tourniquet was used during collection  Mononucleosis screen [[id number]] Collected:  06/22/19 1650    Lab Status: In process Specimen:  Blood from Arm, Right Updated:  06/22/19 1654    Lyme disease, PCR [[id number]] Collected:  06/22/19 1437    Lab Status:   In process Specimen:  Blood from Hand, Right Updated:  06/22/19 1626    Urine Microscopic [[id number]]  (Abnormal) Collected:  06/22/19 1446    Lab Status:  Final result Specimen:  Urine, Clean Catch Updated:  06/22/19 1544     RBC, UA 1-2 /hpf      WBC, UA 0-5 /hpf      Epithelial Cells Occasional /hpf      Bacteria, UA Occasional /hpf     Comprehensive metabolic panel [[id number]]  (Abnormal) Collected:  06/22/19 1437    Lab Status:  Final result Specimen:  Blood from Hand, Right Updated:  06/22/19 1518     Sodium 134 mmol/L      Potassium 4 5 mmol/L Chloride 99 mmol/L      CO2 27 mmol/L      ANION GAP 8 mmol/L      BUN 26 mg/dL      Creatinine 1 11 mg/dL      Glucose 151 mg/dL      Calcium 8 9 mg/dL       U/L       U/L      Alkaline Phosphatase 210 U/L      Total Protein 7 5 g/dL      Albumin 3 2 g/dL      Total Bilirubin 1 30 mg/dL      eGFR 67 ml/min/1 73sq m     Narrative:       [place] guidelines for Chronic Kidney Disease (CKD):     Stage 1 with normal or high GFR (GFR > 90 mL/min/1 73 square meters)    Stage 2 Mild CKD (GFR = 60-89 mL/min/1 73 square meters)    Stage 3A Moderate CKD (GFR = 45-59 mL/min/1 73 square meters)    Stage 3B Moderate CKD (GFR = 30-44 mL/min/1 73 square meters)    Stage 4 Severe CKD (GFR = 15-29 mL/min/1 73 square meters)    Stage 5 End Stage CKD (GFR <15 mL/min/1 73 square meters)  Note: GFR calculation is accurate only with a steady state creatinine    Lactic Acid x2 [[id number]]  (Abnormal) Collected:  06/22/19 1437    Lab Status:  Final result Specimen:  Blood from Hand, Right Updated:  06/22/19 1515     LACTIC ACID 2 3 mmol/L     Narrative:       Result may be elevated if tourniquet was used during collection      UA w Reflex to Microscopic w Reflex to Culture [[id number]]  (Abnormal) Collected:  06/22/19 1446    Lab Status:  Final result Specimen:  Urine, Clean Catch Updated:  06/22/19 1506     Color, UA Yellow     Clarity, UA Clear     Specific Gravity, UA 1 025     pH, UA 5 5     Leukocytes, UA Trace     Nitrite, UA Negative     Protein, UA 30 (1+) mg/dl      Glucose, UA Negative mg/dl      Ketones, UA Negative mg/dl      Urobilinogen, UA 2 0 E U /dl      Bilirubin, UA Small     Blood, UA Small    Protime-INR [[id number]]  (Abnormal) Collected:  06/22/19 1437    Lab Status:  Final result Specimen:  Blood from Hand, Right Updated:  06/22/19 1505     Protime 15 5 seconds      INR 1 27    APTT [[id number]]  (Normal) Collected:  06/22/19 1437    Lab Status:  Final result Specimen: Blood from Hand, Right Updated:  06/22/19 1505     PTT 36 seconds     CBC and differential [[id number]]  (Abnormal) Collected:  06/22/19 1443    Lab Status:  Final result Specimen:  Blood from Hand, Right Updated:  06/22/19 1451     WBC 9 68 Thousand/uL      RBC 4 07 Million/uL      Hemoglobin 13 6 g/dL      Hematocrit 38 6 %      MCV 95 fL      MCH 33 4 pg      MCHC 35 2 g/dL      RDW 12 1 %      MPV 9 7 fL      Platelets 950 Thousands/uL      nRBC 0 /100 WBCs      Neutrophils Relative 81 %      Immat GRANS % 1 %      Lymphocytes Relative 9 %      Monocytes Relative 9 %      Eosinophils Relative 0 %      Basophils Relative 0 %      Neutrophils Absolute 7 91 Thousands/µL      Immature Grans Absolute 0 05 Thousand/uL      Lymphocytes Absolute 0 88 Thousands/µL      Monocytes Absolute 0 82 Thousand/µL      Eosinophils Absolute 0 00 Thousand/µL      Basophils Absolute 0 02 Thousands/µL     Procalcitonin [[id number]] Collected:  06/22/19 1437    Lab Status: In process Specimen:  Blood from Hand, Right Updated:  06/22/19 1450    Blood culture #2 [[id number]] Collected:  06/22/19 1437    Lab Status: In process Specimen:  Blood from Hand, Right Updated:  06/22/19 1447    Blood culture #1 [[id number]] Collected:  06/22/19 1430    Lab Status: In process Specimen:  Blood from Arm, Right Updated:  06/22/19 1447                 CT abdomen pelvis with contrast   Final Result by Alexx De La Cruz MD (06/22 1753)      No acute intra-abdominal abnormality  No large or small bowel obstruction  Scattered colonic diverticulosis with no inflammatory changes present to suggest acute diverticulitis  No free air or free fluid              Workstation performed: [id number]                    Procedures  ECG 12 Lead Documentation  Date/Time: 6/22/2019 2:21 PM  Performed by: Henry Carrera DO  Authorized by: Henry Carrera DO     Patient location:  ED  Previous ECG:     Previous ECG:  Compared to current  Rate:     ECG rate:  77  Rhythm:     Rhythm: sinus rhythm             ED Course                               MDM  Number of Diagnoses or Management Options  Fever: new and requires workup     Amount and/or Complexity of Data Reviewed  Clinical lab tests: ordered and reviewed  Tests in the radiology section of CPT®: ordered and reviewed  Tests in the medicine section of CPT®: reviewed and ordered  Review and summarize past medical records: yes  Independent visualization of images, tracings, or specimens: yes    Patient Progress  Patient progress: stable      Disposition  Final diagnoses:   Fever     Time reflects when diagnosis was documented in both MDM as applicable and the Disposition within this note     Time User Action Codes Description Comment    6/22/2019  6:32 PM Kenneth Trotter Add [R50 9] Fever       ED Disposition     ED Disposition Condition Date/Time Comment    Admit Stable Sat Jun 22, 2019  6:32 PM Case was discussed with dr Arcelia Hadley and the patient's admission status was agreed to be Admission Status: inpatient status to the service of Dr Arcelia Hadley   Follow-up Information    None         Patient's Medications   Discharge Prescriptions    No medications on file     No discharge procedures on file      ED Provider  Electronically Signed by           Lakia Burrows DO  06/22/19 [address] [place] [address]   06/22/19 3702

## 2019-06-22 NOTE — ASSESSMENT & PLAN NOTE
Likely related to rheumatological process  Chart states that patient has history of sarcoidosis  RF positive on prior labs  Quantiferon negative  Will consult ID to assist ruling out infectious process  Will reach out to rheumatology tomorrow  For now follow up cultures  LFTs are elevated - RUQ US now  Hold off on ABx, doubt antibiotics

## 2019-06-23 ENCOUNTER — APPOINTMENT (INPATIENT)
Dept: ULTRASOUND IMAGING | Facility: HOSPITAL | Age: 69
DRG: 864 | End: 2019-06-23
Payer: COMMERCIAL

## 2019-06-23 LAB
ALBUMIN SERPL BCP-MCNC: 2.5 G/DL (ref 3.5–5)
ALP SERPL-CCNC: 186 U/L (ref 46–116)
ALT SERPL W P-5'-P-CCNC: 106 U/L (ref 12–78)
ANION GAP SERPL CALCULATED.3IONS-SCNC: 10 MMOL/L (ref 4–13)
AST SERPL W P-5'-P-CCNC: 64 U/L (ref 5–45)
BILIRUB SERPL-MCNC: 1.2 MG/DL (ref 0.2–1)
BUN SERPL-MCNC: 21 MG/DL (ref 5–25)
CALCIUM SERPL-MCNC: 8.3 MG/DL (ref 8.3–10.1)
CHLORIDE SERPL-SCNC: 103 MMOL/L (ref 100–108)
CO2 SERPL-SCNC: 22 MMOL/L (ref 21–32)
CREAT SERPL-MCNC: 1.09 MG/DL (ref 0.6–1.3)
ERYTHROCYTE [DISTWIDTH] IN BLOOD BY AUTOMATED COUNT: 12.1 % (ref 11.6–15.1)
GFR SERPL CREATININE-BSD FRML MDRD: 68 ML/MIN/1.73SQ M
GLUCOSE SERPL-MCNC: 128 MG/DL (ref 65–140)
HCT VFR BLD AUTO: 33.7 % (ref 36.5–49.3)
HGB BLD-MCNC: 11.7 G/DL (ref 12–17)
MCH RBC QN AUTO: 32.4 PG (ref 26.8–34.3)
MCHC RBC AUTO-ENTMCNC: 34.7 G/DL (ref 31.4–37.4)
MCV RBC AUTO: 93 FL (ref 82–98)
PLATELET # BLD AUTO: 190 THOUSANDS/UL (ref 149–390)
PMV BLD AUTO: 10.1 FL (ref 8.9–12.7)
POTASSIUM SERPL-SCNC: 3.8 MMOL/L (ref 3.5–5.3)
PROCALCITONIN SERPL-MCNC: 0.21 NG/ML
PROT SERPL-MCNC: 6.3 G/DL (ref 6.4–8.2)
RBC # BLD AUTO: 3.61 MILLION/UL (ref 3.88–5.62)
SODIUM SERPL-SCNC: 135 MMOL/L (ref 136–145)
WBC # BLD AUTO: 7.81 THOUSAND/UL (ref 4.31–10.16)

## 2019-06-23 PROCEDURE — 86664 EPSTEIN-BARR NUCLEAR ANTIGEN: CPT | Performed by: NURSE PRACTITIONER

## 2019-06-23 PROCEDURE — 87207 SMEAR SPECIAL STAIN: CPT | Performed by: NURSE PRACTITIONER

## 2019-06-23 PROCEDURE — 99232 SBSQ HOSP IP/OBS MODERATE 35: CPT | Performed by: INTERNAL MEDICINE

## 2019-06-23 PROCEDURE — 86645 CMV ANTIBODY IGM: CPT | Performed by: NURSE PRACTITIONER

## 2019-06-23 PROCEDURE — 86663 EPSTEIN-BARR ANTIBODY: CPT | Performed by: NURSE PRACTITIONER

## 2019-06-23 PROCEDURE — 76705 ECHO EXAM OF ABDOMEN: CPT

## 2019-06-23 PROCEDURE — 80053 COMPREHEN METABOLIC PANEL: CPT | Performed by: INTERNAL MEDICINE

## 2019-06-23 PROCEDURE — 87798 DETECT AGENT NOS DNA AMP: CPT | Performed by: NURSE PRACTITIONER

## 2019-06-23 PROCEDURE — 99222 1ST HOSP IP/OBS MODERATE 55: CPT | Performed by: INTERNAL MEDICINE

## 2019-06-23 PROCEDURE — 86644 CMV ANTIBODY: CPT | Performed by: NURSE PRACTITIONER

## 2019-06-23 PROCEDURE — 86430 RHEUMATOID FACTOR TEST QUAL: CPT | Performed by: NURSE PRACTITIONER

## 2019-06-23 PROCEDURE — 86431 RHEUMATOID FACTOR QUANT: CPT | Performed by: NURSE PRACTITIONER

## 2019-06-23 PROCEDURE — 86665 EPSTEIN-BARR CAPSID VCA: CPT | Performed by: NURSE PRACTITIONER

## 2019-06-23 PROCEDURE — 85027 COMPLETE CBC AUTOMATED: CPT | Performed by: INTERNAL MEDICINE

## 2019-06-23 RX ORDER — DOXYCYCLINE HYCLATE 100 MG/1
100 CAPSULE ORAL EVERY 12 HOURS SCHEDULED
Status: DISCONTINUED | OUTPATIENT
Start: 2019-06-23 | End: 2019-06-25 | Stop reason: HOSPADM

## 2019-06-23 RX ADMIN — DOXYCYCLINE HYCLATE 100 MG: 100 CAPSULE ORAL at 21:18

## 2019-06-23 RX ADMIN — HEPARIN SODIUM 5000 UNITS: 5000 INJECTION INTRAVENOUS; SUBCUTANEOUS at 05:07

## 2019-06-23 RX ADMIN — HEPARIN SODIUM 5000 UNITS: 5000 INJECTION INTRAVENOUS; SUBCUTANEOUS at 21:18

## 2019-06-23 RX ADMIN — IBUPROFEN 200 MG: 100 SUSPENSION ORAL at 11:29

## 2019-06-23 RX ADMIN — IBUPROFEN 200 MG: 100 SUSPENSION ORAL at 18:28

## 2019-06-23 RX ADMIN — HEPARIN SODIUM 5000 UNITS: 5000 INJECTION INTRAVENOUS; SUBCUTANEOUS at 14:39

## 2019-06-23 RX ADMIN — ATORVASTATIN CALCIUM 20 MG: 20 TABLET, FILM COATED ORAL at 09:53

## 2019-06-23 RX ADMIN — Medication 1000 MG: at 09:53

## 2019-06-23 RX ADMIN — FINASTERIDE 5 MG: 5 TABLET, FILM COATED ORAL at 17:24

## 2019-06-23 NOTE — CONSULTS
Consultation - Infectious Disease   Zabrina Meyer 78 y o  male MRN: [id number]  Unit/Bed#: -01 Encounter: [id number]    IMPRESSION & RECOMMENDATIONS:   1  Fever  POA  Accompanied by muscle aches, sweats, and chills  Consider possibly secondary to pneumonia  Chest x-ray showing a faint left perihilar infiltrate  However, patient has no respiratory complaints and pulmonary exam is benign  Consider possible GI source given patient's LFTs are elevated  A right upper quad ultrasound is pending  Mononucleosis screening is pending  A Lyme PCR is pending  Consider other tick-borne illness  Also consider possibly secondary to a non infectious source such as a rheumatologic condition  Patient has been experiencing generalized muscle aches and joint pain for several months  Is following with outpatient Rheumatologist, Methotrexate was stopped at last office visit in 12/2018  No other clear source appreciated  He has no  or GI complaints  Blood cultures are pending  Procalcitonin was negative at 0 21  Patient was given initial dose of IV Rocephin in the ED  Additional antibiotics have been held  Given there is no clear indication of a bacterial infection I recommend holding additional antibiotics at this time while we wait for additional culture results  -monitor off antibiotics  -monitor CBC  -check CMP tomorrow  -trend procalcitonin tomorrow a m   -check Anaplasma PCR  -check blood parasite smear x2  -check rheumatoid factor and titer  -follow up blood cultures  -follow-up right upper quad ultrasound  -follow-up mononucleosis screen  -follow-up Lyme PCR  -monitor vitals  -supportive care    2  Possible pneumonia  In patient with history of sarcoidosis  Patient with a faint left perihilar infiltrate showing on chest x-ray  However, he has no respiratory complaints  Pulmonary exam is benign today  Procalcitonin was negative at 0 21    He received a dose of antibiotics in the ED but additional antibiotics have been held  Would recommend rechecking chest x-ray if he develops pulmonary symptoms  -monitor off antibiotics  -monitor CBC  -trend procalcitonin tomorrow a m   -consider repeating the chest x-ray if patient develops pulmonary symptoms  -monitor vitals    3  Elevated LFTs  I personally reviewed patient's past medical records which showed a slightly high alk-phos in the past but no other LFT abnormalities before this hospitalization  Abdominal exam is benign  A right upper quadrant ultrasound is pending  A mononucleosis screen is pending  Will add EBV and CMV  Consider possibly related to tick-borne illness  A Lyme PCR is pending  Will check for Anaplasma and babesia as well   -check CMP tomorrow  -follow-up right upper quad ultrasound  -follow-up mononucleosis screen  -check EBV and CMV  -follow-up Lyme PCR  -check Anaplasma PCR and blood parasite smear x2  -monitor abdominal symptoms    4  Positive rheumatoid factor  Patient undergoing a rheumatologic workup by Dr Aleah Martinez at [facility]   He was on methotrexate therapy but this was stopped at his last visit in 12/2018   -check rheumatoid factor and titer  -continue outpatient rheumatology follow-up    5  Type 2 diabetes mellitus  Patient's last hemoglobin A1c was 5 6% in 05/31/2019  Reports significant improvement in his blood sugar since losing 40 lb   -blood glucose management per primary service    6  History of sarcoidosis  HISTORY OF PRESENT ILLNESS:  Reason for Consult:  Fever  HPI: Phan Sepulveda is a 78y o  year old male who went to a local urgent care center on 06/22/2019 complaining of fever, chills, muscle aches, and sweats  He states his symptoms had been getting progressively worse for the past four days  They sent him to the ED for further assessment  Upon arrival to the ED the patient's temp was 99 9° with a heart rate of 78  His white blood cell count was 9 68  Lactic acid was 2 3    Patient's creatinine was 1 11 with a GFR of 67  Urinalysis showed trace leukocytes, positive protein/bilirubin/blood, and 1-2 RBC  Blood cultures were sent  He was taken for chest x-ray which showed a possible faint left perihilar infiltrate  He had a CT of the abdomen and pelvis which showed diverticulosis without indication for diverticulitis  The patient was started on Rocephin and was admitted for additional medical management  Since his admission the patient has not received additional dosing of antibiotics  He has remained afebrile  WBC is normal today at 7 81  Lactic acidosis has resolved  His blood cultures are pending  Patient's LFTs were noted to be increased so a right upper quad ultrasound has been ordered  His procalcitonin was only mildly elevated at 0 21  We have been asked for formal consult for fever  The patient has a past medical and surgical history significant for mitral valve disorder, sarcoidosis, acute pancreatitis, hyperlipidemia, gallstones, celiac disease, BPH, cholecystectomy, right rotator cuff repair, ERCP, DMII, an EGD/colonoscopy  He has no known drug allergies  REVIEW OF SYSTEMS:  Patient reports he is feeling very well today  He is concerned that there is no explanation the fevers he has been experiencing  States the fevers caused him to have very bad chills  Reports the seem to be gone this morning  He denies nausea, vomiting, diarrhea  Denies cough, shortness of breath, and chest pain  He reports that he has generalized joint pain and weakness but states that it has been constant for several months  Was seeing a rheumatologist in taking methotrexate for RA for awhile but felt he did need this any longer and stopped in December 2018  Reports he lost 40 lb in the past year and overall feels his joint issues are improving because of the weight loss  A complete 12 point system-based review of systems is otherwise negative      PAST MEDICAL HISTORY:  Past Medical History:   Diagnosis Date    Acute pancreatitis     last assessed - [id number]    BPH (benign prostatic hypertrophy)     Celiac disease     current GI w/u to r/o celiac    Cholelithiasis     Elevated blood pressure reading     last assessed - [id number]    Gallstones     last assessed - [id number]    High cholesterol     Hx of mitral valve disorder     Sarcoidosis     1990's Diagnosed 15 years ago by biopsy, not active; last assessed - [id number]     Past Surgical History:   Procedure Laterality Date    ERCP N/A 4/3/2017    Procedure: ENDOSCOPIC RETROGRADE CHOLANGIOPANCREATOGRAPHY (ERCP); Surgeon: Elida Carmichael MD;  Location: [facility]; Service:     WY COLONOSCOPY FLX DX W/COLLJ SPEC WHEN PFRMD N/A 3/1/2017    Procedure: EGD AND COLONOSCOPY;  Surgeon: Aura Monsivais MD;  Location: [facility]; Service: Gastroenterology    WY ESOPHAGOGASTRODUODENOSCOPY TRANSORAL DIAGNOSTIC N/A 6/5/2017    Procedure: ESOPHAGOGASTRODUODENOSCOPY (EGD); Surgeon: Elida Carmichael MD;  Location: [facility]; Service: Gastroenterology    WY LAP,CHOLECYSTECTOMY N/A 3/29/2017    Procedure: CHOLECYSTECTOMY LAPAROSCOPIC; POSSIBLE OPEN;  Surgeon: Lokesh Hair MD;  Location: [facility];  Service: General; complicated by bile leak and pancreatitis s/p stent     ROTATOR CUFF REPAIR Right 04/29/2015    for rotator cuff tear, Onset: 4/14     FAMILY HISTORY:  Non-contributory    SOCIAL HISTORY:  Social History   Social History     Substance and Sexual Activity   Alcohol Use Yes    Comment: wine, 3-4x/week; social drinker     Social History     Substance and Sexual Activity   Drug Use No    Comment: Denied history of drug use     Social History     Tobacco Use   Smoking Status Never Smoker   Smokeless Tobacco Never Used     ALLERGIES:  No Known Allergies    MEDICATIONS:  All current active medications have been reviewed      ANTIBIOTICS:  No current antibiotic use    PHYSICAL EXAM:  Temp:  [98 7 °F (37 1 °C)-103 1 °F (39 5 °C)] 99 1 °F (37 3 °C)  HR:  [72-95] 85  Resp:  [16-18] 16  BP: (104-170)/(54-99) 122/55  SpO2:  [94 %-98 %] 98 %  Temp (24hrs), Av 4 °F (38 °C), Min:98 7 °F (37 1 °C), Max:103 1 °F (39 5 °C)  Current: Temperature: 99 1 °F (37 3 °C)    Intake/Output Summary (Last 24 hours) at 2019 6131  Last data filed at 2019 1750  Gross per 24 hour   Intake 3000 ml   Output    Net 3000 ml     General Appearance:  Appearing well, nontoxic, and in no distress   Head:  Normocephalic, without obvious abnormality, atraumatic   Eyes:  Conjunctiva pink and sclera anicteric, both eyes   Nose: Nares normal, mucosa normal, no drainage   Throat: Oropharynx moist without lesions   Neck: Supple, symmetrical, no adenopathy, no tenderness/mass/nodules   Back:   Symmetric, no curvature, ROM normal, no CVA tenderness   Lungs:   Clear to auscultation bilaterally, respirations unlabored   Chest Wall:  No tenderness or deformity   Heart:  RRR; no murmur, rub or gallop   Abdomen:   Soft, non-tender, non-distended, positive bowel sounds    Extremities: No cyanosis or clubbing, no edema   Skin: No rashes or lesions  No draining wounds noted  Lymph nodes: Cervical, supraclavicular nodes normal   Neurologic: Alert and oriented times 3, extremity strength 5/5 and symmetric     LABS, IMAGING, & OTHER STUDIES:  Lab Results:  I have personally reviewed pertinent labs  Results from last 7 days   Lab Units 19  0507 19  1443   WBC Thousand/uL 7 81 9 68   HEMOGLOBIN g/dL 11 7* 13 6   PLATELETS Thousands/uL 190 218     Results from last 7 days   Lab Units 19  0507 19  1437   POTASSIUM mmol/L 3 8 4 5   CHLORIDE mmol/L 103 99*   CO2 mmol/L 22 27   BUN mg/dL 21 26*   CREATININE mg/dL 1 09 1 11   EGFR ml/min/1 73sq m 68 67   CALCIUM mg/dL 8 3 8 9   AST U/L 64* 102*   ALT U/L 106* 143*   ALK PHOS U/L 186* 210*     Imaging Studies:   I have personally reviewed pertinent imaging study reports and images in PACS      CT ABDOMEN PELVIS WITH CONTRAST 6/22/19 - I personally reviewed this study which showed small hepatic lesions with no suspicious solid hepatic mass, colonic diverticulosis without diverticulitis, no ascites, no lymphadenopathy, small bilateral fat containing inguinal hernias, no hydronephrosis, and no large or small bowel obstruction  XR CHEST PA & LATERAL 5/23/19 - I personally reviewed this study which showed a possible faint left perihilar infiltrate      Other Studies:   Blood cultures are pending  Right upper quadrant ultrasound is pending

## 2019-06-23 NOTE — UTILIZATION REVIEW
Initial Clinical Review    Admission: Date/Time/Statement: 6/22/19 @ 1833     Orders Placed This Encounter   Procedures    Inpatient Admission     Standing Status:   Standing     Number of Occurrences:   1     Order Specific Question:   Admitting Physician     Answer:   Sheryle Poche     Order Specific Question:   Level of Care     Answer:   Med Surg [16]     Order Specific Question:   Estimated length of stay     Answer:   More than 2 Midnights     Order Specific Question:   Certification     Answer:   I certify that inpatient services are medically necessary for this patient for a duration of greater than two midnights  See H&P and MD Progress Notes for additional information about the patient's course of treatment  ED Arrival Information     Expected Arrival Acuity Means of Arrival Escorted By Service Admission Type    6/22/2019 6/22/2019 13:38 Urgent Walk-In Self General Medicine Urgent    Arrival Complaint    -        Chief Complaint   Patient presents with    Fever - 9 weeks to 74 years     Patient c/o having fever x 4 days; highest 100 2  Sent in by urgent care "because they can't find out what's wrong " Bodyaches present  Assessment/Plan: this is a 78year old male from home to ED per urgent care admitted as inpatient due to fever of unknown origin, likely related to rheumatological process  Patient presented with fever, chills, muscle aches and sweats for the last 4 days  Has increased fatigue  On exam febrile to 99 9 and risen to 103 1 with lactic acid of 2 3    Blood cultures done and IV antibiotics given  LFTs elevated with ast to 102 and   Ct abdomen without acute findings  CxR at urgent care negative  RF positive on prior labs  On 6/23 CxR suspects pneumonia  Per ID - Fever  POA  Accompanied by muscle aches, sweats, and chills  Consider possibly secondary to pneumonia  patient has no respiratory complaints and pulmonary exam is benign    Consider possible GI source given patient's LFTs are elevated  A right upper quad ultrasound is pending  Mononucleosis screening is pending  A Lyme PCR is pending  Consider other tick-borne illness  Also consider possibly secondary to a non infectious source such as a rheumatologic condition  Patient has been experiencing generalized muscle aches and joint pain for several months  Is following with outpatient Rheumatologist, Methotrexate was stopped at last office visit in 12/2018  No other clear source appreciated  He has no  or GI complaints  Blood cultures are pending  Procalcitonin was negative at 0 21  Patient was given initial dose of IV Rocephin in the ED  Additional antibiotics have been held    Given there is no clear indication of a bacterial infection I recommend holding additional antibiotics at this time while we wait for additional culture results     ED Triage Vitals   Temperature Pulse Respirations Blood Pressure SpO2   06/22/19 1349 06/22/19 1348 06/22/19 1348 06/22/19 1348 06/22/19 1348   99 9 °F (37 7 °C) 78 18 160/68 98 %      Temp Source Heart Rate Source Patient Position - Orthostatic VS BP Location FiO2 (%)   06/22/19 1349 06/22/19 1348 06/22/19 1348 06/22/19 1348 --   Oral Monitor Sitting Left arm       Pain Score       06/22/19 1348       No Pain        Wt Readings from Last 1 Encounters:   06/23/19 82 4 kg (181 lb 10 5 oz)     Additional Vital Signs:   06/23/19 0648  99 1 °F (37 3 °C)  85  16  122/55    None (Room air)  Lying   06/23/19 0300  98 7 °F (37 1 °C)  95  16  170/87  98 %  None (Room air)  Sitting   06/22/19 2222  100 1 °F (37 8 °C)  72  16  104/56  94 %  None (Room air)  Lying   06/22/19 2104  100 1 °F (37 8 °C)               06/22/19 1959  100 6 °F (38 1 °C)Abnormal   74  16  120/58         06/22/19 1938    81  16  114/54  95 %  None (Room air)  Sitting   06/22/19 1843  101 6 °F (38 7 °C)Abnormal                06/22/19 1815        132/60         06/22/19 1800   88    131/57  94 %       06/22/19 1745    86    151/67  96 %       06/22/19 1715        155/70         06/22/19 1704  103 1 °F (39 5 °C)Abnormal                06/22/19 1700    82      96 %       06/22/19 1600    82  18  147/66  97 %  None (Room air)  Sitting   06/22/19 1500    84    139/99  96 %       06/22/19 1448    90  18  139/99  97 %  None (Room air)         Pertinent Labs/Diagnostic Test Results:   6/22/2019  Ct abdomen No acute intra-abdominal abnormality   No large or small bowel obstruction  Scattered colonic diverticulosis with no inflammatory changes present to suggest acute diverticulitis    No free air or free fluid    6/23/2019 CxR - Suspected development of left perihilar infiltrate suspicious for pneumonia  Results from last 7 days   Lab Units 06/23/19  0507 06/22/19  1443   WBC Thousand/uL 7 81 9 68   HEMOGLOBIN g/dL 11 7* 13 6   HEMATOCRIT % 33 7* 38 6   PLATELETS Thousands/uL 190 218   NEUTROS ABS Thousands/µL  --  7 91*         Results from last 7 days   Lab Units 06/23/19  0507 06/22/19  1437   SODIUM mmol/L 135* 134*   POTASSIUM mmol/L 3 8 4 5   CHLORIDE mmol/L 103 99*   CO2 mmol/L 22 27   ANION GAP mmol/L 10 8   BUN mg/dL 21 26*   CREATININE mg/dL 1 09 1 11   EGFR ml/min/1 73sq m 68 67   CALCIUM mg/dL 8 3 8 9     Results from last 7 days   Lab Units 06/23/19  0507 06/22/19  1437   AST U/L 64* 102*   ALT U/L 106* 143*   ALK PHOS U/L 186* 210*   TOTAL PROTEIN g/dL 6 3* 7 5   ALBUMIN g/dL 2 5* 3 2*   TOTAL BILIRUBIN mg/dL 1 20* 1 30*         Results from last 7 days   Lab Units 06/23/19  0507 06/22/19  1437   GLUCOSE RANDOM mg/dL 128 151*     Results from last 7 days   Lab Units 06/22/19  1437   PROTIME seconds 15 5*   INR  1 27*   PTT seconds 36     Results from last 7 days   Lab Units 06/22/19  1437   PROCALCITONIN ng/ml 0 21     Results from last 7 days   Lab Units 06/22/19  1650 06/22/19  1437   LACTIC ACID mmol/L 1 5 2 3*       Results from last 7 days   Lab Units 06/22/19  1446 06/22/19  1306   CLARITY UA  Clear clear   COLOR UA  Yellow yellow   SPEC GRAV UA  1 025  --    PH UA  5 5  --    GLUCOSE UA mg/dl Negative neg   KETONES UA mg/dl Negative neg   BLOOD UA  Small* moderate   PROTEIN UA mg/dl 30 (1+)* neg   NITRITE UA  Negative neg   BILIRUBIN UA  Small*  --    BILIRUBIN UA POC   --  neg   UROBILINOGEN UA E U /dl 2 0* 0 2   LEUKOCYTES UA  Trace* small   WBC UA /hpf 0-5  --    RBC UA /hpf 1-2*  --    BACTERIA UA /hpf Occasional  --    EPITHELIAL CELLS WET PREP /hpf Occasional  --        ED Treatment: blood cultures   Medication Administration from 06/22/2019 1318 to 06/22/2019 2022       Date/Time Order Dose Route Action Comments     06/22/2019 1545 sodium chloride 0 9 % bolus 1,000 mL 0 mL Intravenous Stopped      06/22/2019 1430 sodium chloride 0 9 % bolus 1,000 mL 1,000 mL Intravenous New Bag      06/22/2019 1555 sodium chloride 0 9 % bolus 1,000 mL 0 mL Intravenous Stopped      06/22/2019 1507 sodium chloride 0 9 % bolus 1,000 mL 1,000 mL Intravenous New Bag bag #2     06/22/2019 1750 sodium chloride 0 9 % bolus 1,000 mL 0 mL Intravenous Stopped      06/22/2019 1630 sodium chloride 0 9 % bolus 1,000 mL 1,000 mL Intravenous New Bag pt difficult venipuncture, only 1 IV access obtained after attempts X2     06/22/2019 1701 cefTRIAXone (ROCEPHIN) 2,000 mg in dextrose 5 % 50 mL IVPB 0 mg Intravenous Stopped      06/22/2019 1550 cefTRIAXone (ROCEPHIN) 2,000 mg in dextrose 5 % 50 mL IVPB 2,000 mg Intravenous New Bag      06/22/2019 1731 acetaminophen (TYLENOL) tablet 650 mg 650 mg Oral Given      06/22/2019 1720 iohexol (OMNIPAQUE) 350 MG/ML injection (MULTI-DOSE) 100 mL 100 mL Intravenous Given         Past Medical History:   Diagnosis Date    Acute pancreatitis     last assessed - [id number]    BPH (benign prostatic hypertrophy)     Celiac disease     current GI w/u to r/o celiac    Cholelithiasis     Elevated blood pressure reading     last assessed - [id number]  Gallstones     last assessed - [id number]    High cholesterol     Hx of mitral valve disorder     Sarcoidosis     1990's Diagnosed 15 years ago by biopsy, not active; last assessed - [id number]     Present on Admission:  **None**      Admitting Diagnosis: Fever [R50 9]  Age/Sex: 78 y o  male  Admission Orders: 6/22/2019  1833 INPATIENT     Current Facility-Administered Medications:  atorvastatin 20 mg Oral Daily   finasteride 5 mg Oral After Dinner   fish oil 1,000 mg Oral Daily   heparin (porcine) 5,000 Units Subcutaneous Q8H [facility]   Ibuprofen - used x 1 200 mg Oral Q6H PRN   sodium chloride (PF) 3 mL Intravenous PRN       IP CONSULT TO INFECTIOUS DISEASES    Network Utilization Review Department  Phone: [phone]; Fax [phone]  [email or web address]  ATTENTION: Please call with any questions or concerns to [phone]  and carefully listen to the prompts so that you are directed to the right person  Send all requests for admission clinical reviews, approved or denied determinations and any other requests to fax [phone]   All voicemails are confidential

## 2019-06-23 NOTE — PROGRESS NOTES
Progress Note - Izabel Nicolas 1950, 78 y o  male MRN: [id number]    Unit/Bed#: -01 Encounter: [id number]    Primary Care Provider: Maria Del Rosario Collazo DO   Date and time admitted to hospital: 2019  2:09 PM        * FUO (fever of unknown origin)  Assessment & Plan  Appreciate ID input  Given high index of suspicion for tick born illness patient started on doxycycline   Would also consider reaching out to rheumatology on Monday to determine what further testing /treatment they would recommend given clinical symptoms and positive RF  Will continue to follow inpatient  Still spiking temperatures  VTE Pharmacologic Prophylaxis:   Pharmacologic: Heparin  Mechanical VTE Prophylaxis in Place: Yes    Patient Centered Rounds: I have performed bedside rounds with nursing staff today  Discussions with Specialists or Other Care Team Provider: Discussed with patient and with sister at bedside  Reviewed ID note  Education and Discussions with Family / Patient: Discussed with patient and with family  Time Spent for Care: 30 minutes  More than 50% of total time spent on counseling and coordination of care as described above  Current Length of Stay: 1 day(s)    Current Patient Status: Inpatient   Certification Statement: The patient will continue to require additional inpatient hospital stay due to monitor for temperatures and follow up cultures  May also benefit from discussion with rheumatology   Discharge Plan: Not medically stable for DC  Code Status: Level 1 - Full Code      Subjective:   Patient seen and examined      " I feel okay"    Had fever today with "chills" also c/o night sweats  Intentional night sweats         Objective:     Vitals:   Temp (24hrs), Av 4 °F (38 °C), Min:98 7 °F (37 1 °C), Max:102 9 °F (39 4 °C)    Temp:  [98 7 °F (37 1 °C)-102 9 °F (39 4 °C)] 100 5 °F (38 1 °C)  HR:  [67-95] 67  Resp:  [16-18] 18  BP: (104-170)/(54-87) 118/59  SpO2:  [94 %-98 %] 97 %  Body mass index is 27 62 kg/m²  Input and Output Summary (last 24 hours): Intake/Output Summary (Last 24 hours) at 6/23/2019 1709  Last data filed at 6/23/2019 1442  Gross per 24 hour   Intake 1200 ml   Output 275 ml   Net 925 ml       Physical Exam:     Physical Exam   Constitutional: He appears well-developed  No distress  HENT:   Mouth/Throat: No oropharyngeal exudate  Eyes: Right eye exhibits no discharge  Left eye exhibits no discharge  No scleral icterus  Neck: No JVD present  No tracheal deviation present  No thyromegaly present  Cardiovascular: Normal rate  Exam reveals no gallop and no friction rub  No murmur heard  Pulmonary/Chest: Effort normal  No stridor  No respiratory distress  He has no wheezes  He has no rales  He exhibits no tenderness  Abdominal: He exhibits no distension and no mass  There is no tenderness  There is no rebound and no guarding  No hernia  Musculoskeletal: He exhibits no edema, tenderness or deformity  Lymphadenopathy:     He has no cervical adenopathy  Neurological: He is alert  He displays normal reflexes  No cranial nerve deficit or sensory deficit  He exhibits normal muscle tone  Coordination normal    Skin: Capillary refill takes less than 2 seconds  No rash noted  He is not diaphoretic  No erythema  No pallor  Psychiatric: He has a normal mood and affect            Additional Data:     Labs:    Results from last 7 days   Lab Units 06/23/19  0507 06/22/19  1443   WBC Thousand/uL 7 81 9 68   HEMOGLOBIN g/dL 11 7* 13 6   HEMATOCRIT % 33 7* 38 6   PLATELETS Thousands/uL 190 218   NEUTROS PCT %  --  81*   LYMPHS PCT %  --  9*   MONOS PCT %  --  9   EOS PCT %  --  0     Results from last 7 days   Lab Units 06/23/19  0507   SODIUM mmol/L 135*   POTASSIUM mmol/L 3 8   CHLORIDE mmol/L 103   CO2 mmol/L 22   BUN mg/dL 21   CREATININE mg/dL 1 09   ANION GAP mmol/L 10   CALCIUM mg/dL 8 3   ALBUMIN g/dL 2 5*   TOTAL BILIRUBIN mg/dL 1 20*   ALK PHOS U/L 186*   ALT U/L 106*   AST U/L 64*   GLUCOSE RANDOM mg/dL 128     Results from last 7 days   Lab Units 06/22/19  1437   INR  1 27*             Results from last 7 days   Lab Units 06/22/19  1650 06/22/19  1437   LACTIC ACID mmol/L 1 5 2 3*   PROCALCITONIN ng/ml  --  0 21           * I Have Reviewed All Lab Data Listed Above  * Additional Pertinent Lab Tests Reviewed: All Labs For Current Hospital Admission Reviewed    Imaging:    Imaging Reports Reviewed Today Include:   US liver -     "Small left hepatic lobe cyst   Otherwise, unremarkable liver  Tiny right renal cyst     Status post cholecystectomy  Imaging Personally Reviewed by Myself Includes:    As above   Recent Cultures (last 7 days):           Last 24 Hours Medication List:     Current Facility-Administered Medications:  atorvastatin 20 mg Oral Daily Nichelle Farmer MD   doxycycline hyclate 100 mg Oral Q12H Damion Franco MD   finasteride 5 mg Oral After Gil Campo MD   fish oil 1,000 mg Oral Daily Nichelle Farmer MD   heparin (porcine) 5,000 Units Subcutaneous [facility] Nichelle Farmer MD   ibuprofen 200 mg Oral Q6H PRN Chad Aguilar PA-C   sodium chloride (PF) 3 mL Intravenous PRN Nichelle Farmer MD        Today, Patient Was Seen By: Nichelle Farmer MD    ** Please Note: Dictation voice to text software may have been used in the creation of this document   **

## 2019-06-23 NOTE — PLAN OF CARE
Problem: HEMATOLOGIC - ADULT  Goal: Maintains hematologic stability  Description  INTERVENTIONS  - Assess for signs and symptoms of bleeding or hemorrhage  - Monitor labs  - Administer supportive blood products/factors as ordered and appropriate  Outcome: Progressing     Problem: PAIN - ADULT  Goal: Verbalizes/displays adequate comfort level or baseline comfort level  Description  Interventions:  - Encourage patient to monitor pain and request assistance  - Assess pain using appropriate pain scale  - Administer analgesics based on type and severity of pain and evaluate response  - Implement non-pharmacological measures as appropriate and evaluate response  - Consider cultural and social influences on pain and pain management  - Notify physician/advanced practitioner if interventions unsuccessful or patient reports new pain  Outcome: Progressing     Problem: INFECTION - ADULT  Goal: Absence or prevention of progression during hospitalization  Description  INTERVENTIONS:  - Assess and monitor for signs and symptoms of infection  - Monitor lab/diagnostic results  - Monitor all insertion sites, i e  indwelling lines, tubes, and drains  - Monitor endotracheal (as able) and nasal secretions for changes in amount and color  - [place] appropriate cooling/warming therapies per order  - Administer medications as ordered  - Instruct and encourage patient and family to use good hand hygiene technique  - Identify and instruct in appropriate isolation precautions for identified infection/condition  Outcome: Progressing     Problem: SAFETY ADULT  Goal: Patient will remain free of falls  Description  INTERVENTIONS:  - Assess patient frequently for physical needs  -  Identify cognitive and physical deficits and behaviors that affect risk of falls    -  [place] fall precautions as indicated by assessment   - Educate patient/family on patient safety including physical limitations  - Instruct patient to call for assistance with activity based on assessment  - Modify environment to reduce risk of injury  - Consider OT/PT consult to assist with strengthening/mobility  Outcome: Progressing

## 2019-06-23 NOTE — ASSESSMENT & PLAN NOTE
Appreciate ID input  Given high index of suspicion for tick born illness patient started on doxycycline   Would also consider reaching out to rheumatology on Monday to determine what further testing /treatment they would recommend given clinical symptoms and positive RF  Will continue to follow inpatient  Still spiking temperatures

## 2019-06-24 PROBLEM — M06.9 RHEUMATOID ARTHRITIS (HCC): Status: ACTIVE | Noted: 2018-06-25

## 2019-06-24 LAB
% PARASITEMIA: 0
ALBUMIN SERPL BCP-MCNC: 2.4 G/DL (ref 3.5–5)
ALP SERPL-CCNC: 183 U/L (ref 46–116)
ALT SERPL W P-5'-P-CCNC: 81 U/L (ref 12–78)
ANION GAP SERPL CALCULATED.3IONS-SCNC: 10 MMOL/L (ref 4–13)
AST SERPL W P-5'-P-CCNC: 46 U/L (ref 5–45)
ATRIAL RATE: 77 BPM
BASOPHILS # BLD AUTO: 0.02 THOUSANDS/ΜL (ref 0–0.1)
BASOPHILS NFR BLD AUTO: 0 % (ref 0–1)
BILIRUB SERPL-MCNC: 1.2 MG/DL (ref 0.2–1)
BUN SERPL-MCNC: 23 MG/DL (ref 5–25)
CALCIUM SERPL-MCNC: 8.5 MG/DL (ref 8.3–10.1)
CHLORIDE SERPL-SCNC: 102 MMOL/L (ref 100–108)
CO2 SERPL-SCNC: 23 MMOL/L (ref 21–32)
CREAT SERPL-MCNC: 1.12 MG/DL (ref 0.6–1.3)
CRYOGLOB RF SER-ACNC: ABNORMAL [IU]/ML
EOSINOPHIL # BLD AUTO: 0 THOUSAND/ΜL (ref 0–0.61)
EOSINOPHIL NFR BLD AUTO: 0 % (ref 0–6)
ERYTHROCYTE [DISTWIDTH] IN BLOOD BY AUTOMATED COUNT: 12 % (ref 11.6–15.1)
GFR SERPL CREATININE-BSD FRML MDRD: 66 ML/MIN/1.73SQ M
GLUCOSE SERPL-MCNC: 114 MG/DL (ref 65–140)
HCT VFR BLD AUTO: 32.9 % (ref 36.5–49.3)
HETEROPH AB SER QL: NEGATIVE
HGB BLD-MCNC: 11.4 G/DL (ref 12–17)
IMM GRANULOCYTES # BLD AUTO: 0.03 THOUSAND/UL (ref 0–0.2)
IMM GRANULOCYTES NFR BLD AUTO: 0 % (ref 0–2)
LYMPHOCYTES # BLD AUTO: 0.97 THOUSANDS/ΜL (ref 0.6–4.47)
LYMPHOCYTES NFR BLD AUTO: 11 % (ref 14–44)
MCH RBC QN AUTO: 32.1 PG (ref 26.8–34.3)
MCHC RBC AUTO-ENTMCNC: 34.7 G/DL (ref 31.4–37.4)
MCV RBC AUTO: 93 FL (ref 82–98)
MONOCYTES # BLD AUTO: 0.93 THOUSAND/ΜL (ref 0.17–1.22)
MONOCYTES NFR BLD AUTO: 10 % (ref 4–12)
NEUTROPHILS # BLD AUTO: 7.04 THOUSANDS/ΜL (ref 1.85–7.62)
NEUTS SEG NFR BLD AUTO: 79 % (ref 43–75)
NRBC BLD AUTO-RTO: 0 /100 WBCS
P AXIS: 58 DEGREES
PARASITE BLD: NO
PATHOLOGIST INTERPRETATION: NORMAL
PLATELET # BLD AUTO: 200 THOUSANDS/UL (ref 149–390)
PMV BLD AUTO: 9.6 FL (ref 8.9–12.7)
POTASSIUM SERPL-SCNC: 3.7 MMOL/L (ref 3.5–5.3)
PR INTERVAL: 148 MS
PROCALCITONIN SERPL-MCNC: 0.33 NG/ML
PROT SERPL-MCNC: 6 G/DL (ref 6.4–8.2)
QRS AXIS: 72 DEGREES
QRSD INTERVAL: 146 MS
QT INTERVAL: 378 MS
QTC INTERVAL: 427 MS
RBC # BLD AUTO: 3.55 MILLION/UL (ref 3.88–5.62)
RHEUMATOID FACT SER QL LA: POSITIVE
SODIUM SERPL-SCNC: 135 MMOL/L (ref 136–145)
T WAVE AXIS: 44 DEGREES
VENTRICULAR RATE: 77 BPM
WBC # BLD AUTO: 8.99 THOUSAND/UL (ref 4.31–10.16)

## 2019-06-24 PROCEDURE — 99232 SBSQ HOSP IP/OBS MODERATE 35: CPT | Performed by: PHYSICIAN ASSISTANT

## 2019-06-24 PROCEDURE — 93010 ELECTROCARDIOGRAM REPORT: CPT | Performed by: INTERNAL MEDICINE

## 2019-06-24 PROCEDURE — 85025 COMPLETE CBC W/AUTO DIFF WBC: CPT | Performed by: INTERNAL MEDICINE

## 2019-06-24 PROCEDURE — 99232 SBSQ HOSP IP/OBS MODERATE 35: CPT | Performed by: INTERNAL MEDICINE

## 2019-06-24 PROCEDURE — 80053 COMPREHEN METABOLIC PANEL: CPT | Performed by: INTERNAL MEDICINE

## 2019-06-24 PROCEDURE — 84145 PROCALCITONIN (PCT): CPT | Performed by: NURSE PRACTITIONER

## 2019-06-24 RX ADMIN — IBUPROFEN 200 MG: 100 SUSPENSION ORAL at 20:34

## 2019-06-24 RX ADMIN — FINASTERIDE 5 MG: 5 TABLET, FILM COATED ORAL at 18:03

## 2019-06-24 RX ADMIN — HEPARIN SODIUM 5000 UNITS: 5000 INJECTION INTRAVENOUS; SUBCUTANEOUS at 14:47

## 2019-06-24 RX ADMIN — DOXYCYCLINE HYCLATE 100 MG: 100 CAPSULE ORAL at 08:54

## 2019-06-24 RX ADMIN — IBUPROFEN 200 MG: 100 SUSPENSION ORAL at 05:44

## 2019-06-24 RX ADMIN — HEPARIN SODIUM 5000 UNITS: 5000 INJECTION INTRAVENOUS; SUBCUTANEOUS at 21:37

## 2019-06-24 RX ADMIN — ATORVASTATIN CALCIUM 20 MG: 20 TABLET, FILM COATED ORAL at 08:54

## 2019-06-24 RX ADMIN — Medication 1000 MG: at 08:54

## 2019-06-24 RX ADMIN — DOXYCYCLINE HYCLATE 100 MG: 100 CAPSULE ORAL at 20:34

## 2019-06-24 RX ADMIN — HEPARIN SODIUM 5000 UNITS: 5000 INJECTION INTRAVENOUS; SUBCUTANEOUS at 05:44

## 2019-06-24 NOTE — PLAN OF CARE
Problem: HEMATOLOGIC - ADULT  Goal: Maintains hematologic stability  Description  INTERVENTIONS  - Assess for signs and symptoms of bleeding or hemorrhage  - Monitor labs  - Administer supportive blood products/factors as ordered and appropriate  Outcome: Progressing     Problem: PAIN - ADULT  Goal: Verbalizes/displays adequate comfort level or baseline comfort level  Description  Interventions:  - Encourage patient to monitor pain and request assistance  - Assess pain using appropriate pain scale  - Administer analgesics based on type and severity of pain and evaluate response  - Implement non-pharmacological measures as appropriate and evaluate response  - Consider cultural and social influences on pain and pain management  - Notify physician/advanced practitioner if interventions unsuccessful or patient reports new pain  Outcome: Progressing     Problem: INFECTION - ADULT  Goal: Absence or prevention of progression during hospitalization  Description  INTERVENTIONS:  - Assess and monitor for signs and symptoms of infection  - Monitor lab/diagnostic results  - Monitor all insertion sites, i e  indwelling lines, tubes, and drains  - Monitor endotracheal (as able) and nasal secretions for changes in amount and color  - [place] appropriate cooling/warming therapies per order  - Administer medications as ordered  - Instruct and encourage patient and family to use good hand hygiene technique  - Identify and instruct in appropriate isolation precautions for identified infection/condition  Outcome: Progressing     Problem: SAFETY ADULT  Goal: Patient will remain free of falls  Description  INTERVENTIONS:  - Assess patient frequently for physical needs  -  Identify cognitive and physical deficits and behaviors that affect risk of falls    -  [place] fall precautions as indicated by assessment   - Educate patient/family on patient safety including physical limitations  - Instruct patient to call for assistance with activity based on assessment  - Modify environment to reduce risk of injury  - Consider OT/PT consult to assist with strengthening/mobility  Outcome: Progressing

## 2019-06-24 NOTE — ASSESSMENT & PLAN NOTE
· Reports being diagnosed with sarcoidosis 40 years ago and seeing pulmonology at that time but having no additional follow-up since that time  Last CT scan of the chest on file was from 2016 which showed stable lung nodules  · Patient reports mild cough x3 days    May need eventual CT chest/pulmonology follow-up

## 2019-06-24 NOTE — PROGRESS NOTES
Progress Note - Chantel Jorge 1950, 78 y o  male MRN: [id number]    Unit/Bed#: -01 Encounter: [id number]    Primary Care Provider: Wilmer Silver DO   Date and time admitted to hospital: 6/22/2019  2:09 PM  * FUO (fever of unknown origin)  Assessment & Plan  · Appreciate ID input  · Given high index of suspicion (frequently outdoors, has friend who was diagnosed with babesiosis) for tick born illness, patient was started on doxycycline currently on day #2   He reports feeling improved, but has not been fever free x 24 hours  · Supportive care  · procal mildly elevated to 0 33; no leukocytosis/sepsis criteria  · CXR "Suspected development of left perihilar infiltrate suspicious for pneumonia " ID feels pneumonia is less likely; t/c repeat imaging  · RUQ U/S and CT scan unremarkable  · Blood cultures negative so far  · Viral studies and tick studies pending      Rheumatoid arthritis ([facility] 75 )  Assessment & Plan  · Patient with known positive rheumatoid factor previously diagnosed with rheumatoid arthritis last year by [facility] and trialed on methotrexate  He overall was doing quite well with minimal evidence of joint erosion on imaging and due to concern for side effect profile on methotrexate, spoke with his rheumatologist about coming off of it  He has been observed off of it now since October 2018  · Doubtful for acute flare as he reports his myalgias significantly improved with doxycycline  · Recommend outpatient follow-up with Rheumatology    remote history of sarcoidosis  Assessment & Plan  · Reports being diagnosed with sarcoidosis 40 years ago and seeing pulmonology at that time but having no additional follow-up since that time  Last CT scan of the chest on file was from 2016 which showed stable lung nodules  · Patient reports mild cough x3 days    May need eventual CT chest/pulmonology follow-up    Type 2 diabetes mellitus, without long-term current use of insulin ([facility] )  Assessment & Plan  Lab Results   Component Value Date    HGBA1C 5 6 2019       No results for input(s): POCGLU in the last 72 hours  Blood Sugar Average: Last 72 hrs:   noted history of diabetes on file however A1c is excellent at 5 6 and he is not on any medications  He does not require frequent Accu-Cheks    VTE Pharmacologic Prophylaxis:   Pharmacologic: Heparin  Mechanical VTE Prophylaxis in Place: No    Patient Centered Rounds: I have performed bedside rounds with nursing staff today  Discussions with Specialists or Other Care Team Provider: ID, case mgmt    Education and Discussions with Family / Patient:     Time Spent for Care: 30 minutes  More than 50% of total time spent on counseling and coordination of care as described above  Current Length of Stay: 2 day(s)    Current Patient Status: Inpatient   Certification Statement: The patient will continue to require additional inpatient hospital stay due to Ongoing fever, workup in progress    Discharge Plan:  Home when medically stable    Code Status: Level 1 - Full Code    Subjective:   Patient reports today is the best he has felt in some time  His appetite was so much better this morning and he was able to eat a good meal   He is not experiencing the severe myalgias he was having previously  Reports he has had a dry cough for approximately the last 3 days but relays that this is not uncommon for him  He was diagnosed with sarcoidosis and seen by pulmonology 40 years ago but has not been following him consistently  He intermittently does get cough  He also brought to my attention that his friend was diagnosed with babesiosis  He denies any rash, but is outside and in the woods a good bit       Objective:     Vitals:   Temp (24hrs), Av 6 °F (38 1 °C), Min:99 9 °F (37 7 °C), Max:103 1 °F (39 5 °C)    Temp:  [99 9 °F (37 7 °C)-103 1 °F (39 5 °C)] 99 9 °F (37 7 °C)  HR:  [57-67] 57  Resp:  [18] 18  BP: (108-118)/(53-59) 115/56  SpO2:  [95 %-100 %] 95 %  Body mass index is 29 1 kg/m²  Input and Output Summary (last 24 hours): Intake/Output Summary (Last 24 hours) at 6/24/2019 1155  Last data filed at 6/24/2019 0836  Gross per 24 hour   Intake 180 ml   Output 1350 ml   Net -1170 ml       Physical Exam:     Physical Exam   Constitutional: He appears well-developed and well-nourished  No distress  Well-appearing gentleman seen in bed in no distress   HENT:   Head: Normocephalic and atraumatic  Eyes: Conjunctivae are normal  Right eye exhibits no discharge  Left eye exhibits no discharge  No scleral icterus  Neck: Neck supple  Cardiovascular: Normal rate, regular rhythm and normal heart sounds  Exam reveals no gallop and no friction rub  No murmur heard  Pulmonary/Chest: Effort normal  No stridor  No respiratory distress  He has no wheezes  Very mild left basilar rhonchi   Abdominal: Soft  Bowel sounds are normal  He exhibits no distension and no mass  There is no tenderness  There is no guarding  Musculoskeletal: He exhibits no edema, tenderness or deformity  No bony rheumatologic deformities noted  No joint swelling  Neurological: He is alert  Awake alert appropriate with no evidence of focal deficits and no confusion  Skin: Skin is warm and dry  No rash noted  He is not diaphoretic  No erythema  No pallor  Psychiatric: He has a normal mood and affect  His behavior is normal  Thought content normal    Vitals reviewed        Additional Data:     Labs:    Results from last 7 days   Lab Units 06/24/19  0443   WBC Thousand/uL 8 99   HEMOGLOBIN g/dL 11 4*   HEMATOCRIT % 32 9*   PLATELETS Thousands/uL 200   NEUTROS PCT % 79*   LYMPHS PCT % 11*   MONOS PCT % 10   EOS PCT % 0     Results from last 7 days   Lab Units 06/24/19  0443   SODIUM mmol/L 135*   POTASSIUM mmol/L 3 7   CHLORIDE mmol/L 102   CO2 mmol/L 23   BUN mg/dL 23   CREATININE mg/dL 1 12   ANION GAP mmol/L 10   CALCIUM mg/dL 8 5   ALBUMIN g/dL 2 4*   TOTAL BILIRUBIN mg/dL 1 20*   ALK PHOS U/L 183*   ALT U/L 81*   AST U/L 46*   GLUCOSE RANDOM mg/dL 114     Results from last 7 days   Lab Units 06/22/19  1437   INR  1 27*             Results from last 7 days   Lab Units 06/24/19  0443 06/22/19  1650 06/22/19  1437   LACTIC ACID mmol/L  --  1 5 2 3*   PROCALCITONIN ng/ml 0 33*  --  0 21     * I Have Reviewed All Lab Data Listed Above  * Additional Pertinent Lab Tests Reviewed: All [place] Admission Reviewed    Imaging:    Imaging Reports Reviewed Today Include: cxr, ruq u/s, ct scan  Imaging Personally Reviewed by Myself Includes:      Recent Cultures (last 7 days):     Results from last 7 days   Lab Units 06/22/19  1437 06/22/19  1430   BLOOD CULTURE  No Growth at 24 hrs  No Growth at 24 hrs  Last 24 Hours Medication List:     Current Facility-Administered Medications:  atorvastatin 20 mg Oral Daily Carmen Swan MD   doxycycline hyclate 100 mg Oral Q12H Thania Louise MD   finasteride 5 mg Oral After Edelmira Ford MD   fish oil 1,000 mg Oral Daily Carmen Swan MD   heparin (porcine) 5,000 Units Subcutaneous [facility] Carmen Swan MD   ibuprofen 200 mg Oral Q6H PRN Chad Rosales PA-C   sodium chloride (PF) 3 mL Intravenous PRN Carmen Swan MD        Today, Patient Was Seen By: Amanda Nunez PA-C    ** Please Note: Dictation voice to text software may have been used in the creation of this document   **

## 2019-06-24 NOTE — PROGRESS NOTES
Progress Note - Infectious Disease   Sumaya Bassett 78 y o  male MRN: [id number]  Unit/Bed#: -01 Encounter: [id number]      Impression/Plan:  1  Acute febrile illness  Associated with myalgias, increased LFTs, anemia  Tick borne infection including Anaplasma of high suspicion  Patient has had significant improvement in clinical symptoms and decrease in LFTs after only 2 doses of doxycycline  Consider tick-borne infection given extensive outdoor exposure (Lyme, Anaplasma, Babesia)  Continue doxycycline b i d  If continued improvement continues, anticipate patient could be discharged tomorrow on  doxycycline b i d  X3 weeks total    Follow-up tick serologies   Monitor LFTs   Monitor temperature and hemodynamics   Follow-up clinically    2  Acute transaminitis, hyperbilirubinemia  Consider viral versus tick-borne infection as above  CTA/P, RUQ U/S negative   Antibiotic plan as above   Monitor temperature and hemodynamics   Monitor LFTs   Follow-up clinically    3  Positive rheumatoid factor  Unclear if patient clinically had RA  Not currently on therapy  4  DM  Risk factor for infection  Antibiotics:  Doxycycline day 1    Discussed in detail with patient and wife at bedside  All questions answered and reassurance given  Subjective:  Patient's fever appears to be trending down, he denies any headache  His overall fatigue malaise and arthralgias are improving  He had an appetite for the 1st time this a m  He denies chills, sweats; no nausea, vomiting, diarrhea; no cough, shortness of breath; no abdominal pain  No new symptoms      Objective:  Vitals:  Temp:  [99 9 °F (37 7 °C)-103 1 °F (39 5 °C)] 99 9 °F (37 7 °C)  HR:  [57-67] 57  Resp:  [18] 18  BP: (108-118)/(53-59) 115/56  SpO2:  [95 %-100 %] 95 %  Temp (24hrs), Av 6 °F (38 1 °C), Min:99 9 °F (37 7 °C), Max:103 1 °F (39 5 °C)  Current: Temperature: 99 9 °F (37 7 °C)    Physical Exam:   General Appearance:  Alert, interactive, nontoxic, no acute distress  Throat: Oropharynx moist without lesions  Neck: Supple, no rigidity, no adenopathy   Lungs:   Clear to auscultation bilaterally; no wheezes, rhonchi or rales; respirations unlabored   Heart:  RRR; no murmur, rub or gallop   Abdomen:   Soft, non-tender, non-distended, positive bowel sounds  Extremities: No clubbing or cyanosis, no edema, IV site left arm nontender, no joint swelling   Skin: No new rashes or lesions  Labs, Imaging, & Other studies:   All pertinent labs and imaging studies were personally reviewed  Results from last 7 days   Lab Units 06/24/19  0443 06/23/19  0507 06/22/19  1443   WBC Thousand/uL 8 99 7 81 9 68   HEMOGLOBIN g/dL 11 4* 11 7* 13 6   PLATELETS Thousands/uL 200 190 218     Results from last 7 days   Lab Units 06/24/19  0443 06/23/19  0507 06/22/19  1437   POTASSIUM mmol/L 3 7 3 8 4 5   CHLORIDE mmol/L 102 103 99*   CO2 mmol/L 23 22 27   BUN mg/dL 23 21 26*   CREATININE mg/dL 1 12 1 09 1 11   EGFR ml/min/1 73sq m 66 68 67   CALCIUM mg/dL 8 5 8 3 8 9   AST U/L 46* 64* 102*   ALT U/L 81* 106* 143*   ALK PHOS U/L 183* 186* 210*     Results from last 7 days   Lab Units 06/24/19  0443 06/22/19  1437   PROCALCITONIN ng/ml 0 33* 0 21     Results from last 7 days   Lab Units 06/22/19  1437 06/22/19  1430   BLOOD CULTURE  No Growth at 24 hrs   No Growth at 24 hrs     06/23/2019 blood parasite smear negative    EBV, CMV, Anaplasma, and Lyme serologies pending

## 2019-06-24 NOTE — ASSESSMENT & PLAN NOTE
· Patient with known positive rheumatoid factor previously diagnosed with rheumatoid arthritis last year by [facility] and trialed on methotrexate  He overall was doing quite well with minimal evidence of joint erosion on imaging and due to concern for side effect profile on methotrexate, spoke with his rheumatologist about coming off of it    He has been observed off of it now since October 2018  · Doubtful for acute flare as he reports his myalgias significantly improved with doxycycline  · Recommend outpatient follow-up with Rheumatology

## 2019-06-24 NOTE — ASSESSMENT & PLAN NOTE
· Appreciate ID input  · Given high index of suspicion (frequently outdoors, has friend who was diagnosed with babesiosis) for tick born illness, patient was started on doxycycline currently on day #2    He reports feeling improved, but has not been fever free x 24 hours  · Supportive care  · procal mildly elevated to 0 33; no leukocytosis/sepsis criteria  · CXR "Suspected development of left perihilar infiltrate suspicious for pneumonia " ID feels pneumonia is less likely; t/c repeat imaging  · RUQ U/S and CT scan unremarkable  · Blood cultures negative so far  · Viral studies and tick studies pending

## 2019-06-24 NOTE — ASSESSMENT & PLAN NOTE
Lab Results   Component Value Date    HGBA1C 5 6 05/31/2019       No results for input(s): POCGLU in the last 72 hours  Blood Sugar Average: Last 72 hrs:   noted history of diabetes on file however A1c is excellent at 5 6 and he is not on any medications    He does not require frequent Accu-Cheks

## 2019-06-25 VITALS
RESPIRATION RATE: 16 BRPM | DIASTOLIC BLOOD PRESSURE: 59 MMHG | OXYGEN SATURATION: 95 % | SYSTOLIC BLOOD PRESSURE: 102 MMHG | BODY MASS INDEX: 29.1 KG/M2 | HEART RATE: 68 BPM | WEIGHT: 191.36 LBS | TEMPERATURE: 98.9 F

## 2019-06-25 LAB
CMV IGG SERPL IA-ACNC: >10 U/ML (ref 0–0.59)
CMV IGM SERPL IA-ACNC: <30 AU/ML (ref 0–29.9)
EBV EA IGG SER-ACNC: 32.3 U/ML (ref 0–8.9)
EBV NA IGG SER IA-ACNC: <18 U/ML (ref 0–17.9)
EBV PATRN SPEC IB-IMP: ABNORMAL
EBV VCA IGG SER IA-ACNC: 94.3 U/ML (ref 0–17.9)
EBV VCA IGM SER IA-ACNC: <36 U/ML (ref 0–35.9)

## 2019-06-25 PROCEDURE — 99232 SBSQ HOSP IP/OBS MODERATE 35: CPT | Performed by: INTERNAL MEDICINE

## 2019-06-25 PROCEDURE — 99239 HOSP IP/OBS DSCHRG MGMT >30: CPT | Performed by: PHYSICIAN ASSISTANT

## 2019-06-25 PROCEDURE — 86618 LYME DISEASE ANTIBODY: CPT | Performed by: INTERNAL MEDICINE

## 2019-06-25 RX ORDER — DOXYCYCLINE HYCLATE 100 MG/1
100 CAPSULE ORAL EVERY 12 HOURS SCHEDULED
Qty: 38 CAPSULE | Refills: 0 | Status: SHIPPED | OUTPATIENT
Start: 2019-06-25 | End: 2020-04-21 | Stop reason: ALTCHOICE

## 2019-06-25 RX ADMIN — DOXYCYCLINE HYCLATE 100 MG: 100 CAPSULE ORAL at 08:07

## 2019-06-25 RX ADMIN — ATORVASTATIN CALCIUM 20 MG: 20 TABLET, FILM COATED ORAL at 08:07

## 2019-06-25 RX ADMIN — HEPARIN SODIUM 5000 UNITS: 5000 INJECTION INTRAVENOUS; SUBCUTANEOUS at 08:07

## 2019-06-25 RX ADMIN — Medication 1000 MG: at 08:07

## 2019-06-25 NOTE — ASSESSMENT & PLAN NOTE
· Patient with known positive rheumatoid factor previously diagnosed with rheumatoid arthritis last year by [facility] and trialed on methotrexate  He overall was doing quite well with minimal evidence of joint erosion on imaging and due to concern for side effect profile on methotrexate, spoke with his rheumatologist about coming off of it    He has been observed off of it now since October 2018  · Doubtful for acute flare as he reports his myalgias significantly improved with doxycycline  · Recommend outpatient follow-up with Rheumatology

## 2019-06-25 NOTE — DISCHARGE INSTRUCTIONS
Doxycycline (By mouth)   Doxycycline ([id number])  Treats and prevents infections  Also used to prevent malaria and treat rosacea or severe acne  This medicine is a tetracycline antibiotic  Brand Name(s): Acticlate, Adoxa, Adoxa Andrés 1/150, Avidoxy, Avidoxy DK, BenzoDox 30 Kit, BenzoDox 60 Kit, Doryx, Doryx MPC, Monodox, Morgidox [id number], Morgidox [id number] Kit, Morgidox 1x50MG, Morgidox 1x50MG Kit, Morgidox [id number]   There may be other brand names for this medicine  When This Medicine Should Not Be Used: This medicine is not right for everyone  Do not use it if you had an allergic reaction to doxycycline or another tetracycline antibiotic, or if you are pregnant or breastfeeding  How to Use This Medicine:   Capsule, Delayed Release Capsule, Long Acting Capsule, Liquid, Tablet, Delayed Release Tablet  · Your doctor will tell you how much medicine to use  Do not use more than directed  · Ask your pharmacist or doctor if you need to take this medicine with or without food  Some forms can be taken with food or milk, but others must be taken on an empty stomach  · Tablets: You may take this medicine with food or milk to avoid stomach irritation  To break a tablet, hold the tablet between your thumb and index fingers close to the appropriate scored line  Then, apply enough pressure to snap the tablet segments apart  Do not use the tablet if it does not break on the scored lines  · Delayed-release tablets: You may also take this medicine by sprinkling the broken tablets onto room-temperature applesauce  Swallow this mixture right away; do not chew  Do not store the mixture for later use  · Oracea® capsules: This medicine must be taken on an empty stomach, at least 1 hour before or 2 hours after a meal   · Capsule: Swallow whole  Do not break, crush, chew, or open it  · Oral liquid: Shake the bottle well just before each use   Measure the oral liquid medicine with a marked measuring spoon, oral syringe, or medicine cup  · Take all of the medicine in your prescription to clear up your infection, even if you feel better after the first few doses  · Drink plenty of fluids to avoid throat problems, if you take the capsule or tablet form  · Malaria prevention: Start taking the medicine 1 or 2 days before you travel  Take the medicine every day during your trip  Keep taking it for 4 weeks after you return  However, do not use the medicine for longer than 4 months  · Do not use this medicine for more than 9 months if you are using it for rosacea  · Use only the brand of medicine your doctor prescribed  Other brands may not work the same way  · Read and follow the patient instructions that come with this medicine  Talk to your doctor or pharmacist if you have any questions  · Missed dose: Take a dose as soon as you remember  If it is almost time for your next dose, wait until then and take a regular dose  Do not take extra medicine to make up for a missed dose  · Store the medicine in a closed container at room temperature, away from heat, moisture, and direct light  Do not freeze the oral liquid  Drugs and Foods to Avoid:   Ask your doctor or pharmacist before using any other medicine, including over-the-counter medicines, vitamins, and herbal products  · Some foods and medicines can affect how doxycycline works  Tell your doctor if you are using any of the following:  ¨ Bismuth subsalicylate, isotretinoin or other acne medicines, acitretin or other medicine to treat psoriasis  ¨ Penicillin antibiotic, birth control pills, medicine for seizures (such as carbamazepine, phenobarbital, phenytoin), stomach medicine, a blood thinner (such as warfarin), or any medicine that contains aluminum, calcium, or iron (such an antacid or vitamin supplement)  Warnings While Using This Medicine:   · This medicine may cause birth defects if either partner is using it during conception or pregnancy   Tell your doctor right away if you or your partner becomes pregnant  Birth control pills may not work as well when used with this medicine  Use a second form of birth control to keep from getting pregnant  · Tell your doctor if you have kidney disease, liver disease, asthma, or an allergy to sulfites  Tell your doctor if you had surgery on your stomach, or if you have a history of yeast infections  · This medicine may cause the following problems:  ¨ Permanent change in tooth color (in children younger than 6years old)  ¨ Increased pressure inside the head  ¨ Yeast infection  ¨ Immune system problems  · This medicine can cause diarrhea  Call your doctor if the diarrhea becomes severe, does not stop, or is bloody  Do not take any medicine to stop diarrhea until you have talked to your doctor  Diarrhea can occur 2 months or more after you stop taking this medicine  · This medicine may make your skin more sensitive to sunlight  Wear sunscreen  Do not use sunlamps or tanning beds  · Tell any doctor or dentist who treats you that you are using this medicine  This medicine may affect certain medical test results  · Call your doctor if your symptoms do not improve or if they get worse  · Keep all medicine out of the reach of children  Never share your medicine with anyone    Possible Side Effects While Using This Medicine:   Call your doctor right away if you notice any of these side effects:  · Allergic reaction: Itching or hives, swelling in your face or hands, swelling or tingling in your mouth or throat, chest tightness, trouble breathing  · Blistering, peeling, red skin rash  · Burning, pain, or irritation in your upper stomach or throat  · Diarrhea that may contain blood  · Fever, chills, cough, runny or stuffy nose, sore throat, and body aches  · Joint pain, fever, rash, and unusual tiredness or weakness  · Severe headache, dizziness, or vision changes  If you notice these less serious side effects, talk with your doctor:   · Darkening of your skin, scars, teeth, or gums  · Sores or white patches on your lips, mouth, or throat  If you notice other side effects that you think are caused by this medicine, tell your doctor  Call your doctor for medical advice about side effects  You may report side effects to FDA at 4-738-FDA-1883  © 2017 [address] [employer] Information is for End User's use only and may not be sold, redistributed or otherwise used for commercial purposes  The above information is an  only  It is not intended as medical advice for individual conditions or treatments  Talk to your doctor, nurse or pharmacist before following any medical regimen to see if it is safe and effective for you

## 2019-06-25 NOTE — ASSESSMENT & PLAN NOTE
· Appreciate ID input  · Given high index of suspicion (frequently outdoors, has friend who was diagnosed with babesiosis) for tick born illness, patient was started on doxycycline currently on day #3/21    He reports feeling improved, but has not been fever free x 24 hours, though his temperature curve did trend down  · Supportive care  · procal mildly elevated to 0 33; no leukocytosis/sepsis criteria  · CXR "Suspected development of left perihilar infiltrate suspicious for pneumonia " ID feels pneumonia is less likely; no significant respiratory complaints  · RUQ U/S and CT scan unremarkable  · Blood cultures negative so far  · The rest of his viral studies and tick studies are pending; id will follow him up in their office

## 2019-06-25 NOTE — PLAN OF CARE
Problem: HEMATOLOGIC - ADULT  Goal: Maintains hematologic stability  Description  INTERVENTIONS  - Assess for signs and symptoms of bleeding or hemorrhage  - Monitor labs  - Administer supportive blood products/factors as ordered and appropriate  Outcome: Progressing     Problem: PAIN - ADULT  Goal: Verbalizes/displays adequate comfort level or baseline comfort level  Description  Interventions:  - Encourage patient to monitor pain and request assistance  - Assess pain using appropriate pain scale  - Administer analgesics based on type and severity of pain and evaluate response  - Implement non-pharmacological measures as appropriate and evaluate response  - Consider cultural and social influences on pain and pain management  - Notify physician/advanced practitioner if interventions unsuccessful or patient reports new pain  Outcome: Progressing     Problem: INFECTION - ADULT  Goal: Absence or prevention of progression during hospitalization  Description  INTERVENTIONS:  - Assess and monitor for signs and symptoms of infection  - Monitor lab/diagnostic results  - Monitor all insertion sites, i e  indwelling lines, tubes, and drains  - Monitor endotracheal (as able) and nasal secretions for changes in amount and color  - [place] appropriate cooling/warming therapies per order  - Administer medications as ordered  - Instruct and encourage patient and family to use good hand hygiene technique  - Identify and instruct in appropriate isolation precautions for identified infection/condition  Outcome: Progressing     Problem: SAFETY ADULT  Goal: Patient will remain free of falls  Description  INTERVENTIONS:  - Assess patient frequently for physical needs  -  Identify cognitive and physical deficits and behaviors that affect risk of falls    -  [place] fall precautions as indicated by assessment   - Educate patient/family on patient safety including physical limitations  - Instruct patient to call for assistance with activity based on assessment  - Modify environment to reduce risk of injury  - Consider OT/PT consult to assist with strengthening/mobility  Outcome: Progressing

## 2019-06-25 NOTE — PLAN OF CARE
Problem: HEMATOLOGIC - ADULT  Goal: Maintains hematologic stability  Description  INTERVENTIONS  - Assess for signs and symptoms of bleeding or hemorrhage  - Monitor labs  - Administer supportive blood products/factors as ordered and appropriate  Outcome: Progressing     Problem: PAIN - ADULT  Goal: Verbalizes/displays adequate comfort level or baseline comfort level  Description  Interventions:  - Encourage patient to monitor pain and request assistance  - Assess pain using appropriate pain scale  - Administer analgesics based on type and severity of pain and evaluate response  - Implement non-pharmacological measures as appropriate and evaluate response  - Consider cultural and social influences on pain and pain management  - Notify physician/advanced practitioner if interventions unsuccessful or patient reports new pain  Outcome: Progressing     Problem: INFECTION - ADULT  Goal: Absence or prevention of progression during hospitalization  Description  INTERVENTIONS:  - Assess and monitor for signs and symptoms of infection  - Monitor lab/diagnostic results  - Monitor all insertion sites, i e  indwelling lines, tubes, and drains  - Monitor endotracheal (as able) and nasal secretions for changes in amount and color  - [place] appropriate cooling/warming therapies per order  - Administer medications as ordered  - Instruct and encourage patient and family to use good hand hygiene technique  - Identify and instruct in appropriate isolation precautions for identified infection/condition  Outcome: Progressing     Problem: SAFETY ADULT  Goal: Patient will remain free of falls  Description  INTERVENTIONS:  - Assess patient frequently for physical needs  -  Identify cognitive and physical deficits and behaviors that affect risk of falls    -  [place] fall precautions as indicated by assessment   - Educate patient/family on patient safety including physical limitations  - Instruct patient to call for assistance with activity based on assessment  - Modify environment to reduce risk of injury  - Consider OT/PT consult to assist with strengthening/mobility  Outcome: Progressing

## 2019-06-25 NOTE — PROGRESS NOTES
Progress Note - Infectious Disease   Chantel Jorge 78 y o  male MRN: [id number]  Unit/Bed#: -01 Encounter: [id number]      Impression/Plan:  1  Acute febrile illness   Associated with myalgias, increased LFTs, anemia   Tick borne infection including Anaplasmosis of high suspicion  Patient has had significant improvement in clinical symptoms and decrease in LFTs since doxycycline started  Consider tick-borne infection given extensive outdoor exposure (Lyme, Anaplasma, Babesia)           Continue doxycycline b i d  With continued clinical improvement and fever trending down, patient can be discharged  today on doxycycline b i d  X3 weeks total                Follow-up tick serologies        Monitor LFTs        Monitor temperature and hemodynamics        Follow-up in office in 2 weeks      2  Acute transaminitis, hyperbilirubinemia   Consider viral versus tick-borne infection as above   CTA/P, RUQ U/S negative        Antibiotic plan as above        Monitor temperature and hemodynamics        Monitor LFTs        Follow-up clinically     3  Positive rheumatoid factor   Unclear if patient clinically had RA   Not currently on therapy  4  DM   Risk factor for infection      Antibiotics:  Doxycycline day 2     Discussed in detail with patient and BRONWYN Rowe      Subjective:  Patient's fever appears to be trending down, he denies any headache  His overall fatigue and malaise and myalgias are improving  He had good appetite for breakfast  He denies chills, sweats; no nausea, vomiting, diarrhea; no cough, shortness of breath; no abdominal pain  No new symptoms      Objective:  Vitals:  Temp:  [98 2 °F (36 8 °C)-100 7 °F (38 2 °C)] 98 9 °F (37 2 °C)  HR:  [68-81] 68  Resp:  [16-18] 16  BP: (102-139)/(59-63) 102/59  SpO2:  [94 %-96 %] 95 %  Temp (24hrs), Av 3 °F (37 4 °C), Min:98 2 °F (36 8 °C), Max:100 7 °F (38 2 °C)  Current: Temperature: 98 9 °F (37 2 °C)    Physical Exam:   General Appearance: Alert, interactive, nontoxic, no acute distress  Neck: Supple, no rigidity   Throat: Oropharynx moist without lesions  Lungs:   Clear to auscultation bilaterally; no wheezes, rhonchi or rales; respirations unlabored   Heart:  RRR; no murmur, rub or gallop   Abdomen:   Soft, non-tender, non-distended, positive bowel sounds  Extremities: No clubbing or cyanosis, no edema, IV site nontender   Skin: No new rashes or lesions  Labs, Imaging, & Other studies:   All pertinent labs and imaging studies were personally reviewed  Results from last 7 days   Lab Units 06/24/19  0443 06/23/19  0507 06/22/19  1443   WBC Thousand/uL 8 99 7 81 9 68   HEMOGLOBIN g/dL 11 4* 11 7* 13 6   PLATELETS Thousands/uL 200 190 218     Results from last 7 days   Lab Units 06/24/19  0443 06/23/19  0507 06/22/19  1437   POTASSIUM mmol/L 3 7 3 8 4 5   CHLORIDE mmol/L 102 103 99*   CO2 mmol/L 23 22 27   BUN mg/dL 23 21 26*   CREATININE mg/dL 1 12 1 09 1 11   EGFR ml/min/1 73sq m 66 68 67   CALCIUM mg/dL 8 5 8 3 8 9   AST U/L 46* 64* 102*   ALT U/L 81* 106* 143*   ALK PHOS U/L 183* 186* 210*     Results from last 7 days   Lab Units 06/24/19  0443 06/22/19  1437   PROCALCITONIN ng/ml 0 33* 0 21     Results from last 7 days   Lab Units 06/22/19  1437 06/22/19  1430   BLOOD CULTURE  No Growth at 48 hrs   No Growth at 48 hrs     06/23/2019 Anaplasma, EBV, Lyme serologies pending  06/23/2019 parasite smear and CMV IgM negative, CMV IgG positive

## 2019-06-25 NOTE — DISCHARGE SUMMARY
Discharge- Kellie De Luna 1950, 78 y o  male MRN: [id number]    Unit/Bed#: -01 Encounter: [id number]    Primary Care Provider: Nasir Maria DO   Date and time admitted to hospital: 6/22/2019  2:09 PM  * FUO (fever of unknown origin)  Assessment & Plan  · Appreciate ID input  · Given high index of suspicion (frequently outdoors, has friend who was diagnosed with babesiosis) for tick born illness, patient was started on doxycycline currently on day #3/21   He reports feeling improved, but has not been fever free x 24 hours, though his temperature curve did trend down  · Supportive care  · procal mildly elevated to 0 33; no leukocytosis/sepsis criteria  · CXR "Suspected development of left perihilar infiltrate suspicious for pneumonia " ID feels pneumonia is less likely; no significant respiratory complaints  · RUQ U/S and CT scan unremarkable  · Blood cultures negative so far  · The rest of his viral studies and tick studies are pending; id will follow him up in their office      Rheumatoid arthritis [facility])  Assessment & Plan  · Patient with known positive rheumatoid factor previously diagnosed with rheumatoid arthritis last year by [facility] and trialed on methotrexate  He overall was doing quite well with minimal evidence of joint erosion on imaging and due to concern for side effect profile on methotrexate, spoke with his rheumatologist about coming off of it  He has been observed off of it now since October 2018  · Doubtful for acute flare as he reports his myalgias significantly improved with doxycycline  · Recommend outpatient follow-up with Rheumatology    remote history of sarcoidosis  Assessment & Plan  · Reports being diagnosed with sarcoidosis 40 years ago and seeing pulmonology at that time but having no additional follow-up since that time  Last CT scan of the chest on file was from 2016 which showed stable lung nodules  · Patient reports mild cough x3 days    May need eventual CT chest/pulmonology follow-up    Type 2 diabetes mellitus, without long-term current use of insulin (HCC)  Assessment & Plan  Lab Results   Component Value Date    HGBA1C 5 6 05/31/2019       No results for input(s): POCGLU in the last 72 hours  Blood Sugar Average: Last 72 hrs:  noted history of diabetes on file however A1c is excellent at 5 6 and he is not on any medications  He does not require frequent Accu-Cheks    Discharging Physician / Practitioner: Velma Eason PA-C  PCP: Ximena Kan DO  Admission Date:   Admission Orders (From admission, onward)    Ordered        06/22/19 1833  Inpatient Admission  Once             Discharge Date: 06/25/19    Resolved Problems  Date Reviewed: 6/25/2019    None          Consultations During Hospital Stay:  · ID    Procedures Performed:   · CXR: Suspected development of left perihilar infiltrate suspicious for pneumonia  · CT A/P: No acute intra-abdominal abnormality  No large or small bowel obstruction  Scattered colonic diverticulosis with no inflammatory changes present to suggest acute diverticulitis  No free air or free fluid  · RUQ U/S: small left hepatic lobe cyst     Significant Findings / Test Results:   · As above    Incidental Findings:   · none     Test Results Pending at Discharge (will require follow up): · Viral and tick studies     Outpatient Tests Requested:  · None planned    Complications:  none    Reason for Admission: fever    Hospital Course:     Herlinda Hatfield is a 78 y o  male patient who originally presented to the hospital on 6/22/2019 due to fever of unknown origin  Basic workup did not reveal source of infection and he did not meet other sepsis parameters  There was high suspicion for tick-borne illness and patient was placed on doxycycline which he is recommended to remain on for 21 days  His high-grade fevers have subsequently trended down although not completely normalized    Patient was feeling significant improvement overall and was eager to go home  He will follow up as an outpatient with Infectious Disease to review the results of tests which are pending at this time including the remainder of his viral and tick-borne illness panels    Please see above list of diagnoses and related plan for additional information  Condition at Discharge: stable     Discharge Day Visit / Exam:     Subjective:  Patient reports he is overall feeling much better  He does still feel hot" when he gets the low-grade fevers overnight but otherwise his stamina and appetite and overall muscle aches have significantly improved  Vitals: Blood Pressure: 102/59 (06/25/19 0710)  Pulse: 68 (06/25/19 0710)  Temperature: 98 9 °F (37 2 °C) (06/25/19 0710)  Temp Source: Oral (06/25/19 0710)  Respirations: 16 (06/25/19 0710)  Weight - Scale: 86 8 kg (191 lb 5 8 oz) (06/25/19 0600)  SpO2: 95 % (06/25/19 0710)  Exam:   Physical Exam   Constitutional: He appears well-developed and well-nourished  No distress  Well-appearing gentleman, clinically nontoxic and in no apparent distress   HENT:   Head: Normocephalic and atraumatic  Eyes: Conjunctivae are normal  Right eye exhibits no discharge  Left eye exhibits no discharge  No scleral icterus  Neck: Neck supple  Cardiovascular: Normal rate, regular rhythm and normal heart sounds  Exam reveals no gallop and no friction rub  No murmur heard  Pulmonary/Chest: Effort normal and breath sounds normal  No stridor  No respiratory distress  He has no wheezes  He has no rales  No cough, dyspnea, or distress noted   Abdominal: Soft  Bowel sounds are normal  He exhibits no distension  There is no tenderness  There is no guarding  Musculoskeletal: Normal range of motion  He exhibits no edema, tenderness or deformity  Neurological: He is alert  Skin: Skin is warm and dry  No rash noted  He is not diaphoretic  No erythema  No pallor  Psychiatric: He has a normal mood and affect   His behavior is normal  Judgment and thought content normal    Vitals reviewed  Discussion with Family:     Discharge instructions/Information to patient and family:   See after visit summary for information provided to patient and family  Provisions for Follow-Up Care:  See after visit summary for information related to follow-up care and any pertinent home health orders  Disposition:     Home    For Discharges to [facility] SNF:   · Not Applicable to this Patient - Not Applicable to this Patient    Planned Readmission: none     Discharge Statement:  I spent 35 minutes discharging the patient  This time was spent on the day of discharge  I had direct contact with the patient on the day of discharge  Greater than 50% of the total time was spent examining patient, answering all patient questions, arranging and discussing plan of care with patient as well as directly providing post-discharge instructions  Additional time then spent on discharge activities  Case was discussed directly with Infectious Disease PA and attending as well as with case management and nursing/    Discharge Medications:  See after visit summary for reconciled discharge medications provided to patient and family        ** Please Note: This note has been constructed using a voice recognition system **

## 2019-06-25 NOTE — PLAN OF CARE
Problem: HEMATOLOGIC - ADULT  Goal: Maintains hematologic stability  Description  INTERVENTIONS  - Assess for signs and symptoms of bleeding or hemorrhage  - Monitor labs  - Administer supportive blood products/factors as ordered and appropriate  6/25/2019 1230 by Yamil Guillen RN  Outcome: Adequate for Discharge  6/25/2019 0911 by Yamil Guillen RN  Outcome: Progressing     Problem: PAIN - ADULT  Goal: Verbalizes/displays adequate comfort level or baseline comfort level  Description  Interventions:  - Encourage patient to monitor pain and request assistance  - Assess pain using appropriate pain scale  - Administer analgesics based on type and severity of pain and evaluate response  - Implement non-pharmacological measures as appropriate and evaluate response  - Consider cultural and social influences on pain and pain management  - Notify physician/advanced practitioner if interventions unsuccessful or patient reports new pain  6/25/2019 1230 by Yamil Guillen RN  Outcome: Adequate for Discharge  6/25/2019 0911 by Yamil Guillen RN  Outcome: Progressing     Problem: INFECTION - ADULT  Goal: Absence or prevention of progression during hospitalization  Description  INTERVENTIONS:  - Assess and monitor for signs and symptoms of infection  - Monitor lab/diagnostic results  - Monitor all insertion sites, i e  indwelling lines, tubes, and drains  - Monitor endotracheal (as able) and nasal secretions for changes in amount and color  - [place] appropriate cooling/warming therapies per order  - Administer medications as ordered  - Instruct and encourage patient and family to use good hand hygiene technique  - Identify and instruct in appropriate isolation precautions for identified infection/condition  6/25/2019 1230 by Yamil Guillen RN  Outcome: Adequate for Discharge  6/25/2019 0911 by Yamil Guillen RN  Outcome: Progressing     Problem: SAFETY ADULT  Goal: Patient will remain free of falls  Description  INTERVENTIONS:  - Assess patient frequently for physical needs  -  Identify cognitive and physical deficits and behaviors that affect risk of falls    -  [place] fall precautions as indicated by assessment   - Educate patient/family on patient safety including physical limitations  - Instruct patient to call for assistance with activity based on assessment  - Modify environment to reduce risk of injury  - Consider OT/PT consult to assist with strengthening/mobility  6/25/2019 1230 by Kim Schwab RN  Outcome: Adequate for Discharge  6/25/2019 0911 by Kim Schwab RN  Outcome: Progressing

## 2019-06-27 ENCOUNTER — TRANSITIONAL CARE MANAGEMENT (OUTPATIENT)
Dept: FAMILY MEDICINE CLINIC | Facility: CLINIC | Age: 69
End: 2019-06-27

## 2019-06-27 DIAGNOSIS — Z71.89 COMPLEX CARE COORDINATION: Primary | ICD-10-CM

## 2019-06-27 LAB
A PHAGOCYTOPH DNA BLD QL NAA+PROBE: NEGATIVE
B BURGDOR DNA SPEC QL NAA+PROBE: NEGATIVE
B BURGDOR IGG SER IA-ACNC: 0.13
B BURGDOR IGM SER IA-ACNC: 0.52

## 2019-06-28 ENCOUNTER — PATIENT OUTREACH (OUTPATIENT)
Dept: FAMILY MEDICINE CLINIC | Facility: HOSPITAL | Age: 69
End: 2019-06-28

## 2019-06-28 LAB
BACTERIA BLD CULT: NORMAL
BACTERIA BLD CULT: NORMAL

## 2019-06-30 NOTE — PROGRESS NOTES
ASSESSMENT/PLAN:    Flu-like symptoms  Most likely Lyme disease as it is pretty much textbook  Patient has 3 weeks of doxycycline and he will take until he finishes the last pill  And 1 month he will get a Lyme titer  We will call with results    Abnormal chest x-ray with infiltrate on the left  When patient gets a Lyme titer he will get a chest x-ray and we will call him with both  If he does not hear from us in 5 days he will call us for the results    Type 2 diabetes  Patient was not told that her sugar was up  Last A1c was 5 6 with 1 metformin  He has dropped the 2nd 1 and this will be recheck to get in October     Positive rheumatoid factor most likely secondary to  History of sarcoid in the past  Nothing more needs to be done with this     Celiac disease  Patient follows diet  Has been train by dietitian     Hyperlipidemia  Patient is on atorvastatin and diet  He also needs this because of his diabetes      Elevated PSA / BPH  Patient follows with Urology  He is on Proscar so we need to double his PSA whenever we see it     Eczema  Continue using steroid cream on elbows as needed      vitamin-D deficiency   Continue under to vitamin-D we will check this value next visit     History of lung nodule  Pap was followed by CT scan with Pulmonary for years  Felt to be left over sarcoid no need to follow anymore       Recheck as scheduled  Or as needed    Pt going thru a divorce        Health Maintenance   Topic Date Due    BMI: Followup Plan  04/22/1967    HEPATITIS B VACCINES (1 of 3 - Risk 3-dose series) 04/22/1968    INFLUENZA VACCINE  07/01/2019    Medicare Annual Wellness Visit (AWV)  09/17/2019    Fall Risk  09/19/2019    URINE MICROALBUMIN  11/09/2019    HEMOGLOBIN A1C  11/30/2019    DM Eye Exam  12/22/2019    Diabetic Foot Exam  02/22/2020    CRC Screening: Colonoscopy  03/01/2020    Depression Screening PHQ  06/11/2020    BMI: Adult  07/01/2020    DTaP,Tdap,and Td Vaccines (2 - Td) 03/08/2023  Hepatitis C Screening  Completed    Pneumococcal Vaccine: 65+ Years  Completed    Pneumococcal Vaccine: Pediatrics (0 to 5 Years) and At-Risk Patients (6 to 59 Years)  Aged Out         Problem List as of 7/1/2019 Reviewed: 6/25/2019 12:27 PM by Herson Garcia PA-C    Adult celiac disease    Last Assessment & Plan 6/20/2018 Office Visit Written 6/20/2018  9:02 AM by Maureen Becerra MD     On gluten free diet  Off PPI  Benign prostatic hyperplasia with urinary frequency    Last Assessment & Plan 6/20/2018 Office Visit Written 6/20/2018  9:01 AM by Maureen Becerra MD     Sees Urology yearly, on finasteride  Eczema    Elevated prostate specific antigen (PSA)    FUO (fever of unknown origin)    Last Assessment & Plan 6/22/2019 Hospital Encounter Written 6/25/2019 12:26 PM by Herson Garcia PA-C     · Appreciate ID input  · Given high index of suspicion (frequently outdoors, has friend who was diagnosed with babesiosis) for tick born illness, patient was started on doxycycline currently on day #3/21   He reports feeling improved, but has not been fever free x 24 hours, though his temperature curve did trend down  · Supportive care  · procal mildly elevated to 0 33; no leukocytosis/sepsis criteria  · CXR "Suspected development of left perihilar infiltrate suspicious for pneumonia " ID feels pneumonia is less likely; no significant respiratory complaints  · RUQ U/S and CT scan unremarkable  · Blood cultures negative so far  · The rest of his viral studies and tick studies are pending; id will follow him up in their office           Liver cyst    Mitral valve disorder    Mixed hyperlipidemia    Last Assessment & Plan 6/20/2018 Office Visit Written 6/20/2018  9:01 AM by Maureen Becerra MD     Lipids at goal, on statin           Pulmonary nodule seen on imaging study    remote history of sarcoidosis    Last Assessment & Plan 6/22/2019 Hospital Encounter Written 6/25/2019 12:27 PM by Cori Gosselin, PA-C     · Reports being diagnosed with sarcoidosis 40 years ago and seeing pulmonology at that time but having no additional follow-up since that time  Last CT scan of the chest on file was from 2016 which showed stable lung nodules  · Patient reports mild cough x3 days  May need eventual CT chest/pulmonology follow-up         Rheumatoid arthritis [facility])    Last Assessment & Plan 6/22/2019 Hospital Encounter Written 6/25/2019 12:26 PM by Cori Gosselin, PA-C     · Patient with known positive rheumatoid factor previously diagnosed with rheumatoid arthritis last year by [facility] and trialed on methotrexate  He overall was doing quite well with minimal evidence of joint erosion on imaging and due to concern for side effect profile on methotrexate, spoke with his rheumatologist about coming off of it  He has been observed off of it now since October 2018  · Doubtful for acute flare as he reports his myalgias significantly improved with doxycycline  · Recommend outpatient follow-up with Rheumatology         Type 2 diabetes mellitus, without long-term current use of insulin [facility])    Last Assessment & Plan 6/22/2019 Hospital Encounter Written 6/25/2019 12:27 PM by Cori Gosselin, PA-C     Lab Results   Component Value Date    HGBA1C 5 6 05/31/2019       No results for input(s): POCGLU in the last 72 hours  Blood Sugar Average: Last 72 hrs:  noted history of diabetes on file however A1c is excellent at 5 6 and he is not on any medications  He does not require frequent Accu-Cheks         Vitamin D deficiency    Last Assessment & Plan 6/20/2018 Office Visit Written 6/20/2018  9:01 AM by Tawny Albarran MD     Increase D3 during fall/winter months                   Subjective:   Chief Complaint   Patient presents with    Transition of Care Management     TCM Call (since 5/30/2019)     Date and time call was made  6/27/2019 10:00 AM    Hospital care reviewed  Records reviewed    Patient was hospitialized at  [address] [facility]    Date of Admission  06/22/19    Date of discharge  06/25/19    Diagnosis  FUO    Disposition  Home    Were the patients medications reviewed and updated  Yes    Current Symptoms  None      TCM Call (since 5/30/2019)     Post hospital issues  None    Should patient be enrolled in anticoag monitoring? No    Scheduled for follow up? Yes    Did you obtain your prescribed medications  Yes    Do you need help managing your prescriptions or medications  No    Is transportation to your appointment needed  No    I have advised the patient to call PCP with any new or worsening symptoms    Marisel Coats, 06/27/2019      Patient went to the hospital with a fever of unknown origin  He was very concerned because his friend was recently diagnosed with a tick-borne illness, Babesiosis  Patient was treated with doxycycline and started to have his fever trend down but was anxious to go home  He was sent home on doxycycline    Patient also known to have positive rheumatic factor and was given a trial methotrexate through Rheumatology  Because it was no progression of disease he has been off methotrexate since October 2018 without any flare  It was not felt that this fever had anything to do with his rheumatoid arthritis    Patient also had a history of sarcoidosis diagnosed 40 years ago  He saw Pulmonary then was no follow-up since then  CT of his chest from 2016 showed stable lung nodules and nothing else  Chest x-ray done during hospitalization showed a possible perihilar infiltrate    Patient feels much better now, and has an appointment to see infectious disease doctor early  patient ID: Zabrina Meyer is a 78 y o  male      Past Medical History:   Diagnosis Date    Acute pancreatitis     last assessed - [id number]    BPH (benign prostatic hypertrophy)     Celiac disease     current GI w/u to r/o celiac    Cholelithiasis     Elevated blood pressure reading last assessed - [id number]    Gallstones     last assessed - [id number]    High cholesterol     Hx of mitral valve disorder     Sarcoidosis     1990's Diagnosed 15 years ago by biopsy, not active; last assessed - [id number]     Past Surgical History:   Procedure Laterality Date    ERCP N/A 4/3/2017    Procedure: ENDOSCOPIC RETROGRADE CHOLANGIOPANCREATOGRAPHY (ERCP); Surgeon: Cortez Crow MD;  [facility]; Service:     TN COLONOSCOPY FLX DX W/COLLJ SPEC WHEN PFRMD N/A 3/1/2017    Procedure: EGD AND COLONOSCOPY;  Surgeon: Pierce Hercules MD;  [facility]; Service: Gastroenterology    TN ESOPHAGOGASTRODUODENOSCOPY TRANSORAL DIAGNOSTIC N/A 6/5/2017    Procedure: ESOPHAGOGASTRODUODENOSCOPY (EGD); Surgeon: Cortez Crow MD;  [facility];   Service: Gastroenterology    TN LAP,CHOLECYSTECTOMY N/A 3/29/2017    Procedure: CHOLECYSTECTOMY LAPAROSCOPIC; POSSIBLE OPEN;  Surgeon: Nivia Flynn MD;  [facility];  Service: General; complicated by bile leak and pancreatitis s/p stent     ROTATOR CUFF REPAIR Right 04/29/2015    for rotator cuff tear, Onset: 4/14     Family History   Problem Relation Age of Onset    Diabetes Mother         Diabetes Mellitus    Hypertension Mother     Ovarian cancer Mother     Rheumatologic disease Mother     Diabetes Father         Diabetes Mellitus    Heart disease Father     Hypertension Father     Diabetes Sister         Diabetes Mellitus    Other Sister         H  pylori infection    Hypertension Sister     Other Brother         H  pylori infection    Alcohol abuse Neg Hx     Substance Abuse Neg Hx     Mental illness Neg Hx     Depression Neg Hx      Social History     Tobacco Use    Smoking status: Never Smoker    Smokeless tobacco: Never Used   Substance Use Topics    Alcohol use: Yes     Comment: wine, 3-4x/week; social drinker    Drug use: No     Comment: Denied history of drug use     Social History     Tobacco Use   Smoking Status Never Smoker   Smokeless Tobacco Never Used        MED LIST WAS REVIEWED AND UPDATED       ROS    Constitutional  No fever chills no fatigue no weight loss no weight gain    Mental status  No anxiety or depression and no sleep disturbances no changes in personality or emotional problems no suicidal or homicidal ideations    Eyes  No eye pain no red eyes no visual disturbances no discharge no eye itch    ENT  No earache no hearing loss nasal discharge no sore throat no hoarseness no postnasal drip     Cardio  No chest pain no palpitations no leg edema no claudication no dyspnea on exertion no nocturnal dyspnea    Respiratory  No shortness of breath or wheeze no cough no orthopnea no dyspnea on exertion no hemoptysis no sputum production    GI  No abdominal pain no nausea no vomiting no diarrhea or constipation no bloody stools no change in bowel habits no change in weight      no dysuria hematuria no pyuria no incontinence no pelvic pain    Musculoskeletal  No myalgias arthralgias no joint swelling or stiffness no limb pain or swelling    Skin  No rashes or lesions no itchiness and no wounds    Neuro  No headache dizziness lightheadedness syncope numbness paresthesias or confusion    Heme  No swollen glands no easy bruising          Objective:      VITALS:  Wt Readings from Last 3 Encounters:   07/01/19 79 8 kg (175 lb 14 4 oz)   06/25/19 86 8 kg (191 lb 5 8 oz)   06/22/19 81 6 kg (180 lb)     BP Readings from Last 3 Encounters:   07/01/19 138/60   06/25/19 102/59   06/22/19 126/58     Pulse Readings from Last 3 Encounters:   07/01/19 72   06/25/19 68   06/22/19 80     Body mass index is 27 35 kg/m²      Laboratory Results:   Lab Results   Component Value Date    WBC 8 99 06/24/2019    WBC 7 81 06/23/2019    WBC 9 68 06/22/2019    HGB 11 4 (L) 06/24/2019    HGB 11 7 (L) 06/23/2019    HGB 13 6 06/22/2019    HCT 32 9 (L) 06/24/2019    HCT 33 7 (L) 06/23/2019    HCT 38 6 06/22/2019    MCV 93 06/24/2019    MCV 93 06/23/2019    MCV 95 06/22/2019     06/24/2019     06/23/2019     06/22/2019     Lab Results   Component Value Date     02/24/2015     04/17/2014     12/17/2013    K 3 7 06/24/2019    K 3 8 06/23/2019    K 4 5 06/22/2019     06/24/2019     06/23/2019    CL 99 (L) 06/22/2019    CO2 23 06/24/2019    CO2 22 06/23/2019    CO2 27 06/22/2019    BUN 23 06/24/2019    BUN 21 06/23/2019    BUN 26 (H) 06/22/2019     Lab Results   Component Value Date    GLUCOSE 139 02/24/2015    ALT 81 (H) 06/24/2019    AST 46 (H) 06/24/2019    ALKPHOS 183 (H) 06/24/2019    EGFR 66 06/24/2019    CALCIUM 8 5 06/24/2019     No results found for: TSHRFT4      Lipid Profile:   Lab Results   Component Value Date    CHOL 140 02/24/2015    CHOL 140 12/17/2013     Lab Results   Component Value Date    HDL 67 (H) 06/15/2018    HDL 67 (H) 06/19/2017    HDL 60 07/22/2016     Lab Results   Component Value Date    LDLCALC 62 06/15/2018    LDLCALC 61 06/19/2017    LDLCALC 52 07/22/2016     Lab Results   Component Value Date    TRIG 70 06/15/2018    TRIG 100 06/19/2017    TRIG 86 07/22/2016       Diabetic labs (if applicable)  Lab Results   Component Value Date    HGBA1C 5 6 05/31/2019    HGBA1C 5 9 02/14/2019    HGBA1C 6 0 11/09/2018     Lab Results   Component Value Date    GLUF 190 (H) 06/15/2018    LDLCALC 62 06/15/2018    CREATININE 1 12 06/24/2019          Physical Exam  Constitutional  Appears healthy, Looks well, Appearance consistent with age    Mental Status  Alert, Oriented, Cooperative, Memory function normal , clean, and reasonable    Neck  No neck mass, No thyromegaly, Good carotid upstrokes bilaterally, trachea midline positive click    Respiratory  Breath sounds normal, No rales, No rhonchi, No wheezing, normal palpation    Cardiac   Regular rhythm without ectopy or murmur no S3-S4, no heave lift or thrill to palpation    Vascular  No leg edema, No pedal edema    Muscular skeletal  No clubbing cyanosis , muscle tone normal    Skin  No appreciable rashes or abnormal appearing lesions

## 2019-07-01 ENCOUNTER — OFFICE VISIT (OUTPATIENT)
Dept: FAMILY MEDICINE CLINIC | Facility: CLINIC | Age: 69
End: 2019-07-01
Payer: COMMERCIAL

## 2019-07-01 VITALS
RESPIRATION RATE: 16 BRPM | HEART RATE: 72 BPM | WEIGHT: 175.9 LBS | DIASTOLIC BLOOD PRESSURE: 60 MMHG | HEIGHT: 67 IN | SYSTOLIC BLOOD PRESSURE: 138 MMHG | BODY MASS INDEX: 27.61 KG/M2

## 2019-07-01 DIAGNOSIS — E11.9 TYPE 2 DIABETES MELLITUS WITHOUT COMPLICATION, WITHOUT LONG-TERM CURRENT USE OF INSULIN (HCC): ICD-10-CM

## 2019-07-01 DIAGNOSIS — M05.9 RHEUMATOID ARTHRITIS WITH POSITIVE RHEUMATOID FACTOR, INVOLVING UNSPECIFIED SITE (HCC): ICD-10-CM

## 2019-07-01 DIAGNOSIS — R93.89 ABNORMAL CHEST X-RAY: ICD-10-CM

## 2019-07-01 DIAGNOSIS — R50.9 FUO (FEVER OF UNKNOWN ORIGIN): Primary | ICD-10-CM

## 2019-07-01 PROCEDURE — 99496 TRANSJ CARE MGMT HIGH F2F 7D: CPT | Performed by: FAMILY MEDICINE

## 2019-07-01 PROCEDURE — 1160F RVW MEDS BY RX/DR IN RCRD: CPT | Performed by: FAMILY MEDICINE

## 2019-07-01 NOTE — PATIENT INSTRUCTIONS
1  Please take her doxycycline till there are no pills left in the bottle    2  In 1 month get the nonfasting Lyme titer as well as the chest x-ray    3  If we do not call you with results within 5 working days please call us    4   Please come back as scheduled in October with blood work prior or sooner if needed

## 2019-07-10 ENCOUNTER — OFFICE VISIT (OUTPATIENT)
Dept: URGENT CARE | Age: 69
End: 2019-07-10
Payer: COMMERCIAL

## 2019-07-10 VITALS
BODY MASS INDEX: 27.65 KG/M2 | SYSTOLIC BLOOD PRESSURE: 128 MMHG | TEMPERATURE: 97.4 F | WEIGHT: 182.4 LBS | OXYGEN SATURATION: 99 % | DIASTOLIC BLOOD PRESSURE: 62 MMHG | HEART RATE: 74 BPM | RESPIRATION RATE: 16 BRPM | HEIGHT: 68 IN

## 2019-07-10 DIAGNOSIS — H61.23 BILATERAL IMPACTED CERUMEN: Primary | ICD-10-CM

## 2019-07-10 PROCEDURE — 69209 REMOVE IMPACTED EAR WAX UNI: CPT | Performed by: NURSE PRACTITIONER

## 2019-07-10 PROCEDURE — 99213 OFFICE O/P EST LOW 20 MIN: CPT | Performed by: FAMILY MEDICINE

## 2019-07-10 PROCEDURE — S9083 URGENT CARE CENTER GLOBAL: HCPCS | Performed by: FAMILY MEDICINE

## 2019-07-10 NOTE — PATIENT INSTRUCTIONS
Cerumen Impaction   WHAT YOU NEED TO KNOW:   Cerumen impaction is the blockage of the outer ear canal by tightly packed cerumen (earwax)  It is generally treated with procedures such as flushing or suctioning the ear canal or the use of instruments to remove the impaction  DISCHARGE INSTRUCTIONS:   Medicines:  · Ear drops: These are used to soften the wax in your ear  Wax softening ear drops may be bought without a prescription  Ask your healthcare provider how often you should use this medicine  Read the instructions carefully before you use the ear drops  Do the following when you put in ear drops:     ¨ Warm the drops by holding the bottle in your hands for a few minutes  Cold ear drops may make you dizzy  ¨ Lie down with the affected ear toward the ceiling  You may also stand with your head tilted to one side  ¨ Pull your ear lobe up and back, and place the correct number of drops into the ear  ¨ Keep your ear facing up for 5 to 10 minutes so the drops coat the outer ear canal      ¨ Gently clean the outer part of the ear with a cotton swab  Do not  place the cotton swab or anything inside your ear canal  This increases the risk of damaging your eardrum  · Take your medicine as directed  Contact your healthcare provider if you think your medicine is not helping or if you have side effects  Tell him of her if you are allergic to any medicine  Keep a list of the medicines, vitamins, and herbs you take  Include the amounts, and when and why you take them  Bring the list or the pill bottles to follow-up visits  Carry your medicine list with you in case of an emergency  Follow up with your healthcare provider as directed:  Write down your questions so you remember to ask them during your visits  Contact your healthcare provider if:   · You have a fever  · You have trouble hearing or ringing in your ear  · You have questions about your condition or care    Return to the emergency department if:   · You feel dizzy  · You have discharge or blood coming out of your ear  · Your ear pain does not go away or gets worse  © 2017 [address] [employer] Information is for End User's use only and may not be sold, redistributed or otherwise used for commercial purposes  All illustrations and images included in CareNotes® are the copyrighted property of [employer]  or [employer]  The above information is an  only  It is not intended as medical advice for individual conditions or treatments  Talk to your doctor, nurse or pharmacist before following any medical regimen to see if it is safe and effective for you

## 2019-07-10 NOTE — PROGRESS NOTES
[facility]        NAME: Herlinda Hatfield is a 78 y o  male  : 1950    MRN: [id number]  DATE: July 10, 2019  TIME: 4:04 PM    Assessment and Plan   Bilateral impacted cerumen [H61 23]  1  Bilateral impacted cerumen           Patient Instructions     Pain on the ears likely a reaction from taking doxycycline and exposed ears to sun  Wear a hat when outside  Follow up with PCP in 3-5 days  Proceed to  ER if symptoms worsen  Chief Complaint     Chief Complaint   Patient presents with    Earache     Pt complaining of r ear pain and outer ear sensitivity x2 days  Denies fevers  Pt is currently taking doxycycline for suspected lyme disease  History of Present Illness       HPI   Reports ear pain on the outside of the ear, in both ears  Duration 2 days  Currently on doxycycline for suspected lyme disease  Review of Systems   Review of Systems   HENT: Positive for ear pain (as described above)  Negative for ear discharge, hearing loss, rhinorrhea and sore throat  Respiratory: Negative for shortness of breath  Cardiovascular: Negative for chest pain  Neurological: Negative for dizziness and headaches           Current Medications       Current Outpatient Medications:     atorvastatin (LIPITOR) 20 mg tablet, TAKE 1 TABLET BY MOUTH  DAILY, Disp: 90 tablet, Rfl: 1    Cholecalciferol (VITAMIN D-3) 1000 units CAPS, 2 daily, Disp: , Rfl: 0    doxycycline hyclate (VIBRAMYCIN) 100 mg capsule, Take 1 capsule (100 mg total) by mouth every 12 (twelve) hours for 19 days, Disp: 38 capsule, Rfl: 0    finasteride (PROSCAR) 5 mg tablet, Take 5 mg by mouth daily after dinner  , Disp: , Rfl:     Glucosamine-Chondroitin (MOVE FREE PO), Take 2 tablets by mouth daily, Disp: , Rfl:     Multiple Vitamin (MULTIVITAMIN) tablet, Take 1 tablet by mouth daily, Disp: , Rfl:     Omega-3 Fatty Acids (OMEGA 3 PO), Take 1 capsule by mouth daily, Disp: , Rfl:     Current Allergies     Allergies as of 07/10/2019  (No Known Allergies)            The following portions of the patient's history were reviewed and updated as appropriate: allergies, current medications, past family history, past medical history, past social history, past surgical history and problem list      Past Medical History:   Diagnosis Date    Acute pancreatitis     last assessed - [id number]    BPH (benign prostatic hypertrophy)     Celiac disease     current GI w/u to r/o celiac    Cholelithiasis     Elevated blood pressure reading     last assessed - [id number]    Gallstones     last assessed - [id number]    High cholesterol     Hx of mitral valve disorder     Sarcoidosis     1990's Diagnosed 15 years ago by biopsy, not active; last assessed - [id number]       Past Surgical History:   Procedure Laterality Date    ERCP N/A 4/3/2017    Procedure: ENDOSCOPIC RETROGRADE CHOLANGIOPANCREATOGRAPHY (ERCP); Surgeon: Nina Osman MD;  [facility]; Service:     NV COLONOSCOPY FLX DX W/COLLJ SPEC WHEN PFRMD N/A 3/1/2017    Procedure: EGD AND COLONOSCOPY;  Surgeon: Emani Randle MD;  [facility]; Service: Gastroenterology    NV ESOPHAGOGASTRODUODENOSCOPY TRANSORAL DIAGNOSTIC N/A 6/5/2017    Procedure: ESOPHAGOGASTRODUODENOSCOPY (EGD); Surgeon: Nina Osman MD;  [facility];   Service: Gastroenterology    NV LAP,CHOLECYSTECTOMY N/A 3/29/2017    Procedure: CHOLECYSTECTOMY LAPAROSCOPIC; POSSIBLE OPEN;  Surgeon: Hazle Rinne, MD;  [facility];  Service: General; complicated by bile leak and pancreatitis s/p stent     ROTATOR CUFF REPAIR Right 04/29/2015    for rotator cuff tear, Onset: 4/14       Family History   Problem Relation Age of Onset    Diabetes Mother         Diabetes Mellitus    Hypertension Mother     Ovarian cancer Mother     Rheumatologic disease Mother     Diabetes Father         Diabetes Mellitus    Heart disease Father     Hypertension Father     Diabetes Sister         Diabetes Mellitus    Other Sister         H  pylori infection    Hypertension Sister     Other Brother         H  pylori infection    Alcohol abuse Neg Hx     Substance Abuse Neg Hx     Mental illness Neg Hx     Depression Neg Hx          Medications have been verified  Objective   /62 (BP Location: Left arm, Patient Position: Sitting)   Pulse 74   Temp (!) 97 4 °F (36 3 °C) (Temporal)   Resp 16   Ht 5' 8" (1 727 m)   Wt 82 7 kg (182 lb 6 4 oz)   SpO2 99%   BMI 27 73 kg/m²        Physical Exam     Physical Exam   HENT:   There is a large amount of cerumen in the left ear  Decreased but significant amount in the right ear  Otherwise no signs of infection  Throat is normal      Procedure  Ear irrigation: both ears were irrigation with 1:1 of warm water and 3% peroxide  Large amount of wax removed from the left ear  Very small amount removed from the right ear  More wax in the right ear  No sign of infection   Cardiovascular: Normal rate and regular rhythm  Pulmonary/Chest: Effort normal and breath sounds normal    Lymphadenopathy:     He has no cervical adenopathy

## 2019-08-01 ENCOUNTER — APPOINTMENT (OUTPATIENT)
Dept: RADIOLOGY | Age: 69
End: 2019-08-01
Payer: COMMERCIAL

## 2019-08-01 ENCOUNTER — LAB (OUTPATIENT)
Dept: LAB | Age: 69
End: 2019-08-01
Payer: COMMERCIAL

## 2019-08-01 DIAGNOSIS — R93.89 ABNORMAL CHEST X-RAY: ICD-10-CM

## 2019-08-01 DIAGNOSIS — R50.9 FUO (FEVER OF UNKNOWN ORIGIN): ICD-10-CM

## 2019-08-01 PROCEDURE — 71046 X-RAY EXAM CHEST 2 VIEWS: CPT

## 2019-08-01 PROCEDURE — 86618 LYME DISEASE ANTIBODY: CPT

## 2019-08-01 PROCEDURE — 36415 COLL VENOUS BLD VENIPUNCTURE: CPT

## 2019-08-03 LAB
B BURGDOR IGG SER IA-ACNC: 0.17
B BURGDOR IGM SER IA-ACNC: 0.49

## 2019-08-09 ENCOUNTER — APPOINTMENT (OUTPATIENT)
Dept: LAB | Age: 69
End: 2019-08-09
Payer: COMMERCIAL

## 2019-08-09 ENCOUNTER — TRANSCRIBE ORDERS (OUTPATIENT)
Dept: ADMINISTRATIVE | Age: 69
End: 2019-08-09

## 2019-08-09 DIAGNOSIS — M40.10 SECONDARY KYPHOSIS DEFORMITY OF SPINE: Primary | ICD-10-CM

## 2019-08-09 DIAGNOSIS — M40.10 SECONDARY KYPHOSIS DEFORMITY OF SPINE: ICD-10-CM

## 2019-08-09 LAB
BUN SERPL-MCNC: 18 MG/DL (ref 5–25)
CREAT SERPL-MCNC: 0.85 MG/DL (ref 0.6–1.3)
GFR SERPL CREATININE-BSD FRML MDRD: 88 ML/MIN/1.73SQ M
PSA SERPL-MCNC: 1.9 NG/ML (ref 0–4)

## 2019-08-09 PROCEDURE — 84520 ASSAY OF UREA NITROGEN: CPT

## 2019-08-09 PROCEDURE — 36415 COLL VENOUS BLD VENIPUNCTURE: CPT

## 2019-08-09 PROCEDURE — 84153 ASSAY OF PSA TOTAL: CPT

## 2019-08-09 PROCEDURE — 82565 ASSAY OF CREATININE: CPT

## 2019-09-18 ENCOUNTER — CLINICAL SUPPORT (OUTPATIENT)
Dept: NUTRITION | Facility: HOSPITAL | Age: 69
End: 2019-09-18
Payer: COMMERCIAL

## 2019-09-18 VITALS — HEIGHT: 68 IN | WEIGHT: 179.4 LBS | BODY MASS INDEX: 27.19 KG/M2

## 2019-09-18 DIAGNOSIS — E11.9 TYPE 2 DIABETES MELLITUS WITHOUT COMPLICATION, WITHOUT LONG-TERM CURRENT USE OF INSULIN (HCC): ICD-10-CM

## 2019-09-18 PROCEDURE — 97803 MED NUTRITION INDIV SUBSEQ: CPT

## 2019-09-18 NOTE — PROGRESS NOTES
Follow-Up Nutrition Assessment Form    Patient Name: Briseida Simpson    YOB: 1950    Sex: Male      Follow Up Date: 9/18/2019  Start Time: 9:30am Stop Time: 10:00am Total Minutes: 30min     Data:  Present at session: self   Patient Concerns: " I am not tracking as I was before I feel that I am being consistent with my eating  I know how many grams CHO I can eat per meals  Thank you for getting me to learn so much "    Medical Dx/Reason for Referral: E11 91 Type 2 diabetes mellitus with hyperglycemia, without long-term current use of insulin ([facility] [address] )    Past Medical History:   Diagnosis Date    Acute pancreatitis     last assessed - [id number]    BPH (benign prostatic hypertrophy)     Celiac disease     current GI w/u to r/o celiac    Cholelithiasis     Elevated blood pressure reading     last assessed - [id number]    Gallstones     last assessed - [id number]    High cholesterol     Hx of mitral valve disorder     Sarcoidosis     1990's Diagnosed 15 years ago by biopsy, not active; last assessed - [id number]    Celiac Disease/Gluten Free   Current Outpatient Medications   Medication Sig Dispense Refill    atorvastatin (LIPITOR) 20 mg tablet TAKE 1 TABLET BY MOUTH  DAILY 90 tablet 1    Cholecalciferol (VITAMIN D-3) 1000 units CAPS 2 daily  0    finasteride (PROSCAR) 5 mg tablet Take 5 mg by mouth daily after dinner        Glucosamine-Chondroitin (MOVE FREE PO) Take 2 tablets by mouth daily      Multiple Vitamin (MULTIVITAMIN) tablet Take 1 tablet by mouth daily      Omega-3 Fatty Acids (OMEGA 3 PO) Take 1 capsule by mouth daily       No current facility-administered medications for this visit        Taken off Metformin   Additional Meds/Supplements: Taking 4000 IU Vitamin D3 daily all year now   Barriers to Learning: None   Labs: Lab work ordered for [facility] Value Date    HGBA1C 5 6 05/31/2019   trevon     Height: Ht Readings from Last 3 Encounters:   07/10/19 5' 8" (1 727 m) 07/01/19 5' 7 25" (1 708 m)   06/22/19 5' 8" (1 727 m)      Weight: Wt Readings from Last 3 Encounters:   07/10/19 82 7 kg (182 lb 6 4 oz)   07/01/19 79 8 kg (175 lb 14 4 oz)   06/25/19 86 8 kg (191 lb 5 8 oz)   BMI 27 7     Wt  Change Since Last Visit: [x]Yes     []No  Amount: -4 4lb in 3 months  Overall 42 6 lb (19 2%)  past 13 5 months      Energy Needs: [address] Equation:  1600kcal   Pain Screen: Are you having pain now? No      Goals Achieved:Acheived Personal weight goal 180lb, decreased HgBA1c, Decrease BMI, Carb Counting knowledge      New Goals: 1  Maintain weight 180 lb + or - 3lb by next encounter   2  Food Journal daily 4803-8354 kcal, 180 grm CHO, 85 grm protein daily for RD review at next encounter   3  Maintain HgbA1C <5 9mg/dl by next encounter   Initial PES: RESOLVED Excess carbohydrate intake  related to Food and nutrition related knowledge deficit concerning appropriate amount of carbohydrate intake as  evidenced by Hemoglobin A1C >6%      New PES:No overt nutrition diagnoses at this time No                  Assessment:  Tristian Siddiqi  lost 19 2% body weight, decreased his BMI from 33 66 to 27 7 and decreased HgbA1c from 7 3 to 5 6  He is motivated to continue to be able to manage his T2DM with nutrition intervention  RD congratulate him on his efforts   Medical Nutrition Therapy Intervention:  [x]Individualized Meal Plan-1600 kcal, 180 grm CHO, 85 grm protein [x]Understanding Lab Values-maintain HgbA1C 5 6   []Basic Pathophysiology of Disease []Food/Medication Interactions   [x]Food Diary-myfitnesspal [x]Exercise-Golf, walking, treadmill, weights >200min-300min   [x]Lifestyle/Behavior Modification Techniques-stress and eating []Medication, Mechanism of Action   []Label Reading []Self Blood Glucose Monitoring   [x]Weight/BMI Goals-Personal Goal met 180lb [x]Other - ongoing support and encouragement   Other Notes:feels good  Did have Lyme testing          Comprehension: []Excellent  [x]Very Good  []Good  []Fair   []Poor    Receptivity: []Excellent  [x]Very Good  []Good  []Fair   []Poor    Expected Compliance: []Excellent  [x]Very Good  []Good  []Fair   []Poor      Labs:  CMP  Lab Results   Component Value Date     02/24/2015    K 3 7 06/24/2019     06/24/2019    CO2 23 06/24/2019    ANIONGAP 3 (L) 02/24/2015    BUN 18 08/09/2019    CREATININE 0 85 08/09/2019    GLUCOSE 139 02/24/2015    GLUF 190 (H) 06/15/2018    CALCIUM 8 5 06/24/2019    AST 46 (H) 06/24/2019    ALT 81 (H) 06/24/2019    ALKPHOS 183 (H) 06/24/2019    PROT 7 7 02/24/2015    BILITOT 1 0 02/24/2015    EGFR 88 08/09/2019       BMP  Lab Results   Component Value Date    GLUCOSE 139 02/24/2015    CALCIUM 8 5 06/24/2019     02/24/2015    K 3 7 06/24/2019    CO2 23 06/24/2019     06/24/2019    BUN 18 08/09/2019    CREATININE 0 85 08/09/2019       Lipids  Lab Results   Component Value Date    CHOL 140 02/24/2015    CHOL 140 12/17/2013     Lab Results   Component Value Date    HDL 67 (H) 06/15/2018    HDL 67 (H) 06/19/2017    HDL 60 07/22/2016     Lab Results   Component Value Date    LDLCALC 62 06/15/2018    LDLCALC 61 06/19/2017    LDLCALC 52 07/22/2016     Lab Results   Component Value Date    TRIG 70 06/15/2018    TRIG 100 06/19/2017    TRIG 86 07/22/2016     No results found for: CHOLHDL    Hemoglobin A1C  Lab Results   Component Value Date    HGBA1C 5 6 05/31/2019       Fasting Glucose  Lab Results   Component Value Date    GLUF 190 (H) 06/15/2018       Insulin     Thyroid  No results found for: TSH, I4JLNXG, B2ZEZNC, THYROIDAB    Hepatic Function Panel  Lab Results   Component Value Date    ALT 81 (H) 06/24/2019    AST 46 (H) 06/24/2019    ALKPHOS 183 (H) 06/24/2019    BILITOT 1 0 02/24/2015       Celiac Disease Antibody Panel  Endomysial IgA   Date Value Ref Range Status   03/13/2017 Positive (A) Negative Final     Gliadin IgA   Date Value Ref Range Status   03/13/2017 >133 (H) 0 - 19 units Final     Comment:                        Negative                   0 - 19                     Weak Positive             20 - 30                     Moderate to Strong Positive   >30     Gliadin IgG   Date Value Ref Range Status   03/13/2017 65 (H) 0 - 19 units Final     Comment:                        Negative                   0 - 19                     Weak Positive             20 - 30                     Moderate to Strong Positive   >30     IgA   Date Value Ref Range Status   03/13/2017 346 61 - 437 mg/dL Final     TISSUE TRANSGLUTAMINASE IGA   Date Value Ref Range Status   03/13/2017 >100 (H) 0 - 3 U/mL Final     Comment:                                   Negative        0 -  3                                Weak Positive   4 - 10                                Positive           >10   Tissue Transglutaminase (tTG) has been identified   as the endomysial antigen  Studies have demonstr-   ated that endomysial IgA antibodies have over 99%   specificity for gluten sensitive enteropathy        Iron  No results found for: IRON, TIBC, FERRITIN    Vitamins  No results found for: VITAMIN B2   No results found for: NICOTINAMIDE, NICOTINIC ACID   No results found for: VITAMINB6  No results found for: [id number]  No results found for: VITB5  No results found for: J4LDFWYC  No results found for: THYROGLB  No results found for: VITAMIN K   No results found for: 25-HYDROXY VIT D   No components found for: [address] [address]

## 2019-09-27 ENCOUNTER — APPOINTMENT (OUTPATIENT)
Dept: LAB | Facility: CLINIC | Age: 69
End: 2019-09-27
Payer: COMMERCIAL

## 2019-09-27 DIAGNOSIS — E11.9 TYPE 2 DIABETES MELLITUS WITHOUT COMPLICATION, WITHOUT LONG-TERM CURRENT USE OF INSULIN (HCC): ICD-10-CM

## 2019-09-27 LAB
EST. AVERAGE GLUCOSE BLD GHB EST-MCNC: 120 MG/DL
HBA1C MFR BLD: 5.8 % (ref 4.2–6.3)

## 2019-09-27 PROCEDURE — 36415 COLL VENOUS BLD VENIPUNCTURE: CPT

## 2019-09-27 PROCEDURE — 83036 HEMOGLOBIN GLYCOSYLATED A1C: CPT

## 2019-10-01 ENCOUNTER — OFFICE VISIT (OUTPATIENT)
Dept: FAMILY MEDICINE CLINIC | Facility: CLINIC | Age: 69
End: 2019-10-01
Payer: COMMERCIAL

## 2019-10-01 VITALS
HEIGHT: 67 IN | SYSTOLIC BLOOD PRESSURE: 132 MMHG | DIASTOLIC BLOOD PRESSURE: 60 MMHG | WEIGHT: 177.2 LBS | HEART RATE: 60 BPM | RESPIRATION RATE: 16 BRPM | BODY MASS INDEX: 27.81 KG/M2

## 2019-10-01 DIAGNOSIS — Z79.899 ENCOUNTER FOR LONG-TERM (CURRENT) USE OF MEDICATIONS: ICD-10-CM

## 2019-10-01 DIAGNOSIS — Z12.5 PROSTATE CANCER SCREENING: ICD-10-CM

## 2019-10-01 DIAGNOSIS — Z23 NEED FOR VACCINATION: ICD-10-CM

## 2019-10-01 DIAGNOSIS — R91.1 PULMONARY NODULE SEEN ON IMAGING STUDY: ICD-10-CM

## 2019-10-01 DIAGNOSIS — Z00.00 MEDICARE ANNUAL WELLNESS VISIT, SUBSEQUENT: Primary | ICD-10-CM

## 2019-10-01 DIAGNOSIS — L20.84 INTRINSIC ECZEMA: ICD-10-CM

## 2019-10-01 DIAGNOSIS — D86.9 SARCOIDOSIS: ICD-10-CM

## 2019-10-01 DIAGNOSIS — E78.2 MIXED HYPERLIPIDEMIA: ICD-10-CM

## 2019-10-01 DIAGNOSIS — E55.9 VITAMIN D DEFICIENCY: ICD-10-CM

## 2019-10-01 DIAGNOSIS — R35.0 BENIGN PROSTATIC HYPERPLASIA WITH URINARY FREQUENCY: ICD-10-CM

## 2019-10-01 DIAGNOSIS — N40.1 BENIGN PROSTATIC HYPERPLASIA WITH URINARY FREQUENCY: ICD-10-CM

## 2019-10-01 DIAGNOSIS — R73.09 ELEVATED HEMOGLOBIN A1C: ICD-10-CM

## 2019-10-01 DIAGNOSIS — K90.0 ADULT CELIAC DISEASE: ICD-10-CM

## 2019-10-01 PROBLEM — R50.9 FUO (FEVER OF UNKNOWN ORIGIN): Status: RESOLVED | Noted: 2019-06-22 | Resolved: 2019-10-01

## 2019-10-01 PROBLEM — E11.9 TYPE 2 DIABETES MELLITUS, WITHOUT LONG-TERM CURRENT USE OF INSULIN (HCC): Status: RESOLVED | Noted: 2018-06-20 | Resolved: 2019-10-01

## 2019-10-01 PROCEDURE — G0008 ADMIN INFLUENZA VIRUS VAC: HCPCS

## 2019-10-01 PROCEDURE — 1101F PT FALLS ASSESS-DOCD LE1/YR: CPT | Performed by: FAMILY MEDICINE

## 2019-10-01 PROCEDURE — 99214 OFFICE O/P EST MOD 30 MIN: CPT | Performed by: FAMILY MEDICINE

## 2019-10-01 PROCEDURE — 3008F BODY MASS INDEX DOCD: CPT | Performed by: FAMILY MEDICINE

## 2019-10-01 PROCEDURE — 1125F AMNT PAIN NOTED PAIN PRSNT: CPT

## 2019-10-01 PROCEDURE — 90662 IIV NO PRSV INCREASED AG IM: CPT

## 2019-10-01 PROCEDURE — G0439 PPPS, SUBSEQ VISIT: HCPCS | Performed by: FAMILY MEDICINE

## 2019-10-01 PROCEDURE — 1170F FXNL STATUS ASSESSED: CPT

## 2019-10-01 NOTE — PATIENT INSTRUCTIONS
Infiltrate on left on chest x-ray resolved    No longer has type 2 diabetes but A1c elevation  Went from 5 6 to 5 8 with stopping meds  We will check every 6 months    Positive rheumatoid factor only  No disease  Probably positive history of sarcoid in the past    Celiac disease  Maintain no gluten diet    Hyperlipidemia  Continue diet and atorvastatin    BPH/history of elevated PSA  Patient no longer needs to follow with Urology  We will check PSA until he is 75  History greater than 15 years ago with a PSA of 6  Has not been greater than 2 since we have been following him  Continue Proscar/finasteride     Eczema  Continue using steroid cream on elbows as needed      vitamin-D deficiency   Continue vitamin-D daily     History of lung nodule  Pap was followed by CT scan with Pulmonary for years  Felt to be left over sarcoid no need to follow anymore        Recheck as scheduled  Or as needed              Medicare Preventive Visit Patient Instructions  Thank you for completing your Welcome to Medicare Visit or Medicare Annual Wellness Visit today  Your next wellness visit will be due in one year (10/1/2020)  The screening/preventive services that you may require over the next 5-10 years are detailed below  Some tests may not apply to you based off risk factors and/or age  Screening tests ordered at today's visit but not completed yet may show as past due  Also, please note that scanned in results may not display below    Preventive Screenings:  Service Recommendations Previous Testing/Comments   Colorectal Cancer Screening  · Colonoscopy    · Fecal Occult Blood Test (FOBT)/Fecal Immunochemical Test (FIT)  · Fecal DNA/Cologuard Test  · Flexible Sigmoidoscopy Age: 54-65 years old   Colonoscopy: every 10 years (May be performed more frequently if at higher risk)  OR  FOBT/FIT: every 1 year  OR  Cologuard: every 3 years  OR  Sigmoidoscopy: every 5 years  Screening may be recommended earlier than age 48 if at higher risk for colorectal cancer  Also, an individualized decision between you and your healthcare provider will decide whether screening between the ages of 74-80 would be appropriate  Colonoscopy: 03/01/2017  FOBT/FIT: Not on file  Cologuard: Not on file  Sigmoidoscopy: Not on file    Screening Current     Prostate Cancer Screening Individualized decision between patient and health care provider in men between ages of 53-78   Medicare will cover every 12 months beginning on the day after your 50th birthday PSA: 1 9 ng/mL     Screening Current     Hepatitis C Screening Once for adults born between 1945 and 1965  More frequently in patients at high risk for Hepatitis C Hep C Antibody: 09/05/2018    Screening Current   Diabetes Screening 1-2 times per year if you're at risk for diabetes or have pre-diabetes Fasting glucose: 190 mg/dL   A1C: 5 8 %    Screening Not Indicated  History Diabetes   Cholesterol Screening Once every 5 years if you don't have a lipid disorder  May order more often based on risk factors  Lipid panel: 06/15/2018    Screening Not Indicated  History Lipid Disorder      Other Preventive Screenings Covered by Medicare:  1  Abdominal Aortic Aneurysm (AAA) Screening: covered once if your at risk  You're considered to be at risk if you have a family history of AAA or a male between the age of 73-68 who smoking at least 100 cigarettes in your lifetime  2  Lung Cancer Screening: covers low dose CT scan once per year if you meet all of the following conditions: (1) Age 50-69; (2) No signs or symptoms of lung cancer; (3) Current smoker or have quit smoking within the last 15 years; (4) You have a tobacco smoking history of at least 30 pack years (packs per day x number of years you smoked); (5) You get a written order from a healthcare provider    3  Glaucoma Screening: covered annually if you're considered high risk: (1) You have diabetes OR (2) Family history of glaucoma OR (3)  aged 48 and older OR (4)  American aged 72 and older  4  Osteoporosis Screening: covered every 2 years if you meet one of the following conditions: (1) Have a vertebral abnormality; (2) On glucocorticoid therapy for more than 3 months; (3) Have primary hyperparathyroidism; (4) On osteoporosis medications and need to assess response to drug therapy  5  HIV Screening: covered annually if you're between the age of 12-76  Also covered annually if you are younger than 13 and older than 72 with risk factors for HIV infection  For pregnant patients, it is covered up to 3 times per pregnancy  Immunizations:  Immunization Recommendations   Influenza Vaccine Annual influenza vaccination during flu season is recommended for all persons aged >= 6 months who do not have contraindications   Pneumococcal Vaccine (Prevnar and Pneumovax)  * Prevnar = PCV13  * Pneumovax = PPSV23 Adults 25-60 years old: 1-3 doses may be recommended based on certain risk factors  Adults 72 years old: Prevnar (PCV13) vaccine recommended followed by Pneumovax (PPSV23) vaccine  If already received PPSV23 since turning 65, then PCV13 recommended at least one year after PPSV23 dose  Hepatitis B Vaccine 3 dose series if at intermediate or high risk (ex: diabetes, end stage renal disease, liver disease)   Tetanus (Td) Vaccine - COST NOT COVERED BY MEDICARE PART B Following completion of primary series, a booster dose should be given every 10 years to maintain immunity against tetanus  Td may also be given as tetanus wound prophylaxis  Tdap Vaccine - COST NOT COVERED BY MEDICARE PART B Recommended at least once for all adults  For pregnant patients, recommended with each pregnancy     Shingles Vaccine (Shingrix) - COST NOT COVERED BY MEDICARE PART B  2 shot series recommended in those aged 48 and above     Health Maintenance Due:      Topic Date Due    CRC Screening: Colonoscopy  03/01/2020    Hepatitis C Screening  Completed     Immunizations Due:      Topic Date Due    HEPATITIS B VACCINES (1 of 3 - Risk 3-dose series) 04/22/1968    INFLUENZA VACCINE  07/01/2019     Advance Directives   What are advance directives? Advance directives are legal documents that state your wishes and plans for medical care  These plans are made ahead of time in case you lose your ability to make decisions for yourself  Advance directives can apply to any medical decision, such as the treatments you want, and if you want to donate organs  What are the types of advance directives? There are many types of advance directives, and each state has rules about how to use them  You may choose a combination of any of the following:  · Living will: This is a written record of the treatment you want  You can also choose which treatments you do not want, which to limit, and which to stop at a certain time  This includes surgery, medicine, IV fluid, and tube feedings  · Durable power of  for healthcare [facility]): This is a written record that states who you want to make healthcare choices for you when you are unable to make them for yourself  This person, called a proxy, is usually a family member or a friend  You may choose more than 1 proxy  · Do not resuscitate (DNR) order:  A DNR order is used in case your heart stops beating or you stop breathing  It is a request not to have certain forms of treatment, such as CPR  A DNR order may be included in other types of advance directives  · Medical directive: This covers the care that you want if you are in a coma, near death, or unable to make decisions for yourself  You can list the treatments you want for each condition  Treatment may include pain medicine, surgery, blood transfusions, dialysis, IV or tube feedings, and a ventilator (breathing machine)  · Values history: This document has questions about your views, beliefs, and how you feel and think about life   This information can help others choose the care that you would choose  Why are advance directives important? An advance directive helps you control your care  Although spoken wishes may be used, it is better to have your wishes written down  Spoken wishes can be misunderstood, or not followed  Treatments may be given even if you do not want them  An advance directive may make it easier for your family to make difficult choices about your care  Weight Management   Why it is important to manage your weight:  Being overweight increases your risk of health conditions such as heart disease, high blood pressure, type 2 diabetes, and certain types of cancer  It can also increase your risk for osteoarthritis, sleep apnea, and other respiratory problems  Aim for a slow, steady weight loss  Even a small amount of weight loss can lower your risk of health problems  How to lose weight safely:  A safe and healthy way to lose weight is to eat fewer calories and get regular exercise  You can lose up about 1 pound a week by decreasing the number of calories you eat by 500 calories each day  Healthy meal plan for weight management:  A healthy meal plan includes a variety of foods, contains fewer calories, and helps you stay healthy  A healthy meal plan includes the following:  · Eat whole-grain foods more often  A healthy meal plan should contain fiber  Fiber is the part of grains, fruits, and vegetables that is not broken down by your body  Whole-grain foods are healthy and provide extra fiber in your diet  Some examples of whole-grain foods are whole-wheat breads and pastas, oatmeal, brown rice, and bulgur  · Eat a variety of vegetables every day  Include dark, leafy greens such as spinach, kale, john greens, and mustard greens  Eat yellow and orange vegetables such as carrots, sweet potatoes, and winter squash  · Eat a variety of fruits every day  Choose fresh or canned fruit (canned in its own juice or light syrup) instead of juice   Fruit juice has very little or no fiber   · Eat low-fat dairy foods  Drink fat-free (skim) milk or 1% milk  Eat fat-free yogurt and low-fat cottage cheese  Try low-fat cheeses such as mozzarella and other reduced-fat cheeses  · Choose meat and other protein foods that are low in fat  Choose beans or other legumes such as split peas or lentils  Choose fish, skinless poultry (chicken or turkey), or lean cuts of red meat (beef or pork)  Before you cook meat or poultry, cut off any visible fat  · Use less fat and oil  Try baking foods instead of frying them  Add less fat, such as margarine, sour cream, regular salad dressing and mayonnaise to foods  Eat fewer high-fat foods  Some examples of high-fat foods include french fries, doughnuts, ice cream, and cakes  · Eat fewer sweets  Limit foods and drinks that are high in sugar  This includes candy, cookies, regular soda, and sweetened drinks  Exercise:  Exercise at least 30 minutes per day on most days of the week  Some examples of exercise include walking, biking, dancing, and swimming  You can also fit in more physical activity by taking the stairs instead of the elevator or parking farther away from stores  Ask your healthcare provider about the best exercise plan for you  © Copyright [employer] 2018 Information is for End User's use only and may not be sold, redistributed or otherwise used for commercial purposes   All illustrations and images included in CareNotes® are the copyrighted property of A D A M , Inc  or [address] [place] [address]

## 2019-10-01 NOTE — PROGRESS NOTES
BMI Counseling: Body mass index is 27 55 kg/m²  The BMI is above normal  Nutrition recommendations include reducing portion sizes  Assessment and Plan:   Patient hospitalized this year for high fever  Everything else up-to-date  Problem List Items Addressed This Visit     None      Visit Diagnoses     Need for vaccination    -  Primary    Relevant Orders    influenza vaccine, 5520-7528, high-dose, PF 0 5 mL (FLUZONE HIGH-DOSE)    BMI 27 0-27 9,adult            BMI Counseling: Body mass index is 27 55 kg/m²  The BMI is above normal  Nutrition recommendations include decreasing portion sizes and encouraging healthy choices of fruits and vegetables  Exercise recommendations include exercising 3-5 times per week  No pharmacotherapy was ordered  Preventive health issues were discussed with patient, and age appropriate screening tests were ordered as noted in patient's After Visit Summary  Personalized health advice and appropriate referrals for health education or preventive services given if needed, as noted in patient's After Visit Summary       History of Present Illness:     Patient presents for Medicare Annual Wellness visit    Patient Care Team:  Eloy Gray DO as PCP - General (Family Medicine)  MD Alex Wade MD as Endoscopist     Problem List:     Patient Active Problem List   Diagnosis    Mixed hyperlipidemia    Pulmonary nodule seen on imaging study    Vitamin D deficiency    Mitral valve disorder    Adult celiac disease    Benign prostatic hyperplasia with urinary frequency    Elevated prostate specific antigen (PSA)    Liver cyst    Type 2 diabetes mellitus, without long-term current use of insulin ([address] )    Rheumatoid arthritis ([address] )    Eczema    remote history of sarcoidosis    FUO (fever of unknown origin)      Past Medical and Surgical History:     Past Medical History:   Diagnosis Date    Acute pancreatitis     last assessed - [id number]    BPH (benign prostatic hypertrophy)     Celiac disease     current GI w/u to r/o celiac    Cholelithiasis     Elevated blood pressure reading     last assessed - [id number]    Gallstones     last assessed - [id number]    High cholesterol     Hx of mitral valve disorder     Sarcoidosis     1990's Diagnosed 15 years ago by biopsy, not active; last assessed - [id number]     Past Surgical History:   Procedure Laterality Date    ERCP N/A 4/3/2017    Procedure: ENDOSCOPIC RETROGRADE CHOLANGIOPANCREATOGRAPHY (ERCP); Surgeon: Zonia Elmore MD;  [facility]; Service:     IL COLONOSCOPY FLX DX W/COLLJ SPEC WHEN PFRMD N/A 3/1/2017    Procedure: EGD AND COLONOSCOPY;  Surgeon: Zaheer Stevens MD;  [facility]; Service: Gastroenterology    IL ESOPHAGOGASTRODUODENOSCOPY TRANSORAL DIAGNOSTIC N/A 6/5/2017    Procedure: ESOPHAGOGASTRODUODENOSCOPY (EGD); Surgeon: Zonia Elmore MD;  [facility];   Service: Gastroenterology    IL LAP,CHOLECYSTECTOMY N/A 3/29/2017    Procedure: CHOLECYSTECTOMY LAPAROSCOPIC; POSSIBLE OPEN;  Surgeon: Calderon Hendrix MD;  [facility];  Service: General; complicated by bile leak and pancreatitis s/p stent     ROTATOR CUFF REPAIR Right 04/29/2015    for rotator cuff tear, Onset: 4/14      Family History:     Family History   Problem Relation Age of Onset    Diabetes Mother         Diabetes Mellitus    Hypertension Mother     Ovarian cancer Mother     Rheumatologic disease Mother     Diabetes Father         Diabetes Mellitus    Heart disease Father     Hypertension Father     Diabetes Sister         Diabetes Mellitus    Other Sister         H  pylori infection    Hypertension Sister     Other Brother         H  pylori infection    Alcohol abuse Neg Hx     Substance Abuse Neg Hx     Mental illness Neg Hx     Depression Neg Hx       Social History:     Social History     Socioeconomic History    Marital status: /Civil Union     Spouse name: None    Number of children: None    Years of education: None    Highest education level: None   Occupational History    Occupation: Retired   Social Needs    Financial resource strain: None    Food insecurity:     Worry: None     Inability: None    Transportation needs:     Medical: None     Non-medical: None   Tobacco Use    Smoking status: Never Smoker    Smokeless tobacco: Never Used   Substance and Sexual Activity    Alcohol use: Yes     Comment: wine, 3-4x/week; social drinker    Drug use: No     Comment: Denied history of drug use    Sexual activity: None   Lifestyle    Physical activity:     Days per week: None     Minutes per session: None    Stress: None   Relationships    Social connections:     Talks on phone: None     Gets together: None     Attends Ten[employer] service: None     Active member of club or organization: None     Attends meetings of clubs or organizations: None     Relationship status: None    Intimate partner violence:     Fear of current or ex partner: None     Emotionally abused: None     Physically abused: None     Forced sexual activity: None   Other Topics Concern    None   Social History Narrative    Daily coffee consumption (2 Cups/Day)        retired       Medications and Allergies:     Current Outpatient Medications   Medication Sig Dispense Refill    atorvastatin (LIPITOR) 20 mg tablet TAKE 1 TABLET BY MOUTH  DAILY 90 tablet 1    Cholecalciferol (VITAMIN D-3) 1000 units CAPS 2 daily  0    finasteride (PROSCAR) 5 mg tablet Take 5 mg by mouth daily after dinner        Glucosamine-Chondroitin (MOVE FREE PO) Take 2 tablets by mouth daily      Multiple Vitamin (MULTIVITAMIN) tablet Take 1 tablet by mouth daily      Omega-3 Fatty Acids (OMEGA 3 PO) Take 1 capsule by mouth daily       No current facility-administered medications for this visit        No Known Allergies   Immunizations:     Immunization History   Administered Date(s) Administered    Influenza Split High Dose Preservative Free IM 12/11/2015    Influenza TIV (IM) 12/04/2013, 10/14/2016    Influenza, high dose seasonal 0 5 mL 09/14/2018    Pneumococcal Conjugate 13-Valent 06/11/2015    Pneumococcal Polysaccharide PPV23 06/04/2014    Tdap 03/08/2013    Zoster 02/22/2013    Zoster Vaccine Recombinant 01/25/2019, 04/26/2019      Health Maintenance:         Topic Date Due    CRC Screening: Colonoscopy  03/01/2020    Hepatitis C Screening  Completed         Topic Date Due    HEPATITIS B VACCINES (1 of 3 - Risk 3-dose series) 04/22/1968    INFLUENZA VACCINE  07/01/2019      Medicare Health Risk Assessment:     /60 (BP Location: Left arm, Patient Position: Sitting, Cuff Size: Standard)   Pulse 60   Resp 16   Ht 5' 7 25" (1 708 m)   Wt 80 4 kg (177 lb 3 2 oz)   BMI 27 55 kg/m²      Cydney Cantor is here for his Subsequent Wellness visit  Health Risk Assessment:   Patient rates overall health as very good  Patient feels that their physical health rating is much better  Eyesight was rated as same  Hearing was rated as same  Patient feels that their emotional and mental health rating is slightly worse  Pain experienced in the last 7 days has been none  Patient states that he has experienced no weight loss or gain in last 6 months  Depression Screening:   PHQ-2 Score: 0      Fall Risk Screening: In the past year, patient has experienced: no history of falling in past year      Home Safety:  Patient does not have trouble with stairs inside or outside of their home  Patient has working smoke alarms and has working carbon monoxide detector  Home safety hazards include: none  Nutrition:   Current diet is Regular and Limited junk food  Medications:   Patient is currently taking over-the-counter supplements  OTC medications include: see medication list  Patient is able to manage medications       Activities of Daily Living (ADLs)/Instrumental Activities of Daily Living (IADLs):   Walk and transfer into and out of bed and chair?: Yes  Dress and groom yourself?: Yes    Bathe or shower yourself?: Yes    Feed yourself? Yes  Do your laundry/housekeeping?: Yes  Manage your money, pay your bills and track your expenses?: Yes  Make your own meals?: Yes    Do your own shopping?: Yes    Previous Hospitalizations:   Any hospitalizations or ED visits within the last 12 months?: Yes    How many hospitalizations have you had in the last year?: 1-2    Advance Care Planning:   Living will: Yes    Durable POA for healthcare: Yes    Advanced directive: Yes      Cognitive Screening:   Provider or family/friend/caregiver concerned regarding cognition?: No    PREVENTIVE SCREENINGS      Cardiovascular Screening:    General: Screening Not Indicated and History Lipid Disorder      Diabetes Screening:     General: Screening Not Indicated and History Diabetes      Colorectal Cancer Screening:     General: Screening Current      Prostate Cancer Screening:    General: Screening Current      Osteoporosis Screening:    General: Screening Not Indicated      Abdominal Aortic Aneurysm (AAA) Screening:    Risk factors include: age between 73-67 yo        General: Screening Not Indicated      Lung Cancer Screening:     General: Screening Not Indicated      Hepatitis C Screening:    General: Screening Current    Other Counseling Topics:   Regular weightbearing exercise         Lina Hurst, DO

## 2019-10-01 NOTE — PROGRESS NOTES
ASSESSMENT/PLAN:    Infiltrate on left on chest x-ray resolved    No longer has type 2 diabetes but A1c elevation  Went from 5 6 to 5 8 with stopping meds  We will check every 6 months    Positive rheumatoid factor only  No disease  Probably positive history of sarcoid in the past    Celiac disease  Maintain no gluten diet    Hyperlipidemia  Continue diet and atorvastatin    BPH/history of elevated PSA  Patient no longer needs to follow with Urology  We will check PSA until he is 75  History greater than 15 years ago with a PSA of 6  Has not been greater than 2 since we have been following him  Continue Proscar/finasteride     Eczema  Continue using steroid cream on elbows as needed      vitamin-D deficiency   Continue vitamin-D daily     History of lung nodule  Pap was followed by CT scan with Pulmonary for years  Felt to be left over sarcoid no need to follow anymore        Recheck as scheduled  Or as needed    Recheck in 6 months  Screening blood work A1c and urine prior by 1 week       Health Maintenance   Topic Date Due    BMI: Followup Plan  04/22/1967    HEPATITIS B VACCINES (1 of 3 - Risk 3-dose series) 04/22/1968    INFLUENZA VACCINE  07/01/2019    Medicare Annual Wellness Visit (AWV)  09/17/2019    URINE MICROALBUMIN  11/09/2019    DM Eye Exam  12/22/2019    Diabetic Foot Exam  02/22/2020    CRC Screening: Colonoscopy  03/01/2020    HEMOGLOBIN A1C  03/27/2020    Fall Risk  10/01/2020    Depression Screening PHQ  10/01/2020    BMI: Adult  10/01/2020    DTaP,Tdap,and Td Vaccines (2 - Td) 03/08/2023    Hepatitis C Screening  Completed    Pneumococcal Vaccine: 65+ Years  Completed    Pneumococcal Vaccine: Pediatrics (0 to 5 Years) and At-Risk Patients (6 to 59 Years)  Aged Out         Problem List as of 10/1/2019 Reviewed: 7/10/2019  4:03 PM by LEEANN Mendoza    Adult celiac disease    Last Assessment & Plan 6/20/2018 Office Visit Written 6/20/2018  9:02 AM by Gisselle Hartman MD On gluten free diet  Off PPI  Benign prostatic hyperplasia with urinary frequency    Last Assessment & Plan 6/20/2018 Office Visit Written 6/20/2018  9:01 AM by Annie Blancas MD     Sees Urology yearly, on finasteride  Eczema    Elevated prostate specific antigen (PSA)    FUO (fever of unknown origin)    Last Assessment & Plan 6/22/2019 Hospital Encounter Written 6/25/2019 12:26 PM by Le Benjamin PA-C     · Appreciate ID input  · Given high index of suspicion (frequently outdoors, has friend who was diagnosed with babesiosis) for tick born illness, patient was started on doxycycline currently on day #3/21   He reports feeling improved, but has not been fever free x 24 hours, though his temperature curve did trend down  · Supportive care  · procal mildly elevated to 0 33; no leukocytosis/sepsis criteria  · CXR "Suspected development of left perihilar infiltrate suspicious for pneumonia " ID feels pneumonia is less likely; no significant respiratory complaints  · RUQ U/S and CT scan unremarkable  · Blood cultures negative so far  · The rest of his viral studies and tick studies are pending; id will follow him up in their office           Liver cyst    Mitral valve disorder    Mixed hyperlipidemia    Last Assessment & Plan 6/20/2018 Office Visit Written 6/20/2018  9:01 AM by Annie Blancas MD     Lipids at goal, on statin  Pulmonary nodule seen on imaging study    remote history of sarcoidosis    Last Assessment & Plan 6/22/2019 Hospital Encounter Written 6/25/2019 12:27 PM by Le Benjamin PA-C     · Reports being diagnosed with sarcoidosis 40 years ago and seeing pulmonology at that time but having no additional follow-up since that time  Last CT scan of the chest on file was from 2016 which showed stable lung nodules  · Patient reports mild cough x3 days    May need eventual CT chest/pulmonology follow-up         Rheumatoid arthritis [facility])    Last Assessment & Plan 6/22/2019 Hospital Encounter Written 6/25/2019 12:26 PM by Andrew Thakur PA-C     · Patient with known positive rheumatoid factor previously diagnosed with rheumatoid arthritis last year by [facility] and trialed on methotrexate  He overall was doing quite well with minimal evidence of joint erosion on imaging and due to concern for side effect profile on methotrexate, spoke with his rheumatologist about coming off of it  He has been observed off of it now since October 2018  · Doubtful for acute flare as he reports his myalgias significantly improved with doxycycline  · Recommend outpatient follow-up with Rheumatology         Type 2 diabetes mellitus, without long-term current use of insulin [facility])    Last Assessment & Plan 6/22/2019 Hospital Encounter Written 6/25/2019 12:27 PM by Andrew Thakur PA-C     Lab Results   Component Value Date    HGBA1C 5 6 05/31/2019       No results for input(s): POCGLU in the last 72 hours  Blood Sugar Average: Last 72 hrs:  noted history of diabetes on file however A1c is excellent at 5 6 and he is not on any medications  He does not require frequent Accu-Cheks         Vitamin D deficiency    Last Assessment & Plan 6/20/2018 Office Visit Written 6/20/2018  9:01 AM by Dyllan De Jesus MD     Increase D3 during fall/winter months  Subjective:   Chief Complaint   Patient presents with    Diabetes    Medicare Wellness Visit     Patient is here for 6 month recheck  He had his chest x-ray done when he had his Lyme titer done  Chest x-ray showed resolution of his left lower infiltrate and is line was negative  Is high fevers were most likely due to a bad viral disease that will probably never identify    Patient stopped his metformin and is here to get the 2nd blood test regarding his A1c    He has been very busy and stays active in the gym and feels great    He has questions about following with Urology    16 years ago or so his PSA was high around 6   He was put on finasteride and it has been less than for ever since  patient ID: Mary Gomez is a 78 y o  male  Past Medical History:   Diagnosis Date    Acute pancreatitis     last assessed - [id number]    BPH (benign prostatic hypertrophy)     Celiac disease     current GI w/u to r/o celiac    Cholelithiasis     Elevated blood pressure reading     last assessed - [id number]    Gallstones     last assessed - [id number]    High cholesterol     Hx of mitral valve disorder     Sarcoidosis     1990's Diagnosed 15 years ago by biopsy, not active; last assessed - [id number]     Past Surgical History:   Procedure Laterality Date    ERCP N/A 4/3/2017    Procedure: ENDOSCOPIC RETROGRADE CHOLANGIOPANCREATOGRAPHY (ERCP); Surgeon: Efren Cranker, MD;  [facility]; Service:     ND COLONOSCOPY FLX DX W/COLLJ SPEC WHEN PFRMD N/A 3/1/2017    Procedure: EGD AND COLONOSCOPY;  Surgeon: Teetee Martinez MD;  [facility]; Service: Gastroenterology    ND ESOPHAGOGASTRODUODENOSCOPY TRANSORAL DIAGNOSTIC N/A 6/5/2017    Procedure: ESOPHAGOGASTRODUODENOSCOPY (EGD); Surgeon: Efren Cranker, MD;  [facility];   Service: Gastroenterology    ND LAP,CHOLECYSTECTOMY N/A 3/29/2017    Procedure: CHOLECYSTECTOMY LAPAROSCOPIC; POSSIBLE OPEN;  Surgeon: Jeremi Hou MD;  [facility];  Service: General; complicated by bile leak and pancreatitis s/p stent     ROTATOR CUFF REPAIR Right 04/29/2015    for rotator cuff tear, Onset: 4/14     Family History   Problem Relation Age of Onset    Diabetes Mother         Diabetes Mellitus    Hypertension Mother     Ovarian cancer Mother     Rheumatologic disease Mother     Diabetes Father         Diabetes Mellitus    Heart disease Father     Hypertension Father     Diabetes Sister         Diabetes Mellitus    Other Sister         H  pylori infection    Hypertension Sister     Other Brother         H  pylori infection    Alcohol abuse Neg Hx     Substance Abuse Neg Hx     Mental illness Neg Hx     Depression Neg Hx      Social History     Tobacco Use    Smoking status: Never Smoker    Smokeless tobacco: Never Used   Substance Use Topics    Alcohol use: Yes     Comment: wine, 3-4x/week; social drinker    Drug use: No     Comment: Denied history of drug use     Social History     Tobacco Use   Smoking Status Never Smoker   Smokeless Tobacco Never Used        MED LIST WAS REVIEWED AND UPDATED       ROS    Constitutional  No fever chills no fatigue no weight loss no weight gain    Mental status  No anxiety or depression and no sleep disturbances no changes in personality or emotional problems no suicidal or homicidal ideations    Eyes  No eye pain no red eyes no visual disturbances no discharge no eye itch    ENT  No earache no hearing loss nasal discharge no sore throat no hoarseness no postnasal drip     Cardio  No chest pain no palpitations no leg edema no claudication no dyspnea on exertion no nocturnal dyspnea    Respiratory  No shortness of breath or wheeze no cough no orthopnea no dyspnea on exertion no hemoptysis no sputum production    GI  No abdominal pain no nausea no vomiting no diarrhea or constipation no bloody stools no change in bowel habits no change in weight      no dysuria hematuria no pyuria no incontinence no pelvic pain    Musculoskeletal  No myalgias arthralgias no joint swelling or stiffness no limb pain or swelling    Skin  No rashes or lesions no itchiness and no wounds    Neuro  No headache dizziness lightheadedness syncope numbness paresthesias or confusion    Heme  No swollen glands no easy bruising            Objective:      VITALS:  Wt Readings from Last 3 Encounters:   10/01/19 80 4 kg (177 lb 3 2 oz)   09/18/19 81 4 kg (179 lb 6 4 oz)   07/10/19 82 7 kg (182 lb 6 4 oz)     BP Readings from Last 3 Encounters:   10/01/19 132/60   07/10/19 128/62   07/01/19 138/60     Pulse Readings from Last 3 Encounters:   10/01/19 60 07/10/19 74   07/01/19 72     Body mass index is 27 55 kg/m²      Laboratory Results:   Lab Results   Component Value Date    WBC 8 99 06/24/2019    WBC 7 81 06/23/2019    WBC 9 68 06/22/2019    HGB 11 4 (L) 06/24/2019    HGB 11 7 (L) 06/23/2019    HGB 13 6 06/22/2019    HCT 32 9 (L) 06/24/2019    HCT 33 7 (L) 06/23/2019    HCT 38 6 06/22/2019    MCV 93 06/24/2019    MCV 93 06/23/2019    MCV 95 06/22/2019     06/24/2019     06/23/2019     06/22/2019     Lab Results   Component Value Date     02/24/2015     04/17/2014     12/17/2013    K 3 7 06/24/2019    K 3 8 06/23/2019    K 4 5 06/22/2019     06/24/2019     06/23/2019    CL 99 (L) 06/22/2019    CO2 23 06/24/2019    CO2 22 06/23/2019    CO2 27 06/22/2019    BUN 18 08/09/2019    BUN 23 06/24/2019    BUN 21 06/23/2019     Lab Results   Component Value Date    GLUCOSE 139 02/24/2015    ALT 81 (H) 06/24/2019    AST 46 (H) 06/24/2019    ALKPHOS 183 (H) 06/24/2019    EGFR 88 08/09/2019    CALCIUM 8 5 06/24/2019     No results found for: TSHRFT4      Lipid Profile:   Lab Results   Component Value Date    CHOL 140 02/24/2015    CHOL 140 12/17/2013     Lab Results   Component Value Date    HDL 67 (H) 06/15/2018    HDL 67 (H) 06/19/2017    HDL 60 07/22/2016     Lab Results   Component Value Date    LDLCALC 62 06/15/2018    LDLCALC 61 06/19/2017    LDLCALC 52 07/22/2016     Lab Results   Component Value Date    TRIG 70 06/15/2018    TRIG 100 06/19/2017    TRIG 86 07/22/2016       Diabetic labs (if applicable)  Lab Results   Component Value Date    HGBA1C 5 8 09/27/2019    HGBA1C 5 6 05/31/2019    HGBA1C 5 9 02/14/2019     Lab Results   Component Value Date    GLUF 190 (H) 06/15/2018    LDLCALC 62 06/15/2018    CREATININE 0 85 08/09/2019          Physical Exam    Constitutional  Appears healthy, Looks well, Appearance consistent with age    Mental Status  Alert, Oriented, Cooperative, Memory function normal , clean, and reasonable    Neck  No neck mass, No thyromegaly, Good carotid upstrokes bilaterally, trachea midline positive click    Respiratory  Breath sounds normal, No rales, No rhonchi, No wheezing, normal palpation    Cardiac   Regular rhythm without ectopy or murmur no S3-S4, no heave lift or thrill to palpation    Vascular  No leg edema, No pedal edema    Muscular skeletal  No clubbing cyanosis , muscle tone normal    Skin  No appreciable rashes or abnormal appearing lesions

## 2020-01-06 DIAGNOSIS — E78.2 MIXED HYPERLIPIDEMIA: ICD-10-CM

## 2020-01-06 RX ORDER — ATORVASTATIN CALCIUM 20 MG/1
TABLET, FILM COATED ORAL
Qty: 90 TABLET | Refills: 0 | Status: SHIPPED | OUTPATIENT
Start: 2020-01-06 | End: 2020-04-21 | Stop reason: SDUPTHER

## 2020-04-02 ENCOUNTER — TELEPHONE (OUTPATIENT)
Dept: FAMILY MEDICINE CLINIC | Facility: CLINIC | Age: 70
End: 2020-04-02

## 2020-04-03 ENCOUNTER — APPOINTMENT (OUTPATIENT)
Dept: LAB | Age: 70
End: 2020-04-03
Payer: MEDICARE

## 2020-04-03 DIAGNOSIS — R73.09 ELEVATED HEMOGLOBIN A1C: ICD-10-CM

## 2020-04-03 DIAGNOSIS — E78.2 MIXED HYPERLIPIDEMIA: ICD-10-CM

## 2020-04-03 DIAGNOSIS — Z12.5 PROSTATE CANCER SCREENING: ICD-10-CM

## 2020-04-03 DIAGNOSIS — E55.9 VITAMIN D DEFICIENCY: ICD-10-CM

## 2020-04-03 DIAGNOSIS — Z79.899 ENCOUNTER FOR LONG-TERM (CURRENT) USE OF MEDICATIONS: ICD-10-CM

## 2020-04-03 LAB
25(OH)D3 SERPL-MCNC: 39 NG/ML (ref 30–100)
ALBUMIN SERPL BCP-MCNC: 4 G/DL (ref 3.5–5)
ALP SERPL-CCNC: 77 U/L (ref 46–116)
ALT SERPL W P-5'-P-CCNC: 30 U/L (ref 12–78)
ANION GAP SERPL CALCULATED.3IONS-SCNC: 2 MMOL/L (ref 4–13)
AST SERPL W P-5'-P-CCNC: 19 U/L (ref 5–45)
BASOPHILS # BLD AUTO: 0.03 THOUSANDS/ΜL (ref 0–0.1)
BASOPHILS NFR BLD AUTO: 1 % (ref 0–1)
BILIRUB SERPL-MCNC: 0.62 MG/DL (ref 0.2–1)
BILIRUB UR QL STRIP: NEGATIVE
BUN SERPL-MCNC: 25 MG/DL (ref 5–25)
CALCIUM SERPL-MCNC: 9.2 MG/DL (ref 8.3–10.1)
CHLORIDE SERPL-SCNC: 104 MMOL/L (ref 100–108)
CHOLEST SERPL-MCNC: 132 MG/DL (ref 50–200)
CLARITY UR: CLEAR
CO2 SERPL-SCNC: 30 MMOL/L (ref 21–32)
COLOR UR: YELLOW
CREAT SERPL-MCNC: 0.8 MG/DL (ref 0.6–1.3)
EOSINOPHIL # BLD AUTO: 0.1 THOUSAND/ΜL (ref 0–0.61)
EOSINOPHIL NFR BLD AUTO: 2 % (ref 0–6)
ERYTHROCYTE [DISTWIDTH] IN BLOOD BY AUTOMATED COUNT: 12.5 % (ref 11.6–15.1)
EST. AVERAGE GLUCOSE BLD GHB EST-MCNC: 123 MG/DL
GFR SERPL CREATININE-BSD FRML MDRD: 91 ML/MIN/1.73SQ M
GLUCOSE P FAST SERPL-MCNC: 129 MG/DL (ref 65–99)
GLUCOSE UR STRIP-MCNC: NEGATIVE MG/DL
HBA1C MFR BLD: 5.9 %
HCT VFR BLD AUTO: 41.1 % (ref 36.5–49.3)
HDLC SERPL-MCNC: 62 MG/DL
HGB BLD-MCNC: 14.1 G/DL (ref 12–17)
HGB UR QL STRIP.AUTO: NEGATIVE
IMM GRANULOCYTES # BLD AUTO: 0.01 THOUSAND/UL (ref 0–0.2)
IMM GRANULOCYTES NFR BLD AUTO: 0 % (ref 0–2)
KETONES UR STRIP-MCNC: NEGATIVE MG/DL
LDLC SERPL CALC-MCNC: 54 MG/DL (ref 0–100)
LEUKOCYTE ESTERASE UR QL STRIP: NEGATIVE
LYMPHOCYTES # BLD AUTO: 1.87 THOUSANDS/ΜL (ref 0.6–4.47)
LYMPHOCYTES NFR BLD AUTO: 31 % (ref 14–44)
MCH RBC QN AUTO: 32.3 PG (ref 26.8–34.3)
MCHC RBC AUTO-ENTMCNC: 34.3 G/DL (ref 31.4–37.4)
MCV RBC AUTO: 94 FL (ref 82–98)
MONOCYTES # BLD AUTO: 0.47 THOUSAND/ΜL (ref 0.17–1.22)
MONOCYTES NFR BLD AUTO: 8 % (ref 4–12)
NEUTROPHILS # BLD AUTO: 3.63 THOUSANDS/ΜL (ref 1.85–7.62)
NEUTS SEG NFR BLD AUTO: 58 % (ref 43–75)
NITRITE UR QL STRIP: NEGATIVE
NONHDLC SERPL-MCNC: 70 MG/DL
NRBC BLD AUTO-RTO: 0 /100 WBCS
PH UR STRIP.AUTO: 6 [PH]
PLATELET # BLD AUTO: 296 THOUSANDS/UL (ref 149–390)
PMV BLD AUTO: 10 FL (ref 8.9–12.7)
POTASSIUM SERPL-SCNC: 4.3 MMOL/L (ref 3.5–5.3)
PROT SERPL-MCNC: 7.1 G/DL (ref 6.4–8.2)
PROT UR STRIP-MCNC: NEGATIVE MG/DL
PSA SERPL-MCNC: 1.7 NG/ML (ref 0–4)
RBC # BLD AUTO: 4.37 MILLION/UL (ref 3.88–5.62)
SODIUM SERPL-SCNC: 136 MMOL/L (ref 136–145)
SP GR UR STRIP.AUTO: 1.01 (ref 1–1.03)
TRIGL SERPL-MCNC: 78 MG/DL
TSH SERPL DL<=0.05 MIU/L-ACNC: 2.25 UIU/ML (ref 0.36–3.74)
UROBILINOGEN UR QL STRIP.AUTO: 1 E.U./DL
WBC # BLD AUTO: 6.11 THOUSAND/UL (ref 4.31–10.16)

## 2020-04-03 PROCEDURE — 82306 VITAMIN D 25 HYDROXY: CPT

## 2020-04-03 PROCEDURE — 81003 URINALYSIS AUTO W/O SCOPE: CPT

## 2020-04-03 PROCEDURE — G0103 PSA SCREENING: HCPCS

## 2020-04-03 PROCEDURE — 85025 COMPLETE CBC W/AUTO DIFF WBC: CPT

## 2020-04-03 PROCEDURE — 36415 COLL VENOUS BLD VENIPUNCTURE: CPT

## 2020-04-03 PROCEDURE — 80053 COMPREHEN METABOLIC PANEL: CPT

## 2020-04-03 PROCEDURE — 83036 HEMOGLOBIN GLYCOSYLATED A1C: CPT

## 2020-04-03 PROCEDURE — 80061 LIPID PANEL: CPT

## 2020-04-03 PROCEDURE — 84443 ASSAY THYROID STIM HORMONE: CPT

## 2020-04-21 ENCOUNTER — TELEMEDICINE (OUTPATIENT)
Dept: FAMILY MEDICINE CLINIC | Facility: CLINIC | Age: 70
End: 2020-04-21
Payer: MEDICARE

## 2020-04-21 VITALS
HEART RATE: 58 BPM | WEIGHT: 177 LBS | BODY MASS INDEX: 27.52 KG/M2 | TEMPERATURE: 96.5 F | DIASTOLIC BLOOD PRESSURE: 71 MMHG | SYSTOLIC BLOOD PRESSURE: 120 MMHG

## 2020-04-21 DIAGNOSIS — N40.1 BENIGN PROSTATIC HYPERPLASIA WITH URINARY FREQUENCY: ICD-10-CM

## 2020-04-21 DIAGNOSIS — R35.0 BENIGN PROSTATIC HYPERPLASIA WITH URINARY FREQUENCY: ICD-10-CM

## 2020-04-21 DIAGNOSIS — E78.2 MIXED HYPERLIPIDEMIA: ICD-10-CM

## 2020-04-21 DIAGNOSIS — R91.1 PULMONARY NODULE SEEN ON IMAGING STUDY: ICD-10-CM

## 2020-04-21 DIAGNOSIS — E55.9 VITAMIN D DEFICIENCY: ICD-10-CM

## 2020-04-21 DIAGNOSIS — E11.9 TYPE 2 DIABETES MELLITUS WITHOUT COMPLICATION, WITHOUT LONG-TERM CURRENT USE OF INSULIN (HCC): Primary | ICD-10-CM

## 2020-04-21 DIAGNOSIS — M25.512 ACUTE PAIN OF LEFT SHOULDER: ICD-10-CM

## 2020-04-21 DIAGNOSIS — K90.0 ADULT CELIAC DISEASE: ICD-10-CM

## 2020-04-21 DIAGNOSIS — R79.89 ELEVATED LIVER FUNCTION TESTS: ICD-10-CM

## 2020-04-21 DIAGNOSIS — R73.09 ELEVATED HEMOGLOBIN A1C: ICD-10-CM

## 2020-04-21 PROCEDURE — 99214 OFFICE O/P EST MOD 30 MIN: CPT | Performed by: FAMILY MEDICINE

## 2020-04-21 PROCEDURE — 4040F PNEUMOC VAC/ADMIN/RCVD: CPT | Performed by: FAMILY MEDICINE

## 2020-04-21 PROCEDURE — 3044F HG A1C LEVEL LT 7.0%: CPT | Performed by: FAMILY MEDICINE

## 2020-04-21 PROCEDURE — 1160F RVW MEDS BY RX/DR IN RCRD: CPT | Performed by: FAMILY MEDICINE

## 2020-04-21 PROCEDURE — 1036F TOBACCO NON-USER: CPT | Performed by: FAMILY MEDICINE

## 2020-04-21 RX ORDER — FINASTERIDE 5 MG/1
5 TABLET, FILM COATED ORAL
Qty: 90 TABLET | Refills: 3 | Status: SHIPPED | OUTPATIENT
Start: 2020-04-21 | End: 2021-05-15

## 2020-04-21 RX ORDER — ATORVASTATIN CALCIUM 20 MG/1
20 TABLET, FILM COATED ORAL DAILY
Qty: 90 TABLET | Refills: 1 | Status: SHIPPED | OUTPATIENT
Start: 2020-04-21 | End: 2020-11-02

## 2020-04-27 ENCOUNTER — EVALUATION (OUTPATIENT)
Dept: PHYSICAL THERAPY | Facility: REHABILITATION | Age: 70
End: 2020-04-27
Payer: MEDICARE

## 2020-04-27 DIAGNOSIS — M25.512 ACUTE PAIN OF LEFT SHOULDER: ICD-10-CM

## 2020-04-27 PROCEDURE — 97162 PT EVAL MOD COMPLEX 30 MIN: CPT | Performed by: PHYSICAL MEDICINE & REHABILITATION

## 2020-04-27 PROCEDURE — 97110 THERAPEUTIC EXERCISES: CPT | Performed by: PHYSICAL MEDICINE & REHABILITATION

## 2020-04-30 ENCOUNTER — OFFICE VISIT (OUTPATIENT)
Dept: PHYSICAL THERAPY | Facility: REHABILITATION | Age: 70
End: 2020-04-30
Payer: MEDICARE

## 2020-04-30 DIAGNOSIS — M25.512 ACUTE PAIN OF LEFT SHOULDER: Primary | ICD-10-CM

## 2020-04-30 PROCEDURE — 97140 MANUAL THERAPY 1/> REGIONS: CPT

## 2020-04-30 PROCEDURE — 97110 THERAPEUTIC EXERCISES: CPT

## 2020-05-05 ENCOUNTER — OFFICE VISIT (OUTPATIENT)
Dept: PHYSICAL THERAPY | Facility: REHABILITATION | Age: 70
End: 2020-05-05
Payer: MEDICARE

## 2020-05-05 DIAGNOSIS — M25.512 ACUTE PAIN OF LEFT SHOULDER: Primary | ICD-10-CM

## 2020-05-05 PROCEDURE — 97112 NEUROMUSCULAR REEDUCATION: CPT | Performed by: PHYSICAL MEDICINE & REHABILITATION

## 2020-05-05 PROCEDURE — 97140 MANUAL THERAPY 1/> REGIONS: CPT | Performed by: PHYSICAL MEDICINE & REHABILITATION

## 2020-05-05 PROCEDURE — 97110 THERAPEUTIC EXERCISES: CPT | Performed by: PHYSICAL MEDICINE & REHABILITATION

## 2020-05-08 ENCOUNTER — OFFICE VISIT (OUTPATIENT)
Dept: PHYSICAL THERAPY | Facility: REHABILITATION | Age: 70
End: 2020-05-08
Payer: MEDICARE

## 2020-05-08 DIAGNOSIS — M25.512 ACUTE PAIN OF LEFT SHOULDER: Primary | ICD-10-CM

## 2020-05-08 PROCEDURE — 97112 NEUROMUSCULAR REEDUCATION: CPT

## 2020-05-08 PROCEDURE — 97110 THERAPEUTIC EXERCISES: CPT

## 2020-05-08 PROCEDURE — 97140 MANUAL THERAPY 1/> REGIONS: CPT

## 2020-05-12 ENCOUNTER — OFFICE VISIT (OUTPATIENT)
Dept: PHYSICAL THERAPY | Facility: REHABILITATION | Age: 70
End: 2020-05-12
Payer: MEDICARE

## 2020-05-12 DIAGNOSIS — M25.512 ACUTE PAIN OF LEFT SHOULDER: Primary | ICD-10-CM

## 2020-05-12 PROCEDURE — 97140 MANUAL THERAPY 1/> REGIONS: CPT | Performed by: PHYSICAL MEDICINE & REHABILITATION

## 2020-05-12 PROCEDURE — 97112 NEUROMUSCULAR REEDUCATION: CPT | Performed by: PHYSICAL MEDICINE & REHABILITATION

## 2020-05-12 PROCEDURE — 97110 THERAPEUTIC EXERCISES: CPT | Performed by: PHYSICAL MEDICINE & REHABILITATION

## 2020-05-15 ENCOUNTER — OFFICE VISIT (OUTPATIENT)
Dept: PHYSICAL THERAPY | Facility: REHABILITATION | Age: 70
End: 2020-05-15
Payer: MEDICARE

## 2020-05-15 DIAGNOSIS — M25.512 ACUTE PAIN OF LEFT SHOULDER: Primary | ICD-10-CM

## 2020-05-15 PROCEDURE — 97110 THERAPEUTIC EXERCISES: CPT

## 2020-05-15 PROCEDURE — 97112 NEUROMUSCULAR REEDUCATION: CPT

## 2020-10-12 ENCOUNTER — APPOINTMENT (OUTPATIENT)
Dept: LAB | Age: 70
End: 2020-10-12
Payer: MEDICARE

## 2020-10-12 DIAGNOSIS — R73.09 ELEVATED HEMOGLOBIN A1C: ICD-10-CM

## 2020-10-12 LAB
EST. AVERAGE GLUCOSE BLD GHB EST-MCNC: 120 MG/DL
HBA1C MFR BLD: 5.8 %

## 2020-10-12 PROCEDURE — 36415 COLL VENOUS BLD VENIPUNCTURE: CPT

## 2020-10-12 PROCEDURE — 83036 HEMOGLOBIN GLYCOSYLATED A1C: CPT

## 2020-10-23 ENCOUNTER — TELEPHONE (OUTPATIENT)
Dept: GASTROENTEROLOGY | Facility: CLINIC | Age: 70
End: 2020-10-23

## 2020-10-23 ENCOUNTER — OFFICE VISIT (OUTPATIENT)
Dept: FAMILY MEDICINE CLINIC | Facility: CLINIC | Age: 70
End: 2020-10-23
Payer: MEDICARE

## 2020-10-23 VITALS
RESPIRATION RATE: 16 BRPM | BODY MASS INDEX: 29.81 KG/M2 | WEIGHT: 189.9 LBS | HEART RATE: 68 BPM | HEIGHT: 67 IN | TEMPERATURE: 98.2 F | SYSTOLIC BLOOD PRESSURE: 134 MMHG | DIASTOLIC BLOOD PRESSURE: 70 MMHG

## 2020-10-23 DIAGNOSIS — E55.9 VITAMIN D DEFICIENCY: ICD-10-CM

## 2020-10-23 DIAGNOSIS — N40.1 BENIGN PROSTATIC HYPERPLASIA WITH URINARY FREQUENCY: ICD-10-CM

## 2020-10-23 DIAGNOSIS — H25.9 AGE-RELATED CATARACT OF BOTH EYES, UNSPECIFIED AGE-RELATED CATARACT TYPE: ICD-10-CM

## 2020-10-23 DIAGNOSIS — R69 TAKING MEDICATION FOR CHRONIC DISEASE: ICD-10-CM

## 2020-10-23 DIAGNOSIS — D86.9 SARCOIDOSIS: ICD-10-CM

## 2020-10-23 DIAGNOSIS — E78.2 MIXED HYPERLIPIDEMIA: ICD-10-CM

## 2020-10-23 DIAGNOSIS — Z00.00 MEDICARE ANNUAL WELLNESS VISIT, SUBSEQUENT: Primary | ICD-10-CM

## 2020-10-23 DIAGNOSIS — E11.8 TYPE 2 DIABETES MELLITUS WITH COMPLICATION, WITHOUT LONG-TERM CURRENT USE OF INSULIN (HCC): ICD-10-CM

## 2020-10-23 DIAGNOSIS — Z12.5 PROSTATE CANCER SCREENING: ICD-10-CM

## 2020-10-23 DIAGNOSIS — Z01.818 PREOP EXAMINATION: ICD-10-CM

## 2020-10-23 DIAGNOSIS — R35.0 BENIGN PROSTATIC HYPERPLASIA WITH URINARY FREQUENCY: ICD-10-CM

## 2020-10-23 PROCEDURE — 99214 OFFICE O/P EST MOD 30 MIN: CPT | Performed by: PHYSICIAN ASSISTANT

## 2020-10-23 PROCEDURE — G0438 PPPS, INITIAL VISIT: HCPCS | Performed by: PHYSICIAN ASSISTANT

## 2020-10-23 RX ORDER — OFLOXACIN 3 MG/ML
SOLUTION/ DROPS OPHTHALMIC
COMMUNITY
Start: 2020-10-14 | End: 2021-05-17 | Stop reason: ALTCHOICE

## 2020-10-23 RX ORDER — PREDNISOLONE ACETATE 10 MG/ML
SUSPENSION/ DROPS OPHTHALMIC
COMMUNITY
Start: 2020-10-14 | End: 2021-04-09

## 2020-11-01 DIAGNOSIS — E78.2 MIXED HYPERLIPIDEMIA: ICD-10-CM

## 2020-11-02 RX ORDER — ATORVASTATIN CALCIUM 20 MG/1
TABLET, FILM COATED ORAL
Qty: 90 TABLET | Refills: 1 | Status: SHIPPED | OUTPATIENT
Start: 2020-11-02 | End: 2021-05-15

## 2020-12-28 LAB
LEFT EYE DIABETIC RETINOPATHY: NORMAL
RIGHT EYE DIABETIC RETINOPATHY: NORMAL

## 2021-03-10 DIAGNOSIS — Z23 ENCOUNTER FOR IMMUNIZATION: ICD-10-CM

## 2021-04-03 NOTE — PROGRESS NOTES
ASSESSMENT/PLAN:    Type 2 diabetes  Patient has gone without metformin for year and his A1c is still good at 6 up a little from 5 8  But he also gained about 15 lb in the year  Continue diet and exercise  Begin charting what he eats  Recheck in 6 months    Elevated liver test  Totally resolved and back to normal    Colon polyps  Patient for a colonoscopy in the near future    Hyperlipidemia  Cholesterol excellent 155 and LDL 77  Continue atorvastatin    History of elevated PSA  Biopsies years ago  We alone or following patient's PSA  That is stable at 2 3  Check yearly      Positive rheumatoid factor  No disease manifestations     Celiac disease  Gluten free diet     Eczema  Continue using steroid cream on elbows as needed      vitamin-D deficiency   Continue vitamin-D daily  Vitamin-D level is normal     History of lung nodule  Pap was followed by CT scan with Pulmonary for years  Felt to be left over sarcoid no need to follow anymore        Recheck in 6 months with a A1c prior  Recheck sooner if needed     BMI Counseling: Body mass index is 30 07 kg/m²  The BMI is above normal  Nutrition recommendations include decreasing portion sizes and encouraging healthy choices of fruits and vegetables  Exercise recommendations include exercising 3-5 times per week                Health Maintenance   Topic Date Due    DM Eye Exam  12/22/2019    Diabetic Foot Exam  02/22/2020    Colonoscopy Surveillance  03/01/2020    COVID-19 Vaccine (2 - Inadvertent mRNA 2-dose series) 04/05/2021    BMI: Followup Plan  04/21/2021    HEMOGLOBIN A1C  10/07/2021    Medicare Annual Wellness Visit (AWV)  10/23/2021    URINE MICROALBUMIN  04/07/2022    Fall Risk  04/09/2022    Depression Screening PHQ  04/09/2022    BMI: Adult  04/09/2022    DTaP,Tdap,and Td Vaccines (2 - Td) 03/08/2023    Colorectal Cancer Screening  03/01/2027    Hepatitis C Screening  Completed    Pneumococcal Vaccine: 65+ Years  Completed    Influenza Vaccine Completed    HIB Vaccine  Aged Out    Hepatitis B Vaccine  Aged Out    IPV Vaccine  Aged Out    Hepatitis A Vaccine  Aged Out    Meningococcal ACWY Vaccine  Aged Out    HPV Vaccine  Aged Out         Problem List as of 4/9/2021 Reviewed: 10/23/2020 10:06 AM by Adrianne Roldan PA-C    Adult celiac disease    Last Assessment & Plan 6/20/2018 Office Visit Written 6/20/2018  9:02 AM by Daxa Menard MD     On gluten free diet  Off PPI  Benign prostatic hyperplasia with urinary frequency    Last Assessment & Plan 6/20/2018 Office Visit Written 6/20/2018  9:01 AM by Daxa Menard MD     Sees Urology yearly, on finasteride  Intrinsic eczema    Liver cyst    Mixed hyperlipidemia    Last Assessment & Plan 6/20/2018 Office Visit Written 6/20/2018  9:01 AM by Daxa Menard MD     Lipids at goal, on statin  Pulmonary nodule seen on imaging study    remote history of sarcoidosis    Last Assessment & Plan 6/22/2019 Hospital Encounter Written 6/25/2019 12:27 PM by Kayli Tena PA-C     · Reports being diagnosed with sarcoidosis 40 years ago and seeing pulmonology at that time but having no additional follow-up since that time  Last CT scan of the chest on file was from 2016 which showed stable lung nodules  · Patient reports mild cough x3 days  May need eventual CT chest/pulmonology follow-up         Type 2 diabetes mellitus with complication, without long-term current use of insulin [facility])    Last Assessment & Plan 6/22/2019 Hospital Encounter Written 6/25/2019 12:27 PM by Kayli Tena PA-C     Lab Results   Component Value Date    HGBA1C 5 6 05/31/2019       No results for input(s): POCGLU in the last 72 hours  Blood Sugar Average: Last 72 hrs:  noted history of diabetes on file however A1c is excellent at 5 6 and he is not on any medications    He does not require frequent Accu-Cheks         Vitamin D deficiency    Last Assessment & Plan 6/20/2018 Office Visit Written 6/20/2018  9:01 AM by Vijay Pual MD     Increase D3 during fall/winter months  Subjective:   Chief Complaint   Patient presents with    Diabetes    Hyperlipidemia    Vitamin D Deficiency     Patient is here for his recheck  He got his blood work done  Since last visit he had cataract surgery and that was very successful    He also had his COVID vaccine that was J [employer] J and he is now done with that    He admits to eating a bit more over Yesi and as a result is gained 15 lb since I saw him a year ago  He is going back to journaling so he can pay attention to what he is eating and get this back under control  He will also now that the weather has changed be able to work outside as well as golf at least twice a week    Patient also has not been using metformin for at least a year      patient ID: Tanner Mota is a 69 y o  male  Past Medical History:   Diagnosis Date    Acute pancreatitis     last assessed - [id number]    BPH (benign prostatic hypertrophy)     Celiac disease     current GI w/u to r/o celiac    Cholelithiasis     Elevated blood pressure reading     last assessed - [id number]    Gallstones     last assessed - [id number]    High cholesterol     Hx of mitral valve disorder     Sarcoidosis     1990's Diagnosed 15 years ago by biopsy, not active; last assessed - [id number]     Past Surgical History:   Procedure Laterality Date    ERCP N/A 4/3/2017    Procedure: ENDOSCOPIC RETROGRADE CHOLANGIOPANCREATOGRAPHY (ERCP); Surgeon: Brittney Alberts MD;  [facility]; Service:     MT COLONOSCOPY FLX DX W/COLLJ SPEC WHEN PFRMD N/A 3/1/2017    Procedure: EGD AND COLONOSCOPY;  Surgeon: Olga Thakkar MD;  [facility]; Service: Gastroenterology    MT ESOPHAGOGASTRODUODENOSCOPY TRANSORAL DIAGNOSTIC N/A 6/5/2017    Procedure: ESOPHAGOGASTRODUODENOSCOPY (EGD); Surgeon: Brittney Alberts MD;  [facility];   Service: Gastroenterology    MT LAP,CHOLECYSTECTOMY N/A 3/29/2017    Procedure: CHOLECYSTECTOMY LAPAROSCOPIC; POSSIBLE OPEN;  Surgeon: Maxwell Goodman MD;  [facility];  Service: General; complicated by bile leak and pancreatitis s/p stent     ROTATOR CUFF REPAIR Right 04/29/2015    for rotator cuff tear, Onset: 4/14     Family History   Problem Relation Age of Onset    Diabetes Mother         Diabetes Mellitus    Hypertension Mother     Ovarian cancer Mother     Rheumatologic disease Mother     Diabetes Father         Diabetes Mellitus    Heart disease Father     Hypertension Father     Diabetes Sister         Diabetes Mellitus    Other Sister         H  pylori infection    Hypertension Sister     Other Brother         H  pylori infection    Alcohol abuse Neg Hx     Substance Abuse Neg Hx     Mental illness Neg Hx     Depression Neg Hx      Social History     Tobacco Use    Smoking status: Never Smoker    Smokeless tobacco: Never Used   Substance Use Topics    Alcohol use: Yes     Comment: wine, 3-4x/week; social drinker    Drug use: No     Comment: Denied history of drug use     Social History     Tobacco Use   Smoking Status Never Smoker   Smokeless Tobacco Never Used        MED LIST WAS REVIEWED AND UPDATED       ROS  As per HPI  Rest of 12 point review of systems negative     Objective:      VITALS:  Wt Readings from Last 3 Encounters:   04/09/21 87 1 kg (192 lb)   10/23/20 86 1 kg (189 lb 14 4 oz)   04/21/20 80 3 kg (177 lb)     BP Readings from Last 3 Encounters:   04/09/21 122/78   10/23/20 134/70   04/21/20 120/71     Pulse Readings from Last 3 Encounters:   04/09/21 68   10/23/20 68   04/21/20 58     Body mass index is 30 07 kg/m²  Laboratory Results: All pertinent labs and studies were reviewed with patient during this office visit  with highlights of the results contained in this notes ASSESSMENT AND PLAN section       Physical Exam      Gen    No acute distress well-appearing well-nourished appears stated age    Mental status  Good judgment and insight oriented to time person and place, recent and remote memory intact mood and affect normal cooperative and patient is reasonable    HEENT  PERRLA 3 mm, EOMI without nystagmus, normocephalic atraumatic without facial weakness      Neck   supple no masses trachea midline positive click normal carotid upstrokes with no bruits    Cor  Regular rhythm without ectopy or murmur, no S3-S4, normal palpation that is no heave lift or thrill    Vascular  No edema, good pedal pulses    Lungs  CTA bilaterally in no respiratory distress no wheezes rhonchi or rales, normal to palpation no tactile fremitus    Abdomen  Soft, no palpable masses, no hepatosplenomegaly, normal bowel sounds, nontender    Lymphatics  No palpable nodes in the neck, supraclavicular area, axilla, or groin     Musculoskeletal  No clubbing cyanosis or edema muscle tone normal    Skin  no rashes or abnormal appearing lesions    Neuro  Normal ambulation, cranial nerves 2-12 grossly intact, higher functioning with reasoning intact  Patient's shoes and socks removed  Right Foot/Ankle   Right Foot Inspection  Skin Exam: skin normal and skin intact no dry skin, no warmth, no callus, no erythema, no maceration, no abnormal color, no pre-ulcer, no ulcer and no callus                            Sensory       Monofilament testing: intact  Vascular    The right DP pulse is 2+  The right PT pulse is 2+  Left Foot/Ankle  Left Foot Inspection  Skin Exam: skin normal and skin intactno dry skin, no warmth, no erythema, no maceration, normal color, no pre-ulcer, no ulcer and no callus                                         Sensory       Monofilament: intact  Vascular    The left DP pulse is 2+  The left PT pulse is 2+  Assign Risk Category:  No deformity present; No loss of protective sensation;  No weak pulses       Risk: 0

## 2021-04-07 ENCOUNTER — APPOINTMENT (OUTPATIENT)
Dept: LAB | Age: 71
End: 2021-04-07
Payer: MEDICARE

## 2021-04-07 DIAGNOSIS — E11.8 TYPE 2 DIABETES MELLITUS WITH COMPLICATION, WITHOUT LONG-TERM CURRENT USE OF INSULIN (HCC): ICD-10-CM

## 2021-04-07 DIAGNOSIS — E55.9 VITAMIN D DEFICIENCY: ICD-10-CM

## 2021-04-07 DIAGNOSIS — E78.2 MIXED HYPERLIPIDEMIA: ICD-10-CM

## 2021-04-07 DIAGNOSIS — R69 TAKING MEDICATION FOR CHRONIC DISEASE: ICD-10-CM

## 2021-04-07 DIAGNOSIS — Z12.5 PROSTATE CANCER SCREENING: ICD-10-CM

## 2021-04-07 LAB
25(OH)D3 SERPL-MCNC: 38.6 NG/ML (ref 30–100)
ALBUMIN SERPL BCP-MCNC: 4.2 G/DL (ref 3.5–5)
ALP SERPL-CCNC: 80 U/L (ref 46–116)
ALT SERPL W P-5'-P-CCNC: 33 U/L (ref 12–78)
ANION GAP SERPL CALCULATED.3IONS-SCNC: 4 MMOL/L (ref 4–13)
AST SERPL W P-5'-P-CCNC: 17 U/L (ref 5–45)
BASOPHILS # BLD AUTO: 0.02 THOUSANDS/ΜL (ref 0–0.1)
BASOPHILS NFR BLD AUTO: 0 % (ref 0–1)
BILIRUB SERPL-MCNC: 0.63 MG/DL (ref 0.2–1)
BILIRUB UR QL STRIP: NEGATIVE
BUN SERPL-MCNC: 18 MG/DL (ref 5–25)
CALCIUM SERPL-MCNC: 9.5 MG/DL (ref 8.3–10.1)
CHLORIDE SERPL-SCNC: 106 MMOL/L (ref 100–108)
CHOLEST SERPL-MCNC: 155 MG/DL (ref 50–200)
CLARITY UR: CLEAR
CO2 SERPL-SCNC: 30 MMOL/L (ref 21–32)
COLOR UR: YELLOW
CREAT SERPL-MCNC: 0.93 MG/DL (ref 0.6–1.3)
CREAT UR-MCNC: 57.1 MG/DL
EOSINOPHIL # BLD AUTO: 0.09 THOUSAND/ΜL (ref 0–0.61)
EOSINOPHIL NFR BLD AUTO: 2 % (ref 0–6)
ERYTHROCYTE [DISTWIDTH] IN BLOOD BY AUTOMATED COUNT: 12.8 % (ref 11.6–15.1)
EST. AVERAGE GLUCOSE BLD GHB EST-MCNC: 126 MG/DL
GFR SERPL CREATININE-BSD FRML MDRD: 82 ML/MIN/1.73SQ M
GLUCOSE P FAST SERPL-MCNC: 140 MG/DL (ref 65–99)
GLUCOSE UR STRIP-MCNC: NEGATIVE MG/DL
HBA1C MFR BLD: 6 %
HCT VFR BLD AUTO: 44.6 % (ref 36.5–49.3)
HDLC SERPL-MCNC: 64 MG/DL
HGB BLD-MCNC: 14.7 G/DL (ref 12–17)
HGB UR QL STRIP.AUTO: NEGATIVE
IMM GRANULOCYTES # BLD AUTO: 0.03 THOUSAND/UL (ref 0–0.2)
IMM GRANULOCYTES NFR BLD AUTO: 1 % (ref 0–2)
KETONES UR STRIP-MCNC: NEGATIVE MG/DL
LDLC SERPL CALC-MCNC: 77 MG/DL (ref 0–100)
LEUKOCYTE ESTERASE UR QL STRIP: NEGATIVE
LYMPHOCYTES # BLD AUTO: 1.94 THOUSANDS/ΜL (ref 0.6–4.47)
LYMPHOCYTES NFR BLD AUTO: 32 % (ref 14–44)
MCH RBC QN AUTO: 32 PG (ref 26.8–34.3)
MCHC RBC AUTO-ENTMCNC: 33 G/DL (ref 31.4–37.4)
MCV RBC AUTO: 97 FL (ref 82–98)
MICROALBUMIN UR-MCNC: <5 MG/L (ref 0–20)
MICROALBUMIN/CREAT 24H UR: <9 MG/G CREATININE (ref 0–30)
MONOCYTES # BLD AUTO: 0.43 THOUSAND/ΜL (ref 0.17–1.22)
MONOCYTES NFR BLD AUTO: 7 % (ref 4–12)
NEUTROPHILS # BLD AUTO: 3.52 THOUSANDS/ΜL (ref 1.85–7.62)
NEUTS SEG NFR BLD AUTO: 58 % (ref 43–75)
NITRITE UR QL STRIP: NEGATIVE
NRBC BLD AUTO-RTO: 0 /100 WBCS
PH UR STRIP.AUTO: 6.5 [PH]
PLATELET # BLD AUTO: 299 THOUSANDS/UL (ref 149–390)
PMV BLD AUTO: 9.6 FL (ref 8.9–12.7)
POTASSIUM SERPL-SCNC: 4.4 MMOL/L (ref 3.5–5.3)
PROT SERPL-MCNC: 8 G/DL (ref 6.4–8.2)
PROT UR STRIP-MCNC: NEGATIVE MG/DL
PSA SERPL-MCNC: 2.3 NG/ML (ref 0–4)
RBC # BLD AUTO: 4.6 MILLION/UL (ref 3.88–5.62)
SODIUM SERPL-SCNC: 140 MMOL/L (ref 136–145)
SP GR UR STRIP.AUTO: 1.01 (ref 1–1.03)
TRIGL SERPL-MCNC: 72 MG/DL
TSH SERPL DL<=0.05 MIU/L-ACNC: 1.6 UIU/ML (ref 0.36–3.74)
UROBILINOGEN UR QL STRIP.AUTO: 1 E.U./DL
WBC # BLD AUTO: 6.03 THOUSAND/UL (ref 4.31–10.16)

## 2021-04-07 PROCEDURE — 83036 HEMOGLOBIN GLYCOSYLATED A1C: CPT

## 2021-04-07 PROCEDURE — 82570 ASSAY OF URINE CREATININE: CPT

## 2021-04-07 PROCEDURE — 82306 VITAMIN D 25 HYDROXY: CPT

## 2021-04-07 PROCEDURE — 84443 ASSAY THYROID STIM HORMONE: CPT

## 2021-04-07 PROCEDURE — 80053 COMPREHEN METABOLIC PANEL: CPT

## 2021-04-07 PROCEDURE — 85025 COMPLETE CBC W/AUTO DIFF WBC: CPT

## 2021-04-07 PROCEDURE — 80061 LIPID PANEL: CPT

## 2021-04-07 PROCEDURE — 81003 URINALYSIS AUTO W/O SCOPE: CPT

## 2021-04-07 PROCEDURE — 36415 COLL VENOUS BLD VENIPUNCTURE: CPT

## 2021-04-07 PROCEDURE — G0103 PSA SCREENING: HCPCS

## 2021-04-07 PROCEDURE — 82043 UR ALBUMIN QUANTITATIVE: CPT

## 2021-04-09 ENCOUNTER — OFFICE VISIT (OUTPATIENT)
Dept: FAMILY MEDICINE CLINIC | Facility: CLINIC | Age: 71
End: 2021-04-09
Payer: MEDICARE

## 2021-04-09 VITALS
WEIGHT: 192 LBS | SYSTOLIC BLOOD PRESSURE: 122 MMHG | HEART RATE: 68 BPM | HEIGHT: 67 IN | RESPIRATION RATE: 16 BRPM | DIASTOLIC BLOOD PRESSURE: 78 MMHG | TEMPERATURE: 97.9 F | BODY MASS INDEX: 30.13 KG/M2

## 2021-04-09 DIAGNOSIS — E78.2 MIXED HYPERLIPIDEMIA: ICD-10-CM

## 2021-04-09 DIAGNOSIS — E11.8 TYPE 2 DIABETES MELLITUS WITH COMPLICATION, WITHOUT LONG-TERM CURRENT USE OF INSULIN (HCC): Primary | ICD-10-CM

## 2021-04-09 DIAGNOSIS — Z87.898 HISTORY OF ELEVATED PSA: ICD-10-CM

## 2021-04-09 DIAGNOSIS — K90.0 ADULT CELIAC DISEASE: ICD-10-CM

## 2021-04-09 DIAGNOSIS — E55.9 VITAMIN D DEFICIENCY: ICD-10-CM

## 2021-04-09 DIAGNOSIS — Z12.11 SCREENING FOR MALIGNANT NEOPLASM OF COLON: ICD-10-CM

## 2021-04-09 DIAGNOSIS — R91.1 PULMONARY NODULE SEEN ON IMAGING STUDY: ICD-10-CM

## 2021-04-09 PROCEDURE — 99214 OFFICE O/P EST MOD 30 MIN: CPT | Performed by: FAMILY MEDICINE

## 2021-04-09 NOTE — PATIENT INSTRUCTIONS
Everything looks good continue as you are  Let us lose some of that 15 lb if not all of it before next visit    See you in 6 months with a nonfasting A1c 1 or 2 weeks prior

## 2021-04-12 ENCOUNTER — PREP FOR PROCEDURE (OUTPATIENT)
Dept: GASTROENTEROLOGY | Facility: CLINIC | Age: 71
End: 2021-04-12

## 2021-04-12 ENCOUNTER — TELEPHONE (OUTPATIENT)
Dept: GASTROENTEROLOGY | Facility: CLINIC | Age: 71
End: 2021-04-12

## 2021-04-12 DIAGNOSIS — Z12.11 SPECIAL SCREENING FOR MALIGNANT NEOPLASMS, COLON: Primary | ICD-10-CM

## 2021-04-12 NOTE — TELEPHONE ENCOUNTER
Repeat colon scheduled for 5/26/21 @ [facility] with Dr Marilyn Puentes  Miralax/ Dulcolax instructions given verbally/ mailed to pt

## 2021-04-20 NOTE — TELEPHONE ENCOUNTER
Colon rescheduled to 5/17/21 with Dr Kim Duran at [facility]  Patient still has prep and instructions

## 2021-05-03 ENCOUNTER — ANESTHESIA EVENT (OUTPATIENT)
Dept: ANESTHESIOLOGY | Facility: HOSPITAL | Age: 71
End: 2021-05-03

## 2021-05-03 ENCOUNTER — ANESTHESIA (OUTPATIENT)
Dept: ANESTHESIOLOGY | Facility: HOSPITAL | Age: 71
End: 2021-05-03

## 2021-05-15 DIAGNOSIS — R35.0 BENIGN PROSTATIC HYPERPLASIA WITH URINARY FREQUENCY: ICD-10-CM

## 2021-05-15 DIAGNOSIS — E78.2 MIXED HYPERLIPIDEMIA: ICD-10-CM

## 2021-05-15 DIAGNOSIS — N40.1 BENIGN PROSTATIC HYPERPLASIA WITH URINARY FREQUENCY: ICD-10-CM

## 2021-05-15 RX ORDER — ATORVASTATIN CALCIUM 20 MG/1
TABLET, FILM COATED ORAL
Qty: 90 TABLET | Refills: 1 | Status: SHIPPED | OUTPATIENT
Start: 2021-05-15 | End: 2021-12-20

## 2021-05-15 RX ORDER — FINASTERIDE 5 MG/1
TABLET, FILM COATED ORAL
Qty: 90 TABLET | Refills: 3 | Status: SHIPPED | OUTPATIENT
Start: 2021-05-15 | End: 2022-07-01

## 2021-05-17 ENCOUNTER — ANESTHESIA (OUTPATIENT)
Dept: GASTROENTEROLOGY | Facility: AMBULARY SURGERY CENTER | Age: 71
End: 2021-05-17

## 2021-05-17 ENCOUNTER — HOSPITAL ENCOUNTER (OUTPATIENT)
Dept: GASTROENTEROLOGY | Facility: AMBULARY SURGERY CENTER | Age: 71
Setting detail: OUTPATIENT SURGERY
Discharge: HOME | End: 2021-05-17
Attending: INTERNAL MEDICINE | Admitting: INTERNAL MEDICINE
Payer: MEDICARE

## 2021-05-17 ENCOUNTER — ANESTHESIA EVENT (OUTPATIENT)
Dept: GASTROENTEROLOGY | Facility: AMBULARY SURGERY CENTER | Age: 71
End: 2021-05-17

## 2021-05-17 VITALS
OXYGEN SATURATION: 97 % | BODY MASS INDEX: 29.51 KG/M2 | HEART RATE: 61 BPM | RESPIRATION RATE: 21 BRPM | WEIGHT: 188 LBS | SYSTOLIC BLOOD PRESSURE: 152 MMHG | DIASTOLIC BLOOD PRESSURE: 65 MMHG | HEIGHT: 67 IN | TEMPERATURE: 97.9 F

## 2021-05-17 DIAGNOSIS — Z12.11 SPECIAL SCREENING FOR MALIGNANT NEOPLASMS, COLON: ICD-10-CM

## 2021-05-17 PROCEDURE — G0121 COLON CA SCRN NOT HI RSK IND: HCPCS | Performed by: INTERNAL MEDICINE

## 2021-05-17 RX ORDER — LIDOCAINE HYDROCHLORIDE 10 MG/ML
INJECTION, SOLUTION EPIDURAL; INFILTRATION; INTRACAUDAL; PERINEURAL AS NEEDED
Status: DISCONTINUED | OUTPATIENT
Start: 2021-05-17 | End: 2021-05-17

## 2021-05-17 RX ORDER — PROPOFOL 10 MG/ML
INJECTION, EMULSION INTRAVENOUS AS NEEDED
Status: DISCONTINUED | OUTPATIENT
Start: 2021-05-17 | End: 2021-05-17

## 2021-05-17 RX ORDER — SODIUM CHLORIDE, SODIUM LACTATE, POTASSIUM CHLORIDE, CALCIUM CHLORIDE 600; 310; 30; 20 MG/100ML; MG/100ML; MG/100ML; MG/100ML
INJECTION, SOLUTION INTRAVENOUS CONTINUOUS PRN
Status: DISCONTINUED | OUTPATIENT
Start: 2021-05-17 | End: 2021-05-17

## 2021-05-17 RX ADMIN — PROPOFOL 20 MG: 10 INJECTION, EMULSION INTRAVENOUS at 08:24

## 2021-05-17 RX ADMIN — LIDOCAINE HYDROCHLORIDE 50 MG: 10 INJECTION, SOLUTION EPIDURAL; INFILTRATION; INTRACAUDAL at 08:18

## 2021-05-17 RX ADMIN — PROPOFOL 20 MG: 10 INJECTION, EMULSION INTRAVENOUS at 08:21

## 2021-05-17 RX ADMIN — SODIUM CHLORIDE, SODIUM LACTATE, POTASSIUM CHLORIDE, AND CALCIUM CHLORIDE: .6; .31; .03; .02 INJECTION, SOLUTION INTRAVENOUS at 08:06

## 2021-05-17 RX ADMIN — PROPOFOL 20 MG: 10 INJECTION, EMULSION INTRAVENOUS at 08:27

## 2021-05-17 RX ADMIN — Medication 40 MG: at 08:25

## 2021-05-17 RX ADMIN — PROPOFOL 140 MG: 10 INJECTION, EMULSION INTRAVENOUS at 08:18

## 2021-05-17 NOTE — ANESTHESIA PREPROCEDURE EVALUATION
Procedure:  COLONOSCOPY    Relevant Problems   CARDIO   (+) Mixed hyperlipidemia      ENDO   (+) Type 2 diabetes mellitus with complication, without long-term current use of insulin (HCC)      GI/HEPATIC   (+) Liver cyst      NEURO/PSYCH   (+) History of elevated PSA        Physical Exam    Airway    Mallampati score: II  TM Distance: >3 FB       Dental       Cardiovascular  Rhythm: regular, Rate: normal,     Pulmonary  Breath sounds clear to auscultation,     Other Findings        Anesthesia Plan  ASA Score- 3     Anesthesia Type- IV sedation with anesthesia with ASA Monitors  Additional Monitors:   Airway Plan:           Plan Factors-Exercise tolerance (METS): >4 METS  Chart reviewed  EKG reviewed  Existing labs reviewed  Patient summary reviewed  Patient is not a current smoker  Induction-     Postoperative Plan-     Informed Consent-   I personally reviewed this patient with the CRNA  Discussed and agreed on the Anesthesia Plan with the CRNA  Matthew Myrick

## 2021-05-17 NOTE — H&P
History and Physical -  [facility]  Tejal Finch 66 y o  male MRN: [id number]    HPI: Tejal Finch is a 66y o  year old male who presents for surveillance colonoscopy  He had multiple polyps removed in 27      Review of Systems    Historical Information   Past Medical History:   Diagnosis Date    Acute pancreatitis     last assessed - [id number]    BPH (benign prostatic hypertrophy)     Celiac disease     current GI w/u to r/o celiac    Cholelithiasis     Colon polyp     Elevated blood pressure reading     last assessed - [id number]    Gallstones     last assessed - [id number]    High cholesterol     Hx of mitral valve disorder     Sarcoidosis     1990's Diagnosed 15 years ago by biopsy, not active; last assessed - [id number]     Past Surgical History:   Procedure Laterality Date    CATARACT EXTRACTION      ERCP N/A 4/3/2017    Procedure: ENDOSCOPIC RETROGRADE CHOLANGIOPANCREATOGRAPHY (ERCP); Surgeon: Coco Espinoza MD;  [facility]; Service:     AZ COLONOSCOPY FLX DX W/COLLJ SPEC WHEN PFRMD N/A 3/1/2017    Procedure: EGD AND COLONOSCOPY;  Surgeon: Jo Ann Preston MD;  [facility]; Service: Gastroenterology    AZ ESOPHAGOGASTRODUODENOSCOPY TRANSORAL DIAGNOSTIC N/A 6/5/2017    Procedure: ESOPHAGOGASTRODUODENOSCOPY (EGD); Surgeon: Coco Espinoza MD;  [facility];   Service: Gastroenterology    AZ LAP,CHOLECYSTECTOMY N/A 3/29/2017    Procedure: CHOLECYSTECTOMY LAPAROSCOPIC; POSSIBLE OPEN;  Surgeon: Danny Rich MD;  [facility];  Service: General; complicated by bile leak and pancreatitis s/p stent     ROTATOR CUFF REPAIR Right 04/29/2015    for rotator cuff tear, Onset: 4/14     Social History   Social History     Substance and Sexual Activity   Alcohol Use Yes    Comment: wine, 3-4x/week; social drinker     Social History     Substance and Sexual Activity   Drug Use No    Comment: Denied history of drug use     Social History     Tobacco Use Smoking Status Never Smoker   Smokeless Tobacco Never Used     Family History   Problem Relation Age of Onset    Diabetes Mother         Diabetes Mellitus    Hypertension Mother     Ovarian cancer Mother     Rheumatologic disease Mother     Diabetes Father         Diabetes Mellitus    Heart disease Father     Hypertension Father     Diabetes Sister         Diabetes Mellitus    Other Sister         H  pylori infection    Hypertension Sister     Other Brother         H  pylori infection    Alcohol abuse Neg Hx     Substance Abuse Neg Hx     Mental illness Neg Hx     Depression Neg Hx        Meds/Allergies     (Not in a hospital admission)      No Known Allergies    Objective     /77   Pulse 79   Temp (!) 97 1 °F (36 2 °C) (Temporal)   Resp 22   Ht 5' 7" (1 702 m)   Wt 85 3 kg (188 lb)   SpO2 99%   BMI 29 44 kg/m²       PHYSICAL EXAM    Gen: NAD  CV: RRR  CHEST: Clear  ABD: soft, NT/ND  EXT: no edema  Neuro: AAO      ASSESSMENT/PLAN:  This is a 66y o  year old male here for surveillance colonoscopy      PLAN:   Procedure:  Colonoscopy with biopsy and possible polypectomy

## 2021-09-09 ENCOUNTER — OFFICE VISIT (OUTPATIENT)
Dept: FAMILY MEDICINE CLINIC | Facility: CLINIC | Age: 71
End: 2021-09-09
Payer: MEDICARE

## 2021-09-09 VITALS
TEMPERATURE: 97.9 F | DIASTOLIC BLOOD PRESSURE: 80 MMHG | WEIGHT: 190.2 LBS | SYSTOLIC BLOOD PRESSURE: 124 MMHG | RESPIRATION RATE: 15 BRPM | HEIGHT: 67 IN | HEART RATE: 64 BPM | BODY MASS INDEX: 29.85 KG/M2

## 2021-09-09 DIAGNOSIS — M18.11 OSTEOARTHRITIS OF CARPOMETACARPAL (CMC) JOINT OF RIGHT THUMB, UNSPECIFIED OSTEOARTHRITIS TYPE: Primary | ICD-10-CM

## 2021-09-09 PROCEDURE — 1124F ACP DISCUSS-NO DSCNMKR DOCD: CPT | Performed by: FAMILY MEDICINE

## 2021-09-09 PROCEDURE — 99214 OFFICE O/P EST MOD 30 MIN: CPT | Performed by: FAMILY MEDICINE

## 2021-09-09 NOTE — PATIENT INSTRUCTIONS
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

## 2021-09-09 NOTE — PROGRESS NOTES
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  Patient will try occupational therapy  Glucosamine and chondroitin will be continued  Trial of Voltaren gel over-the-counter  Can take arthritis strength Tylenol 2 pills together up to 3 times daily  Each 1 the of the Tylenol Arthritis are 650 mg each  Also known as acetaminophen 650 mg               Subjective:   Erendira Jade is a 66 y o male  Chief Complaint   Patient presents with    right thumb pain     5 months ago patient moved 30 bags of mulch  He noticed pain in the base of his right some after that time and has gotten a little better but is still there  He has hard time shaking hands, opening up jars, and even bowling he cannot even hold ball  He does not remember a specific incident that caused the pain he just remembers overall it hurting  By this time he thought it would be better but it is not    He has been on glucosamine and chondroitin for at least 20 years at this point  He was checked for rheumatoid arthritis in 2018 with a hand x-ray that does show mild osteoarthritis of his joints      Past medical history, social history, and family history reviewed as appropriate for the complaint of this patient  MEDICATIONS REVIEWED AND UPDATED    10 point review of systems performed, the remainder of the ROS is negative except for what is noted in the history of chief complaint    Objective:    Vitals:    09/09/21 1558   BP: 124/80   Pulse: 64   Resp: 15   Temp: 97 9 °F (36 6 °C)     Body mass index is 29 79 kg/m²  Physical Exam    General  Patient in no acute distress, well appearing, well nourished and appears stated age    Mental status  Good judgment and insight, oriented to time person and place, recent and remote memory is intact, mood and affect are normal, cooperative, and patient is reasonable      Hand right  Patient has reproducible crepitance at his MCP joint with reproduction of the pain  Negative Finkelstein's test  Left MCP has some crepitance but not nearly as much as the right hand  Patient is right-handed

## 2021-09-16 ENCOUNTER — OFFICE VISIT (OUTPATIENT)
Dept: URGENT CARE | Age: 71
End: 2021-09-16
Payer: MEDICARE

## 2021-09-16 VITALS
HEART RATE: 80 BPM | WEIGHT: 190 LBS | TEMPERATURE: 98.9 F | RESPIRATION RATE: 18 BRPM | BODY MASS INDEX: 29.82 KG/M2 | OXYGEN SATURATION: 100 % | HEIGHT: 67 IN

## 2021-09-16 DIAGNOSIS — Z20.822 EXPOSURE TO 2019 NOVEL CORONAVIRUS: Primary | ICD-10-CM

## 2021-09-16 PROCEDURE — 99213 OFFICE O/P EST LOW 20 MIN: CPT | Performed by: PHYSICIAN ASSISTANT

## 2021-09-16 PROCEDURE — U0005 INFEC AGEN DETEC AMPLI PROBE: HCPCS | Performed by: PHYSICIAN ASSISTANT

## 2021-09-16 PROCEDURE — G0463 HOSPITAL OUTPT CLINIC VISIT: HCPCS | Performed by: PHYSICIAN ASSISTANT

## 2021-09-16 PROCEDURE — U0003 INFECTIOUS AGENT DETECTION BY NUCLEIC ACID (DNA OR RNA); SEVERE ACUTE RESPIRATORY SYNDROME CORONAVIRUS 2 (SARS-COV-2) (CORONAVIRUS DISEASE [COVID-19]), AMPLIFIED PROBE TECHNIQUE, MAKING USE OF HIGH THROUGHPUT TECHNOLOGIES AS DESCRIBED BY CMS-2020-01-R: HCPCS | Performed by: PHYSICIAN ASSISTANT

## 2021-09-16 NOTE — PATIENT INSTRUCTIONS
Check or sign up for   [facility]'s my Chart to view your results  We do not call patient's with negative results  Go directly home after today's visit, quarantine until you receive a negative result  If you have a positive result you need to quarantine at home for a minimum of 10 days from the date of testing  You may end your quarantine when you are symptoms free without medications to reduce fever (e g  acetaminophen/Tylenol) for 24 hours  Recommend over the counter antihistamines such as Claratin, Allegra, Zyrtec, or Benadryl for congestion  You may also use over the counter nasal sprays such as Flonase for this  Over the counter lozenges such as Cepacol, Ricola, Halls, or Chloroseptic can be used for sore throat symptoms  Recommend Vitamin C 1,000 mg twice daily, Vitamin D3 2000 IU daily, multivitamin and Zinc for immune support  If your symptoms worsen or you develop shortness of breath report to the nearest emergency room  Check cdc gov for most current guidelines as guidelines are subject to change as we learn more about the virus  [address]    Your healthcare provider and/or public health staff have evaluated you and have determined that you do not need to remain in the hospital at this time  At this time you can be isolated at home where you will be monitored by staff from your local or state health department  You should carefully follow the prevention and isolation steps below until a healthcare provider or local or state health department says that you can return to your normal activities  Stay home except to get medical care    People who are mildly ill with COVID-19 are able to isolate at home during their illness  You should restrict activities outside your home, except for getting medical care  Do not go to work, school, or public areas  Avoid using public transportation, ride-sharing, or taxis      Separate yourself from other people and animals in your home    People: As much as possible, you should stay in a specific room and away from other people in your home  Also, you should use a separate bathroom, if available  Animals: You should restrict contact with pets and other animals while you are sick with COVID-19, just like you would around other people  Although there have not been reports of pets or other animals becoming sick with COVID-19, it is still recommended that people sick with COVID-19 limit contact with animals until more information is known about the virus  When possible, have another member of your household care for your animals while you are sick  If you are sick with COVID-19, avoid contact with your pet, including petting, snuggling, being kissed or licked, and sharing food  If you must care for your pet or be around animals while you are sick, wash your hands before and after you interact with pets and wear a facemask  See COVID-19 and Animals for more information  Call ahead before visiting your doctor    If you have a medical appointment, call the healthcare provider and tell them that you have or may have COVID-19  This will help the healthcare providers office take steps to keep other people from getting infected or exposed  Wear a facemask    You should wear a facemask when you are around other people (e g , sharing a room or vehicle) or pets and before you enter a healthcare providers office  If you are not able to wear a facemask (for example, because it causes trouble breathing), then people who live with you should not stay in the same room with you, or they should wear a facemask if they enter your room  Cover your coughs and sneezes    Cover your mouth and nose with a tissue when you cough or sneeze  Throw used tissues in a lined trash can   Immediately wash your hands with soap and water for at least 20 seconds or, if soap and water are not available, clean your hands with an alcohol-based hand  that contains at least 60% alcohol  Clean your hands often    Wash your hands often with soap and water for at least 20 seconds, especially after blowing your nose, coughing, or sneezing; going to the bathroom; and before eating or preparing food  If soap and water are not readily available, use an alcohol-based hand  with at least 60% alcohol, covering all surfaces of your hands and rubbing them together until they feel dry  Soap and water are the best option if hands are visibly dirty  Avoid touching your eyes, nose, and mouth with unwashed hands  Avoid sharing personal household items    You should not share dishes, drinking glasses, cups, eating utensils, towels, or bedding with other people or pets in your home  After using these items, they should be washed thoroughly with soap and water  Clean all high-touch surfaces everyday    High touch surfaces include counters, tabletops, doorknobs, bathroom fixtures, toilets, phones, keyboards, tablets, and bedside tables  Also, clean any surfaces that may have blood, stool, or body fluids on them  Use a household cleaning spray or wipe, according to the label instructions  Labels contain instructions for safe and effective use of the cleaning product including precautions you should take when applying the product, such as wearing gloves and making sure you have good ventilation during use of the product  Monitor your symptoms    Seek prompt medical attention if your illness is worsening (e g , difficulty breathing)  Before seeking care, call your healthcare provider and tell them that you have, or are being evaluated for, COVID-19  Put on a facemask before you enter the facility  These steps will help the healthcare providers office to keep other people in the office or waiting room from getting infected or exposed  Ask your healthcare provider to call the local or state health department   Persons who are placed under active monitoring or facilitated self-monitoring should follow instructions provided by their local health department or occupational health professionals, as appropriate  If you have a medical emergency and need to call 911, notify the dispatch personnel that you have, or are being evaluated for COVID-19  If possible, put on a facemask before emergency medical services arrive  Discontinuing home isolation    Patients with confirmed COVID-19 should remain under home isolation precautions until the following conditions are met:   - They have had no fever for at least 24 hours (that is one full day of no fever without the use medicine that reduces fevers)  AND  - other symptoms have improved (for example, when their cough or shortness of breath have improved)  AND  - If had mild or moderate illness, at least 10 days have passed since their symptoms first appeared or if severe illness (needed oxygen) or immunosuppressed, at least 20 days have passed since symptoms first appeared  Patients with confirmed COVID-19 should also notify close contacts (including their workplace) and ask that they self-quarantine  Currently, close contact is defined as being within 6 feet for 15 minutes or more from the period 24 hours starting 48 hours before symptom onset to the time at which the patient went into isolation  Close contacts of patients diagnosed with COVID-19 should be instructed by the patient to self-quarantine for 14 days from the last time of their last contact with the patient       Source: [employer]

## 2021-09-16 NOTE — PROGRESS NOTES
[address] [facility]        NAME: Ryne Pérez is a 66 y o  male  : 1950    MRN: [id number]  DATE: 2021  TIME: 11:05 AM    Assessment and Plan   Special screening examination for unspecified viral disease [Z11 59]  1  Special screening examination for unspecified viral disease  Novel Coronavirus (Covid-19),PCR [facility]   Pt presents with symptoms consistent with possible COVID 19 infection after exposure 6 days ago  Pt will be tested in accordance with CDC guidelines and recommendations for symptomatic individuals regardless of vaccination status  We discussed quarantine protocols and symptomatic treatments  The pt may follow-up with their PCP if symptoms are not improved in 2-3 days and COVID test is negative  Pt will report to the emergency department if symptoms worsen  Patient Instructions     Patient Instructions   Check or sign up for [facility]  Chart to view your results  We do not call patient's with negative results  Go directly home after today's visit, quarantine until you receive a negative result  If you have a positive result you need to quarantine at home for a minimum of 10 days from the date of testing  You may end your quarantine when you are symptoms free without medications to reduce fever (e g  acetaminophen/Tylenol) for 24 hours  Recommend over the counter antihistamines such as Claratin, Allegra, Zyrtec, or Benadryl for congestion  You may also use over the counter nasal sprays such as Flonase for this  Over the counter lozenges such as Cepacol, Ricola, Halls, or Chloroseptic can be used for sore throat symptoms  Recommend Vitamin C 1,000 mg twice daily, Vitamin D3 2000 IU daily, multivitamin and Zinc for immune support  If your symptoms worsen or you develop shortness of breath report to the nearest emergency room  Check cdc gov for most current guidelines as guidelines are subject to change as we learn more about the virus       [address] [facility] Guidelines    Your healthcare provider and/or public health staff have evaluated you and have determined that you do not need to remain in the hospital at this time  At this time you can be isolated at home where you will be monitored by staff from your local or state health department  You should carefully follow the prevention and isolation steps below until a healthcare provider or local or state health department says that you can return to your normal activities  Stay home except to get medical care    People who are mildly ill with COVID-19 are able to isolate at home during their illness  You should restrict activities outside your home, except for getting medical care  Do not go to work, school, or public areas  Avoid using public transportation, ride-sharing, or taxis  Separate yourself from other people and animals in your home    People: As much as possible, you should stay in a specific room and away from other people in your home  Also, you should use a separate bathroom, if available  Animals: You should restrict contact with pets and other animals while you are sick with COVID-19, just like you would around other people  Although there have not been reports of pets or other animals becoming sick with COVID-19, it is still recommended that people sick with COVID-19 limit contact with animals until more information is known about the virus  When possible, have another member of your household care for your animals while you are sick  If you are sick with COVID-19, avoid contact with your pet, including petting, snuggling, being kissed or licked, and sharing food  If you must care for your pet or be around animals while you are sick, wash your hands before and after you interact with pets and wear a facemask  See COVID-19 and Animals for more information      Call ahead before visiting your doctor    If you have a medical appointment, call the healthcare provider and tell them that you have or may have COVID-19  This will help the healthcare providers office take steps to keep other people from getting infected or exposed  Wear a facemask    You should wear a facemask when you are around other people (e g , sharing a room or vehicle) or pets and before you enter a healthcare providers office  If you are not able to wear a facemask (for example, because it causes trouble breathing), then people who live with you should not stay in the same room with you, or they should wear a facemask if they enter your room  Cover your coughs and sneezes    Cover your mouth and nose with a tissue when you cough or sneeze  Throw used tissues in a lined trash can  Immediately wash your hands with soap and water for at least 20 seconds or, if soap and water are not available, clean your hands with an alcohol-based hand  that contains at least 60% alcohol  Clean your hands often    Wash your hands often with soap and water for at least 20 seconds, especially after blowing your nose, coughing, or sneezing; going to the bathroom; and before eating or preparing food  If soap and water are not readily available, use an alcohol-based hand  with at least 60% alcohol, covering all surfaces of your hands and rubbing them together until they feel dry  Soap and water are the best option if hands are visibly dirty  Avoid touching your eyes, nose, and mouth with unwashed hands  Avoid sharing personal household items    You should not share dishes, drinking glasses, cups, eating utensils, towels, or bedding with other people or pets in your home  After using these items, they should be washed thoroughly with soap and water  Clean all high-touch surfaces everyday    High touch surfaces include counters, tabletops, doorknobs, bathroom fixtures, toilets, phones, keyboards, tablets, and bedside tables  Also, clean any surfaces that may have blood, stool, or body fluids on them   Use a household cleaning spray or wipe, according to the label instructions  Labels contain instructions for safe and effective use of the cleaning product including precautions you should take when applying the product, such as wearing gloves and making sure you have good ventilation during use of the product  Monitor your symptoms    Seek prompt medical attention if your illness is worsening (e g , difficulty breathing)  Before seeking care, call your healthcare provider and tell them that you have, or are being evaluated for, COVID-19  Put on a facemask before you enter the facility  These steps will help the healthcare providers office to keep other people in the office or waiting room from getting infected or exposed  Ask your healthcare provider to call the local or [facility] health department  Persons who are placed under active monitoring or facilitated self-monitoring should follow instructions provided by their local health department or occupational health professionals, as appropriate  If you have a medical emergency and need to call 911, notify the dispatch personnel that you have, or are being evaluated for COVID-19  If possible, put on a facemask before emergency medical services arrive  Discontinuing home isolation    Patients with confirmed COVID-19 should remain under home isolation precautions until the following conditions are met:   - They have had no fever for at least 24 hours (that is one full day of no fever without the use medicine that reduces fevers)  AND  - other symptoms have improved (for example, when their cough or shortness of breath have improved)  AND  - If had mild or moderate illness, at least 10 days have passed since their symptoms first appeared or if severe illness (needed oxygen) or immunosuppressed, at least 20 days have passed since symptoms first appeared  Patients with confirmed COVID-19 should also notify close contacts (including their workplace) and ask that they self-quarantine  Currently, close contact is defined as being within 6 feet for 15 minutes or more from the period 24 hours starting 48 hours before symptom onset to the time at which the patient went into isolation  Close contacts of patients diagnosed with COVID-19 should be instructed by the patient to self-quarantine for 14 days from the last time of their last contact with the patient  Source: [employer]         Follow up with PCP in 3-5 days  Proceed to  ER if symptoms worsen  Chief Complaint     Chief Complaint   Patient presents with    COVID-19     headache, fatigue and +exposure to covid  symptoms started about 5 days ago  History of Present Illness       66year old male presents with complaints of headache, fatigue, and mild sore throat for 5 days duration  He denies dizziness, lightheadedness, and changes in vistion  Pt denies fever, chills, runny nose,  cough,shortness of breath, chest pain, nausea, vomiting, diarrhea, myalgias, and loss of taste and smell  Pt reports his nephew tested positive for COVID  He states he had dinner with his sister and nephew 6 days ago and his nephew did not know he had COVID at the time  He denies history of asthma He denies smoking and use of e-cigarettes  He is vaccinated against COVID 19  No other concerns or complaints today  Review of Systems   Review of Systems   Constitutional: Positive for fatigue  Negative for chills and fever  HENT: Negative for congestion, postnasal drip, rhinorrhea and sore throat  Respiratory: Negative for cough and shortness of breath  Cardiovascular: Negative for chest pain  Gastrointestinal: Negative for diarrhea, nausea and vomiting  Musculoskeletal: Negative for myalgias  Neurological: Positive for headaches           Current Medications       Current Outpatient Medications:     atorvastatin (LIPITOR) 20 mg tablet, TAKE 1 TABLET DAILY, Disp: 90 tablet, Rfl: 1    Cholecalciferol (VITAMIN D-3) 1000 units CAPS, 2 daily, Disp: , Rfl: 0    finasteride (PROSCAR) 5 mg tablet, TAKE 1 TABLET DAILY AFTER  DINNER, Disp: 90 tablet, Rfl: 3    Glucosamine-Chondroitin (MOVE FREE PO), Take 2 tablets by mouth daily, Disp: , Rfl:     Multiple Vitamin (MULTIVITAMIN) tablet, Take 1 tablet by mouth daily, Disp: , Rfl:     Current Allergies     Allergies as of 09/16/2021    (No Known Allergies)            The following portions of the patient's history were reviewed and updated as appropriate: allergies, current medications, past family history, past medical history, past social history, past surgical history and problem list      Past Medical History:   Diagnosis Date    Acute pancreatitis     last assessed - [id number]    BPH (benign prostatic hypertrophy)     Celiac disease     current GI w/u to r/o celiac    Cholelithiasis     Colon polyp     Elevated blood pressure reading     last assessed - [id number]    Gallstones     last assessed - [id number]    High cholesterol     Hx of mitral valve disorder     Sarcoidosis     1990's Diagnosed 15 years ago by biopsy, not active; last assessed - [id number]       Past Surgical History:   Procedure Laterality Date    CATARACT EXTRACTION      ERCP N/A 4/3/2017    Procedure: ENDOSCOPIC RETROGRADE CHOLANGIOPANCREATOGRAPHY (ERCP); Surgeon: Elida Carmichael MD;  Location: [facility]; Service:     PA COLONOSCOPY FLX DX W/COLLJ SPEC WHEN PFRMD N/A 3/1/2017    Procedure: EGD AND COLONOSCOPY;  Surgeon: Aura Monsivais MD;  Location[facility]; Service: Gastroenterology    PA ESOPHAGOGASTRODUODENOSCOPY TRANSORAL DIAGNOSTIC N/A 6/5/2017    Procedure: ESOPHAGOGASTRODUODENOSCOPY (EGD); Surgeon: Elida Carmichael MD;  Location[facility];   Service: Gastroenterology    PA LAP,CHOLECYSTECTOMY N/A 3/29/2017    Procedure: CHOLECYSTECTOMY LAPAROSCOPIC; POSSIBLE OPEN;  Surgeon: Lokesh Hair MD;  Location: [facility];  Service: General; complicated by bile leak and pancreatitis s/p stent     ROTATOR CUFF REPAIR Right 04/29/2015    for rotator cuff tear, Onset: 4/14       Family History   Problem Relation Age of Onset    Diabetes Mother         Diabetes Mellitus    Hypertension Mother     Ovarian cancer Mother     Rheumatologic disease Mother     Diabetes Father         Diabetes Mellitus    Heart disease Father     Hypertension Father     Diabetes Sister         Diabetes Mellitus    Other Sister         H  pylori infection    Hypertension Sister     Other Brother         H  pylori infection    Alcohol abuse Neg Hx     Substance Abuse Neg Hx     Mental illness Neg Hx     Depression Neg Hx          Medications have been verified  Objective   Pulse 80   Temp 98 9 °F (37 2 °C)   Resp 18   Ht 5' 7" (1 702 m)   Wt 86 2 kg (190 lb)   SpO2 100%   BMI 29 76 kg/m²   No LMP for male patient  Physical Exam     Physical Exam  Constitutional:       General: He is awake  He is not in acute distress  Appearance: Normal appearance  He is well-developed and well-groomed  He is not ill-appearing, toxic-appearing or diaphoretic  HENT:      Head: Normocephalic and atraumatic  Right Ear: Hearing and external ear normal       Left Ear: Hearing and external ear normal    Eyes:      General: Lids are normal  Vision grossly intact  Gaze aligned appropriately  Cardiovascular:      Rate and Rhythm: Normal rate  Pulmonary:      Effort: Pulmonary effort is normal       Comments: Patient is speaking in full sentences with no increased respiratory effort  No audible wheezing or stridor  Musculoskeletal:      Cervical back: Normal range of motion  Skin:     General: Skin is warm and dry  Neurological:      Mental Status: He is alert and oriented to person, place, and time  Coordination: Coordination is intact  Gait: Gait is intact     Psychiatric:         Attention and Perception: Attention and perception normal          Mood and Affect: Mood and affect normal          Speech: Speech normal          Behavior: Behavior normal  Behavior is cooperative  Note: Portions of this record may have been created with voice recognition software  Occasional wrong word or "sound a like" substitutions may have occurred due to the inherent limitations of voice recognition software  Please read the chart carefully and recognize, using context, where substitutions have occurred  *

## 2021-09-17 LAB — SARS-COV-2 RNA RESP QL NAA+PROBE: NEGATIVE

## 2021-09-21 ENCOUNTER — EVALUATION (OUTPATIENT)
Dept: PHYSICAL THERAPY | Facility: REHABILITATION | Age: 71
End: 2021-09-21
Payer: MEDICARE

## 2021-09-21 DIAGNOSIS — M18.11 PRIMARY OSTEOARTHRITIS OF FIRST CARPOMETACARPAL JOINT OF RIGHT HAND: Primary | ICD-10-CM

## 2021-09-21 DIAGNOSIS — M79.641 RIGHT HAND PAIN: ICD-10-CM

## 2021-09-21 PROCEDURE — 97110 THERAPEUTIC EXERCISES: CPT | Performed by: PHYSICAL THERAPIST

## 2021-09-21 PROCEDURE — 97162 PT EVAL MOD COMPLEX 30 MIN: CPT | Performed by: PHYSICAL THERAPIST

## 2021-09-21 NOTE — PROGRESS NOTES
PT Evaluation     Today's date: 2021  Patient name: Sheldon Scherer  : 1950  MRN: [id number]  Referring provider: Barbra Martinez DO  Dx:   Encounter Diagnosis     ICD-10-CM    1  Primary osteoarthritis of first carpometacarpal joint of right hand  M18 11    2  Right hand pain  M79 641                   Assessment  Assessment details: Pt is a pleasant 66 y o  male presenting to outpatient physical therapy with Primary osteoarthritis of first carpometacarpal joint of right hand  (primary encounter diagnosis) Right hand pain  Pt presents with pain, decreased range of motion, decreased strength, and decreased tolerance to activity  Pt is a good candidate for outpatient physical therapy and would benefit from skilled physical therapy to address limitations and to achieve goals  Thank you for this referral    Impairments: abnormal coordination, abnormal or restricted ROM, activity intolerance, impaired physical strength and pain with function  Understanding of Dx/Px/POC: good   Prognosis: good    Goals  ST  Patient will report 25% decrease in pain in 4 weeks  2  Patient will demonstrate 25% improvement in ROM in 4 weeks  3  Patient will demonstrate 1/2 grade improvement in strength in 4 weeks  LT  Patient will be able to perform IADLS without restriction or pain by discharge  2  Patient will be independent in HEP by discharge  3  Patient will be able to return to recreational/work duties without restriction or pain by discharge        Plan  Patient would benefit from: PT eval and skilled PT  Planned modality interventions: cryotherapy and thermotherapy: hydrocollator packs  Planned therapy interventions: IADL retraining, body mechanics training, flexibility, functional ROM exercises, home exercise program, neuromuscular re-education, manual therapy, postural training, strengthening, stretching, therapeutic activities, therapeutic exercise and joint mobilization  Frequency: 2x week  Duration in visits: 8  Duration in weeks: 4  Treatment plan discussed with: patient        Subjective Evaluation    History of Present Illness  Mechanism of injury: Pt is a 66year old right-handed male presenting to PT with 5 month history of bilateral hand pain  Pt reports he feels the pain began with picking up bags of mulch, spent 2 -3 hours working with raking, pushing wheelbarrow and spreading the mulch  He developed pain while he was doing this which has not improved over the past 5 months  Pt reports he did not seek any treatment up until now  He reports he was trying to use his total gym and go bowling, and he is unable do to either due to pain  Pt went to PCP for further evaluation, no new diagnostic imaging performed  X-Rays from 2018:    R Hand:    Pain Location: right CMC joint, thenar eminence, denies numbness/tingling    Pain Type: aching, soreness    Pain Intensity:  Current: 1  Best: 1  Worst: 5      Pt reports increased pain and/or difficulty with: using his camera (using thumb to go between 2 buttons), opening jars/soda bottles, gripping/holding heavier objects, shaking hands, pulling motions, shoveling  Denies sleep disturbance secondary to hand pain  Pt reports decreased pain with: rest            Recurrent probem    Quality of life: good          Objective     Observations     Right Wrist/Hand   Positive for atrophy and edema  Additional Observation Details  Right hypothenar eminence atrophy, thenar edema and tenderness       Neurological Testing     Sensation     Wrist/Hand   Left   Intact: light touch    Right   Intact: light touch    Active Range of Motion     Left Wrist   Normal active range of motion    Right Wrist   Wrist flexion: 65 degrees   Wrist extension: 38 degrees   Radial deviation: WFL  Ulnar deviation: WFL    Right Thumb   Flexion     CMC: 25    MP: 45    DIP: 35  Extension     CMC: 55    MP: 0    DIP: 0  Palmar Abduction    CMC: 35  Adduction    CMC: 35  Opposition: Unable to touch 5th digit  Right Digits   Flexion   Index     MCP: 90    PIP: 90    DIP: 65  Middle     MCP: 80    PIP: 85    DIP: 45  Ring     MCP: 75    PIP: 80    DIP: 35  Little     MCP: 75    PIP: 75    DIP: 35    Strength/Myotome Testing     Left Wrist/Hand   Normal wrist strength     (2nd hand position)     Trial 1: 60    Right Wrist/Hand   Wrist extension: 3+  Wrist flexion: 3+  Radial deviation: 3+  Ulnar deviation: 3+     (2nd hand position)     Trial 1: 50    Tests     Right Wrist/Hand   Positive CMC grind  Negative Finkelstein's         Flowsheet Rows      Most Recent Value   PT/OT G-Codes   Current Score  54   Projected Score  69             Precautions: DM, HLD     Daily Treatment Diary      Assessment  9/21                     Eval/Reval                       FOTO  54/69       **         **   Manuals    R Hand/Finger PROM                        R CMC Distraction                          Exercise Diary     Putty  - Whole Hand  Yellow                      Putty  - Opposition  Yellow                      Wrist Flexors Stretch  20"x3                      Wrist Extensors  Stretch  20"x3                      Digiflex - Whole Hand  Red                      Digiflex - Individual Fingers  Red                                                                                                                                                                                                                                                                     Modalities    MHP R Hand Pre-Tx

## 2021-09-24 ENCOUNTER — OFFICE VISIT (OUTPATIENT)
Dept: PHYSICAL THERAPY | Facility: REHABILITATION | Age: 71
End: 2021-09-24
Payer: MEDICARE

## 2021-09-24 DIAGNOSIS — M79.641 RIGHT HAND PAIN: ICD-10-CM

## 2021-09-24 DIAGNOSIS — M18.11 PRIMARY OSTEOARTHRITIS OF FIRST CARPOMETACARPAL JOINT OF RIGHT HAND: Primary | ICD-10-CM

## 2021-09-24 PROCEDURE — 97110 THERAPEUTIC EXERCISES: CPT

## 2021-09-24 PROCEDURE — 97140 MANUAL THERAPY 1/> REGIONS: CPT

## 2021-09-24 NOTE — PROGRESS NOTES
Daily Note     Today's date: 2021  Patient name: Natasha Parson  : 1950  MRN: [id number]  Referring provider: Isaura Bosch DO  Dx:   Encounter Diagnosis     ICD-10-CM    1  Primary osteoarthritis of first carpometacarpal joint of right hand  M18 11    2  Right hand pain  M79 641                   Subjective: Pt reports that he is having minimal pain and achiness in his R thenar eminence  Notes that he has been complaint with his HEP but notices increased achiness as he does the exercises  Pt will be on vacation until later next week  Objective: See treatment diary below      Assessment: Tolerated treatment well  He was able to perform all newly added exercises this session with no complaints  Gentle PROM was performed to thenar eminence with some tenderness present  Pt felt looser with some soreness to end and was given an updated HEP  Patient demonstrated fatigue post treatment, exhibited good technique with therapeutic exercises and would benefit from continued PT      Plan: Continue per plan of care        Precautions: DM, HLD     Daily Treatment Diary      Assessment                     Eval/Reval                       FOTO  54/69       **         **   Manuals    R Hand/Finger PROM    MM                    R CMC Distraction  MM ROM                        Exercise Diary     Putty  - Whole Hand  Yellow  yel 3'                    Putty  - Opposition  Yellow  yel 3'                    Wrist Flexors Stretch  20"x3  20"x3                    Wrist Extensors  Stretch  20"x3  20"x3                    Digiflex - Whole Hand  Red  Red 3"x20                    Digiflex - Individual Fingers  Red  Red 3"x20                                                                                                                                                                                                                                                                   Modalities    MHP R Hand Pre-Tx     10'

## 2021-09-28 ENCOUNTER — APPOINTMENT (OUTPATIENT)
Dept: PHYSICAL THERAPY | Facility: REHABILITATION | Age: 71
End: 2021-09-28
Payer: MEDICARE

## 2021-10-01 ENCOUNTER — OFFICE VISIT (OUTPATIENT)
Dept: PHYSICAL THERAPY | Facility: REHABILITATION | Age: 71
End: 2021-10-01
Payer: MEDICARE

## 2021-10-01 DIAGNOSIS — M79.641 RIGHT HAND PAIN: ICD-10-CM

## 2021-10-01 DIAGNOSIS — M18.11 PRIMARY OSTEOARTHRITIS OF FIRST CARPOMETACARPAL JOINT OF RIGHT HAND: Primary | ICD-10-CM

## 2021-10-01 PROCEDURE — 97140 MANUAL THERAPY 1/> REGIONS: CPT | Performed by: PHYSICAL MEDICINE & REHABILITATION

## 2021-10-01 PROCEDURE — 97110 THERAPEUTIC EXERCISES: CPT | Performed by: PHYSICAL MEDICINE & REHABILITATION

## 2021-10-05 ENCOUNTER — OFFICE VISIT (OUTPATIENT)
Dept: PHYSICAL THERAPY | Facility: REHABILITATION | Age: 71
End: 2021-10-05
Payer: MEDICARE

## 2021-10-05 DIAGNOSIS — M79.641 RIGHT HAND PAIN: ICD-10-CM

## 2021-10-05 DIAGNOSIS — M18.11 PRIMARY OSTEOARTHRITIS OF FIRST CARPOMETACARPAL JOINT OF RIGHT HAND: Primary | ICD-10-CM

## 2021-10-05 PROCEDURE — 97140 MANUAL THERAPY 1/> REGIONS: CPT | Performed by: PHYSICAL THERAPIST

## 2021-10-05 PROCEDURE — 97535 SELF CARE MNGMENT TRAINING: CPT | Performed by: PHYSICAL THERAPIST

## 2021-10-06 ENCOUNTER — APPOINTMENT (EMERGENCY)
Dept: RADIOLOGY | Facility: HOSPITAL | Age: 71
End: 2021-10-06
Payer: MEDICARE

## 2021-10-06 ENCOUNTER — HOSPITAL ENCOUNTER (OUTPATIENT)
Facility: HOSPITAL | Age: 71
Setting detail: OBSERVATION
Discharge: HOME | End: 2021-10-07
Attending: EMERGENCY MEDICINE | Admitting: INTERNAL MEDICINE
Payer: MEDICARE

## 2021-10-06 DIAGNOSIS — R07.9 CHEST PAIN: ICD-10-CM

## 2021-10-06 DIAGNOSIS — I30.0 ACUTE IDIOPATHIC PERICARDITIS: Primary | ICD-10-CM

## 2021-10-06 LAB
ALBUMIN SERPL BCP-MCNC: 3.7 G/DL (ref 3.5–5)
ALP SERPL-CCNC: 84 U/L (ref 46–116)
ALT SERPL W P-5'-P-CCNC: 32 U/L (ref 12–78)
ANION GAP SERPL CALCULATED.3IONS-SCNC: 2 MMOL/L (ref 4–13)
AST SERPL W P-5'-P-CCNC: 21 U/L (ref 5–45)
ATRIAL RATE: 78 BPM
ATRIAL RATE: 80 BPM
ATRIAL RATE: 83 BPM
ATRIAL RATE: 86 BPM
ATRIAL RATE: 90 BPM
BASOPHILS # BLD AUTO: 0.03 THOUSANDS/ΜL (ref 0–0.1)
BASOPHILS NFR BLD AUTO: 0 % (ref 0–1)
BILIRUB SERPL-MCNC: 0.53 MG/DL (ref 0.2–1)
BUN SERPL-MCNC: 25 MG/DL (ref 5–25)
CALCIUM SERPL-MCNC: 9.2 MG/DL (ref 8.3–10.1)
CHLORIDE SERPL-SCNC: 106 MMOL/L (ref 100–108)
CO2 SERPL-SCNC: 28 MMOL/L (ref 21–32)
CREAT SERPL-MCNC: 0.83 MG/DL (ref 0.6–1.3)
EOSINOPHIL # BLD AUTO: 0.1 THOUSAND/ΜL (ref 0–0.61)
EOSINOPHIL NFR BLD AUTO: 1 % (ref 0–6)
ERYTHROCYTE [DISTWIDTH] IN BLOOD BY AUTOMATED COUNT: 12.4 % (ref 11.6–15.1)
EST. AVERAGE GLUCOSE BLD GHB EST-MCNC: 137 MG/DL
GFR SERPL CREATININE-BSD FRML MDRD: 88 ML/MIN/1.73SQ M
GLUCOSE SERPL-MCNC: 170 MG/DL (ref 65–140)
HBA1C MFR BLD: 6.4 %
HCT VFR BLD AUTO: 41.1 % (ref 36.5–49.3)
HGB BLD-MCNC: 14.2 G/DL (ref 12–17)
IMM GRANULOCYTES # BLD AUTO: 0.04 THOUSAND/UL (ref 0–0.2)
IMM GRANULOCYTES NFR BLD AUTO: 0 % (ref 0–2)
LYMPHOCYTES # BLD AUTO: 1.44 THOUSANDS/ΜL (ref 0.6–4.47)
LYMPHOCYTES NFR BLD AUTO: 14 % (ref 14–44)
MCH RBC QN AUTO: 32.3 PG (ref 26.8–34.3)
MCHC RBC AUTO-ENTMCNC: 34.5 G/DL (ref 31.4–37.4)
MCV RBC AUTO: 94 FL (ref 82–98)
MONOCYTES # BLD AUTO: 0.91 THOUSAND/ΜL (ref 0.17–1.22)
MONOCYTES NFR BLD AUTO: 9 % (ref 4–12)
NEUTROPHILS # BLD AUTO: 7.75 THOUSANDS/ΜL (ref 1.85–7.62)
NEUTS SEG NFR BLD AUTO: 76 % (ref 43–75)
NRBC BLD AUTO-RTO: 0 /100 WBCS
P AXIS: 36 DEGREES
P AXIS: 43 DEGREES
P AXIS: 44 DEGREES
P AXIS: 51 DEGREES
P AXIS: 53 DEGREES
PLATELET # BLD AUTO: 273 THOUSANDS/UL (ref 149–390)
PMV BLD AUTO: 9.2 FL (ref 8.9–12.7)
POTASSIUM SERPL-SCNC: 4.1 MMOL/L (ref 3.5–5.3)
PR INTERVAL: 138 MS
PR INTERVAL: 140 MS
PR INTERVAL: 146 MS
PR INTERVAL: 148 MS
PR INTERVAL: 166 MS
PROT SERPL-MCNC: 7.6 G/DL (ref 6.4–8.2)
QRS AXIS: 65 DEGREES
QRS AXIS: 68 DEGREES
QRS AXIS: 69 DEGREES
QRS AXIS: 69 DEGREES
QRS AXIS: 72 DEGREES
QRSD INTERVAL: 138 MS
QRSD INTERVAL: 140 MS
QRSD INTERVAL: 142 MS
QRSD INTERVAL: 144 MS
QRSD INTERVAL: 144 MS
QT INTERVAL: 366 MS
QT INTERVAL: 380 MS
QT INTERVAL: 384 MS
QTC INTERVAL: 437 MS
QTC INTERVAL: 438 MS
QTC INTERVAL: 447 MS
QTC INTERVAL: 451 MS
QTC INTERVAL: 459 MS
RBC # BLD AUTO: 4.39 MILLION/UL (ref 3.88–5.62)
SODIUM SERPL-SCNC: 136 MMOL/L (ref 136–145)
T WAVE AXIS: 40 DEGREES
T WAVE AXIS: 41 DEGREES
T WAVE AXIS: 43 DEGREES
TROPONIN I SERPL-MCNC: <0.02 NG/ML
VENTRICULAR RATE: 78 BPM
VENTRICULAR RATE: 80 BPM
VENTRICULAR RATE: 83 BPM
VENTRICULAR RATE: 86 BPM
VENTRICULAR RATE: 90 BPM
WBC # BLD AUTO: 10.27 THOUSAND/UL (ref 4.31–10.16)

## 2021-10-06 PROCEDURE — 85025 COMPLETE CBC W/AUTO DIFF WBC: CPT | Performed by: EMERGENCY MEDICINE

## 2021-10-06 PROCEDURE — 71045 X-RAY EXAM CHEST 1 VIEW: CPT

## 2021-10-06 PROCEDURE — 93005 ELECTROCARDIOGRAM TRACING: CPT

## 2021-10-06 PROCEDURE — 99285 EMERGENCY DEPT VISIT HI MDM: CPT | Performed by: EMERGENCY MEDICINE

## 2021-10-06 PROCEDURE — 80053 COMPREHEN METABOLIC PANEL: CPT | Performed by: EMERGENCY MEDICINE

## 2021-10-06 PROCEDURE — 83036 HEMOGLOBIN GLYCOSYLATED A1C: CPT | Performed by: STUDENT IN AN ORGANIZED HEALTH CARE EDUCATION/TRAINING PROGRAM

## 2021-10-06 PROCEDURE — 84484 ASSAY OF TROPONIN QUANT: CPT | Performed by: PHYSICIAN ASSISTANT

## 2021-10-06 PROCEDURE — 93010 ELECTROCARDIOGRAM REPORT: CPT | Performed by: INTERNAL MEDICINE

## 2021-10-06 PROCEDURE — 84484 ASSAY OF TROPONIN QUANT: CPT | Performed by: EMERGENCY MEDICINE

## 2021-10-06 PROCEDURE — 36415 COLL VENOUS BLD VENIPUNCTURE: CPT

## 2021-10-06 PROCEDURE — 99220 PR INITIAL OBSERVATION CARE/DAY 70 MINUTES: CPT | Performed by: INTERNAL MEDICINE

## 2021-10-06 PROCEDURE — 84484 ASSAY OF TROPONIN QUANT: CPT | Performed by: STUDENT IN AN ORGANIZED HEALTH CARE EDUCATION/TRAINING PROGRAM

## 2021-10-06 PROCEDURE — 99285 EMERGENCY DEPT VISIT HI MDM: CPT

## 2021-10-06 RX ORDER — FINASTERIDE 5 MG/1
5 TABLET, FILM COATED ORAL DAILY
Status: DISCONTINUED | OUTPATIENT
Start: 2021-10-06 | End: 2021-10-07 | Stop reason: HOSPADM

## 2021-10-06 RX ORDER — HEPARIN SODIUM 5000 [USP'U]/ML
5000 INJECTION, SOLUTION INTRAVENOUS; SUBCUTANEOUS EVERY 8 HOURS SCHEDULED
Status: DISCONTINUED | OUTPATIENT
Start: 2021-10-06 | End: 2021-10-07 | Stop reason: HOSPADM

## 2021-10-06 RX ORDER — ASPIRIN 81 MG/1
81 TABLET ORAL DAILY
Status: DISCONTINUED | OUTPATIENT
Start: 2021-10-06 | End: 2021-10-07

## 2021-10-06 RX ORDER — MAGNESIUM HYDROXIDE/ALUMINUM HYDROXICE/SIMETHICONE 120; 1200; 1200 MG/30ML; MG/30ML; MG/30ML
30 SUSPENSION ORAL EVERY 4 HOURS PRN
Status: DISCONTINUED | OUTPATIENT
Start: 2021-10-06 | End: 2021-10-07 | Stop reason: HOSPADM

## 2021-10-06 RX ORDER — ASPIRIN 81 MG/1
81 TABLET, CHEWABLE ORAL DAILY
Status: DISCONTINUED | OUTPATIENT
Start: 2021-10-06 | End: 2021-10-06

## 2021-10-06 RX ORDER — NITROGLYCERIN 0.4 MG/1
0.4 TABLET SUBLINGUAL
Status: DISCONTINUED | OUTPATIENT
Start: 2021-10-06 | End: 2021-10-07

## 2021-10-06 RX ORDER — ATORVASTATIN CALCIUM 20 MG/1
20 TABLET, FILM COATED ORAL DAILY
Status: DISCONTINUED | OUTPATIENT
Start: 2021-10-06 | End: 2021-10-07 | Stop reason: HOSPADM

## 2021-10-06 RX ORDER — ACETAMINOPHEN 325 MG/1
650 TABLET ORAL EVERY 6 HOURS PRN
Status: DISCONTINUED | OUTPATIENT
Start: 2021-10-06 | End: 2021-10-07 | Stop reason: HOSPADM

## 2021-10-06 RX ADMIN — Medication 12.5 MG: at 22:19

## 2021-10-06 RX ADMIN — ALUMINUM HYDROXIDE, MAGNESIUM HYDROXIDE, AND SIMETHICONE 30 ML: 200; 200; 20 SUSPENSION ORAL at 23:25

## 2021-10-06 RX ADMIN — ATORVASTATIN CALCIUM 20 MG: 20 TABLET, FILM COATED ORAL at 09:34

## 2021-10-06 RX ADMIN — HEPARIN SODIUM 5000 UNITS: 5000 INJECTION INTRAVENOUS; SUBCUTANEOUS at 09:34

## 2021-10-06 RX ADMIN — HEPARIN SODIUM 5000 UNITS: 5000 INJECTION INTRAVENOUS; SUBCUTANEOUS at 22:19

## 2021-10-06 RX ADMIN — ACETAMINOPHEN 650 MG: 325 TABLET, FILM COATED ORAL at 16:12

## 2021-10-06 RX ADMIN — ASPIRIN 81 MG: 81 TABLET, COATED ORAL at 13:46

## 2021-10-07 ENCOUNTER — APPOINTMENT (OUTPATIENT)
Dept: NON INVASIVE DIAGNOSTICS | Facility: HOSPITAL | Age: 71
End: 2021-10-07
Payer: MEDICARE

## 2021-10-07 VITALS
SYSTOLIC BLOOD PRESSURE: 118 MMHG | DIASTOLIC BLOOD PRESSURE: 70 MMHG | BODY MASS INDEX: 29.65 KG/M2 | RESPIRATION RATE: 18 BRPM | HEART RATE: 71 BPM | TEMPERATURE: 98.8 F | OXYGEN SATURATION: 100 % | HEIGHT: 67 IN | WEIGHT: 188.93 LBS

## 2021-10-07 PROBLEM — M05.9 RHEUMATOID ARTHRITIS WITH POSITIVE RHEUMATOID FACTOR (HCC): Status: ACTIVE | Noted: 2021-10-07

## 2021-10-07 PROBLEM — R73.03 PREDIABETES: Status: ACTIVE | Noted: 2021-10-07

## 2021-10-07 LAB
ATRIAL RATE: 73 BPM
P AXIS: 45 DEGREES
PR INTERVAL: 164 MS
QRS AXIS: 56 DEGREES
QRSD INTERVAL: 148 MS
QT INTERVAL: 406 MS
QTC INTERVAL: 447 MS
T WAVE AXIS: 59 DEGREES
VENTRICULAR RATE: 73 BPM

## 2021-10-07 PROCEDURE — 93306 TTE W/DOPPLER COMPLETE: CPT

## 2021-10-07 PROCEDURE — 99205 OFFICE O/P NEW HI 60 MIN: CPT | Performed by: INTERNAL MEDICINE

## 2021-10-07 PROCEDURE — 93010 ELECTROCARDIOGRAM REPORT: CPT | Performed by: INTERNAL MEDICINE

## 2021-10-07 PROCEDURE — 93306 TTE W/DOPPLER COMPLETE: CPT | Performed by: INTERNAL MEDICINE

## 2021-10-07 PROCEDURE — 99239 HOSP IP/OBS DSCHRG MGMT >30: CPT | Performed by: INTERNAL MEDICINE

## 2021-10-07 RX ORDER — IBUPROFEN 600 MG/1
TABLET ORAL
Qty: 49 TABLET | Refills: 0 | Status: SHIPPED | OUTPATIENT
Start: 2021-10-07 | End: 2022-05-03 | Stop reason: ALTCHOICE

## 2021-10-07 RX ORDER — COLCHICINE 0.6 MG/1
0.6 TABLET ORAL 2 TIMES DAILY
Status: DISCONTINUED | OUTPATIENT
Start: 2021-10-07 | End: 2021-10-07 | Stop reason: HOSPADM

## 2021-10-07 RX ORDER — IBUPROFEN 600 MG/1
600 TABLET ORAL EVERY 8 HOURS SCHEDULED
Status: DISCONTINUED | OUTPATIENT
Start: 2021-10-07 | End: 2021-10-07 | Stop reason: HOSPADM

## 2021-10-07 RX ORDER — COLCHICINE 0.6 MG/1
0.6 TABLET ORAL 2 TIMES DAILY
Qty: 60 TABLET | Refills: 0 | Status: SHIPPED | OUTPATIENT
Start: 2021-10-07 | End: 2021-10-26 | Stop reason: SDUPTHER

## 2021-10-07 RX ADMIN — HEPARIN SODIUM 5000 UNITS: 5000 INJECTION INTRAVENOUS; SUBCUTANEOUS at 05:51

## 2021-10-07 RX ADMIN — IBUPROFEN 600 MG: 600 TABLET, FILM COATED ORAL at 14:00

## 2021-10-07 RX ADMIN — COLCHICINE 0.6 MG: 0.6 TABLET ORAL at 10:02

## 2021-10-07 RX ADMIN — FINASTERIDE 5 MG: 5 TABLET, FILM COATED ORAL at 10:02

## 2021-10-07 RX ADMIN — ATORVASTATIN CALCIUM 20 MG: 20 TABLET, FILM COATED ORAL at 10:02

## 2021-10-07 RX ADMIN — ALUMINUM HYDROXIDE, MAGNESIUM HYDROXIDE, AND SIMETHICONE 30 ML: 200; 200; 20 SUSPENSION ORAL at 05:51

## 2021-10-07 RX ADMIN — HEPARIN SODIUM 5000 UNITS: 5000 INJECTION INTRAVENOUS; SUBCUTANEOUS at 14:00

## 2021-10-08 ENCOUNTER — OFFICE VISIT (OUTPATIENT)
Dept: PHYSICAL THERAPY | Facility: REHABILITATION | Age: 71
End: 2021-10-08
Payer: MEDICARE

## 2021-10-08 ENCOUNTER — TRANSITIONAL CARE MANAGEMENT (OUTPATIENT)
Dept: FAMILY MEDICINE CLINIC | Facility: CLINIC | Age: 71
End: 2021-10-08

## 2021-10-08 DIAGNOSIS — M79.641 RIGHT HAND PAIN: ICD-10-CM

## 2021-10-08 DIAGNOSIS — M18.11 PRIMARY OSTEOARTHRITIS OF FIRST CARPOMETACARPAL JOINT OF RIGHT HAND: Primary | ICD-10-CM

## 2021-10-08 PROCEDURE — 97140 MANUAL THERAPY 1/> REGIONS: CPT | Performed by: PHYSICAL THERAPIST

## 2021-10-11 ENCOUNTER — OFFICE VISIT (OUTPATIENT)
Dept: OBGYN CLINIC | Facility: HOSPITAL | Age: 71
End: 2021-10-11
Payer: MEDICARE

## 2021-10-11 ENCOUNTER — HOSPITAL ENCOUNTER (OUTPATIENT)
Dept: RADIOLOGY | Facility: HOSPITAL | Age: 71
Discharge: HOME | End: 2021-10-11
Payer: MEDICARE

## 2021-10-11 VITALS
SYSTOLIC BLOOD PRESSURE: 161 MMHG | WEIGHT: 195.8 LBS | BODY MASS INDEX: 30.73 KG/M2 | HEART RATE: 57 BPM | DIASTOLIC BLOOD PRESSURE: 80 MMHG | HEIGHT: 67 IN

## 2021-10-11 DIAGNOSIS — M79.641 RIGHT HAND PAIN: Primary | ICD-10-CM

## 2021-10-11 DIAGNOSIS — M79.641 RIGHT HAND PAIN: ICD-10-CM

## 2021-10-11 DIAGNOSIS — M18.11 PRIMARY OSTEOARTHRITIS OF FIRST CARPOMETACARPAL JOINT OF RIGHT HAND: ICD-10-CM

## 2021-10-11 PROCEDURE — 99203 OFFICE O/P NEW LOW 30 MIN: CPT | Performed by: PHYSICIAN ASSISTANT

## 2021-10-11 PROCEDURE — 20600 DRAIN/INJ JOINT/BURSA W/O US: CPT | Performed by: PHYSICIAN ASSISTANT

## 2021-10-11 PROCEDURE — 73130 X-RAY EXAM OF HAND: CPT

## 2021-10-11 RX ORDER — BETAMETHASONE SODIUM PHOSPHATE AND BETAMETHASONE ACETATE 3; 3 MG/ML; MG/ML
3 INJECTION, SUSPENSION INTRA-ARTICULAR; INTRALESIONAL; INTRAMUSCULAR; SOFT TISSUE
Status: COMPLETED | OUTPATIENT
Start: 2021-10-11 | End: 2021-10-11

## 2021-10-11 RX ORDER — LIDOCAINE HYDROCHLORIDE 10 MG/ML
0.5 INJECTION, SOLUTION INFILTRATION; PERINEURAL
Status: COMPLETED | OUTPATIENT
Start: 2021-10-11 | End: 2021-10-11

## 2021-10-11 RX ADMIN — LIDOCAINE HYDROCHLORIDE 0.5 ML: 10 INJECTION, SOLUTION INFILTRATION; PERINEURAL at 18:43

## 2021-10-11 RX ADMIN — BETAMETHASONE SODIUM PHOSPHATE AND BETAMETHASONE ACETATE 3 MG: 3; 3 INJECTION, SUSPENSION INTRA-ARTICULAR; INTRALESIONAL; INTRAMUSCULAR; SOFT TISSUE at 18:43

## 2021-10-12 ENCOUNTER — OFFICE VISIT (OUTPATIENT)
Dept: FAMILY MEDICINE CLINIC | Facility: CLINIC | Age: 71
End: 2021-10-12
Payer: MEDICARE

## 2021-10-12 VITALS
SYSTOLIC BLOOD PRESSURE: 130 MMHG | HEIGHT: 67 IN | BODY MASS INDEX: 30.15 KG/M2 | TEMPERATURE: 97.9 F | HEART RATE: 74 BPM | WEIGHT: 192.1 LBS | DIASTOLIC BLOOD PRESSURE: 78 MMHG | RESPIRATION RATE: 15 BRPM

## 2021-10-12 DIAGNOSIS — I30.9 ACUTE PERICARDITIS, UNSPECIFIED TYPE: Primary | ICD-10-CM

## 2021-10-12 DIAGNOSIS — Z79.899 HIGH RISK MEDICATION USE: ICD-10-CM

## 2021-10-12 PROCEDURE — 99496 TRANSJ CARE MGMT HIGH F2F 7D: CPT | Performed by: NURSE PRACTITIONER

## 2021-10-26 ENCOUNTER — OFFICE VISIT (OUTPATIENT)
Dept: FAMILY MEDICINE CLINIC | Facility: CLINIC | Age: 71
End: 2021-10-26
Payer: MEDICARE

## 2021-10-26 VITALS
HEIGHT: 67 IN | WEIGHT: 189.1 LBS | SYSTOLIC BLOOD PRESSURE: 128 MMHG | BODY MASS INDEX: 29.68 KG/M2 | DIASTOLIC BLOOD PRESSURE: 80 MMHG | RESPIRATION RATE: 16 BRPM | HEART RATE: 58 BPM

## 2021-10-26 DIAGNOSIS — Z12.5 PROSTATE CANCER SCREENING: ICD-10-CM

## 2021-10-26 DIAGNOSIS — I30.0 ACUTE IDIOPATHIC PERICARDITIS: ICD-10-CM

## 2021-10-26 DIAGNOSIS — R91.1 PULMONARY NODULE SEEN ON IMAGING STUDY: ICD-10-CM

## 2021-10-26 DIAGNOSIS — R73.03 PREDIABETES: ICD-10-CM

## 2021-10-26 DIAGNOSIS — E55.9 VITAMIN D DEFICIENCY: ICD-10-CM

## 2021-10-26 DIAGNOSIS — R76.8 RHEUMATOID FACTOR POSITIVE: ICD-10-CM

## 2021-10-26 DIAGNOSIS — R35.0 BENIGN PROSTATIC HYPERPLASIA WITH URINARY FREQUENCY: ICD-10-CM

## 2021-10-26 DIAGNOSIS — E11.8 TYPE 2 DIABETES MELLITUS WITH COMPLICATION, WITHOUT LONG-TERM CURRENT USE OF INSULIN (HCC): Primary | ICD-10-CM

## 2021-10-26 DIAGNOSIS — E78.2 MIXED HYPERLIPIDEMIA: ICD-10-CM

## 2021-10-26 DIAGNOSIS — K90.0 ADULT CELIAC DISEASE: ICD-10-CM

## 2021-10-26 DIAGNOSIS — N40.1 BENIGN PROSTATIC HYPERPLASIA WITH URINARY FREQUENCY: ICD-10-CM

## 2021-10-26 DIAGNOSIS — Z79.899 ENCOUNTER FOR LONG-TERM (CURRENT) USE OF MEDICATIONS: ICD-10-CM

## 2021-10-26 PROBLEM — R07.9 CHEST PAIN: Status: RESOLVED | Noted: 2021-10-06 | Resolved: 2021-10-26

## 2021-10-26 PROCEDURE — 99215 OFFICE O/P EST HI 40 MIN: CPT | Performed by: FAMILY MEDICINE

## 2021-10-26 RX ORDER — COLCHICINE 0.6 MG/1
0.6 TABLET ORAL 2 TIMES DAILY
Qty: 60 TABLET | Refills: 1 | Status: SHIPPED | OUTPATIENT
Start: 2021-10-26 | End: 2021-11-16 | Stop reason: SDUPTHER

## 2021-11-01 ENCOUNTER — OFFICE VISIT (OUTPATIENT)
Dept: OBGYN CLINIC | Facility: CLINIC | Age: 71
End: 2021-11-01
Payer: MEDICARE

## 2021-11-01 VITALS
HEART RATE: 71 BPM | SYSTOLIC BLOOD PRESSURE: 122 MMHG | HEIGHT: 67 IN | WEIGHT: 189 LBS | BODY MASS INDEX: 29.66 KG/M2 | DIASTOLIC BLOOD PRESSURE: 80 MMHG | RESPIRATION RATE: 18 BRPM

## 2021-11-01 DIAGNOSIS — M18.11 PRIMARY OSTEOARTHRITIS OF FIRST CARPOMETACARPAL JOINT OF RIGHT HAND: Primary | ICD-10-CM

## 2021-11-01 PROCEDURE — 99213 OFFICE O/P EST LOW 20 MIN: CPT | Performed by: SURGERY

## 2021-11-09 ENCOUNTER — HOSPITAL ENCOUNTER (OUTPATIENT)
Dept: NON INVASIVE DIAGNOSTICS | Facility: HOSPITAL | Age: 71
Discharge: HOME | End: 2021-11-09
Attending: INTERNAL MEDICINE
Payer: MEDICARE

## 2021-11-09 VITALS
WEIGHT: 189 LBS | HEIGHT: 67 IN | BODY MASS INDEX: 29.66 KG/M2 | SYSTOLIC BLOOD PRESSURE: 128 MMHG | DIASTOLIC BLOOD PRESSURE: 80 MMHG

## 2021-11-09 DIAGNOSIS — I30.0 ACUTE IDIOPATHIC PERICARDITIS: ICD-10-CM

## 2021-11-09 LAB
APICAL FOUR CHAMBER EJECTION FRACTION: 60 %
E WAVE DECELERATION TIME: 382 MS
MV E'TISSUE VEL-SEP: 6 CM/S
MV PEAK A VEL: 0.86 M/S
MV PEAK E VEL: 60 CM/S
MV STENOSIS PRESSURE HALF TIME: 0 MS
SL CV LV EF: 69
TRICUSPID VALVE PEAK REGURGITATION VELOCITY: 1.59 M/S
TRICUSPID VALVE S': 0.6 CM/S
TV PEAK GRADIENT: 10 MMHG

## 2021-11-09 PROCEDURE — 93321 DOPPLER ECHO F-UP/LMTD STD: CPT

## 2021-11-09 PROCEDURE — 93308 TTE F-UP OR LMTD: CPT | Performed by: INTERNAL MEDICINE

## 2021-11-09 PROCEDURE — 93325 DOPPLER ECHO COLOR FLOW MAPG: CPT | Performed by: INTERNAL MEDICINE

## 2021-11-09 PROCEDURE — 93308 TTE F-UP OR LMTD: CPT

## 2021-11-09 PROCEDURE — 93321 DOPPLER ECHO F-UP/LMTD STD: CPT | Performed by: INTERNAL MEDICINE

## 2021-11-09 PROCEDURE — 93325 DOPPLER ECHO COLOR FLOW MAPG: CPT

## 2021-11-16 ENCOUNTER — OFFICE VISIT (OUTPATIENT)
Dept: CARDIOLOGY CLINIC | Facility: CLINIC | Age: 71
End: 2021-11-16
Payer: MEDICARE

## 2021-11-16 VITALS
SYSTOLIC BLOOD PRESSURE: 130 MMHG | HEART RATE: 68 BPM | BODY MASS INDEX: 29.66 KG/M2 | DIASTOLIC BLOOD PRESSURE: 60 MMHG | HEIGHT: 67 IN | WEIGHT: 189 LBS

## 2021-11-16 DIAGNOSIS — I45.10 RIGHT BUNDLE BRANCH BLOCK (RBBB): ICD-10-CM

## 2021-11-16 DIAGNOSIS — I30.0 ACUTE IDIOPATHIC PERICARDITIS: Primary | ICD-10-CM

## 2021-11-16 PROCEDURE — 99214 OFFICE O/P EST MOD 30 MIN: CPT | Performed by: INTERNAL MEDICINE

## 2021-11-16 RX ORDER — COLCHICINE 0.6 MG/1
0.6 TABLET ORAL 2 TIMES DAILY
Qty: 60 TABLET | Refills: 1 | Status: SHIPPED | OUTPATIENT
Start: 2021-11-16

## 2021-12-20 DIAGNOSIS — E78.2 MIXED HYPERLIPIDEMIA: ICD-10-CM

## 2021-12-20 RX ORDER — ATORVASTATIN CALCIUM 20 MG/1
TABLET, FILM COATED ORAL
Qty: 90 TABLET | Refills: 1 | Status: SHIPPED | OUTPATIENT
Start: 2021-12-20 | End: 2022-07-01

## 2022-04-14 ENCOUNTER — RA CDI HCC (OUTPATIENT)
Dept: OTHER | Facility: HOSPITAL | Age: 72
End: 2022-04-14

## 2022-04-14 NOTE — PROGRESS NOTES
Jessica [facility] 75  coding opportunities          Chart Reviewed number of suggestions sent to Provider: 1     Patients Insurance     Medicare Insurance: [employer]

## 2022-04-21 ENCOUNTER — APPOINTMENT (OUTPATIENT)
Dept: LAB | Age: 72
End: 2022-04-21
Payer: MEDICARE

## 2022-04-21 DIAGNOSIS — R73.03 PREDIABETES: ICD-10-CM

## 2022-04-21 DIAGNOSIS — E11.8 TYPE 2 DIABETES MELLITUS WITH COMPLICATION, WITHOUT LONG-TERM CURRENT USE OF INSULIN (HCC): ICD-10-CM

## 2022-04-21 DIAGNOSIS — E78.2 MIXED HYPERLIPIDEMIA: ICD-10-CM

## 2022-04-21 DIAGNOSIS — Z79.899 ENCOUNTER FOR LONG-TERM (CURRENT) USE OF MEDICATIONS: ICD-10-CM

## 2022-04-21 DIAGNOSIS — Z12.5 PROSTATE CANCER SCREENING: ICD-10-CM

## 2022-04-21 DIAGNOSIS — E55.9 VITAMIN D DEFICIENCY: ICD-10-CM

## 2022-04-21 LAB
25(OH)D3 SERPL-MCNC: 55.4 NG/ML (ref 30–100)
ALBUMIN SERPL BCP-MCNC: 3.9 G/DL (ref 3.5–5)
ALP SERPL-CCNC: 79 U/L (ref 46–116)
ALT SERPL W P-5'-P-CCNC: 33 U/L (ref 12–78)
ANION GAP SERPL CALCULATED.3IONS-SCNC: 3 MMOL/L (ref 4–13)
AST SERPL W P-5'-P-CCNC: 26 U/L (ref 5–45)
BASOPHILS # BLD AUTO: 0.03 THOUSANDS/ΜL (ref 0–0.1)
BASOPHILS NFR BLD AUTO: 1 % (ref 0–1)
BILIRUB SERPL-MCNC: 0.83 MG/DL (ref 0.2–1)
BILIRUB UR QL STRIP: NEGATIVE
BUN SERPL-MCNC: 22 MG/DL (ref 5–25)
CALCIUM SERPL-MCNC: 9.2 MG/DL (ref 8.3–10.1)
CHLORIDE SERPL-SCNC: 108 MMOL/L (ref 100–108)
CHOLEST SERPL-MCNC: 117 MG/DL
CLARITY UR: CLEAR
CO2 SERPL-SCNC: 28 MMOL/L (ref 21–32)
COLOR UR: NORMAL
CREAT SERPL-MCNC: 0.9 MG/DL (ref 0.6–1.3)
EOSINOPHIL # BLD AUTO: 0.12 THOUSAND/ΜL (ref 0–0.61)
EOSINOPHIL NFR BLD AUTO: 2 % (ref 0–6)
ERYTHROCYTE [DISTWIDTH] IN BLOOD BY AUTOMATED COUNT: 12.7 % (ref 11.6–15.1)
EST. AVERAGE GLUCOSE BLD GHB EST-MCNC: 123 MG/DL
GFR SERPL CREATININE-BSD FRML MDRD: 85 ML/MIN/1.73SQ M
GLUCOSE P FAST SERPL-MCNC: 139 MG/DL (ref 65–99)
GLUCOSE UR STRIP-MCNC: NEGATIVE MG/DL
HBA1C MFR BLD: 5.9 %
HCT VFR BLD AUTO: 40 % (ref 36.5–49.3)
HDLC SERPL-MCNC: 53 MG/DL
HGB BLD-MCNC: 13.3 G/DL (ref 12–17)
HGB UR QL STRIP.AUTO: NEGATIVE
IMM GRANULOCYTES # BLD AUTO: 0.02 THOUSAND/UL (ref 0–0.2)
IMM GRANULOCYTES NFR BLD AUTO: 0 % (ref 0–2)
KETONES UR STRIP-MCNC: NEGATIVE MG/DL
LDLC SERPL CALC-MCNC: 50 MG/DL (ref 0–100)
LEUKOCYTE ESTERASE UR QL STRIP: NEGATIVE
LYMPHOCYTES # BLD AUTO: 1.61 THOUSANDS/ΜL (ref 0.6–4.47)
LYMPHOCYTES NFR BLD AUTO: 30 % (ref 14–44)
MCH RBC QN AUTO: 31.7 PG (ref 26.8–34.3)
MCHC RBC AUTO-ENTMCNC: 33.3 G/DL (ref 31.4–37.4)
MCV RBC AUTO: 96 FL (ref 82–98)
MONOCYTES # BLD AUTO: 0.47 THOUSAND/ΜL (ref 0.17–1.22)
MONOCYTES NFR BLD AUTO: 9 % (ref 4–12)
NEUTROPHILS # BLD AUTO: 3.05 THOUSANDS/ΜL (ref 1.85–7.62)
NEUTS SEG NFR BLD AUTO: 58 % (ref 43–75)
NITRITE UR QL STRIP: NEGATIVE
NONHDLC SERPL-MCNC: 64 MG/DL
NRBC BLD AUTO-RTO: 0 /100 WBCS
PH UR STRIP.AUTO: 6 [PH]
PLATELET # BLD AUTO: 257 THOUSANDS/UL (ref 149–390)
PMV BLD AUTO: 9.8 FL (ref 8.9–12.7)
POTASSIUM SERPL-SCNC: 5.3 MMOL/L (ref 3.5–5.3)
PROT SERPL-MCNC: 6.7 G/DL (ref 6.4–8.2)
PROT UR STRIP-MCNC: NEGATIVE MG/DL
PSA SERPL-MCNC: 2 NG/ML (ref 0–4)
RBC # BLD AUTO: 4.19 MILLION/UL (ref 3.88–5.62)
SODIUM SERPL-SCNC: 139 MMOL/L (ref 136–145)
SP GR UR STRIP.AUTO: 1.01 (ref 1–1.03)
TRIGL SERPL-MCNC: 72 MG/DL
TSH SERPL DL<=0.05 MIU/L-ACNC: 1.75 UIU/ML (ref 0.45–4.5)
UROBILINOGEN UR STRIP-ACNC: <2 MG/DL
WBC # BLD AUTO: 5.3 THOUSAND/UL (ref 4.31–10.16)

## 2022-04-21 PROCEDURE — 84443 ASSAY THYROID STIM HORMONE: CPT

## 2022-04-21 PROCEDURE — 80053 COMPREHEN METABOLIC PANEL: CPT

## 2022-04-21 PROCEDURE — 36415 COLL VENOUS BLD VENIPUNCTURE: CPT

## 2022-04-21 PROCEDURE — 82306 VITAMIN D 25 HYDROXY: CPT

## 2022-04-21 PROCEDURE — 80061 LIPID PANEL: CPT

## 2022-04-21 PROCEDURE — G0103 PSA SCREENING: HCPCS

## 2022-04-21 PROCEDURE — 81003 URINALYSIS AUTO W/O SCOPE: CPT

## 2022-04-21 PROCEDURE — 85025 COMPLETE CBC W/AUTO DIFF WBC: CPT

## 2022-04-21 PROCEDURE — 83036 HEMOGLOBIN GLYCOSYLATED A1C: CPT

## 2022-05-02 ENCOUNTER — TELEPHONE (OUTPATIENT)
Dept: GASTROENTEROLOGY | Facility: CLINIC | Age: 72
End: 2022-05-02

## 2022-05-02 NOTE — TELEPHONE ENCOUNTER
Patients GI provider:  Dr Estelita Ferguson    Number to return call: [phone]    Reason for call: Pt calling in to schedule his procedure  Pt was told to schedule in 1 year since it was not successful last time and to have 2 day prep  Above is his number       Scheduled procedure/appointment date if applicable: Apt/procedure NA

## 2022-05-03 ENCOUNTER — OFFICE VISIT (OUTPATIENT)
Dept: FAMILY MEDICINE CLINIC | Facility: CLINIC | Age: 72
End: 2022-05-03
Payer: MEDICARE

## 2022-05-03 ENCOUNTER — PREP FOR PROCEDURE (OUTPATIENT)
Dept: GASTROENTEROLOGY | Facility: CLINIC | Age: 72
End: 2022-05-03

## 2022-05-03 VITALS
HEIGHT: 67 IN | DIASTOLIC BLOOD PRESSURE: 82 MMHG | RESPIRATION RATE: 15 BRPM | OXYGEN SATURATION: 98 % | WEIGHT: 190.1 LBS | BODY MASS INDEX: 29.84 KG/M2 | HEART RATE: 70 BPM | SYSTOLIC BLOOD PRESSURE: 122 MMHG

## 2022-05-03 DIAGNOSIS — N40.1 BENIGN PROSTATIC HYPERPLASIA WITH URINARY FREQUENCY: ICD-10-CM

## 2022-05-03 DIAGNOSIS — K90.0 ADULT CELIAC DISEASE: ICD-10-CM

## 2022-05-03 DIAGNOSIS — Z12.11 SCREENING FOR MALIGNANT NEOPLASM OF COLON: ICD-10-CM

## 2022-05-03 DIAGNOSIS — R73.09 ELEVATED HEMOGLOBIN A1C: ICD-10-CM

## 2022-05-03 DIAGNOSIS — R35.0 BENIGN PROSTATIC HYPERPLASIA WITH URINARY FREQUENCY: ICD-10-CM

## 2022-05-03 DIAGNOSIS — R91.1 PULMONARY NODULE SEEN ON IMAGING STUDY: ICD-10-CM

## 2022-05-03 DIAGNOSIS — L20.84 INTRINSIC ECZEMA: ICD-10-CM

## 2022-05-03 DIAGNOSIS — Z00.00 MEDICARE ANNUAL WELLNESS VISIT, SUBSEQUENT: Primary | ICD-10-CM

## 2022-05-03 DIAGNOSIS — I45.10 RIGHT BUNDLE BRANCH BLOCK (RBBB): ICD-10-CM

## 2022-05-03 DIAGNOSIS — E78.2 MIXED HYPERLIPIDEMIA: ICD-10-CM

## 2022-05-03 DIAGNOSIS — E55.9 VITAMIN D DEFICIENCY: ICD-10-CM

## 2022-05-03 DIAGNOSIS — Z87.898 HISTORY OF ELEVATED PSA: ICD-10-CM

## 2022-05-03 DIAGNOSIS — K57.92 DIVERTICULITIS: Primary | ICD-10-CM

## 2022-05-03 PROBLEM — R73.03 PREDIABETES: Status: RESOLVED | Noted: 2021-10-07 | Resolved: 2022-05-03

## 2022-05-03 PROBLEM — E11.8 TYPE 2 DIABETES MELLITUS WITH COMPLICATION, WITHOUT LONG-TERM CURRENT USE OF INSULIN (HCC): Status: RESOLVED | Noted: 2018-06-20 | Resolved: 2022-05-03

## 2022-05-03 PROCEDURE — G0439 PPPS, SUBSEQ VISIT: HCPCS | Performed by: FAMILY MEDICINE

## 2022-05-03 PROCEDURE — 99215 OFFICE O/P EST HI 40 MIN: CPT | Performed by: FAMILY MEDICINE

## 2022-05-03 RX ORDER — BETAMETHASONE DIPROPIONATE 0.5 MG/G
CREAM TOPICAL 2 TIMES DAILY
Qty: 45 G | Refills: 3 | Status: SHIPPED | OUTPATIENT
Start: 2022-05-03

## 2022-05-03 RX ORDER — TADALAFIL 5 MG/1
5 TABLET ORAL
Qty: 90 TABLET | Refills: 3 | Status: SHIPPED | OUTPATIENT
Start: 2022-05-03

## 2022-05-03 NOTE — PROGRESS NOTES
Assessment and Plan:   1 immunizations up-to-date  2  All preventative studies are up-to-date  Problem List Items Addressed This Visit     None      Visit Diagnoses     Screening for malignant neoplasm of colon    -  Primary    Relevant Orders    Ambulatory Referral to Gastroenterology        BMI Counseling: Body mass index is 29 77 kg/m²  The BMI is above normal  Nutrition recommendations include decreasing portion sizes and encouraging healthy choices of fruits and vegetables  Rationale for BMI follow-up plan is due to patient being overweight or obese  Depression Screening and Follow-up Plan: Patient was screened for depression during today's encounter  They screened negative with a PHQ-2 score of 0  Preventive health issues were discussed with patient, and age appropriate screening tests were ordered as noted in patient's After Visit Summary  Personalized health advice and appropriate referrals for health education or preventive services given if needed, as noted in patient's After Visit Summary       History of Present Illness:     Patient presents for Medicare Annual Wellness visit    Patient Care Team:  Claudeen Cass, DO as PCP - General (Family Medicine)  MD Ashwin Tate MD as Endoscopist     Problem List:     Patient Active Problem List   Diagnosis    Mixed hyperlipidemia    Pulmonary nodule seen on imaging study    Vitamin D deficiency    Adult celiac disease    Benign prostatic hyperplasia with urinary frequency    Liver cyst    Type 2 diabetes mellitus with complication, without long-term current use of insulin ([facility] [address] )    Intrinsic eczema    remote history of sarcoidosis    History of elevated PSA    Prediabetes    Rheumatoid factor positive    Right bundle branch block (RBBB)      Past Medical and Surgical History:     Past Medical History:   Diagnosis Date    Acute pancreatitis     last assessed - [id number]    BPH (benign prostatic hypertrophy)     Celiac disease     current GI w/u to r/o celiac    Cholelithiasis     Colon polyp     Elevated blood pressure reading     last assessed - [id number]    Gallstones     last assessed - [id number]    High cholesterol     Hx of mitral valve disorder     Sarcoidosis     1990's Diagnosed 15 years ago by biopsy, not active; last assessed - [id number]     Past Surgical History:   Procedure Laterality Date    CATARACT EXTRACTION      ERCP N/A 4/3/2017    Procedure: ENDOSCOPIC RETROGRADE CHOLANGIOPANCREATOGRAPHY (ERCP); Surgeon: Rene Bob MD;  [facility]; Service:     NV COLONOSCOPY FLX DX W/COLLJ SPEC WHEN PFRMD N/A 3/1/2017    Procedure: EGD AND COLONOSCOPY;  Surgeon: Alex Blanco MD;  [facility]; Service: Gastroenterology    NV ESOPHAGOGASTRODUODENOSCOPY TRANSORAL DIAGNOSTIC N/A 6/5/2017    Procedure: ESOPHAGOGASTRODUODENOSCOPY (EGD); Surgeon: Rene Bob MD;  [facility];   Service: Gastroenterology    NV LAP,CHOLECYSTECTOMY N/A 3/29/2017    Procedure: CHOLECYSTECTOMY LAPAROSCOPIC; POSSIBLE OPEN;  Surgeon: Zenon Johnson MD;  [facility];  Service: General; complicated by bile leak and pancreatitis s/p stent     ROTATOR CUFF REPAIR Right 04/29/2015    for rotator cuff tear, Onset: 4/14      Family History:     Family History   Problem Relation Age of Onset    Diabetes Mother         Diabetes Mellitus    Hypertension Mother     Ovarian cancer Mother     Rheumatologic disease Mother     Diabetes Father         Diabetes Mellitus    Heart disease Father     Hypertension Father     Diabetes Sister         Diabetes Mellitus    Other Sister         H  pylori infection    Hypertension Sister     Other Brother         H  pylori infection    Alcohol abuse Neg Hx     Substance Abuse Neg Hx     Mental illness Neg Hx     Depression Neg Hx       Social History:     Social History     Socioeconomic History    Marital status:      Spouse name: None    Number of children: None    Years of education: None    Highest education level: None   Occupational History    Occupation: Retired   Tobacco Use    Smoking status: Never Smoker    Smokeless tobacco: Never Used   Vaping Use    Vaping Use: Never used   Substance and Sexual Activity    Alcohol use: Yes     Comment: wine, 3-4x/week; social drinker    Drug use: No     Comment: Denied history of drug use    Sexual activity: None   Other Topics Concern    None   Social History Narrative    Daily coffee consumption (2 Cups/Day)        retired     Social Determinants of Health     Financial Resource Strain: Not on file   Food Insecurity: Not on file   Transportation Needs: Not on file   Physical Activity: Not on file   Stress: Not on file   Social Connections: Not on file   Intimate Partner Violence: Not on file   Housing Stability: Not on file      Medications and Allergies:     Current Outpatient Medications   Medication Sig Dispense Refill    atorvastatin (LIPITOR) 20 mg tablet TAKE 1 TABLET DAILY 90 tablet 1    Cholecalciferol (VITAMIN D-3) 1000 units CAPS 2 daily  0    colchicine (COLCRYS) 0 6 mg tablet Take 1 tablet (0 6 mg total) by mouth 2 (two) times a day 60 tablet 1    finasteride (PROSCAR) 5 mg tablet TAKE 1 TABLET DAILY AFTER  DINNER 90 tablet 3    Glucosamine-Chondroitin (MOVE FREE PO) Take 2 tablets by mouth daily      Multiple Vitamin (MULTIVITAMIN) tablet Take 1 tablet by mouth daily       No current facility-administered medications for this visit       No Known Allergies   Immunizations:     Immunization History   Administered Date(s) Administered    COVID-19, unspecified 03/09/2021    INFLUENZA 01/01/2003, 10/06/2003, 10/05/2021    Influenza Split High Dose Preservative Free IM 12/11/2015    Influenza, high dose seasonal 0 7 mL 09/14/2018, 10/01/2019, 10/05/2021    Influenza, seasonal, injectable 12/04/2013, 10/14/2016    Pneumococcal Conjugate 13-Valent 06/11/2015    Pneumococcal Polysaccharide PPV23 06/04/2014    Tdap 03/08/2013    Tetanus Toxoid, Unspecified 01/01/2004    Zoster 02/22/2013    Zoster Vaccine Recombinant 01/25/2019, 04/26/2019    influenza, trivalent, adjuvanted 09/11/2020      Health Maintenance:         Topic Date Due    Colorectal Cancer Screening  05/17/2022    Hepatitis C Screening  Completed         Topic Date Due    COVID-19 Vaccine (2 - Inadvertent 3-dose series) 04/06/2021      Medicare Health Risk Assessment:     /82   Pulse 70   Resp 15   Ht 5' 7" (1 702 m)   Wt 86 2 kg (190 lb 1 6 oz)   SpO2 98%   BMI 29 77 kg/m²      Tramaine Keane is here for his Subsequent Wellness visit  Health Risk Assessment:   Patient rates overall health as good  Patient feels that their physical health rating is same  Patient is satisfied with their life  Eyesight was rated as same  Hearing was rated as same  Patient feels that their emotional and mental health rating is same  Patients states they are never, rarely angry  Patient states they are never, rarely unusually tired/fatigued  Pain experienced in the last 7 days has been none  Patient states that he has experienced no weight loss or gain in last 6 months  Depression Screening:   PHQ-2 Score: 0      Fall Risk Screening: In the past year, patient has experienced: no history of falling in past year      Home Safety:  Patient does not have trouble with stairs inside or outside of their home  Patient has working smoke alarms and has working carbon monoxide detector  Home safety hazards include: none  Nutrition:   Current diet is Regular and Limited junk food  Medications:   Patient is currently taking over-the-counter supplements  OTC medications include: see medication list  Patient is able to manage medications       Activities of Daily Living (ADLs)/Instrumental Activities of Daily Living (IADLs):   Walk and transfer into and out of bed and chair?: Yes  Dress and groom yourself?: Yes    Bathe or shower yourself?: Yes    Feed yourself? Yes  Do your laundry/housekeeping?: Yes  Manage your money, pay your bills and track your expenses?: Yes  Make your own meals?: Yes    Do your own shopping?: Yes    Previous Hospitalizations:   Any hospitalizations or ED visits within the last 12 months?: No      Advance Care Planning:   Living will: Yes    Advanced directive: Yes    Advanced directive counseling given: Yes    End of Life Decisions reviewed with patient: Yes      Cognitive Screening:   Provider or family/friend/caregiver concerned regarding cognition?: No    PREVENTIVE SCREENINGS      Cardiovascular Screening:    General: Screening Not Indicated and History Lipid Disorder      Diabetes Screening:     General: Screening Not Indicated and History Diabetes      Colorectal Cancer Screening:     General: Screening Current      Prostate Cancer Screening:    General: Screening Current      Osteoporosis Screening:    General: Screening Not Indicated      Abdominal Aortic Aneurysm (AAA) Screening:    Risk factors include: age between 73-67 yo        General: Screening Current      Lung Cancer Screening:     General: Screening Not Indicated      Hepatitis C Screening:    General: Screening Current    Screening, Brief Intervention, and Referral to Treatment (SBIRT)    Screening  Typical number of drinks in a day: 1  Typical number of drinks in a week: 2  Interpretation: Low risk drinking behavior  Single Item Drug Screening:  How often have you used an illegal drug (including marijuana) or a prescription medication for non-medical reasons in the past year? never    Single Item Drug Screen Score: 0  Interpretation: Negative screen for possible drug use disorder    Brief Intervention  Alcohol & drug use screenings were reviewed  No concerns regarding substance use disorder identified  Other Counseling Topics:   Regular weightbearing exercise and calcium and vitamin D intake         Janet Ryan, DO

## 2022-05-03 NOTE — PATIENT INSTRUCTIONS
1  Try to remember to send us a copy of your living will  You could take a picture of it and send it through my chart    2  Everything looks wonderful continue the same    3  Remember the things we talked about for her wonderful trip to [place]  Get [address] mask and wear that whenever in public or crowded    4 try Cialis daily 5 mg every day and we will see how that works    See you back in 6 months with a nonfasting A1c prior                    Medicare Preventive Visit Patient Instructions  Thank you for completing your Welcome to Medicare Visit or Medicare Annual Wellness Visit today  Your next wellness visit will be due in one year (5/4/2023)  The screening/preventive services that you may require over the next 5-10 years are detailed below  Some tests may not apply to you based off risk factors and/or age  Screening tests ordered at today's visit but not completed yet may show as past due  Also, please note that scanned in results may not display below  Preventive Screenings:  Service Recommendations Previous Testing/Comments   Colorectal Cancer Screening  · Colonoscopy    · Fecal Occult Blood Test (FOBT)/Fecal Immunochemical Test (FIT)  · Fecal DNA/Cologuard Test  · Flexible Sigmoidoscopy Age: 54-65 years old   Colonoscopy: every 10 years (May be performed more frequently if at higher risk)  OR  FOBT/FIT: every 1 year  OR  Cologuard: every 3 years  OR  Sigmoidoscopy: every 5 years  Screening may be recommended earlier than age 48 if at higher risk for colorectal cancer  Also, an individualized decision between you and your healthcare provider will decide whether screening between the ages of 74-80 would be appropriate   Colonoscopy: 05/17/2021  FOBT/FIT: Not on file  Cologuard: Not on file  Sigmoidoscopy: Not on file    Screening Current     Prostate Cancer Screening Individualized decision between patient and health care provider in men between ages of 53-78   Medicare will cover every 12 months beginning on the day after your 50th birthday PSA: 2 0 ng/mL     Screening Current     Hepatitis C Screening Once for adults born between 1945 and 1965  More frequently in patients at high risk for Hepatitis C Hep C Antibody: 09/05/2018    Screening Current   Diabetes Screening 1-2 times per year if you're at risk for diabetes or have pre-diabetes Fasting glucose: 139 mg/dL   A1C: 5 9 %    Screening Not Indicated  History Diabetes   Cholesterol Screening Once every 5 years if you don't have a lipid disorder  May order more often based on risk factors  Lipid panel: 04/21/2022    Screening Not Indicated  History Lipid Disorder      Other Preventive Screenings Covered by Medicare:  1  Abdominal Aortic Aneurysm (AAA) Screening: covered once if your at risk  You're considered to be at risk if you have a family history of AAA or a male between the age of 73-68 who smoking at least 100 cigarettes in your lifetime  2  Lung Cancer Screening: covers low dose CT scan once per year if you meet all of the following conditions: (1) Age 50-69; (2) No signs or symptoms of lung cancer; (3) Current smoker or have quit smoking within the last 15 years; (4) You have a tobacco smoking history of at least 30 pack years (packs per day x number of years you smoked); (5) You get a written order from a healthcare provider  3  Glaucoma Screening: covered annually if you're considered high risk: (1) You have diabetes OR (2) Family history of glaucoma OR (3)  aged 48 and older OR (3)  American aged 72 and older  3  Osteoporosis Screening: covered every 2 years if you meet one of the following conditions: (1) Have a vertebral abnormality; (2) On glucocorticoid therapy for more than 3 months; (3) Have primary hyperparathyroidism; (4) On osteoporosis medications and need to assess response to drug therapy  5  HIV Screening: covered annually if you're between the age of 12-76   Also covered annually if you are younger than 13 and older than 72 with risk factors for HIV infection  For pregnant patients, it is covered up to 3 times per pregnancy  Immunizations:  Immunization Recommendations   Influenza Vaccine Annual influenza vaccination during flu season is recommended for all persons aged >= 6 months who do not have contraindications   Pneumococcal Vaccine (Prevnar and Pneumovax)  * Prevnar = PCV13  * Pneumovax = PPSV23 Adults 25-60 years old: 1-3 doses may be recommended based on certain risk factors  Adults 72 years old: Prevnar (PCV13) vaccine recommended followed by Pneumovax (PPSV23) vaccine  If already received PPSV23 since turning 65, then PCV13 recommended at least one year after PPSV23 dose  Hepatitis B Vaccine 3 dose series if at intermediate or high risk (ex: diabetes, end stage renal disease, liver disease)   Tetanus (Td) Vaccine - COST NOT COVERED BY MEDICARE PART B Following completion of primary series, a booster dose should be given every 10 years to maintain immunity against tetanus  Td may also be given as tetanus wound prophylaxis  Tdap Vaccine - COST NOT COVERED BY MEDICARE PART B Recommended at least once for all adults  For pregnant patients, recommended with each pregnancy  Shingles Vaccine (Shingrix) - COST NOT COVERED BY MEDICARE PART B  2 shot series recommended in those aged 48 and above     Health Maintenance Due:      Topic Date Due    Colorectal Cancer Screening  05/17/2022    Hepatitis C Screening  Completed     Immunizations Due:      Topic Date Due    COVID-19 Vaccine (2 - Inadvertent 3-dose series) 04/06/2021     Advance Directives   What are advance directives? Advance directives are legal documents that state your wishes and plans for medical care  These plans are made ahead of time in case you lose your ability to make decisions for yourself  Advance directives can apply to any medical decision, such as the treatments you want, and if you want to donate organs     What are the types of advance directives? There are many types of advance directives, and each state has rules about how to use them  You may choose a combination of any of the following:  · Living will: This is a written record of the treatment you want  You can also choose which treatments you do not want, which to limit, and which to stop at a certain time  This includes surgery, medicine, IV fluid, and tube feedings  · Durable power of  for healthcare [facility] This is a written record that states who you want to make healthcare choices for you when you are unable to make them for yourself  This person, called a proxy, is usually a family member or a friend  You may choose more than 1 proxy  · Do not resuscitate (DNR) order:  A DNR order is used in case your heart stops beating or you stop breathing  It is a request not to have certain forms of treatment, such as CPR  A DNR order may be included in other types of advance directives  · Medical directive: This covers the care that you want if you are in a coma, near death, or unable to make decisions for yourself  You can list the treatments you want for each condition  Treatment may include pain medicine, surgery, blood transfusions, dialysis, IV or tube feedings, and a ventilator (breathing machine)  · Values history: This document has questions about your views, beliefs, and how you feel and think about life  This information can help others choose the care that you would choose  Why are advance directives important? An advance directive helps you control your care  Although spoken wishes may be used, it is better to have your wishes written down  Spoken wishes can be misunderstood, or not followed  Treatments may be given even if you do not want them  An advance directive may make it easier for your family to make difficult choices about your care     Weight Management   Why it is important to manage your weight:  Being overweight increases your risk of health conditions such as heart disease, high blood pressure, type 2 diabetes, and certain types of cancer  It can also increase your risk for osteoarthritis, sleep apnea, and other respiratory problems  Aim for a slow, steady weight loss  Even a small amount of weight loss can lower your risk of health problems  How to lose weight safely:  A safe and healthy way to lose weight is to eat fewer calories and get regular exercise  You can lose up about 1 pound a week by decreasing the number of calories you eat by 500 calories each day  Healthy meal plan for weight management:  A healthy meal plan includes a variety of foods, contains fewer calories, and helps you stay healthy  A healthy meal plan includes the following:  · Eat whole-grain foods more often  A healthy meal plan should contain fiber  Fiber is the part of grains, fruits, and vegetables that is not broken down by your body  Whole-grain foods are healthy and provide extra fiber in your diet  Some examples of whole-grain foods are whole-wheat breads and pastas, oatmeal, brown rice, and bulgur  · Eat a variety of vegetables every day  Include dark, leafy greens such as spinach, kale, john greens, and mustard greens  Eat yellow and orange vegetables such as carrots, sweet potatoes, and winter squash  · Eat a variety of fruits every day  Choose fresh or canned fruit (canned in its own juice or light syrup) instead of juice  Fruit juice has very little or no fiber  · Eat low-fat dairy foods  Drink fat-free (skim) milk or 1% milk  Eat fat-free yogurt and low-fat cottage cheese  Try low-fat cheeses such as mozzarella and other reduced-fat cheeses  · Choose meat and other protein foods that are low in fat  Choose beans or other legumes such as split peas or lentils  Choose fish, skinless poultry (chicken or turkey), or lean cuts of red meat (beef or pork)  Before you cook meat or poultry, cut off any visible fat  · Use less fat and oil    Try baking foods instead of frying them  Add less fat, such as margarine, sour cream, regular salad dressing and mayonnaise to foods  Eat fewer high-fat foods  Some examples of high-fat foods include french fries, doughnuts, ice cream, and cakes  · Eat fewer sweets  Limit foods and drinks that are high in sugar  This includes candy, cookies, regular soda, and sweetened drinks  Exercise:  Exercise at least 30 minutes per day on most days of the week  Some examples of exercise include walking, biking, dancing, and swimming  You can also fit in more physical activity by taking the stairs instead of the elevator or parking farther away from stores  Ask your healthcare provider about the best exercise plan for you  © Copyright [employer] 2018 Information is for End User's use only and may not be sold, redistributed or otherwise used for commercial purposes   All illustrations and images included in CareNotes® are the copyrighted property of [employer]  or [address] [place] [address]

## 2022-05-03 NOTE — TELEPHONE ENCOUNTER
Scheduled date of colonoscopy (as of today):   6/19/22  Physician performing colonoscopy: Dr Young Marker  Location of colonoscopy: [facility]  Bowel prep reviewed with patient: miralax/dulcolax 2 day prep instructions mailed to the patient  Instructions reviewed with patient by: Ricki Canela  Clearances:  n/a

## 2022-05-03 NOTE — PROGRESS NOTES
ASSESSMENT/PLAN:    ED/BPH  Patient will continue Flomax  We will add Cialis daily at 5 mg and see how that works  1st we have to see if his prescription plan will cover    Elevated A1c  A1c 5 9 from 6 4  Continue diet exercise and recheck 6 months    Pericarditis  Resolved    Osteoarthritis of the right thumb  Injected but basically no help  Patient will live with it     Celiac disease  History of colon polyps  Patient called GI and they will call him for an appointment      Hyperlipidemia  Continue atorvastatin  Cholesterol excellent at 117 with LDL of 50     History of elevated PSA  Biopsies years ago  We alone or following patient's PSA  PSA stable at 2 down from 2 3      Positive rheumatoid factor  No disease manifestations     Celiac disease  Gluten free diet     Eczema  Continue using betamethasone cream on elbows as needed      vitamin-D deficiency   Continue vitamin-D daily  Vitamin-D level is normal     History of lung nodule  Pap was followed by CT scan with Pulmonary for years  Felt to be left over sarcoid no need to follow anymore        Recheck in 6 months with a A1c   Patient will try to remember to drop off a copy of his living well  Recheck sooner if needed             Health Maintenance   Topic Date Due    COVID-19 Vaccine (2 - Inadvertent 3-dose series) 04/06/2021    PT PLAN OF CARE  10/21/2021    Medicare Annual Wellness Visit (AWV)  10/23/2021    URINE MICROALBUMIN  04/07/2022    BMI: Followup Plan  04/09/2022    Diabetic Foot Exam  04/09/2022    Colorectal Cancer Screening  05/17/2022    HEMOGLOBIN A1C  10/21/2022    BMI: Adult  11/16/2022    DM Eye Exam  12/28/2022    DTaP,Tdap,and Td Vaccines (2 - Td or Tdap) 03/08/2023    Fall Risk  05/03/2023    Depression Screening  05/03/2023    Hepatitis C Screening  Completed    Pneumococcal Vaccine: 65+ Years  Completed    Influenza Vaccine  Completed    HIB Vaccine  Aged Out    Hepatitis B Vaccine  Aged Out    IPV Vaccine  Aged Out  Hepatitis A Vaccine  Aged Out    Meningococcal ACWY Vaccine  Aged Out    HPV Vaccine  Aged Out         Problem List as of 5/3/2022 Reviewed: 11/16/2021  9:36 AM by Shanice Hartman MD    Adult celiac disease    Last Assessment & Plan 6/20/2018 Office Visit Written 6/20/2018  9:02 AM by Zak Pina MD     On gluten free diet  Off PPI  Benign prostatic hyperplasia with urinary frequency    Last Assessment & Plan 6/20/2018 Office Visit Written 6/20/2018  9:01 AM by Zak Pina MD     Sees Urology yearly, on finasteride  History of elevated PSA    Intrinsic eczema    Liver cyst    Mixed hyperlipidemia    Last Assessment & Plan 10/6/2021 Hospital Encounter Written 10/7/2021 11:22 AM by Theresa Go DO     Continue lipitor         Prediabetes    Pulmonary nodule seen on imaging study    remote history of sarcoidosis    Last Assessment & Plan 6/22/2019 Hospital Encounter Written 6/25/2019 12:27 PM by Arlin Basilio PA-C     · Reports being diagnosed with sarcoidosis 40 years ago and seeing pulmonology at that time but having no additional follow-up since that time  Last CT scan of the chest on file was from 2016 which showed stable lung nodules  · Patient reports mild cough x3 days  May need eventual CT chest/pulmonology follow-up         Rheumatoid factor positive    Right bundle branch block (RBBB)    Type 2 diabetes mellitus with complication, without long-term current use of insulin [facility])    Last Assessment & Plan 10/6/2021 Hospital Encounter Written 10/6/2021  8:59 AM by Brock Bergman DO     Lab Results   Component Value Date    HGBA1C 6 0 (H) 04/07/2021       No results for input(s): POCGLU in the last 72 hours      Blood Sugar Average: Last 72 hrs:     Controlled with diet  Has been off metformin for 10 + years         Vitamin D deficiency    Last Assessment & Plan 6/20/2018 Office Visit Written 6/20/2018  9:01 AM by Zak Pina MD     Increase D3 during fall/winter months  Subjective:   Chief Complaint   Patient presents with   [facility] Wellness Visit     Patient is here for his 6 month recheck for his blood pressure and his diabetes  He also had all his blood work done for review    He also saw Cardiology for recheck on his pericarditis and has been discharged without any further workup needed    He saw Orthopedics regarding arthritis in his right [facility] joint  Had injected and really did not help    Also had echo with a ejection fraction of 69% in slight LVH  Patient has questions about ED  He has BPH and now has a romantic interest and is having problems  He is already on Flomax      patient ID: Elder Castro is a 67 y o  male  Patient's past medical history, surgical history, family history, social history, and Tobacco history reviewed with patient  MED LIST WAS REVIEWED AND UPDATED    ROS  As per HPI  Rest of 12 point review of systems negative     Objective:      VITALS:  Wt Readings from Last 3 Encounters:   05/03/22 86 2 kg (190 lb 1 6 oz)   11/16/21 85 7 kg (189 lb)   11/09/21 85 7 kg (189 lb)     BP Readings from Last 3 Encounters:   05/03/22 122/82   11/16/21 130/60   11/09/21 128/80     Pulse Readings from Last 3 Encounters:   05/03/22 70   11/16/21 68   11/01/21 71     Body mass index is 29 77 kg/m²  Laboratory Results: All pertinent labs and studies were reviewed with patient during this office visit with highlights of the results contained in this note in the ASSESSMENT AND PLAN section       Physical Exam    Gen    No acute distress well-appearing well-nourished appears stated age    Mental status  Good judgment and insight oriented to time person and place, recent and remote memory intact mood and affect normal cooperative and patient is reasonable    HEENT  PERRLA 3 mm, EOMI without nystagmus, normocephalic atraumatic without facial weakness      Neck   supple no masses trachea midline positive click normal carotid upstrokes with no bruits    Cor  Regular rhythm without ectopy or murmur, no S3-S4, normal palpation that is no heave lift or thrill    Vascular  No edema, good pedal pulses    Lungs  CTA bilaterally in no respiratory distress no wheezes rhonchi or rales, normal to palpation no tactile fremitus    Abdomen  Soft, no palpable masses, no hepatosplenomegaly, normal bowel sounds, nontender    Lymphatics  No palpable nodes in the neck, supraclavicular area, axilla, or groin     Musculoskeletal  No clubbing cyanosis or edema muscle tone normal    Skin  no rashes or abnormal appearing lesions    Neuro  Normal ambulation, cranial nerves 2-12 grossly intact, higher functioning with reasoning intact  Patient's shoes and socks removed  Right Foot/Ankle   Right Foot Inspection  Skin Exam: skin normal and skin intact  No dry skin, no warmth, no callus, no erythema, no maceration, no abnormal color, no pre-ulcer, no ulcer and no callus  Sensory   Monofilament testing: intact    Vascular  The right DP pulse is 2+  The right PT pulse is 2+  Left Foot/Ankle  Left Foot Inspection  Skin Exam: skin normal and skin intact  No dry skin, no warmth, no erythema, no maceration, normal color, no pre-ulcer, no ulcer and no callus  Sensory   Monofilament testing: intact    Vascular  The left DP pulse is 2+  The left PT pulse is 2+       Assign Risk Category  No deformity present  No loss of protective sensation  No weak pulses  Risk: 0

## 2022-05-31 ENCOUNTER — TELEPHONE (OUTPATIENT)
Dept: GASTROENTEROLOGY | Facility: CLINIC | Age: 72
End: 2022-05-31

## 2022-05-31 NOTE — TELEPHONE ENCOUNTER
Patients GI provider:  Dr Yanick Jacobs    Number to return call: [phone]    Reason for call: Pt calling to reschedule his procedure  Above is his number       Scheduled procedure/appointment date if applicable: procedure 7/3/07

## 2022-06-01 NOTE — TELEPHONE ENCOUNTER
Left message for patient to call back to reschedule procedure  Offered 6/20, 7/21 and 8/22 with Dr Yanick Jacobs at [place] [id number]  Gave my direct line to call back on

## 2022-06-01 NOTE — TELEPHONE ENCOUNTER
Colon rescheduled to 6/20/22 with Dr Majo Matthews at [facility]  Patient still has prep instructions

## 2022-06-20 ENCOUNTER — HOSPITAL ENCOUNTER (OUTPATIENT)
Dept: GASTROENTEROLOGY | Facility: AMBULARY SURGERY CENTER | Age: 72
Setting detail: OUTPATIENT SURGERY
Discharge: HOME | End: 2022-06-20
Attending: INTERNAL MEDICINE | Admitting: INTERNAL MEDICINE
Payer: MEDICARE

## 2022-06-20 ENCOUNTER — ANESTHESIA (OUTPATIENT)
Dept: GASTROENTEROLOGY | Facility: AMBULARY SURGERY CENTER | Age: 72
End: 2022-06-20

## 2022-06-20 ENCOUNTER — ANESTHESIA EVENT (OUTPATIENT)
Dept: GASTROENTEROLOGY | Facility: AMBULARY SURGERY CENTER | Age: 72
End: 2022-06-20

## 2022-06-20 VITALS
HEART RATE: 58 BPM | HEIGHT: 67 IN | SYSTOLIC BLOOD PRESSURE: 151 MMHG | RESPIRATION RATE: 18 BRPM | TEMPERATURE: 98 F | WEIGHT: 185 LBS | BODY MASS INDEX: 29.03 KG/M2 | DIASTOLIC BLOOD PRESSURE: 68 MMHG | OXYGEN SATURATION: 100 %

## 2022-06-20 DIAGNOSIS — K57.92 DIVERTICULITIS: ICD-10-CM

## 2022-06-20 PROCEDURE — 88305 TISSUE EXAM BY PATHOLOGIST: CPT | Performed by: PATHOLOGY

## 2022-06-20 PROCEDURE — 45385 COLONOSCOPY W/LESION REMOVAL: CPT | Performed by: INTERNAL MEDICINE

## 2022-06-20 RX ORDER — PROPOFOL 10 MG/ML
INJECTION, EMULSION INTRAVENOUS AS NEEDED
Status: DISCONTINUED | OUTPATIENT
Start: 2022-06-20 | End: 2022-06-20

## 2022-06-20 RX ORDER — SODIUM CHLORIDE, SODIUM LACTATE, POTASSIUM CHLORIDE, CALCIUM CHLORIDE 600; 310; 30; 20 MG/100ML; MG/100ML; MG/100ML; MG/100ML
INJECTION, SOLUTION INTRAVENOUS CONTINUOUS PRN
Status: DISCONTINUED | OUTPATIENT
Start: 2022-06-20 | End: 2022-06-20

## 2022-06-20 RX ADMIN — PROPOFOL 20 MG: 10 INJECTION, EMULSION INTRAVENOUS at 09:09

## 2022-06-20 RX ADMIN — PROPOFOL 20 MG: 10 INJECTION, EMULSION INTRAVENOUS at 09:13

## 2022-06-20 RX ADMIN — PROPOFOL 20 MG: 10 INJECTION, EMULSION INTRAVENOUS at 09:11

## 2022-06-20 RX ADMIN — PROPOFOL 20 MG: 10 INJECTION, EMULSION INTRAVENOUS at 09:19

## 2022-06-20 RX ADMIN — PROPOFOL 20 MG: 10 INJECTION, EMULSION INTRAVENOUS at 09:17

## 2022-06-20 RX ADMIN — PROPOFOL 120 MG: 10 INJECTION, EMULSION INTRAVENOUS at 09:07

## 2022-06-20 RX ADMIN — PROPOFOL 20 MG: 10 INJECTION, EMULSION INTRAVENOUS at 09:15

## 2022-06-20 RX ADMIN — SODIUM CHLORIDE, SODIUM LACTATE, POTASSIUM CHLORIDE, AND CALCIUM CHLORIDE: .6; .31; .03; .02 INJECTION, SOLUTION INTRAVENOUS at 08:57

## 2022-06-20 NOTE — ANESTHESIA PREPROCEDURE EVALUATION
Procedure:  COLONOSCOPY    Relevant Problems   ANESTHESIA (within normal limits)      CARDIO  Echo shows good LV and mild MR   (+) Mixed hyperlipidemia   (+) Right bundle branch block (RBBB)      ENDO (within normal limits)      GI/HEPATIC   (+) Liver cyst      NEURO/PSYCH   (+) History of elevated PSA      PULMONARY (within normal limits)      Digestive   (+) Adult celiac disease      Other   (+) Benign prostatic hyperplasia with urinary frequency   (+) Pulmonary nodule seen on imaging study        Physical Exam    Airway    Mallampati score: II  TM Distance: >3 FB  Neck ROM: full     Dental   lower dentures,     Cardiovascular      Pulmonary      Other Findings        Anesthesia Plan  ASA Score- 2     Anesthesia Type- IV sedation with anesthesia with ASA Monitors  Additional Monitors:   Airway Plan:           Plan Factors-Exercise tolerance (METS): >4 METS  Chart reviewed  EKG reviewed  Existing labs reviewed  Patient summary reviewed  Patient is not a current smoker  Induction-     Postoperative Plan-     Informed Consent- Anesthetic plan and risks discussed with patient  I personally reviewed this patient with the CRNA  Discussed and agreed on the Anesthesia Plan with the CRNA  Melvin Baig

## 2022-06-20 NOTE — H&P
History and Physical - [facility]  Mary Gomez 67 y o  male MRN: [id number]     HPI: Mary Gomez is a 67y o  year old male who presents for surveillance colonoscopy  He had personal history of colon polyps in 2017  Inadequate prep last year  Review of Systems    Historical Information   Past Medical History:   Diagnosis Date    Acute pancreatitis     last assessed - [id number]    BPH (benign prostatic hypertrophy)     Celiac disease     current GI w/u to r/o celiac    Cholelithiasis     Colon polyp     Elevated blood pressure reading     last assessed - [id number]    Gallstones     last assessed - [id number]    High cholesterol     Hx of mitral valve disorder     Hypertension     Sarcoidosis     1990's Diagnosed 15 years ago by biopsy, not active; last assessed - [id number]     Past Surgical History:   Procedure Laterality Date    CATARACT EXTRACTION      ERCP N/A 4/3/2017    Procedure: ENDOSCOPIC RETROGRADE CHOLANGIOPANCREATOGRAPHY (ERCP); Surgeon: Efren Cranker, MD;  [facility]: [facility]; Service:     OH COLONOSCOPY FLX DX W/COLLJ SPEC WHEN PFRMD N/A 3/1/2017    Procedure: EGD AND COLONOSCOPY;  Surgeon: Teetee Martinez MD;  [facility]: [facility]; Service: Gastroenterology    OH ESOPHAGOGASTRODUODENOSCOPY TRANSORAL DIAGNOSTIC N/A 6/5/2017    Procedure: ESOPHAGOGASTRODUODENOSCOPY (EGD); Surgeon: Efren Cranker, MD;  [facility];   Service: Gastroenterology    OH LAP,CHOLECYSTECTOMY N/A 3/29/2017    Procedure: CHOLECYSTECTOMY LAPAROSCOPIC; POSSIBLE OPEN;  Surgeon: Jeremi Hou MD;  [facility];  Service: General; complicated by bile leak and pancreatitis s/p stent     ROTATOR CUFF REPAIR Right 04/29/2015    for rotator cuff tear, Onset: 4/14     Social History   Social History     Substance and Sexual Activity   Alcohol Use Yes    Alcohol/week: 2 0 standard drinks    Types: 2 Glasses of wine per week    Comment: wine,  social drinker     Social History     Substance and Sexual Activity   Drug Use No    Comment: Denied history of drug use     Social History     Tobacco Use   Smoking Status Never Smoker   Smokeless Tobacco Never Used     Family History   Problem Relation Age of Onset    Diabetes Mother         Diabetes Mellitus    Hypertension Mother     Ovarian cancer Mother     Rheumatologic disease Mother     Diabetes Father         Diabetes Mellitus    Heart disease Father     Hypertension Father     Diabetes Sister         Diabetes Mellitus    Other Sister         H  pylori infection    Hypertension Sister     Other Brother         H  pylori infection    Alcohol abuse Neg Hx     Substance Abuse Neg Hx     Mental illness Neg Hx     Depression Neg Hx        Meds/Allergies     (Not in a hospital admission)      No Known Allergies    Objective     /78   Pulse 72   Temp (!) 96 5 °F (35 8 °C) (Temporal)   Resp 18   Ht 5' 7" (1 702 m)   Wt 83 9 kg (185 lb)   SpO2 99%   BMI 28 98 kg/m²       PHYSICAL EXAM    Gen: NAD  CV: RRR  CHEST: Clear  ABD: soft, NT/ND  EXT: no edema  Neuro: AAO      ASSESSMENT/PLAN:  This is a 67y o  year old male here for surveillance colonoscopy for personal history of colon polyps      PLAN:   Procedure:  Colonoscopy with biopsy and possible polypectomy

## 2022-06-20 NOTE — ANESTHESIA POSTPROCEDURE EVALUATION
Post-Op Assessment Note    CV Status:  Stable  Pain Score: 0    Pain management: adequate     Mental Status:  Alert and awake   Hydration Status:  Euvolemic   PONV Controlled:  Controlled   Airway Patency:  Patent      Post Op Vitals Reviewed: Yes      Staff: CRNA         No complications documented      BP   134/71   Temp 97   Pulse 56   Resp 12   SpO2 100

## 2022-07-01 DIAGNOSIS — N40.1 BENIGN PROSTATIC HYPERPLASIA WITH URINARY FREQUENCY: ICD-10-CM

## 2022-07-01 DIAGNOSIS — R35.0 BENIGN PROSTATIC HYPERPLASIA WITH URINARY FREQUENCY: ICD-10-CM

## 2022-07-01 DIAGNOSIS — E78.2 MIXED HYPERLIPIDEMIA: ICD-10-CM

## 2022-07-01 RX ORDER — FINASTERIDE 5 MG/1
TABLET, FILM COATED ORAL
Qty: 90 TABLET | Refills: 3 | Status: SHIPPED | OUTPATIENT
Start: 2022-07-01

## 2022-07-01 RX ORDER — ATORVASTATIN CALCIUM 20 MG/1
TABLET, FILM COATED ORAL
Qty: 90 TABLET | Refills: 1 | Status: SHIPPED | OUTPATIENT
Start: 2022-07-01

## 2022-10-12 ENCOUNTER — APPOINTMENT (OUTPATIENT)
Dept: LAB | Age: 72
End: 2022-10-12
Payer: MEDICARE

## 2022-10-12 DIAGNOSIS — R73.09 ELEVATED HEMOGLOBIN A1C: ICD-10-CM

## 2022-10-12 LAB
EST. AVERAGE GLUCOSE BLD GHB EST-MCNC: 134 MG/DL
HBA1C MFR BLD: 6.3 %

## 2022-10-12 PROCEDURE — 36415 COLL VENOUS BLD VENIPUNCTURE: CPT

## 2022-10-12 PROCEDURE — 83036 HEMOGLOBIN GLYCOSYLATED A1C: CPT

## 2022-11-01 ENCOUNTER — OFFICE VISIT (OUTPATIENT)
Dept: FAMILY MEDICINE CLINIC | Facility: CLINIC | Age: 72
End: 2022-11-01

## 2022-11-01 VITALS
WEIGHT: 193 LBS | OXYGEN SATURATION: 96 % | HEIGHT: 67 IN | BODY MASS INDEX: 30.29 KG/M2 | RESPIRATION RATE: 18 BRPM | DIASTOLIC BLOOD PRESSURE: 68 MMHG | SYSTOLIC BLOOD PRESSURE: 128 MMHG | HEART RATE: 58 BPM

## 2022-11-01 DIAGNOSIS — R35.0 BENIGN PROSTATIC HYPERPLASIA WITH URINARY FREQUENCY: Primary | ICD-10-CM

## 2022-11-01 DIAGNOSIS — E55.9 VITAMIN D DEFICIENCY: ICD-10-CM

## 2022-11-01 DIAGNOSIS — Z79.899 ENCOUNTER FOR LONG-TERM (CURRENT) USE OF MEDICATIONS: ICD-10-CM

## 2022-11-01 DIAGNOSIS — E78.2 MIXED HYPERLIPIDEMIA: ICD-10-CM

## 2022-11-01 DIAGNOSIS — Z87.898 HISTORY OF ELEVATED PSA: ICD-10-CM

## 2022-11-01 DIAGNOSIS — R97.20 ELEVATED PSA: ICD-10-CM

## 2022-11-01 DIAGNOSIS — R73.09 ELEVATED HEMOGLOBIN A1C: ICD-10-CM

## 2022-11-01 DIAGNOSIS — N40.1 BENIGN PROSTATIC HYPERPLASIA WITH URINARY FREQUENCY: Primary | ICD-10-CM

## 2022-11-01 DIAGNOSIS — R91.1 PULMONARY NODULE SEEN ON IMAGING STUDY: ICD-10-CM

## 2022-11-01 RX ORDER — ATORVASTATIN CALCIUM 20 MG/1
20 TABLET, FILM COATED ORAL DAILY
Qty: 90 TABLET | Refills: 3 | Status: SHIPPED | OUTPATIENT
Start: 2022-11-01

## 2022-11-01 RX ORDER — ATORVASTATIN CALCIUM 20 MG/1
20 TABLET, FILM COATED ORAL DAILY
Qty: 90 TABLET | Refills: 3 | Status: SHIPPED | OUTPATIENT
Start: 2022-11-01 | End: 2022-11-01 | Stop reason: SDUPTHER

## 2022-11-01 NOTE — PATIENT INSTRUCTIONS
1  Make an appointment for the next couple weeks to have her mole removed    2  Please increase  aerobic exercise to 4 hours total week    3   Journal your food so that you cut down on amounts    Overall see you back then in 6 months with fasting blood work 1 or 2 weeks prior  See you sooner if needed

## 2022-11-01 NOTE — PROGRESS NOTES
ASSESSMENT/PLAN:    Dysplastic mole/abnormal neoplasm  Patient to return for removal    Possible memory problems  Patient to work on 4 hours of aerobic exercise weekly and we will see if this seems any better  As patient journals his food intake whole general is memory issues as well    Elevated A1c  6 3 from 5 9  Patient will Jimmy in on his diet and journal what he eats in order to bring this back down    Hyperplastic mole  Next colonoscopy in 7 years so that would be 2029    ED/BPH  Good results with Cialis 5 mg daily in addition to Flomax  Continue both     Celiac disease  Limits the amount of gluten when he eats    Hyperlipidemia  Continue atorvastatin  Check lipids before next visit    Elevated PSA  Biopsies negative years ago  Check PSA  before next visit    Positive rheumatoid factor  No disease manifestations      vitamin-D deficiency   Continue vitamin-D daily  He check level before next visit l     History of lung nodule   CT scan with Pulmonary followed for years  Felt to be leftover sarcoid no need to follow anymore        Recheck in 6 months with a A1c see PSA and screen  Back in a couple weeks for mole removal  Increase exercise for weight A1c and memory  Patient will try to remember to drop off a copy of his living well  Recheck sooner if needed          Health Maintenance   Topic Date Due   • PT PLAN OF CARE  10/21/2021   • COVID-19 Vaccine (4 - Booster) 08/19/2022   • Fall Risk  05/03/2023   • Depression Screening  05/03/2023   • Medicare Annual Wellness Visit (AWV)  05/03/2023   • BMI: Followup Plan  05/03/2023   • BMI: Adult  11/01/2023   • Colorectal Cancer Screening  06/17/2032   • Hepatitis C Screening  Completed   • Pneumococcal Vaccine: 65+ Years  Completed   • Influenza Vaccine  Completed   • HIB Vaccine  Aged Out   • Hepatitis B Vaccine  Aged Out   • IPV Vaccine  Aged Out   • Hepatitis A Vaccine  Aged Out   • Meningococcal ACWY Vaccine  Aged Out   • HPV Vaccine  Aged Out         Problem List as of 11/1/2022 Reviewed: 6/20/2022  8:25 AM by Franchesca Bellamy MD    Adult celiac disease    Last Assessment & Plan 6/20/2018 Office Visit Written 6/20/2018  9:02 AM by Deena Sterling MD     On gluten free diet  Off PPI  Benign prostatic hyperplasia with urinary frequency    Last Assessment & Plan 6/20/2018 Office Visit Written 6/20/2018  9:01 AM by Deena Sterling MD     Sees Urology yearly, on finasteride  Elevated hemoglobin A1c    History of elevated PSA    Intrinsic eczema    Liver cyst    Mixed hyperlipidemia    Last Assessment & Plan 10/6/2021 Hospital Encounter Written 10/7/2021 11:22 AM by Paradise Villegas,      Continue lipitor         Pulmonary nodule seen on imaging study    remote history of sarcoidosis    Last Assessment & Plan 6/22/2019 Hospital Encounter Written 6/25/2019 12:27 PM by Tracey Gary PA-C     · Reports being diagnosed with sarcoidosis 40 years ago and seeing pulmonology at that time but having no additional follow-up since that time  Last CT scan of the chest on file was from 2016 which showed stable lung nodules  · Patient reports mild cough x3 days  May need eventual CT chest/pulmonology follow-up         Rheumatoid factor positive    Right bundle branch block (RBBB)    Vitamin D deficiency    Last Assessment & Plan 6/20/2018 Office Visit Written 6/20/2018  9:01 AM by Deena Sterling MD     Increase D3 during fall/winter months  Subjective:   Chief Complaint   Patient presents with   • Hyperlipidemia   • Vitamin D Deficiency   • Skin Lesion     On lower back   First noticed a few months ago   Painless     Patient is here for recheck  He has 2 concerns 1 is a mole on his back, the other is memory  He knows that people forget things with a walk into her room but he finds that he is having more issues with recalling names of people he knows for a long time as well as names of objects    Seems to be worse in the last 6-8 months  No family history of dementia      patient ID: Suha Jimenez is a 67 y o  male  Patient's past medical history, surgical history, family history, social history, and Tobacco history reviewed with patient  MED LIST WAS REVIEWED AND UPDATED    ROS  As per HPI  Rest of 12 point review of systems negative     Objective:      VITALS:  Wt Readings from Last 3 Encounters:   11/01/22 87 5 kg (193 lb)   06/20/22 83 9 kg (185 lb)   05/03/22 86 2 kg (190 lb 1 6 oz)     BP Readings from Last 3 Encounters:   11/01/22 128/68   06/20/22 151/68   05/03/22 122/82     Pulse Readings from Last 3 Encounters:   11/01/22 58   06/20/22 58   05/03/22 70     Body mass index is 30 23 kg/m²  Laboratory Results: All pertinent labs and studies were reviewed with patient during this office visit with highlights of the results contained in this note in the ASSESSMENT AND PLAN section       Physical Exam    Constitutional  Appears healthy, Looks well, Appearance consistent with age    Mental Status  Alert, Oriented, Cooperative, Memory function normal , clean, and reasonable    Neck  No neck mass, No thyromegaly, Good carotid upstrokes bilaterally, trachea midline positive click    Respiratory  Breath sounds normal, No rales, No rhonchi, No wheezing, normal palpation    Cardiac   Regular rhythm without ectopy or murmur no S3-S4, no heave lift or thrill to palpation    Vascular  No leg edema, No pedal edema    Muscular skeletal  No clubbing cyanosis , muscle tone normal    Skin  Left lower back is what looks like a seborrheic keratosis and not comet information with thickening of the body

## 2022-11-11 ENCOUNTER — RA CDI HCC (OUTPATIENT)
Dept: OTHER | Facility: HOSPITAL | Age: 72
End: 2022-11-11

## 2022-11-11 NOTE — PROGRESS NOTES
M06 [id number]  [facility] 75  coding opportunities          Chart Reviewed number of suggestions sent to Provider: 1     Patients Insurance     Medicare Insurance: [employer]

## 2022-11-18 ENCOUNTER — PROCEDURE VISIT (OUTPATIENT)
Dept: FAMILY MEDICINE CLINIC | Facility: CLINIC | Age: 72
End: 2022-11-18

## 2022-11-18 VITALS
DIASTOLIC BLOOD PRESSURE: 62 MMHG | WEIGHT: 197 LBS | OXYGEN SATURATION: 97 % | SYSTOLIC BLOOD PRESSURE: 120 MMHG | HEIGHT: 67 IN | HEART RATE: 57 BPM | RESPIRATION RATE: 16 BRPM | BODY MASS INDEX: 30.92 KG/M2

## 2022-11-18 DIAGNOSIS — L82.0 INFLAMED SEBORRHEIC KERATOSIS: Primary | ICD-10-CM

## 2022-11-18 NOTE — PATIENT INSTRUCTIONS
Keep the area clean and dry for the rest of today  Tomorrow take off the dressing and take a shower or wash as she normally would  Please wash this area with soap and water being careful to the area  Please put antibiotic or Vaseline on the pad of a Band-Aid and cover this area      If you cannot reach your self, please ask someone to assist you  Please do this for at least 10 days or until it is totally healed    Please call if you see any signs of infection such as:  Redness  Discharge  You have pain  The area becomes swollen

## 2022-11-18 NOTE — PROGRESS NOTES
Patient is here to have a lesion removed that we found last office visit  It is a seborrheic keratosis that almost looks like it is sliding office back leaving a comet like trail with varying colors in it     15 mm online    Shave lesion    Date/Time: 11/18/2022 12:23 PM  Performed by: Serenity Jefferson DO  Authorized by: Serenity Jefferson DO   Universal Protocol:  Consent: Verbal consent obtained  Written consent not obtained    Risks and benefits: risks, benefits and alternatives were discussed  Consent given by: patient  Patient understanding: patient states understanding of the procedure being performed  Patient identity confirmed: verbally with patient      Number of Lesions: 1  Lesion 1:     Body area: trunk    Trunk location: back    Initial size (mm): 15    Final defect size (mm): 17    Malignancy: benign lesion      Destruction method: shave removal      Comments:  Inflamed keratosis seborrheic  Changed in color and itchiness  Left side T12 area   Pathology sent   Patient tolerated procedure well

## 2023-02-08 ENCOUNTER — TELEMEDICINE (OUTPATIENT)
Dept: FAMILY MEDICINE CLINIC | Facility: CLINIC | Age: 73
End: 2023-02-08

## 2023-02-08 VITALS
DIASTOLIC BLOOD PRESSURE: 67 MMHG | TEMPERATURE: 100.2 F | SYSTOLIC BLOOD PRESSURE: 126 MMHG | BODY MASS INDEX: 29.82 KG/M2 | HEIGHT: 67 IN | OXYGEN SATURATION: 96 % | HEART RATE: 80 BPM | WEIGHT: 190 LBS | RESPIRATION RATE: 20 BRPM

## 2023-02-08 DIAGNOSIS — U07.1 COVID: Primary | ICD-10-CM

## 2023-02-08 RX ORDER — NIRMATRELVIR AND RITONAVIR 300-100 MG
3 KIT ORAL 2 TIMES DAILY
Qty: 30 TABLET | Refills: 0 | Status: SHIPPED | OUTPATIENT
Start: 2023-02-08 | End: 2023-02-13

## 2023-02-08 NOTE — PROGRESS NOTES
COVID-19 Outpatient Progress Note    Assessment/Plan:    Pt presents via televideo for symptoms that started on 02/06  Pt had a positive test yesterday at home on 02/07  Pt has been using Mucinex DM, Advil & Nyquil for his symptoms  Pt does qualify for Paxlovid  Medication and s/e reviewed  Pt will hold his Atorvastatin & Cialis while taking Paxlovid  Quarantine guidelines reviewed  Rest and fluids encouraged  Patient is encouraged to call our office for any questions/concerns, persistent or worsening symptoms  Patient states they understand and agree with treatment plan  Problem List Items Addressed This Visit    None  Visit Diagnoses     COVID    -  Primary    Relevant Medications    nirmatrelvir & ritonavir (Paxlovid, 300/100,) tablet therapy pack         Disposition:     Patient has asymptomatic or mild COVID-19 infection  Based off CDC guidelines, they were recommended to isolate for 5 days  If they are asymptomatic or symptoms are improving with no fevers in the past 24 hours, isolation may be ended followed by 5 days of wearing a mask when around othes to minimize risk of infecting others  If still have a fever or other symptoms have not improved, continue to isolate until they improve  Regardless of when they end isolation, avoid being around people who are more likely to get very sick from COVID-19 until at least day 11  Patient meets criteria for PAXLOVID and they have been counseled appropriately according to EUA documentation released by the FDA  After discussion, patient agrees to treatment  Gene Arriaga is an investigational medicine used to treat mild-to-moderate COVID-19 in adults and children (15years of age and older weighing at least 80 pounds (40 kg)) with positive results of direct SARS-CoV-2 viral testing, and who are at high risk for progression to severe COVID-19, including hospitalization or death  PAXLOVID is investigational because it is still being studied   There is limited information about the safety and effectiveness of using PAXLOVID to treat people with mild-to-moderate COVID-19  The FDA has authorized the emergency use of PAXLOVID for the treatment of mild-tomoderate COVID-19 in adults and children (15years of age and older weighing at least 80 pounds (40 kg)) with a positive test for the virus that causes COVID-19, and who are at high risk for progression to severe COVID-19, including hospitalization or death, under an EUA  What should I tell my healthcare provider before I take PAXLOVID? Tell your healthcare provider if you:  - Have any allergies  - Have liver or kidney disease  - Are pregnant or plan to become pregnant  - Are breastfeeding a child  - Have any serious illnesses    Tell your healthcare provider about all the medicines you take, including prescription and over-the-counter medicines, vitamins, and herbal supplements  Some medicines may interact with PAXLOVID and may cause serious side effects  Keep a list of your medicines to show your healthcare provider and pharmacist when you get a new medicine  You can ask your healthcare provider or pharmacist for a list of medicines that interact with PAXLOVID  Do not start taking a new medicine without telling your healthcare provider  Your healthcare provider can tell you if it is safe to take PAXLOVID with other medicines  Tell your healthcare provider if you are taking combined hormonal contraceptive  PAXLOVID may affect how your birth control pills work  Females who are able to become pregnant should use another effective alternative form of contraception or an additional barrier method of contraception  Talk to your healthcare provider if you have any questions about contraceptive methods that might be right for you  How do I take PAXLOVID? PAXLOVID consists of 2 medicines: nirmatrelvir and ritonavir    - Take 2 pink tablets of nirmatrelvir with 1 white tablet of ritonavir by mouth 2 times each day (in the morning and in the evening) for 5 days  For each dose, take all 3 tablets at the same time  - If you have kidney disease, talk to your healthcare provider  You may need a different dose  - Swallow the tablets whole  Do not chew, break, or crush the tablets  - Take PAXLOVID with or without food  - Do not stop taking PAXLOVID without talking to your healthcare provider, even if you feel better  - If you miss a dose of PAXLOVID within 8 hours of the time it is usually taken, take it as soon as you remember  If you miss a dose by more than 8 hours, skip the missed dose and take the next dose at your regular time  Do not take 2 doses of PAXLOVID at the same time  - If you take too much PAXLOVID, call your healthcare provider or go to the nearest hospital emergency room right away  - If you are taking a ritonavir- or cobicistat-containing medicine to treat hepatitis C or Human Immunodeficiency Virus (HIV), you should continue to take your medicine as prescribed by your healthcare provider   - Talk to your healthcare provider if you do not feel better or if you feel worse after 5 days  Who should generally not take PAXLOVID? Do not take PAXLOVID if:  You are allergic to nirmatrelvir, ritonavir, or any of the ingredients in PAXLOVID  You are taking any of the following medicines:  - Alfuzosin  - Pethidine, piroxicam, propoxyphene  - Ranolazine  - Amiodarone, dronedarone, flecainide, propafenone, quinidine  - Colchicine  - Lurasidone, pimozide, clozapine  - Dihydroergotamine, ergotamine, methylergonovine  - Lovastatin, simvastatin  - Sildenafil (Revatio®) for pulmonary arterial hypertension (PAH)  - Triazolam, oral midazolam  - Apalutamide  - Carbamazepine, phenobarbital, phenytoin  - Rifampin  - St  Luis’s Wort (hypericum perforatum)    What are the important possible side effects of PAXLOVID? Possible side effects of PAXLOVID are:  - Liver Problems   Tell your healthcare provider right away if you have any of these signs and symptoms of liver problems: loss of appetite, yellowing of your skin and the whites of eyes (jaundice), dark-colored urine, pale colored stools and itchy skin, stomach area (abdominal) pain  - Resistance to HIV Medicines  If you have untreated HIV infection, PAXLOVID may lead to some HIV medicines not working as well in the future  - Other possible side effects include: altered sense of taste, diarrhea, high blood pressure, or muscle aches    These are not all the possible side effects of PAXLOVID  Not many people have taken PAXLOVID  Serious and unexpected side effects may happen  [address] is still being studied, so it is possible that all of the risks are not known at this time  What other treatment choices are there? Like [place] may allow for the emergency use of other medicines to treat people with COVID-19  Go to https://[employer]/ for information on the emergency use of other medicines that are authorized by FDA to treat people with COVID-19  Your healthcare provider may talk with you about clinical trials for which you may be eligible  It is your choice to be treated or not to be treated with PAXLOVID  Should you decide not to receive it or for your child not to receive it, it will not change your standard medical care  What if I am pregnant or breastfeeding? There is no experience treating pregnant women or breastfeeding mothers with PAXLOVID  For a mother and unborn baby, the benefit of taking PAXLOVID may be greater than the risk from the treatment  If you are pregnant, discuss your options and specific situation with your healthcare provider  It is recommended that you use effective barrier contraception or do not have sexual activity while taking PAXLOVID      If you are breastfeeding, discuss your options and specific situation with your healthcare provider  How do I report side effects with PAXLOVID? Contact your healthcare provider if you have any side effects that bother you or do not go away  Report side effects to FDA MedWatch at www fda gov/medwatch or call 5-701-WGA6077 or you can report side effects to [facility] [facility]  at the contact information provided below  Website Fax number Telephone number   [facility] [phone]     How should I store [address]? Store PAXLOVID tablets at room temperature between 68°F to 77°F (20°C to 25°C)  Full fact sheet for patients, parents, and caregivers can be found at: [employer]    I have spent 15 minutes directly with the patient  Greater than 50% of this time was spent in counseling/coordination of care regarding: risks and benefits of treatment options, instructions for management, patient and family education and importance of treatment compliance  Encounter provider: LEEANN Somers     Provider located at: [address] [facility]  [phone]     Recent Visits  No visits were found meeting these conditions  Showing recent visits within past 7 days and meeting all other requirements  Today's Visits  Date Type Provider Dept   02/08/23 Telemedicine LEEANN Somers [address]   Showing today's visits and meeting all other requirements  Future Appointments  No visits were found meeting these conditions  Showing future appointments within next 150 days and meeting all other requirements     This virtual check-in was done via [facility] [address] and patient was informed that this is a secure, HIPAA-compliant platform  He agrees to proceed      Patient agrees to participate in a virtual check in via telephone or video visit instead of presenting to the office to address urgent/immediate medical needs  Patient is aware this is a billable service  He acknowledged consent and understanding of privacy and security of the video platform  The patient has agreed to participate and understands they can discontinue the visit at any time  After connecting through [facility], the patient was identified by name and date of birth  Magan Parrish was informed that this was a telemedicine visit and that the exam was being conducted confidentially over secure lines  My office door was closed  No one else was in the room  Magan Parrish acknowledged consent and understanding of privacy and security of the telemedicine visit  I informed the patient that I have reviewed his record in Epic and presented the opportunity for him to ask any questions regarding the visit today  The patient agreed to participate  Verification of patient location:  Patient is located in the following state in which I hold an active license: [place]    Subjective:   Magan Parrish is a 67 y o  male who has been screened for COVID-19  Symptom change since last report: unchanged  Patient's symptoms include fever (tmax 101 4, responds to Advil), chills, fatigue, nasal congestion, rhinorrhea, cough (dry unproductive), myalgias and headache  Patient denies sore throat, anosmia, loss of taste, shortness of breath, chest tightness, nausea, vomiting and diarrhea  - Date of symptom onset: 2/6/2023  - Date of positive COVID-19 test: 2/7/2023  Type of test: Home antigen  Patient with typical symptoms of COVID-19 and they attest that they were positive on home rapid antigen testing  Image of positive result is not able to be uploaded into their chart  COVID-19 vaccination status: Fully vaccinated with booster    [facility] Client has been staying home and has isolated themselves in his home   He is taking care to not share personal items and is cleaning all surfaces that are touched often, like counters, tabletops, and doorknobs using household cleaning sprays or wipes  He is wearing a mask when he leaves his room  Pt presents via televideo for symptoms that started on 02/06  Pt had a positive test yesterday at home on 02/07  Pt has been using Mucinex DM, Advil & Nyquil for his symptoms  Pt does qualify for Paxlovid  Medication and s/e reviewed  Pt will hold his Atorvastatin & Cialis while taking Paxlovid  Quarantine guidelines reviewed  Rest and fluids encouraged  Patient is encouraged to call our office for any questions/concerns, persistent or worsening symptoms  Patient states they understand and agree with treatment plan  Lab Results   Component Value Date    SARSCOV2 Negative 09/16/2021       Review of Systems   Constitutional: Positive for chills, fatigue and fever (tmax 101 4, responds to Advil)  HENT: Positive for congestion and rhinorrhea  Negative for sore throat  Respiratory: Positive for cough (dry unproductive)  Negative for chest tightness and shortness of breath  Gastrointestinal: Negative for diarrhea, nausea and vomiting  Musculoskeletal: Positive for myalgias  Neurological: Positive for headaches       Current Outpatient Medications on File Prior to Visit   Medication Sig   • atorvastatin (LIPITOR) 20 mg tablet Take 1 tablet (20 mg total) by mouth daily   • betamethasone dipropionate (DIPROSONE) 0 05 % cream Apply topically 2 (two) times a day Until the rash is gone   • Cholecalciferol (VITAMIN D-3) 1000 units CAPS 2 daily   • finasteride (PROSCAR) 5 mg tablet TAKE 1 TABLET DAILY AFTER  DINNER   • Glucosamine-Chondroitin (MOVE FREE PO) Take 2 tablets by mouth daily   • Multiple Vitamin (MULTIVITAMIN) tablet Take 1 tablet by mouth daily   • tadalafil (CIALIS) 5 MG tablet Take 1 tablet (5 mg total) by mouth daily at bedtime       Objective:    /67   Pulse 80   Temp 100 2 °F (37 9 °C)   Resp 20   Ht 5' 7" (1 702 m)   Wt 86 2 kg (190 lb)   SpO2 96%   BMI 29 76 kg/m²      Physical Exam  Constitutional:       General: He is not in acute distress  Appearance: Normal appearance  Pulmonary:      Effort: Pulmonary effort is normal    Neurological:      Mental Status: He is alert and oriented to person, place, and time  Mental status is at baseline  Psychiatric:         Mood and Affect: Mood normal          Behavior: Behavior normal          Thought Content:  Thought content normal          Judgment: Judgment normal        LEEANN Mejia

## 2023-03-16 DIAGNOSIS — E78.2 MIXED HYPERLIPIDEMIA: ICD-10-CM

## 2023-03-16 RX ORDER — ATORVASTATIN CALCIUM 20 MG/1
20 TABLET, FILM COATED ORAL DAILY
Qty: 90 TABLET | Refills: 3 | Status: SHIPPED | OUTPATIENT
Start: 2023-03-16

## 2023-05-16 ENCOUNTER — APPOINTMENT (OUTPATIENT)
Dept: LAB | Age: 73
End: 2023-05-16

## 2023-05-16 DIAGNOSIS — R73.09 ELEVATED HEMOGLOBIN A1C: ICD-10-CM

## 2023-05-16 DIAGNOSIS — Z79.899 ENCOUNTER FOR LONG-TERM (CURRENT) USE OF MEDICATIONS: ICD-10-CM

## 2023-05-16 DIAGNOSIS — E55.9 VITAMIN D DEFICIENCY: ICD-10-CM

## 2023-05-16 DIAGNOSIS — R97.20 ELEVATED PSA: ICD-10-CM

## 2023-05-16 DIAGNOSIS — Z87.898 HISTORY OF ELEVATED PSA: ICD-10-CM

## 2023-05-16 DIAGNOSIS — E78.2 MIXED HYPERLIPIDEMIA: ICD-10-CM

## 2023-05-16 LAB
25(OH)D3 SERPL-MCNC: 26.7 NG/ML (ref 30–100)
ALBUMIN SERPL BCP-MCNC: 3.9 G/DL (ref 3.5–5)
ALP SERPL-CCNC: 70 U/L (ref 46–116)
ALT SERPL W P-5'-P-CCNC: 32 U/L (ref 12–78)
ANION GAP SERPL CALCULATED.3IONS-SCNC: 2 MMOL/L (ref 4–13)
AST SERPL W P-5'-P-CCNC: 24 U/L (ref 5–45)
BASOPHILS # BLD AUTO: 0.03 THOUSANDS/ÂΜL (ref 0–0.1)
BASOPHILS NFR BLD AUTO: 1 % (ref 0–1)
BILIRUB SERPL-MCNC: 0.58 MG/DL (ref 0.2–1)
BILIRUB UR QL STRIP: NEGATIVE
BUN SERPL-MCNC: 22 MG/DL (ref 5–25)
CALCIUM SERPL-MCNC: 9.4 MG/DL (ref 8.3–10.1)
CHLORIDE SERPL-SCNC: 109 MMOL/L (ref 96–108)
CHOLEST SERPL-MCNC: 140 MG/DL
CLARITY UR: CLEAR
CO2 SERPL-SCNC: 27 MMOL/L (ref 21–32)
COLOR UR: NORMAL
CREAT SERPL-MCNC: 0.99 MG/DL (ref 0.6–1.3)
EOSINOPHIL # BLD AUTO: 0.09 THOUSAND/ÂΜL (ref 0–0.61)
EOSINOPHIL NFR BLD AUTO: 2 % (ref 0–6)
ERYTHROCYTE [DISTWIDTH] IN BLOOD BY AUTOMATED COUNT: 12.9 % (ref 11.6–15.1)
EST. AVERAGE GLUCOSE BLD GHB EST-MCNC: 120 MG/DL
GFR SERPL CREATININE-BSD FRML MDRD: 74 ML/MIN/1.73SQ M
GLUCOSE P FAST SERPL-MCNC: 136 MG/DL (ref 65–99)
GLUCOSE UR STRIP-MCNC: NEGATIVE MG/DL
HBA1C MFR BLD: 5.8 %
HCT VFR BLD AUTO: 41.5 % (ref 36.5–49.3)
HDLC SERPL-MCNC: 59 MG/DL
HGB BLD-MCNC: 13.5 G/DL (ref 12–17)
HGB UR QL STRIP.AUTO: NEGATIVE
IMM GRANULOCYTES # BLD AUTO: 0.01 THOUSAND/UL (ref 0–0.2)
IMM GRANULOCYTES NFR BLD AUTO: 0 % (ref 0–2)
KETONES UR STRIP-MCNC: NEGATIVE MG/DL
LDLC SERPL CALC-MCNC: 65 MG/DL (ref 0–100)
LEUKOCYTE ESTERASE UR QL STRIP: NEGATIVE
LYMPHOCYTES # BLD AUTO: 1.32 THOUSANDS/ÂΜL (ref 0.6–4.47)
LYMPHOCYTES NFR BLD AUTO: 26 % (ref 14–44)
MCH RBC QN AUTO: 31.5 PG (ref 26.8–34.3)
MCHC RBC AUTO-ENTMCNC: 32.5 G/DL (ref 31.4–37.4)
MCV RBC AUTO: 97 FL (ref 82–98)
MONOCYTES # BLD AUTO: 0.44 THOUSAND/ÂΜL (ref 0.17–1.22)
MONOCYTES NFR BLD AUTO: 9 % (ref 4–12)
NEUTROPHILS # BLD AUTO: 3.18 THOUSANDS/ÂΜL (ref 1.85–7.62)
NEUTS SEG NFR BLD AUTO: 62 % (ref 43–75)
NITRITE UR QL STRIP: NEGATIVE
NONHDLC SERPL-MCNC: 81 MG/DL
NRBC BLD AUTO-RTO: 0 /100 WBCS
PH UR STRIP.AUTO: 6 [PH]
PLATELET # BLD AUTO: 275 THOUSANDS/UL (ref 149–390)
PMV BLD AUTO: 10 FL (ref 8.9–12.7)
POTASSIUM SERPL-SCNC: 5.1 MMOL/L (ref 3.5–5.3)
PROT SERPL-MCNC: 7.3 G/DL (ref 6.4–8.4)
PROT UR STRIP-MCNC: NEGATIVE MG/DL
PSA SERPL-MCNC: 2 NG/ML (ref 0–4)
RBC # BLD AUTO: 4.28 MILLION/UL (ref 3.88–5.62)
SODIUM SERPL-SCNC: 138 MMOL/L (ref 135–147)
SP GR UR STRIP.AUTO: 1.01 (ref 1–1.03)
TRIGL SERPL-MCNC: 82 MG/DL
TSH SERPL DL<=0.05 MIU/L-ACNC: 1.81 UIU/ML (ref 0.45–4.5)
UROBILINOGEN UR STRIP-ACNC: <2 MG/DL
WBC # BLD AUTO: 5.07 THOUSAND/UL (ref 4.31–10.16)

## 2023-05-23 ENCOUNTER — OFFICE VISIT (OUTPATIENT)
Dept: FAMILY MEDICINE CLINIC | Facility: CLINIC | Age: 73
End: 2023-05-23

## 2023-05-23 VITALS
OXYGEN SATURATION: 98 % | HEART RATE: 88 BPM | HEIGHT: 67 IN | WEIGHT: 189.2 LBS | DIASTOLIC BLOOD PRESSURE: 70 MMHG | BODY MASS INDEX: 29.7 KG/M2 | RESPIRATION RATE: 17 BRPM | SYSTOLIC BLOOD PRESSURE: 120 MMHG

## 2023-05-23 DIAGNOSIS — R35.0 BENIGN PROSTATIC HYPERPLASIA WITH URINARY FREQUENCY: ICD-10-CM

## 2023-05-23 DIAGNOSIS — E55.9 VITAMIN D DEFICIENCY: ICD-10-CM

## 2023-05-23 DIAGNOSIS — N40.1 BENIGN PROSTATIC HYPERPLASIA WITH URINARY FREQUENCY: ICD-10-CM

## 2023-05-23 DIAGNOSIS — R91.1 PULMONARY NODULE SEEN ON IMAGING STUDY: ICD-10-CM

## 2023-05-23 DIAGNOSIS — R73.09 ELEVATED HEMOGLOBIN A1C: ICD-10-CM

## 2023-05-23 DIAGNOSIS — Z00.00 MEDICARE ANNUAL WELLNESS VISIT, SUBSEQUENT: Primary | ICD-10-CM

## 2023-05-23 DIAGNOSIS — K90.0 ADULT CELIAC DISEASE: ICD-10-CM

## 2023-05-23 DIAGNOSIS — I45.10 RIGHT BUNDLE BRANCH BLOCK (RBBB): ICD-10-CM

## 2023-05-23 DIAGNOSIS — Z87.898 HISTORY OF ELEVATED PSA: ICD-10-CM

## 2023-05-23 DIAGNOSIS — E78.2 MIXED HYPERLIPIDEMIA: ICD-10-CM

## 2023-05-23 RX ORDER — FINASTERIDE 5 MG/1
5 TABLET, FILM COATED ORAL DAILY
Qty: 90 TABLET | Refills: 3 | Status: SHIPPED | OUTPATIENT
Start: 2023-05-23

## 2023-05-23 RX ORDER — TADALAFIL 5 MG/1
5 TABLET ORAL
Qty: 90 TABLET | Refills: 3 | Status: SHIPPED | OUTPATIENT
Start: 2023-05-23

## 2023-05-23 NOTE — PROGRESS NOTES
Assessment and Plan:   Everything is up-to-date  Bivalent COVID in August  Problem List Items Addressed This Visit     Adult celiac disease    Benign prostatic hyperplasia with urinary frequency    Relevant Medications    finasteride (PROSCAR) 5 mg tablet    tadalafil (CIALIS) 5 MG tablet    Elevated hemoglobin A1c - Primary    Relevant Orders    Hemoglobin A1C    History of elevated PSA    Mixed hyperlipidemia    Pulmonary nodule seen on imaging study    Right bundle branch block (RBBB)    Vitamin D deficiency     BMI Counseling: Body mass index is 29 86 kg/m²  The BMI is above normal  Nutrition recommendations include decreasing portion sizes, encouraging healthy choices of fruits and vegetables and decreasing fast food intake  Exercise recommendations include vigorous physical activity 75 minutes/week  Rationale for BMI follow-up plan is due to patient being overweight or obese  Depression Screening and Follow-up Plan: Patient was screened for depression during today's encounter  They screened negative with a PHQ-2 score of 0  Preventive health issues were discussed with patient, and age appropriate screening tests were ordered as noted in patient's After Visit Summary  Personalized health advice and appropriate referrals for health education or preventive services given if needed, as noted in patient's After Visit Summary       History of Present Illness:     Patient presents for a Medicare Wellness Visit    HPI   Patient Care Team:  Gaurav Prescott DO as PCP - General (Family Medicine)  MD Petty Varela MD as Endoscopist     Review of Systems:     Review of Systems     Problem List:     Patient Active Problem List   Diagnosis   • Mixed hyperlipidemia   • Pulmonary nodule seen on imaging study   • Vitamin D deficiency   • Adult celiac disease   • Benign prostatic hyperplasia with urinary frequency   • Liver cyst   • Intrinsic eczema   • remote history of sarcoidosis   • Elevated hemoglobin A1c   • History of elevated PSA   • Rheumatoid factor positive   • Right bundle branch block (RBBB)      Past Medical and Surgical History:     Past Medical History:   Diagnosis Date   • Acute pancreatitis     last assessed - [id number]   • BPH (benign prostatic hypertrophy)    • Celiac disease     current GI w/u to r/o celiac   • Cholelithiasis    • Colon polyp    • Elevated blood pressure reading     last assessed - [id number]   • Gallstones     last assessed - [id number]   • High cholesterol    • Hx of mitral valve disorder    • Hypertension    • Sarcoidosis     1990's Diagnosed 15 years ago by biopsy, not active; last assessed - [id number]     Past Surgical History:   Procedure Laterality Date   • CATARACT EXTRACTION     • ERCP N/A 4/3/2017    Procedure: ENDOSCOPIC RETROGRADE CHOLANGIOPANCREATOGRAPHY (ERCP); Surgeon: Eun Crews MD;  Location: [facility]; Service:    • KS COLONOSCOPY FLX DX W/COLLJ SPEC WHEN PFRMD N/A 3/1/2017    Procedure: EGD AND COLONOSCOPY;  Surgeon: Gisella Mejia MD;  Location: [facility]; Service: Gastroenterology   • KS ESOPHAGOGASTRODUODENOSCOPY TRANSORAL DIAGNOSTIC N/A 6/5/2017    Procedure: ESOPHAGOGASTRODUODENOSCOPY (EGD); Surgeon: Eun Crews MD;  Location: [facility];   Service: Gastroenterology   • KS LAPAROSCOPY SURG CHOLECYSTECTOMY N/A 3/29/2017    Procedure: CHOLECYSTECTOMY LAPAROSCOPIC; POSSIBLE OPEN;  Surgeon: Bertha Medrano MD;  Location: [facility];  Service: General; complicated by bile leak and pancreatitis s/p stent    • ROTATOR CUFF REPAIR Right 04/29/2015    for rotator cuff tear, Onset: 4/14      Family History:     Family History   Problem Relation Age of Onset   • Diabetes Mother         Diabetes Mellitus   • Hypertension Mother    • Ovarian cancer Mother    • Rheumatologic disease Mother    • Diabetes Father         Diabetes Mellitus   • Heart disease Father    • Hypertension Father    • Diabetes Sister         Diabetes Mellitus   • Other Sister H  pylori infection   • Hypertension Sister    • Other Brother         H  pylori infection   • Alcohol abuse Neg Hx    • Substance Abuse Neg Hx    • Mental illness Neg Hx    • Depression Neg Hx       Social History:     Social History     Socioeconomic History   • Marital status:      Spouse name: None   • Number of children: None   • Years of education: None   • Highest education level: None   Occupational History   • Occupation: Retired   Tobacco Use   • Smoking status: Never   • Smokeless tobacco: Never   Vaping Use   • Vaping Use: Never used   Substance and Sexual Activity   • Alcohol use: Yes     Alcohol/week: 2 0 standard drinks     Types: 2 Glasses of wine per week     Comment: wine,  social drinker   • Drug use: No     Comment: Denied history of drug use   • Sexual activity: None   Other Topics Concern   • None   Social History Narrative    Daily coffee consumption (2 Cups/Day)        retired     Social Determinants of Health     Financial Resource Strain: Low Risk    • Difficulty of Paying Living Expenses: Not hard at all   Food Insecurity: Not on file   Transportation Needs: No Transportation Needs   • Lack of Transportation (Medical): No   • Lack of Transportation (Non-Medical):  No   Physical Activity: Not on file   Stress: Not on file   Social Connections: Not on file   Intimate Partner Violence: Not on file   Housing Stability: Not on file      Medications and Allergies:     Current Outpatient Medications   Medication Sig Dispense Refill   • atorvastatin (LIPITOR) 20 mg tablet Take 1 tablet (20 mg total) by mouth daily 90 tablet 3   • betamethasone dipropionate (DIPROSONE) 0 05 % cream Apply topically 2 (two) times a day Until the rash is gone 45 g 3   • Cholecalciferol (VITAMIN D-3) 1000 units CAPS 2 daily  0   • finasteride (PROSCAR) 5 mg tablet Take 1 tablet (5 mg total) by mouth daily 90 tablet 3   • Glucosamine-Chondroitin (MOVE FREE PO) Take 2 tablets by mouth daily     • Multiple Vitamin (MULTIVITAMIN) tablet Take 1 tablet by mouth daily     • tadalafil (CIALIS) 5 MG tablet Take 1 tablet (5 mg total) by mouth daily at bedtime 90 tablet 3     No current facility-administered medications for this visit  No Known Allergies   Immunizations:     Immunization History   Administered Date(s) Administered   • COVID-19 MODERNA VACC 0 5 ML IM 11/17/2021, 04/19/2022   • COVID-19 Pfizer Vac BIVALENT Avelino-sucrose 12 Yr+ IM (BOOSTER ONLY) 09/23/2022   • COVID-19, unspecified 03/09/2021   • INFLUENZA 01/01/2003, 10/06/2003, 09/20/2021, 10/05/2021, 09/22/2022   • Influenza Split High Dose Preservative Free IM 12/11/2015   • Influenza, high dose seasonal 0 7 mL 09/14/2018, 10/01/2019, 10/05/2021   • Influenza, seasonal, injectable 12/04/2013, 10/14/2016   • Pneumococcal Conjugate 13-Valent 06/11/2015   • Pneumococcal Polysaccharide PPV23 06/04/2014   • Tdap 03/08/2013   • Tetanus Toxoid, Unspecified 01/01/2004   • Zoster 02/22/2013   • Zoster Vaccine Recombinant 01/25/2019, 04/26/2019   • influenza, trivalent, adjuvanted 09/11/2020      Health Maintenance:         Topic Date Due   • Colorectal Cancer Screening  06/17/2032   • Hepatitis C Screening  Completed         Topic Date Due   • DTaP,Tdap,and Td Vaccines (2 - Td or Tdap) 03/08/2023      Medicare Screening Tests and Risk Assessments:     Kehinde Ayala is here for his Subsequent Wellness visit  Health Risk Assessment:   Patient rates overall health as very good  Patient feels that their physical health rating is same  Patient is very satisfied with their life  Eyesight was rated as same  Hearing was rated as same  Patient feels that their emotional and mental health rating is same  Patients states they are never, rarely angry  Patient states they are sometimes unusually tired/fatigued  Pain experienced in the last 7 days has been some  Patient's pain rating has been 3/10  Patient states that he has experienced no weight loss or gain in last 6 months  Depression Screening:   PHQ-2 Score: 0      Fall Risk Screening: In the past year, patient has experienced: no history of falling in past year      Home Safety:  Patient does not have trouble with stairs inside or outside of their home  Patient has working smoke alarms and has no working carbon monoxide detector  Home safety hazards include: none  Nutrition:   Current diet is Low Carb  Medications:   Patient is not currently taking any over-the-counter supplements  Patient is able to manage medications  Activities of Daily Living (ADLs)/Instrumental Activities of Daily Living (IADLs):   Walk and transfer into and out of bed and chair?: Yes  Dress and groom yourself?: Yes    Bathe or shower yourself?: Yes    Feed yourself? Yes  Do your laundry/housekeeping?: Yes  Manage your money, pay your bills and track your expenses?: Yes  Make your own meals?: Yes    Do your own shopping?: Yes    Previous Hospitalizations:   Any hospitalizations or ED visits within the last 12 months?: No      Advance Care Planning:   Living will: Yes    Durable POA for healthcare:  Yes    Advanced directive: Yes    End of Life Decisions reviewed with patient: Yes      Cognitive Screening:   Provider or family/friend/caregiver concerned regarding cognition?: No    PREVENTIVE SCREENINGS      Cardiovascular Screening:    General: Screening Not Indicated and History Lipid Disorder      Diabetes Screening:     General: Screening Current      Colorectal Cancer Screening:     General: Screening Current      Prostate Cancer Screening:    General: Screening Current      Osteoporosis Screening:    General: Screening Not Indicated      Abdominal Aortic Aneurysm (AAA) Screening:    Risk factors include: age between 73-69 yo        Lung Cancer Screening:     General: Screening Not Indicated      Hepatitis C Screening:    General: Screening Current    Screening, Brief Intervention, and Referral to Treatment (SBIRT)    Screening  Typical "number of drinks in a day: 0  Typical number of drinks in a week: 2  Interpretation: Low risk drinking behavior  AUDIT-C Screenin) How often did you have a drink containing alcohol in the past year? 2 to 3 times a week  2) How many drinks did you have on a typical day when you were drinking in the past year? 0  3) How often did you have 6 or more drinks on one occasion in the past year? never    AUDIT-C Score: 3  Interpretation: Score 0-3 (male): Negative screen for alcohol misuse    Single Item Drug Screening:  How often have you used an illegal drug (including marijuana) or a prescription medication for non-medical reasons in the past year? never    Single Item Drug Screen Score: 0  Interpretation: Negative screen for possible drug use disorder    Brief Intervention  Alcohol & drug use screenings were reviewed  No concerns regarding substance use disorder identified  Other Counseling Topics:   Regular weightbearing exercise  No results found       Physical Exam:     /70   Pulse 88   Resp 17   Ht 5' 6 75\" (1 695 m)   Wt 85 8 kg (189 lb 3 2 oz)   SpO2 98%   BMI 29 86 kg/m²     Physical Exam     Edison Kramer DO  "

## 2023-05-23 NOTE — PATIENT INSTRUCTIONS
Great job on your [employer] and your A1c    I am glad you are taking your vitamin D daily again    We will see you back in 6 months with a A1c one or 2 weeks prior    Around August consider getting the BiValent COVID-vaccine as a booster  If you do not hopefully it can be anything deal    You sooner if needed otherwise

## 2023-05-23 NOTE — PROGRESS NOTES
ASSESSMENT/PLAN:    Word finding difficulty  Seems the same but patient has working on 4 hours of aerobic exercise weekly has lost 8 pounds in the process and decreased his A1c  Continue the same  Patient reassured that everyone as we age has the same problem    Elevated A1c  Now 5 8 from 6 3  Continue the excellent work and recheck in 6 months      ED/BPH  Continue Cialis and Flomax  Working well together     Celiac disease  Limits the amount of gluten when he eats     Hyperlipidemia  Continue atorvastatin  Cholesterol excellent 140 with LDL 65     Elevated PSA  Biopsies negative years ago  PSA normal now 2 0     Positive rheumatoid factor  No disease manifestations      vitamin-D deficiency   Continue vitamin-D daily  Vitamin D dropped to 26 but patient also was not consistent with taking it  He will be consistent again     History of lung nodule   CT scan with Pulmonary followed for years  Felt to be leftover sarcoid no need to follow anymore        Recheck in 6 months with a A1c  Full physical next visit  Recheck sooner if needed               Health Maintenance   Topic Date Due   • Annual Physical  Never done   • PT PLAN OF CARE  10/21/2021   • DTaP,Tdap,and Td Vaccines (2 - Td or Tdap) 03/08/2023   • BMI: Followup Plan  05/03/2023   • BMI: Adult  02/08/2024   • Fall Risk  05/23/2024   • Depression Screening  05/23/2024   • Colorectal Cancer Screening  06/17/2032   • Hepatitis C Screening  Completed   • Pneumococcal Vaccine: 65+ Years  Completed   • Influenza Vaccine  Completed   • COVID-19 Vaccine  Completed   • HIB Vaccine  Aged Out   • IPV Vaccine  Aged Out   • Hepatitis A Vaccine  Aged Out   • Meningococcal ACWY Vaccine  Aged Out   • HPV Vaccine  Aged Out         Problem List as of 5/23/2023 Reviewed: 11/1/2022  7:55 AM by Karla Uribe DO    Adult celiac disease    Last Assessment & Plan 6/20/2018 Office Visit Written 6/20/2018  9:02 AM by Debbie Rodriguez MD     On gluten free diet  Off PPI  Benign prostatic hyperplasia with urinary frequency    Last Assessment & Plan 6/20/2018 Office Visit Written 6/20/2018  9:01 AM by Marcia Guillaume MD     Sees Urology yearly, on finasteride  Elevated hemoglobin A1c    History of elevated PSA    Intrinsic eczema    Liver cyst    Mixed hyperlipidemia    Last Assessment & Plan 10/6/2021 Hospital Encounter Written 10/7/2021 11:22 AM by Tushar Shah,      Continue lipitor         Pulmonary nodule seen on imaging study    remote history of sarcoidosis    Last Assessment & Plan 6/22/2019 Hospital Encounter Written 6/25/2019 12:27 PM by Chuck Esteban PA-C     · Reports being diagnosed with sarcoidosis 40 years ago and seeing pulmonology at that time but having no additional follow-up since that time  Last CT scan of the chest on file was from 2016 which showed stable lung nodules  · Patient reports mild cough x3 days  May need eventual CT chest/pulmonology follow-up         Rheumatoid factor positive    Right bundle branch block (RBBB)    Vitamin D deficiency    Last Assessment & Plan 6/20/2018 Office Visit Written 6/20/2018  9:01 AM by Marcia Guillaume MD     Increase D3 during fall/winter months  Subjective:   Chief Complaint   Patient presents with   • Medicare Wellness Visit     Patient is here for 6-month recheck and also for his AWV  He had all his blood work done for us to review today    Question about taking the by Norbert Presser vaccine    Has worked diligently to bring his A1c down and in the process has lost 8 pounds      patient ID: Favio Estrella is a 75 y o  male  Patient's past medical history, surgical history, family history, social history, and Tobacco history reviewed with patient       MED LIST WAS REVIEWED AND UPDATED    ROS  As per HPI  Rest of 12 point review of systems negative     Objective:      VITALS:  Wt Readings from Last 3 Encounters:   05/23/23 85 8 kg (189 lb 3 2 oz)   02/08/23 86 2 kg (190 lb)   11/18/22 89 4 kg (197 lb)     BP Readings from Last 3 Encounters:   05/23/23 120/70   02/08/23 126/67   11/18/22 120/62     Pulse Readings from Last 3 Encounters:   05/23/23 88   02/08/23 80   11/18/22 57     Body mass index is 29 86 kg/m²  Laboratory Results:    All pertinent labs and studies were reviewed with patient during this office visit with highlights of the results contained in this note in the ASSESSMENT AND PLAN section       Physical Exam    Constitutional  Appears healthy, Looks well, Appearance consistent with age    Mental Status  Alert, Oriented, Cooperative, Memory function normal , clean, and reasonable    Neck  No neck mass, No thyromegaly, Good carotid upstrokes bilaterally, trachea midline positive click    Respiratory  Breath sounds normal, No rales, No rhonchi, No wheezing, normal palpation    Cardiac  Regular rhythm without ectopy or murmur no S3-S4, no heave lift or thrill to palpation    Vascular  No leg edema, No pedal edema    Muscular skeletal  No clubbing cyanosis , muscle tone normal    Skin  No appreciable rashes or abnormal appearing lesions

## 2023-06-24 NOTE — PATIENT INSTRUCTIONS
Please complete follow up goal paperwork that will be mailed to you in three months 
Aluminum Finger Splint

## 2023-11-22 ENCOUNTER — APPOINTMENT (OUTPATIENT)
Dept: LAB | Age: 73
End: 2023-11-22
Payer: MEDICARE

## 2023-11-22 DIAGNOSIS — R73.09 ELEVATED HEMOGLOBIN A1C: ICD-10-CM

## 2023-11-22 LAB
EST. AVERAGE GLUCOSE BLD GHB EST-MCNC: 128 MG/DL
HBA1C MFR BLD: 6.1 %

## 2023-11-22 PROCEDURE — 36415 COLL VENOUS BLD VENIPUNCTURE: CPT

## 2023-11-22 PROCEDURE — 83036 HEMOGLOBIN GLYCOSYLATED A1C: CPT

## 2023-11-28 ENCOUNTER — OFFICE VISIT (OUTPATIENT)
Dept: FAMILY MEDICINE CLINIC | Facility: CLINIC | Age: 73
End: 2023-11-28
Payer: MEDICARE

## 2023-11-28 VITALS
SYSTOLIC BLOOD PRESSURE: 118 MMHG | RESPIRATION RATE: 15 BRPM | DIASTOLIC BLOOD PRESSURE: 80 MMHG | HEIGHT: 67 IN | OXYGEN SATURATION: 98 % | HEART RATE: 66 BPM | BODY MASS INDEX: 29.63 KG/M2 | WEIGHT: 188.8 LBS

## 2023-11-28 DIAGNOSIS — E78.2 MIXED HYPERLIPIDEMIA: ICD-10-CM

## 2023-11-28 DIAGNOSIS — Z79.899 ENCOUNTER FOR LONG-TERM (CURRENT) USE OF MEDICATIONS: ICD-10-CM

## 2023-11-28 DIAGNOSIS — R91.1 PULMONARY NODULE SEEN ON IMAGING STUDY: ICD-10-CM

## 2023-11-28 DIAGNOSIS — R73.09 ELEVATED HEMOGLOBIN A1C: ICD-10-CM

## 2023-11-28 DIAGNOSIS — N40.1 BENIGN PROSTATIC HYPERPLASIA WITH URINARY FREQUENCY: Primary | ICD-10-CM

## 2023-11-28 DIAGNOSIS — R35.0 BENIGN PROSTATIC HYPERPLASIA WITH URINARY FREQUENCY: Primary | ICD-10-CM

## 2023-11-28 DIAGNOSIS — Z87.898 HISTORY OF ELEVATED PSA: ICD-10-CM

## 2023-11-28 DIAGNOSIS — D86.9 SARCOIDOSIS: ICD-10-CM

## 2023-11-28 DIAGNOSIS — R76.8 RHEUMATOID FACTOR POSITIVE: ICD-10-CM

## 2023-11-28 DIAGNOSIS — E55.9 VITAMIN D DEFICIENCY: ICD-10-CM

## 2023-11-28 PROCEDURE — 99214 OFFICE O/P EST MOD 30 MIN: CPT | Performed by: FAMILY MEDICINE

## 2023-11-28 NOTE — PROGRESS NOTES
ASSESSMENT/PLAN:    Elevated A1c  6.1 from 5.8  Patient will focus again on diet and exercise and we will simply check this again in 6 months    ED/BPH  Check PSA as it will be a year  Continue Cialis daily and Flomax daily as they are doing a good job with his symptoms      Celiac disease  Limits the amount of gluten when he eats     Hyperlipidemia  Continue atorvastatin  Check lipids before next visit     Elevated PSA  Biopsies negative years ago  Last PSA 2.0 we will check it before next visit     Positive rheumatoid factor  No disease manifestations      vitamin-D deficiency   Continue vitamin-D daily  Check vitamin D level     History of lung nodule  CT scan with Pulmonary followed for years  Felt to be leftover sarcoid no need to follow anymore        Recheck in 6 months   Fasting blood work and urine and A1c prior  AWV next visit  Recheck sooner if needed        Depression Screening and Follow-up Plan: Patient was screened for depression during today's encounter. They screened negative with a PHQ-2 score of 0. Health Maintenance   Topic Date Due    PT PLAN OF CARE  10/21/2021    DTaP,Tdap,and Td Vaccines (2 - Td or Tdap) 03/08/2023    BMI: Followup Plan  05/23/2024    BMI: Adult  05/23/2024    Annual Physical  05/23/2024 (Originally 4/22/1968)    Fall Risk  05/23/2024    Depression Screening  11/28/2024    Colorectal Cancer Screening  06/17/2032    Hepatitis C Screening  Completed    Pneumococcal Vaccine: 65+ Years  Completed    Influenza Vaccine  Completed    COVID-19 Vaccine  Completed    HIB Vaccine  Aged Out    IPV Vaccine  Aged Out    Hepatitis A Vaccine  Aged Out    Meningococcal ACWY Vaccine  Aged Out    HPV Vaccine  Aged Out         Problem List as of 11/28/2023 Reviewed: 5/23/2023 10:17 AM by Marry Baig DO      Adult celiac disease    Last Assessment & Plan 6/20/2018 Office Visit Written 6/20/2018  9:02 AM by Harshil Mcknight MD     On gluten free diet. Off PPI.          Benign prostatic hyperplasia with urinary frequency    Last Assessment & Plan 6/20/2018 Office Visit Written 6/20/2018  9:01 AM by João Gaines MD     Sees Urology yearly, on finasteride. Elevated hemoglobin A1c    History of elevated PSA    Intrinsic eczema    Liver cyst    Mixed hyperlipidemia    Last Assessment & Plan 10/6/2021 Hospital Encounter Written 10/7/2021 11:22 AM by Francheska Pike, DO     Continue lipitor         Pulmonary nodule seen on imaging study    remote history of sarcoidosis    Last Assessment & Plan 6/22/2019 Hospital Encounter Written 6/25/2019 12:27 PM by All Quesada PA-C     Reports being diagnosed with sarcoidosis 40 years ago and seeing pulmonology at that time but having no additional follow-up since that time. Last CT scan of the chest on file was from 2016 which showed stable lung nodules  Patient reports mild cough x3 days. May need eventual CT chest/pulmonology follow-up         Rheumatoid factor positive    Right bundle branch block (RBBB)    Vitamin D deficiency    Last Assessment & Plan 6/20/2018 Office Visit Written 6/20/2018  9:01 AM by João Gaines MD     Increase D3 during fall/winter months. Subjective:   Chief Complaint   Patient presents with    Hyperlipidemia    Vitamin D Deficiency            Patient is here for 6-month recheck of his A1c in the meantime he has had a BCC removed from his nose and another lesion from his arm that will be taken off in the near future    Otherwise he is feeling well without any complaints        patient ID: Lorena Payne is a 75 y.o. male. Patient's past medical history, surgical history, family history, social history, and Tobacco history reviewed with patient.      MED LIST WAS REVIEWED AND UPDATED    ROS  As per HPI  Rest of 12 point review of systems negative     Objective:      VITALS:  Wt Readings from Last 3 Encounters:   11/28/23 85.6 kg (188 lb 12.8 oz)   05/23/23 85.8 kg (189 lb 3.2 oz) 02/08/23 86.2 kg (190 lb)     BP Readings from Last 3 Encounters:   11/28/23 118/80   05/23/23 120/70   02/08/23 126/67     Pulse Readings from Last 3 Encounters:   11/28/23 66   05/23/23 88   02/08/23 80     Body mass index is 29.79 kg/m². Laboratory Results: All pertinent labs and studies were reviewed with patient during this office visit with highlights of the results contained in this note in the ASSESSMENT AND PLAN section       Physical Exam    Gen. No acute distress well-appearing well-nourished appears stated age    Mental status  Good judgment and insight oriented to time person and place, recent and remote memory intact mood and affect normal cooperative and patient is reasonable    HEENT  PERRLA 3 mm, EOMI without nystagmus, normocephalic atraumatic without facial weakness    Neck   supple no masses trachea midline positive click normal carotid upstrokes with no bruits    Cor  Regular rhythm without ectopy or murmur, no S3-S4, normal palpation that is no heave lift or thrill    Vascular  No edema, good pedal pulses    Lungs  CTA bilaterally in no respiratory distress no wheezes rhonchi or rales, normal to palpation no tactile fremitus    Abdomen  Soft, no palpable masses, no hepatosplenomegaly, normal bowel sounds, nontender    Lymphatics  No palpable nodes in the neck, supraclavicular area, axilla, or groin    Musculoskeletal  No clubbing cyanosis or edema muscle tone normal    Skin  no rashes or abnormal appearing lesions    Neuro  Normal ambulation, cranial nerves 2-12 grossly intact, higher functioning with reasoning intact.

## 2023-11-28 NOTE — PATIENT INSTRUCTIONS
Just clean up your diet a little bit and increase your exercise for your A1c, otherwise everything looks wonderful    See you back in 6 months with fasting blood work and urine 1 to 2 weeks prior  See you sooner if needed

## 2024-01-25 ENCOUNTER — VBI (OUTPATIENT)
Dept: ADMINISTRATIVE | Facility: OTHER | Age: 74
End: 2024-01-25

## 2024-01-31 DIAGNOSIS — E78.2 MIXED HYPERLIPIDEMIA: ICD-10-CM

## 2024-01-31 RX ORDER — ATORVASTATIN CALCIUM 20 MG/1
20 TABLET, FILM COATED ORAL DAILY
Qty: 90 TABLET | Refills: 3 | Status: SHIPPED | OUTPATIENT
Start: 2024-01-31

## 2024-05-21 ENCOUNTER — APPOINTMENT (OUTPATIENT)
Dept: LAB | Age: 74
End: 2024-05-21
Payer: MEDICARE

## 2024-05-21 DIAGNOSIS — R35.0 BENIGN PROSTATIC HYPERPLASIA WITH URINARY FREQUENCY: ICD-10-CM

## 2024-05-21 DIAGNOSIS — E78.2 MIXED HYPERLIPIDEMIA: ICD-10-CM

## 2024-05-21 DIAGNOSIS — N40.1 BENIGN PROSTATIC HYPERPLASIA WITH URINARY FREQUENCY: ICD-10-CM

## 2024-05-21 DIAGNOSIS — Z87.898 HISTORY OF ELEVATED PSA: ICD-10-CM

## 2024-05-21 DIAGNOSIS — Z79.899 ENCOUNTER FOR LONG-TERM (CURRENT) USE OF MEDICATIONS: ICD-10-CM

## 2024-05-21 DIAGNOSIS — E55.9 VITAMIN D DEFICIENCY: ICD-10-CM

## 2024-05-21 DIAGNOSIS — R73.09 ELEVATED HEMOGLOBIN A1C: ICD-10-CM

## 2024-05-21 LAB
25(OH)D3 SERPL-MCNC: 38.4 NG/ML (ref 30–100)
ALBUMIN SERPL BCP-MCNC: 4.5 G/DL (ref 3.5–5)
ALP SERPL-CCNC: 70 U/L (ref 34–104)
ALT SERPL W P-5'-P-CCNC: 21 U/L (ref 7–52)
ANION GAP SERPL CALCULATED.3IONS-SCNC: 5 MMOL/L (ref 4–13)
AST SERPL W P-5'-P-CCNC: 19 U/L (ref 13–39)
BASOPHILS # BLD AUTO: 0.03 THOUSANDS/ÂΜL (ref 0–0.1)
BASOPHILS NFR BLD AUTO: 1 % (ref 0–1)
BILIRUB SERPL-MCNC: 0.76 MG/DL (ref 0.2–1)
BILIRUB UR QL STRIP: NEGATIVE
BUN SERPL-MCNC: 25 MG/DL (ref 5–25)
CALCIUM SERPL-MCNC: 9.7 MG/DL (ref 8.4–10.2)
CHLORIDE SERPL-SCNC: 104 MMOL/L (ref 96–108)
CHOLEST SERPL-MCNC: 134 MG/DL
CLARITY UR: CLEAR
CO2 SERPL-SCNC: 29 MMOL/L (ref 21–32)
COLOR UR: NORMAL
CREAT SERPL-MCNC: 0.85 MG/DL (ref 0.6–1.3)
EOSINOPHIL # BLD AUTO: 0.09 THOUSAND/ÂΜL (ref 0–0.61)
EOSINOPHIL NFR BLD AUTO: 2 % (ref 0–6)
ERYTHROCYTE [DISTWIDTH] IN BLOOD BY AUTOMATED COUNT: 12.4 % (ref 11.6–15.1)
EST. AVERAGE GLUCOSE BLD GHB EST-MCNC: 131 MG/DL
GFR SERPL CREATININE-BSD FRML MDRD: 85 ML/MIN/1.73SQ M
GLUCOSE P FAST SERPL-MCNC: 134 MG/DL (ref 65–99)
GLUCOSE UR STRIP-MCNC: NEGATIVE MG/DL
HBA1C MFR BLD: 6.2 %
HCT VFR BLD AUTO: 41.8 % (ref 36.5–49.3)
HDLC SERPL-MCNC: 52 MG/DL
HGB BLD-MCNC: 14 G/DL (ref 12–17)
HGB UR QL STRIP.AUTO: NEGATIVE
IMM GRANULOCYTES # BLD AUTO: 0.01 THOUSAND/UL (ref 0–0.2)
IMM GRANULOCYTES NFR BLD AUTO: 0 % (ref 0–2)
KETONES UR STRIP-MCNC: NEGATIVE MG/DL
LDLC SERPL CALC-MCNC: 66 MG/DL (ref 0–100)
LEUKOCYTE ESTERASE UR QL STRIP: NEGATIVE
LYMPHOCYTES # BLD AUTO: 1.78 THOUSANDS/ÂΜL (ref 0.6–4.47)
LYMPHOCYTES NFR BLD AUTO: 32 % (ref 14–44)
MCH RBC QN AUTO: 32.1 PG (ref 26.8–34.3)
MCHC RBC AUTO-ENTMCNC: 33.5 G/DL (ref 31.4–37.4)
MCV RBC AUTO: 96 FL (ref 82–98)
MONOCYTES # BLD AUTO: 0.39 THOUSAND/ÂΜL (ref 0.17–1.22)
MONOCYTES NFR BLD AUTO: 7 % (ref 4–12)
NEUTROPHILS # BLD AUTO: 3.33 THOUSANDS/ÂΜL (ref 1.85–7.62)
NEUTS SEG NFR BLD AUTO: 58 % (ref 43–75)
NITRITE UR QL STRIP: NEGATIVE
NONHDLC SERPL-MCNC: 82 MG/DL
NRBC BLD AUTO-RTO: 0 /100 WBCS
PH UR STRIP.AUTO: 6 [PH]
PLATELET # BLD AUTO: 297 THOUSANDS/UL (ref 149–390)
PMV BLD AUTO: 10 FL (ref 8.9–12.7)
POTASSIUM SERPL-SCNC: 4.7 MMOL/L (ref 3.5–5.3)
PROT SERPL-MCNC: 7.7 G/DL (ref 6.4–8.4)
PROT UR STRIP-MCNC: NEGATIVE MG/DL
PSA SERPL-MCNC: 2.1 NG/ML (ref 0–4)
RBC # BLD AUTO: 4.36 MILLION/UL (ref 3.88–5.62)
SODIUM SERPL-SCNC: 138 MMOL/L (ref 135–147)
SP GR UR STRIP.AUTO: 1.01 (ref 1–1.03)
TRIGL SERPL-MCNC: 80 MG/DL
TSH SERPL DL<=0.05 MIU/L-ACNC: 3.04 UIU/ML (ref 0.45–4.5)
UROBILINOGEN UR STRIP-ACNC: <2 MG/DL
WBC # BLD AUTO: 5.63 THOUSAND/UL (ref 4.31–10.16)

## 2024-05-21 PROCEDURE — 84153 ASSAY OF PSA TOTAL: CPT

## 2024-05-21 PROCEDURE — 80053 COMPREHEN METABOLIC PANEL: CPT

## 2024-05-21 PROCEDURE — 82306 VITAMIN D 25 HYDROXY: CPT

## 2024-05-21 PROCEDURE — 80061 LIPID PANEL: CPT

## 2024-05-21 PROCEDURE — 83036 HEMOGLOBIN GLYCOSYLATED A1C: CPT

## 2024-05-21 PROCEDURE — 84443 ASSAY THYROID STIM HORMONE: CPT

## 2024-05-21 PROCEDURE — 36415 COLL VENOUS BLD VENIPUNCTURE: CPT

## 2024-05-21 PROCEDURE — 81003 URINALYSIS AUTO W/O SCOPE: CPT

## 2024-05-21 PROCEDURE — 85025 COMPLETE CBC W/AUTO DIFF WBC: CPT

## 2024-06-04 ENCOUNTER — OFFICE VISIT (OUTPATIENT)
Dept: FAMILY MEDICINE CLINIC | Facility: CLINIC | Age: 74
End: 2024-06-04
Payer: MEDICARE

## 2024-06-04 VITALS
BODY MASS INDEX: 29.51 KG/M2 | WEIGHT: 188 LBS | HEART RATE: 55 BPM | HEIGHT: 67 IN | DIASTOLIC BLOOD PRESSURE: 80 MMHG | RESPIRATION RATE: 18 BRPM | OXYGEN SATURATION: 97 % | SYSTOLIC BLOOD PRESSURE: 120 MMHG

## 2024-06-04 DIAGNOSIS — N40.1 BENIGN PROSTATIC HYPERPLASIA WITH URINARY FREQUENCY: ICD-10-CM

## 2024-06-04 DIAGNOSIS — L20.84 INTRINSIC ECZEMA: ICD-10-CM

## 2024-06-04 DIAGNOSIS — R91.1 PULMONARY NODULE SEEN ON IMAGING STUDY: ICD-10-CM

## 2024-06-04 DIAGNOSIS — Z00.00 MEDICARE ANNUAL WELLNESS VISIT, SUBSEQUENT: ICD-10-CM

## 2024-06-04 DIAGNOSIS — R35.0 BENIGN PROSTATIC HYPERPLASIA WITH URINARY FREQUENCY: ICD-10-CM

## 2024-06-04 DIAGNOSIS — D86.9 SARCOIDOSIS: ICD-10-CM

## 2024-06-04 DIAGNOSIS — E78.2 MIXED HYPERLIPIDEMIA: ICD-10-CM

## 2024-06-04 DIAGNOSIS — R73.09 ELEVATED HEMOGLOBIN A1C: Primary | ICD-10-CM

## 2024-06-04 DIAGNOSIS — E55.9 VITAMIN D DEFICIENCY: ICD-10-CM

## 2024-06-04 PROCEDURE — G0439 PPPS, SUBSEQ VISIT: HCPCS | Performed by: FAMILY MEDICINE

## 2024-06-04 PROCEDURE — 99214 OFFICE O/P EST MOD 30 MIN: CPT | Performed by: FAMILY MEDICINE

## 2024-06-04 RX ORDER — BETAMETHASONE DIPROPIONATE 0.5 MG/G
CREAM TOPICAL 2 TIMES DAILY
Qty: 45 G | Refills: 3 | Status: SHIPPED | OUTPATIENT
Start: 2024-06-04

## 2024-06-04 RX ORDER — TADALAFIL 5 MG/1
5 TABLET ORAL
Qty: 90 TABLET | Refills: 3 | Status: SHIPPED | OUTPATIENT
Start: 2024-06-04 | End: 2024-06-06 | Stop reason: SDUPTHER

## 2024-06-04 RX ORDER — FINASTERIDE 5 MG/1
5 TABLET, FILM COATED ORAL DAILY
Qty: 90 TABLET | Refills: 3 | Status: SHIPPED | OUTPATIENT
Start: 2024-06-04

## 2024-06-04 NOTE — PATIENT INSTRUCTIONS
Great job just cut out white rice white potatoes and sugar and remember you could have brown rice and sweet potatoes if you like to, we will check your A1c which is nonfasting a week or 2 before your next visit in 6 months  Keep walking and exercising and if you need us before let us now        Medicare Preventive Visit Patient Instructions  Thank you for completing your Welcome to Medicare Visit or Medicare Annual Wellness Visit today. Your next wellness visit will be due in one year (6/5/2025).  The screening/preventive services that you may require over the next 5-10 years are detailed below. Some tests may not apply to you based off risk factors and/or age. Screening tests ordered at today's visit but not completed yet may show as past due. Also, please note that scanned in results may not display below.  Preventive Screenings:  Service Recommendations Previous Testing/Comments   Colorectal Cancer Screening  Colonoscopy    Fecal Occult Blood Test (FOBT)/Fecal Immunochemical Test (FIT)  Fecal DNA/Cologuard Test  Flexible Sigmoidoscopy Age: 45-75 years old   Colonoscopy: every 10 years (May be performed more frequently if at higher risk)  OR  FOBT/FIT: every 1 year  OR  Cologuard: every 3 years  OR  Sigmoidoscopy: every 5 years  Screening may be recommended earlier than age 45 if at higher risk for colorectal cancer. Also, an individualized decision between you and your healthcare provider will decide whether screening between the ages of 76-85 would be appropriate. Colonoscopy: 06/20/2022  FOBT/FIT: Not on file  Cologuard: Not on file  Sigmoidoscopy: Not on file    Screening Current     Prostate Cancer Screening Individualized decision between patient and health care provider in men between ages of 55-69   Medicare will cover every 12 months beginning on the day after your 50th birthday PSA: 2.10 ng/mL     Screening Not Indicated     Hepatitis C Screening Once for adults born between 1945 and 1965  More  frequently in patients at high risk for Hepatitis C Hep C Antibody: 09/05/2018    Screening Current   Diabetes Screening 1-2 times per year if you're at risk for diabetes or have pre-diabetes Fasting glucose: 134 mg/dL (5/21/2024)  A1C: 6.2 % (5/21/2024)  Screening Current   Cholesterol Screening Once every 5 years if you don't have a lipid disorder. May order more often based on risk factors. Lipid panel: 05/21/2024  Screening Not Indicated  History Lipid Disorder      Other Preventive Screenings Covered by Medicare:  Abdominal Aortic Aneurysm (AAA) Screening: covered once if your at risk. You're considered to be at risk if you have a family history of AAA or a male between the age of 65-75 who smoking at least 100 cigarettes in your lifetime.  Lung Cancer Screening: covers low dose CT scan once per year if you meet all of the following conditions: (1) Age 55-77; (2) No signs or symptoms of lung cancer; (3) Current smoker or have quit smoking within the last 15 years; (4) You have a tobacco smoking history of at least 20 pack years (packs per day x number of years you smoked); (5) You get a written order from a healthcare provider.  Glaucoma Screening: covered annually if you're considered high risk: (1) You have diabetes OR (2) Family history of glaucoma OR (3)  aged 50 and older OR (4)  American aged 65 and older  Osteoporosis Screening: covered every 2 years if you meet one of the following conditions: (1) Have a vertebral abnormality; (2) On glucocorticoid therapy for more than 3 months; (3) Have primary hyperparathyroidism; (4) On osteoporosis medications and need to assess response to drug therapy.  HIV Screening: covered annually if you're between the age of 15-65. Also covered annually if you are younger than 15 and older than 65 with risk factors for HIV infection. For pregnant patients, it is covered up to 3 times per pregnancy.    Immunizations:  Immunization Recommendations    Influenza Vaccine Annual influenza vaccination during flu season is recommended for all persons aged >= 6 months who do not have contraindications   Pneumococcal Vaccine   * Pneumococcal conjugate vaccine = PCV13 (Prevnar 13), PCV15 (Vaxneuvance), PCV20 (Prevnar 20)  * Pneumococcal polysaccharide vaccine = PPSV23 (Pneumovax) Adults 19-63 yo with certain risk factors or if 65+ yo  If never received any pneumonia vaccine: recommend Prevnar 20 (PCV20)  Give PCV20 if previously received 1 dose of PCV13 or PPSV23   Hepatitis B Vaccine 3 dose series if at intermediate or high risk (ex: diabetes, end stage renal disease, liver disease)   Respiratory syncytial virus (RSV) Vaccine - COVERED BY MEDICARE PART D  * RSVPreF3 (Arexvy) CDC recommends that adults 60 years of age and older may receive a single dose of RSV vaccine using shared clinical decision-making (SCDM)   Tetanus (Td) Vaccine - COST NOT COVERED BY MEDICARE PART B Following completion of primary series, a booster dose should be given every 10 years to maintain immunity against tetanus. Td may also be given as tetanus wound prophylaxis.   Tdap Vaccine - COST NOT COVERED BY MEDICARE PART B Recommended at least once for all adults. For pregnant patients, recommended with each pregnancy.   Shingles Vaccine (Shingrix) - COST NOT COVERED BY MEDICARE PART B  2 shot series recommended in those 19 years and older who have or will have weakened immune systems or those 50 years and older     Health Maintenance Due:      Topic Date Due    Colorectal Cancer Screening  06/17/2032    Hepatitis C Screening  Completed     Immunizations Due:      Topic Date Due    DTaP,Tdap,and Td Vaccines (2 - Td or Tdap) 03/08/2023    COVID-19 Vaccine (6 - 2023-24 season) 10/24/2023     Advance Directives   What are advance directives?  Advance directives are legal documents that state your wishes and plans for medical care. These plans are made ahead of time in case you lose your ability to  make decisions for yourself. Advance directives can apply to any medical decision, such as the treatments you want, and if you want to donate organs.   What are the types of advance directives?  There are many types of advance directives, and each state has rules about how to use them. You may choose a combination of any of the following:  Living will:  This is a written record of the treatment you want. You can also choose which treatments you do not want, which to limit, and which to stop at a certain time. This includes surgery, medicine, IV fluid, and tube feedings.   Durable power of  for healthcare (DPAHC):  This is a written record that states who you want to make healthcare choices for you when you are unable to make them for yourself. This person, called a proxy, is usually a family member or a friend. You may choose more than 1 proxy.  Do not resuscitate (DNR) order:  A DNR order is used in case your heart stops beating or you stop breathing. It is a request not to have certain forms of treatment, such as CPR. A DNR order may be included in other types of advance directives.  Medical directive:  This covers the care that you want if you are in a coma, near death, or unable to make decisions for yourself. You can list the treatments you want for each condition. Treatment may include pain medicine, surgery, blood transfusions, dialysis, IV or tube feedings, and a ventilator (breathing machine).  Values history:  This document has questions about your views, beliefs, and how you feel and think about life. This information can help others choose the care that you would choose.  Why are advance directives important?  An advance directive helps you control your care. Although spoken wishes may be used, it is better to have your wishes written down. Spoken wishes can be misunderstood, or not followed. Treatments may be given even if you do not want them. An advance directive may make it easier for your  family to make difficult choices about your care.   Weight Management   Why it is important to manage your weight:  Being overweight increases your risk of health conditions such as heart disease, high blood pressure, type 2 diabetes, and certain types of cancer. It can also increase your risk for osteoarthritis, sleep apnea, and other respiratory problems. Aim for a slow, steady weight loss. Even a small amount of weight loss can lower your risk of health problems.  How to lose weight safely:  A safe and healthy way to lose weight is to eat fewer calories and get regular exercise. You can lose up about 1 pound a week by decreasing the number of calories you eat by 500 calories each day.   Healthy meal plan for weight management:  A healthy meal plan includes a variety of foods, contains fewer calories, and helps you stay healthy. A healthy meal plan includes the following:  Eat whole-grain foods more often.  A healthy meal plan should contain fiber. Fiber is the part of grains, fruits, and vegetables that is not broken down by your body. Whole-grain foods are healthy and provide extra fiber in your diet. Some examples of whole-grain foods are whole-wheat breads and pastas, oatmeal, brown rice, and bulgur.  Eat a variety of vegetables every day.  Include dark, leafy greens such as spinach, kale, john greens, and mustard greens. Eat yellow and orange vegetables such as carrots, sweet potatoes, and winter squash.   Eat a variety of fruits every day.  Choose fresh or canned fruit (canned in its own juice or light syrup) instead of juice. Fruit juice has very little or no fiber.  Eat low-fat dairy foods.  Drink fat-free (skim) milk or 1% milk. Eat fat-free yogurt and low-fat cottage cheese. Try low-fat cheeses such as mozzarella and other reduced-fat cheeses.  Choose meat and other protein foods that are low in fat.  Choose beans or other legumes such as split peas or lentils. Choose fish, skinless poultry (chicken  or turkey), or lean cuts of red meat (beef or pork). Before you cook meat or poultry, cut off any visible fat.   Use less fat and oil.  Try baking foods instead of frying them. Add less fat, such as margarine, sour cream, regular salad dressing and mayonnaise to foods. Eat fewer high-fat foods. Some examples of high-fat foods include french fries, doughnuts, ice cream, and cakes.  Eat fewer sweets.  Limit foods and drinks that are high in sugar. This includes candy, cookies, regular soda, and sweetened drinks.  Exercise:  Exercise at least 30 minutes per day on most days of the week. Some examples of exercise include walking, biking, dancing, and swimming. You can also fit in more physical activity by taking the stairs instead of the elevator or parking farther away from stores. Ask your healthcare provider about the best exercise plan for you.      © Copyright [employer] 2018 Information is for End User's use only and may not be sold, redistributed or otherwise used for commercial purposes. All illustrations and images included in CareNotes® are the copyrighted property of [employer].A[employer]M., Inc. or [employer]

## 2024-06-04 NOTE — PROGRESS NOTES
Ambulatory Visit  Name: Mauricio Chiang      : 1950      MRN: [id number]  Encounter Provider: Negra Aleman DO  Encounter Date: 2024   Encounter department: [facility]    Assessment & Plan   1. Elevated hemoglobin A1c  -     Hemoglobin A1C; Future; Expected date: 2024  2. Medicare annual wellness visit, subsequent  3. Vitamin D deficiency  4. remote history of sarcoidosis  5. Pulmonary nodule seen on imaging study  6. Mixed hyperlipidemia  7. Benign prostatic hyperplasia with urinary frequency  -     finasteride (PROSCAR) 5 mg tablet; Take 1 tablet (5 mg total) by mouth daily  -     tadalafil (CIALIS) 5 MG tablet; Take 1 tablet (5 mg total) by mouth daily at bedtime  8. Intrinsic eczema  -     betamethasone dipropionate (DIPROSONE) 0.05 % cream; Apply topically 2 (two) times a day Until the rash is gone       Preventive health issues were discussed with patient, and age appropriate screening tests were ordered as noted in patient's After Visit Summary. Personalized health advice and appropriate referrals for health education or preventive services given if needed, as noted in patient's After Visit Summary.    History of Present Illness     HPI   Patient Care Team:  Negra Aleman DO as PCP - General (Family Medicine)  MD Zoey Daniel MD as Endoscopist    Review of Systems  Medical History Reviewed by provider this encounter:  Tobacco  Allergies  Meds  Problems  Med Hx  Surg Hx  Fam Hx       Annual Wellness Visit Questionnaire   Mauricio is here for his Subsequent Wellness visit.     Health Risk Assessment:   Patient rates overall health as very good. Patient feels that their physical health rating is same. Patient is very satisfied with their life. Eyesight was rated as same. Hearing was rated as same. Patient feels that their emotional and mental health rating is same. Patients states they are never, rarely angry. Patient states they  are never, rarely unusually tired/fatigued. Pain experienced in the last 7 days has been none. Patient states that he has experienced no weight loss or gain in last 6 months.     Depression Screening:   PHQ-2 Score: 0      Fall Risk Screening:   In the past year, patient has experienced: no history of falling in past year      Home Safety:  Patient does not have trouble with stairs inside or outside of their home. Patient has working smoke alarms and has no working carbon monoxide detector. Home safety hazards include: none.     Nutrition:   Current diet is Diabetic and Low Carb.     Medications:   Patient is currently taking over-the-counter supplements. OTC medications include: see medication list. Patient is able to manage medications.     Activities of Daily Living (ADLs)/Instrumental Activities of Daily Living (IADLs):   Walk and transfer into and out of bed and chair?: Yes  Dress and groom yourself?: Yes    Bathe or shower yourself?: Yes    Feed yourself? Yes  Do your laundry/housekeeping?: Yes  Manage your money, pay your bills and track your expenses?: Yes  Make your own meals?: Yes    Do your own shopping?: Yes    Previous Hospitalizations:   Any hospitalizations or ED visits within the last 12 months?: No      Advance Care Planning:   Living will: Yes    Durable POA for healthcare: Yes    Advanced directive: Yes    End of Life Decisions reviewed with patient: Yes      Cognitive Screening:   Provider or family/friend/caregiver concerned regarding cognition?: No    PREVENTIVE SCREENINGS      Cardiovascular Screening:    General: Screening Not Indicated and History Lipid Disorder      Diabetes Screening:     General: Screening Current      Colorectal Cancer Screening:     General: Screening Current      Prostate Cancer Screening:    General: Screening Not Indicated      Osteoporosis Screening:    General: Screening Not Indicated      Abdominal Aortic Aneurysm (AAA) Screening:    Risk factors include: age  between 65-76 yo        General: Screening Current      Lung Cancer Screening:     General: Screening Not Indicated      Hepatitis C Screening:    General: Screening Current    Screening, Brief Intervention, and Referral to Treatment (SBIRT)    Screening  Typical number of drinks in a day: 0  Typical number of drinks in a week: 3  Interpretation: Low risk drinking behavior.    AUDIT-C Screenin) How often did you have a drink containing alcohol in the past year? 2 to 3 times a week  2) How many drinks did you have on a typical day when you were drinking in the past year? 0  3) How often did you have 6 or more drinks on one occasion in the past year? never    AUDIT-C Score: 3  Interpretation: Score 0-3 (male): Negative screen for alcohol misuse    Single Item Drug Screening:  How often have you used an illegal drug (including marijuana) or a prescription medication for non-medical reasons in the past year? never    Single Item Drug Screen Score: 0  Interpretation: Negative screen for possible drug use disorder    Brief Intervention  Alcohol & drug use screenings were reviewed. No concerns regarding substance use disorder identified.     Other Counseling Topics:   Regular weightbearing exercise and calcium and vitamin D intake.     Social Determinants of Health     Financial Resource Strain: Low Risk  (2023)    Overall Financial Resource Strain (CARDIA)     Difficulty of Paying Living Expenses: Not hard at all   Food Insecurity: No Food Insecurity (6/3/2024)    Hunger Vital Sign     Worried About Running Out of Food in the Last Year: Never true     Ran Out of Food in the Last Year: Never true   Transportation Needs: No Transportation Needs (6/3/2024)    PRAPARE - Transportation     Lack of Transportation (Medical): No     Lack of Transportation (Non-Medical): No   Housing Stability: Low Risk  (6/3/2024)    Housing Stability Vital Sign     Unable to Pay for Housing in the Last Year: No     Number of Times  "Moved in the Last Year: 0     Homeless in the Last Year: No   Utilities: Not At Risk (6/3/2024)    [facility] Utilities     Threatened with loss of utilities: No     No results found.    Objective     /80   Pulse 55   Resp 18   Ht 5' 6.75\" (1.695 m)   Wt 85.3 kg (188 lb)   SpO2 97%   BMI 29.67 kg/m²     Physical Exam  Administrative Statements           "

## 2024-06-04 NOTE — PROGRESS NOTES
ASSESSMENT/PLAN:    Elevated A1c  Basically unchanged 6.2 from 6.1  Patient will try to cut out simple sugars, white rice and potato  Is already exercising a lot may be a bit more would help bring this down  Recheck in 6 months     ED/BPH  PSA is excellent and unchanged 2.1 from 2.0 because of the Cialis  Continue Cialis and finasteride  PSA yearly      Celiac disease  Limits the amount of gluten when he eats     Hyperlipidemia  Continue atorvastatin  Cholesterol excellent 134 with a LDL of 66     Elevated PSA  Biopsies negative years ago  Last PSA 2.1     Positive rheumatoid factor  No disease manifestations      vitamin-D deficiency   Continue vitamin-D daily  Vitamin D finally normal in the 30s    History of lung nodule  CT scan with Pulmonary followed for years  Felt to be leftover sarcoid no need to follow anymore        Recheck in 6 months   Nonfasting A1c  Full physical next visit  Recheck sooner if needed    Depression Screening and Follow-up Plan: Patient was screened for depression during today's encounter. They screened negative with a PHQ-2 score of 0.           Health Maintenance   Topic Date Due    Annual Physical  Never done    RSV Vaccine Age 60+ Years (1 - 1-dose 60+ series) Never done    PT PLAN OF CARE  10/21/2021    DTaP,Tdap,and Td Vaccines (2 - Td or Tdap) 03/08/2023    COVID-19 Vaccine (6 - 2023-24 season) 10/24/2023    Fall Risk  06/04/2025    Depression Screening  06/04/2025    Colorectal Cancer Screening  06/17/2032    Hepatitis C Screening  Completed    Zoster Vaccine  Completed    Pneumococcal Vaccine: 65+ Years  Completed    Influenza Vaccine  Completed    RSV Vaccine age 0-20 Months  Aged Out    HIB Vaccine  Aged Out    IPV Vaccine  Aged Out    Hepatitis A Vaccine  Aged Out    Meningococcal ACWY Vaccine  Aged Out    HPV Vaccine  Aged Out         Problem List as of 6/4/2024 Reviewed: 5/23/2023 10:17 AM by Negra Aleman,       Adult celiac disease    Last Assessment & Plan 6/20/2018  Office Visit Written 6/20/2018  9:02 AM by Deedee Ridley MD     On gluten free diet.  Off PPI.         Benign prostatic hyperplasia with urinary frequency    Last Assessment & Plan 6/20/2018 Office Visit Written 6/20/2018  9:01 AM by Deedee Ridley MD     Sees Urology yearly, on finasteride.         Elevated hemoglobin A1c    History of elevated PSA    Intrinsic eczema    Liver cyst    Mixed hyperlipidemia    Last Assessment & Plan 10/6/2021 Hospital Encounter Written 10/7/2021 11:22 AM by Francisco J Kate DO     Continue lipitor         Pulmonary nodule seen on imaging study    remote history of sarcoidosis    Last Assessment & Plan 6/22/2019 Hospital Encounter Written 6/25/2019 12:27 PM by Carmenza Ortez PA-C     Reports being diagnosed with sarcoidosis 40 years ago and seeing pulmonology at that time but having no additional follow-up since that time.  Last CT scan of the chest on file was from 2016 which showed stable lung nodules  Patient reports mild cough x3 days.  May need eventual CT chest/pulmonology follow-up         Rheumatoid factor positive    Right bundle branch block (RBBB)    Vitamin D deficiency    Last Assessment & Plan 6/20/2018 Office Visit Written 6/20/2018  9:01 AM by Deedee Ridley MD     Increase D3 during fall/winter months.              Subjective:   Chief Complaint   Patient presents with    Medicare Wellness Visit     Patient is here for his AWV and his recheck  He also had all his blood work done for us to review    He continues to stay busy walking during this golfing season about 8 miles or more weekly  He really has no complaints        patient ID: Mauricio Chiang is a 74 y.o. male.    Patient's past medical history, surgical history, family history, social history, and Tobacco history reviewed with patient.     MED LIST WAS REVIEWED AND UPDATED    ROS  As per HPI  Rest of 12 point review of systems negative     Objective:      VITALS:  Wt Readings from Last 3  Encounters:   06/04/24 85.3 kg (188 lb)   11/28/23 85.6 kg (188 lb 12.8 oz)   05/23/23 85.8 kg (189 lb 3.2 oz)     BP Readings from Last 3 Encounters:   06/04/24 120/80   11/28/23 118/80   05/23/23 120/70     Pulse Readings from Last 3 Encounters:   06/04/24 55   11/28/23 66   05/23/23 88     Body mass index is 29.67 kg/m².    Laboratory Results:   All pertinent labs and studies were reviewed with patient during this office visit with highlights of the results contained in this note in the ASSESSMENT AND PLAN section       Physical Exam  Constitutional  Appears healthy, Looks well, Appearance consistent with age    Mental Status  Alert, Oriented, Cooperative, Memory function normal , clean, and reasonable    Neck  No neck mass, No thyromegaly, Good carotid upstrokes bilaterally, trachea midline positive click    Respiratory  Breath sounds normal, No rales, No rhonchi, No wheezing, normal palpation    Cardiac  Regular rhythm without ectopy or murmur no S3-S4, no heave lift or thrill to palpation    Vascular  No leg edema, No pedal edema    Muscular skeletal  No clubbing cyanosis , muscle tone normal    Skin  No appreciable rashes or abnormal appearing lesions

## 2024-06-05 ENCOUNTER — TELEPHONE (OUTPATIENT)
Age: 74
End: 2024-06-05

## 2024-06-05 NOTE — TELEPHONE ENCOUNTER
PA for tadalafil (CIALIS)     Submitted via    []CMM-KEY    [x][employer]-Case ID # [id number]   []Faxed to plan   []Other website    []Phone call Case ID #      Office notes sent, clinical questions answered. Awaiting determination    Turnaround time for your insurance to make a decision on your Prior Authorization can take 7-21 business days.

## 2024-06-06 DIAGNOSIS — R35.0 BENIGN PROSTATIC HYPERPLASIA WITH URINARY FREQUENCY: ICD-10-CM

## 2024-06-06 DIAGNOSIS — N40.1 BENIGN PROSTATIC HYPERPLASIA WITH URINARY FREQUENCY: ICD-10-CM

## 2024-06-06 RX ORDER — TADALAFIL 5 MG/1
5 TABLET ORAL
Qty: 90 TABLET | Refills: 3 | Status: SHIPPED | OUTPATIENT
Start: 2024-06-06

## 2024-06-06 NOTE — TELEPHONE ENCOUNTER
Medication sent to wrong pharmacy please resend to [facility]  Reason for call:   [x] Refill   [] Prior Auth  [] Other:     Office:   [x] PCP/Provider - Ash  [] Specialty/Provider -     Medication: Tadalafil 5mg    Dose/Frequency: 1 tab daily at bedtime    Quantity: 90    Pharmacy: [id number] - [place], [place] - [address] [phone]     Does the patient have enough for 3 days?   [] Yes   [x] No - Send as HP to POD

## 2024-06-17 DIAGNOSIS — Z00.6 ENCOUNTER FOR EXAMINATION FOR NORMAL COMPARISON OR CONTROL IN CLINICAL RESEARCH PROGRAM: ICD-10-CM

## 2024-07-02 ENCOUNTER — TELEPHONE (OUTPATIENT)
Age: 74
End: 2024-07-02

## 2024-07-02 NOTE — TELEPHONE ENCOUNTER
I received a Care Request from patient to schedule for:    First Name: Mauricio  Last Name: Mariia  Email: [email or web address]  Phone: [phone]  Subject: Request an appointment  Where does it hurt? Pain on left side of neck and lower back pain.      I lvm to [facility] at [phone].

## 2024-07-02 NOTE — TELEPHONE ENCOUNTER
Patient called asking to speak to [facility].  Provided number and warm transfer to [facility].  Advised Dr. Dominguez is surgeon who typically deals with Total joint surgery of hip and knee.

## 2024-07-03 ENCOUNTER — APPOINTMENT (OUTPATIENT)
Dept: LAB | Facility: IMAGING CENTER | Age: 74
End: 2024-07-03

## 2024-07-03 DIAGNOSIS — Z00.6 ENCOUNTER FOR EXAMINATION FOR NORMAL COMPARISON OR CONTROL IN CLINICAL RESEARCH PROGRAM: ICD-10-CM

## 2024-07-03 PROCEDURE — 36415 COLL VENOUS BLD VENIPUNCTURE: CPT

## 2024-07-12 ENCOUNTER — OFFICE VISIT (OUTPATIENT)
Dept: OBGYN CLINIC | Facility: CLINIC | Age: 74
End: 2024-07-12
Payer: MEDICARE

## 2024-07-12 VITALS
WEIGHT: 188 LBS | HEIGHT: 67 IN | DIASTOLIC BLOOD PRESSURE: 56 MMHG | BODY MASS INDEX: 29.51 KG/M2 | SYSTOLIC BLOOD PRESSURE: 124 MMHG

## 2024-07-12 DIAGNOSIS — M54.50 BILATERAL LOW BACK PAIN WITHOUT SCIATICA, UNSPECIFIED CHRONICITY: Primary | ICD-10-CM

## 2024-07-12 PROCEDURE — 99213 OFFICE O/P EST LOW 20 MIN: CPT | Performed by: FAMILY MEDICINE

## 2024-07-12 RX ORDER — IBUPROFEN 200 MG
200 TABLET ORAL EVERY 6 HOURS PRN
COMMUNITY

## 2024-07-12 NOTE — PROGRESS NOTES
Assessment:     1. Bilateral low back pain without sciatica, unspecified chronicity  Ambulatory Referral to [facility]        Orders Placed This Encounter   Procedures    Ambulatory Referral to [facility]        Impression:   Bilateral lumbar  pain likely secondary to most likely L5-S1 degenerative changes.      Conservative Management   We discussed different treatment options:  Reviewed prior documentation completed by PCP on 09/17/2018.  Ice or Heat Therapy as needed 1-2 times daily for 10-20 minutes. As tolerated.   Over the counter Tylenol and/or NSAIDs  as needed based off your Past Medical Hx. Please follow product label for dosing and maximum limits.    Trial of over the counter Topical Analgesics such as Lidocaine cream or Voltaren Gel, as tolerated. If skin becomes irritated, discontinue use.   Initiate Formal Physical Therapy at any preferred location.  Prescription provided        Imaging   No imaging was available for review today.    Procedure  No Injection performed today. May consider future corticosteroid injection depending on clinical exam/imaging.    Shared decision making, patient agreeable to plan.      Return for Follow up after 3 months.    HPI:   Mauricio Chiang is a 74 y.o. male  who presents for evaluation of   Chief Complaint   Patient presents with    Lower Back - Pain     Pain started over a month ago.        Occupation: Retired  Injury Related: No     Onset/Mechanism: Back pain started over a month ago.  Pain remains unchanged.  Denies any trauma..  No discomfort with coughing  Location: Bilateral lower back..  Severity: Current severity: 3/10. Max severity: 5/10.  Pain described as: Dull, ache  Radiation: Denies radiating down the legs..  Provocative: Prolonged standing, lying.  Prolonged sitting  Associated symptoms: Denies any numbness tingling or weakness..    Denies any lower extremity tingling, numbness or weakness.    Denies any recent onset bowel or  bladder control problems.   Denies any saddle paresthesias/anaesthesia.   Denies recent sudden loss of weight or appetite.   Denies recent systemic symptoms like fever or chills.  Denies personal history of cancer.     Summary of treatment to-date:   Over-the-counter analgesics  Has been completing home exercise program      Following History Reviewed and Updated     Past Medical History:   Diagnosis Date    Acute pancreatitis     last assessed - 11Apr2017    BPH (benign prostatic hypertrophy)     Celiac disease     current GI w/u to r/o celiac    Cholelithiasis     Colon polyp     Elevated blood pressure reading     last assessed - 09Jan2017    Gallstones     last assessed - 14Feb2017    High cholesterol     Hx of mitral valve disorder     Hypertension     Sarcoidosis     1990's Diagnosed 15 years ago by biopsy, not active; last assessed - 15Nov2016     Past Surgical History:   Procedure Laterality Date    CATARACT EXTRACTION      ERCP N/A 4/3/2017    Procedure: ENDOSCOPIC RETROGRADE CHOLANGIOPANCREATOGRAPHY (ERCP);  Surgeon: Justice Jama MD;  Location: [facility];  Service:     MN COLONOSCOPY FLX DX W/COLLJ SPEC WHEN PFRMD N/A 3/1/2017    Procedure: EGD AND COLONOSCOPY;  Surgeon: Zoey De La Cruz MD;  Location: [facility];  Service: Gastroenterology    MN ESOPHAGOGASTRODUODENOSCOPY TRANSORAL DIAGNOSTIC N/A 6/5/2017    Procedure: ESOPHAGOGASTRODUODENOSCOPY (EGD);  Surgeon: Justice Jama MD;  Location: [facility];  Service: Gastroenterology    MN LAPAROSCOPY SURG CHOLECYSTECTOMY N/A 3/29/2017    Procedure: CHOLECYSTECTOMY LAPAROSCOPIC; POSSIBLE OPEN;  Surgeon: Alonso Medina MD;  Location: [facility];  Service: General; complicated by bile leak and pancreatitis s/p stent     ROTATOR CUFF REPAIR Right 04/29/2015    for rotator cuff tear, Onset: 4/14     Family History   Problem Relation Age of Onset    Diabetes Mother         Diabetes Mellitus    Hypertension Mother     Ovarian cancer Mother     Rheumatologic disease  Mother     Diabetes Father         Diabetes Mellitus    Heart disease Father     Hypertension Father     Diabetes Sister         Diabetes Mellitus    Other Sister         H. pylori infection    Hypertension Sister     Other Brother         H. pylori infection    Alcohol abuse Neg Hx     Substance Abuse Neg Hx     Mental illness Neg Hx     Depression Neg Hx        Social History     Substance and Sexual Activity   Alcohol Use Yes    Alcohol/week: 2.0 standard drinks of alcohol    Types: 2 Glasses of wine per week    Comment: wine,  social drinker     Social History     Substance and Sexual Activity   Drug Use No    Comment: Denied history of drug use     Social History     Tobacco Use   Smoking Status Never   Smokeless Tobacco Never       Social Determinants of Health     Tobacco Use: Low Risk  (7/12/2024)    Patient History     Smoking Tobacco Use: Never     Smokeless Tobacco Use: Never     Passive Exposure: Not on file   Alcohol Use: Not on file   Financial Resource Strain: Low Risk  (5/18/2023)    Overall Financial Resource Strain (CARDIA)     Difficulty of Paying Living Expenses: Not hard at all   Food Insecurity: No Food Insecurity (6/3/2024)    Hunger Vital Sign     Worried About Running Out of Food in the Last Year: Never true     Ran Out of Food in the Last Year: Never true   Transportation Needs: No Transportation Needs (6/3/2024)    PRAPARE - Transportation     Lack of Transportation (Medical): No     Lack of Transportation (Non-Medical): No   Physical Activity: Not on file   Stress: Not on file   Social Connections: Not on file   Intimate Partner Violence: Not on file   Depression: Not at risk (6/4/2024)    PHQ-2     PHQ-2 Score: 0   Housing Stability: Low Risk  (6/3/2024)    Housing Stability Vital Sign     Unable to Pay for Housing in the Last Year: No     Number of Times Moved in the Last Year: 0     Homeless in the Last Year: No   Utilities: Not At Risk (6/3/2024)    [facility] Utilities     Threatened with  "loss of utilities: No   Health Literacy: Not on file        No Known Allergies    Review of Systems      Review of Systems     Review of Systems   Constitutional: Negative for chills and fever.   HENT: Negative for drooling and sneezing.    Eyes: Negative for redness.   Respiratory: Negative for cough and wheezing.    Gastrointestinal: Negative for vomiting.   Psychiatric/Behavioral: Negative for behavioral problems. The patient is not nervous/anxious.      All other systems negative.   Physical Exam   Physical Exam    Vitals and nursing note reviewed.  Constitutional:   Appearance. Normal Appearance.  /56 (BP Location: Left arm, Patient Position: Sitting, Cuff Size: Standard)   Ht 5' 7\" (1.702 m)   Wt 85.3 kg (188 lb)   BMI 29.44 kg/m²     Body mass index is 29.44 kg/m².   HENT:  Head: Atraumatic.  Nose: Nose normal  Eyes: Conjunctiva/sclera: Conjunctivae normal.  Cardiovascular:   Rate and Rhythm: Bilateral equal distal pulses  Pulmonary:   Effort: Pulmonary effort is normal  Skin:   General: Skin is warm and dry.  Neurological:   General: No focal deficit present.  Mental Status: Alert and oriented to person, place, and time.   Psychiatric:   Mood and Affect: mood normal.  Behavior: Behavior normal     Musculoskeletal Exam     Ortho Exam     Bilateral HIP and BACK     Inspection:  Gait Gross deformity Erythema Warmth   Normal Negative Negative Negative     TENDERNESS:  Thoracic/Lumbar Spinous Processes Paraspinal Muscles SI Joint: Sacrum JOY Greater Trochanteric Bursa   Negative Negative Negative Negative Negative     LUMBAR Range of Motion:  Flexion Extension Sidebending Rotation   Intact Intact Intact Intact     HIP Range of Motion:  Hip Supine Supine Prone Prone   Flexion ER IR ER IR   Intact and symmetrical  Intact and symmetrical  Intact and symmetrical        DERMATOMAL SENSATION:  L1 L2 L3 L4 L5 S1   Intact Intact Intact Intact Intact Intact     STRENGTH (bilateral):  Hip flexion Knee Flexion Knee " "extension Foot dorsiflexion Great toe extension Foot plantarflexion   5/5 5/5 5/5 5/5 5/5 5/5     SPECIAL TESTS:   B/L Hip  Logroll NYA FADIR Daysi's Popliteal angle Supine straight leg Prone straight leg   Negative Negative Negative   Negative N/A     SI JOINT ASIS COMPRESSION TEST:  STORK TEST:  FORTON'S FINGER: NYA SI PAIN:   Discomfort bilateral SI joint Negative  N/A Negative  Negative      Special Tests:   Kuldeep test Bicycle test Adductor squeeze Piriformis TEST:   GAENSLIN'S TEST:  Pubalgia   Supine, unaffected hip is hyperflexed Supine, opposite active b/l hip flexion / extension  Supine, hips flexed 45, active activation of adductors  Lies on unaffected hip with knees flexed and abducts against resistance  Hyperflexed unaffected hip, extended hip has downward force applied  Lying supine, perform sit-up   Negative or without hip flexion contracture of contralateral leg    N/A  Negative for pain at pubis      Bilateral negative clonus  2/4 patellar reflexes  Neurovascular:  Sensation to light touch Posterior tibial artery   Intact and equal bilaterally Intact and equal bilaterally     (Test not completed if space left blank )         Procedures       Portions of the record may have been created with voice recognition software. Occasional wrong word or \"sound alike\" substitutions may have occurred due to the inherent limitations of voice recognition software. Please review the chart carefully and recognize, using context, where substitutions/typographical errors may have occurred.   "

## 2024-07-16 LAB
APOB+LDLR+PCSK9 GENE MUT ANL BLD/T: NOT DETECTED
BRCA1+BRCA2 DEL+DUP + FULL MUT ANL BLD/T: NOT DETECTED
MLH1+MSH2+MSH6+PMS2 GN DEL+DUP+FUL M: NOT DETECTED

## 2024-07-24 ENCOUNTER — EVALUATION (OUTPATIENT)
Dept: PHYSICAL THERAPY | Facility: REHABILITATION | Age: 74
End: 2024-07-24
Payer: MEDICARE

## 2024-07-24 DIAGNOSIS — M54.50 BILATERAL LOW BACK PAIN WITHOUT SCIATICA, UNSPECIFIED CHRONICITY: ICD-10-CM

## 2024-07-24 PROCEDURE — 97110 THERAPEUTIC EXERCISES: CPT | Performed by: PHYSICAL THERAPIST

## 2024-07-24 PROCEDURE — 97162 PT EVAL MOD COMPLEX 30 MIN: CPT | Performed by: PHYSICAL THERAPIST

## 2024-07-24 NOTE — PROGRESS NOTES
PT Evaluation     Today's date: 2024  Patient name: Mauricio Chiang  : 1950  MRN: [id number]  Referring provider: Fredrick Jean Baptiste*  Dx:   Encounter Diagnosis     ICD-10-CM    1. Bilateral low back pain without sciatica, unspecified chronicity  M54.50 Ambulatory Referral to [facility]                     Assessment    Assessment details: Pt is a 73 yo male with low back pain present for several months.  He presents with lumbar hypomobility and tight hip quadriceps femoris and hip flexors. History and mechanical assessment indicate an extension bias.  He would benefit from therapeutic exercises to restore mobility and improve core strength and posture and body mechanics instruction with the goal of being able to sit, stand, golf and do yard work safely and without producing pain.      Goals  STG: in 4 weeks  Able to sit for 30 minutes and get up easily  Performing HEP consistently  Improve flexibilty   LTG: by D/C  Able to sit for 1 hour and get up easily  Rises in the morning without significant pain  Perform yard work without increased pain    Plan  Patient would benefit from: skilled physical therapy    Planned therapy interventions: joint mobilization, manual therapy, neuromuscular re-education, strengthening, stretching, therapeutic exercise and home exercise program    Frequency: 2x week  Duration in weeks: 6  Treatment plan discussed with: patient  Plan details: Decrease to 1x a week after 2 weeks        Subjective Evaluation    History of Present Illness  Mechanism of injury: Pt reports that he has had low back for a couple months.  It gradually increased.  He had a good week last week and is now more painful again.   He feels better on the move than being still.    Patient Goals  Patient goals for therapy: decreased pain  Patient goal: sit comfortably, rise from sitting easily.  Pain  Current pain ratin  At best pain ratin  At worst pain ratin  Location: Straight  across the back above the hips.  Quality: dull ache  Relieving factors: change in position  Aggravating factors: sitting (getting up in the mornign)    Life stress: golf 2x a week, yard work    Treatments  No previous or current treatments        Objective     Concurrent Complaints  Negative for night pain, disturbed sleep, bladder dysfunction, bowel dysfunction and saddle (S4) numbness    Postural Observations  Seated posture: fair  Standing posture: fair      Neurological Testing     Sensation     Lumbar   Left   Intact: light touch    Right   Intact: light touch    Active Range of Motion     Lumbar   Flexion:  WFL  Extension:  with pain Restriction level: maximal  Left lateral flexion:  with pain Restriction level: moderate  Right lateral flexion:  with pain Restriction level: moderate    Joint Play     Hypomobile: L1, L2, L3, L4, L5 and S1     Pain: L2, L3, L4 and L5   Mechanical Assessment    Cervical      Thoracic      Lumbar    Standing flexion: repeated movements   Pain location:no change  Lying flexion: repeated movements  Pain location: no change  Standing extension: repeated movements  Pain intensity: better  Pain level: increased  Lying extension: repeated movements  Pain intensity: better  Pain level: increased    Strength/Myotome Testing     Left Hip   Planes of Motion   Flexion: 4+  Extension: 4+  Abduction: 4+  External rotation: 4+    Right Hip   Planes of Motion   Flexion: 4+  Extension: 4+  Abduction: 4+  External rotation: 4+    Left Knee   Flexion: 5  Extension: 5    Right Knee   Flexion: 5  Extension: 5    Left Ankle/Foot   Dorsiflexion: 5  Plantar flexion: 5  Great toe extension: 5    Right Ankle/Foot   Dorsiflexion: 5  Plantar flexion: 5  Great toe extension: 5    Tests     Lumbar   Negative SIJ compression, sacroiliac distraction and sacral spring .     Left   Negative crossed SLR, femoral stretch, passive SLR and slump test.     Right   Negative crossed SLR, femoral stretch, passive SLR and  "slump test.     Left Pelvic Girdle/Sacrum   Negative: thigh thrust.     Right Pelvic Girdle/Sacrum   Negative: thigh thrust.     Left Hip   Negative NYA and FADIR.     Right Hip   Negative NYA and FADIR.              Precautions: none        Daily Treatment Diary     Assessment 7/24            Eval/Reval             FOTO     **     **   HEP Issued              Manuals             Hip flexor stretch NV            Lumbar ext mobs prn                                       Exercise Diary                           Standing lumbar ext x10            Prone press up x10            Press up w/exhale and sag x10            Quad stretch w/chair 30\"x3            TA             TA w/bridge             LTR                          Pallof press                                                                                           Modalities                                           "

## 2024-07-29 ENCOUNTER — OFFICE VISIT (OUTPATIENT)
Dept: PHYSICAL THERAPY | Facility: REHABILITATION | Age: 74
End: 2024-07-29
Payer: MEDICARE

## 2024-07-29 DIAGNOSIS — M54.50 BILATERAL LOW BACK PAIN WITHOUT SCIATICA, UNSPECIFIED CHRONICITY: Primary | ICD-10-CM

## 2024-07-29 PROCEDURE — 97112 NEUROMUSCULAR REEDUCATION: CPT | Performed by: PHYSICAL THERAPIST

## 2024-07-29 PROCEDURE — 97110 THERAPEUTIC EXERCISES: CPT | Performed by: PHYSICAL THERAPIST

## 2024-07-29 NOTE — PROGRESS NOTES
"Daily Note     Today's date: 2024  Patient name: Mauricio Chiang  : 1950  MRN: [id number]  Referring provider: Fredrick Jean Baptiste*  Dx:   Encounter Diagnosis     ICD-10-CM    1. Bilateral low back pain without sciatica, unspecified chronicity  M54.50                      Subjective: Pt reports that he is feeling a little better.        Objective: See treatment diary below      Assessment: Tolerated treatment well.  Lumbar extension is improving. New exercises were mildly challenging.   Patient would benefit from continued PT      Plan: Continue per plan of care.      Precautions: none        Daily Treatment Diary     Assessment            Eval/Reval             FOTO     **     **   HEP Issued              Manuals             Hip flexor stretch NV EOT 30\"x3           Lumbar ext mobs prn                                       Exercise Diary              bike  5' L1           Standing lumbar ext x10 x10           Prone press up x10 x10           Press up w/exhale and sag x10 x10           Quad stretch w/chair 30\"x3 30\"x3           TA  5\"x10 TA w/leg lift          TA w/bridge  5\"2x10           LTR  5\"x10                        Pallof press  5\" 2x10 W/side step                                                                                        Modalities                                           "

## 2024-07-31 ENCOUNTER — OFFICE VISIT (OUTPATIENT)
Dept: PHYSICAL THERAPY | Facility: REHABILITATION | Age: 74
End: 2024-07-31
Payer: MEDICARE

## 2024-07-31 DIAGNOSIS — M54.50 BILATERAL LOW BACK PAIN WITHOUT SCIATICA, UNSPECIFIED CHRONICITY: Primary | ICD-10-CM

## 2024-07-31 PROCEDURE — 97112 NEUROMUSCULAR REEDUCATION: CPT | Performed by: PHYSICAL THERAPIST

## 2024-07-31 PROCEDURE — 97140 MANUAL THERAPY 1/> REGIONS: CPT | Performed by: PHYSICAL THERAPIST

## 2024-07-31 PROCEDURE — 97110 THERAPEUTIC EXERCISES: CPT | Performed by: PHYSICAL THERAPIST

## 2024-07-31 NOTE — PROGRESS NOTES
"Daily Note     Today's date: 2024  Patient name: Mauricio Chiang  : 1950  MRN: [id number]  Referring provider: Fredrick Jean Baptiste*  Dx:   Encounter Diagnosis     ICD-10-CM    1. Bilateral low back pain without sciatica, unspecified chronicity  M54.50                        Subjective: Pt reports that he is still feeling it in the morning.  Especially when he gets up from sitting.       Objective: See treatment diary below    Extension and SB are still painful at end range  Assessment: Tolerated treatment well.  Lumbar extension is improving. New exercises were mildly challenging.   Patient would benefit from continued PT      Plan: Continue per plan of care.      Precautions: none        Daily Treatment Diary     Assessment           Eval/Reval             FOTO     **     **   HEP Issued              Manuals             Hip flexor stretch NV EOT 30\"x3 EOT 30\"x3          Lumbar ext mobs prn   NT L3,4,5                                    Exercise Diary              bike  5' L1 6' L1          Standing lumbar ext x10 x10           Prone press up x10 x10 x10          Press up w/exhale and sag x10 x10 x10          Quad stretch w/chair 30\"x3 30\"x3 30\" x3          TA  5\"x10 TA w/leg lift 3\"x10          TA w/bridge  5\"2x10 5\" 2x10          LTR  5\"x10 5\"x10          90/90 HS   20\"x3          Pallof press  5\" 2x10 W3 /side steps 2x5 ea          Piriformis stretch   20\"x3          TA w/ hip abd w/TB   NV                                                              Modalities                                           "

## 2024-08-05 ENCOUNTER — OFFICE VISIT (OUTPATIENT)
Dept: PHYSICAL THERAPY | Facility: REHABILITATION | Age: 74
End: 2024-08-05
Payer: MEDICARE

## 2024-08-05 DIAGNOSIS — M54.50 BILATERAL LOW BACK PAIN WITHOUT SCIATICA, UNSPECIFIED CHRONICITY: Primary | ICD-10-CM

## 2024-08-05 PROCEDURE — 97110 THERAPEUTIC EXERCISES: CPT

## 2024-08-05 PROCEDURE — 97140 MANUAL THERAPY 1/> REGIONS: CPT

## 2024-08-05 PROCEDURE — 97112 NEUROMUSCULAR REEDUCATION: CPT

## 2024-08-05 NOTE — PROGRESS NOTES
"Daily Note     Today's date: 2024  Patient name: Mauricio Chiang  : 1950  MRN: [id number]  Referring provider: Fredrick Jean Baptiste*  Dx:   Encounter Diagnosis     ICD-10-CM    1. Bilateral low back pain without sciatica, unspecified chronicity  M54.50                      Subjective: pt reports feeling stiff upon waking in the a.m.  He went golfing with no difficulty this morning. He noted not having any leg pain for the past 2 days.      Objective: See treatment diary below      Assessment: Pt tolerated treatment well and without complaints. Patient demonstrated fatigue post treatment, exhibited good technique with therapeutic exercises, and would benefit from continued PT      Plan: Continue per plan of care.  Progress treatment as tolerated.       Precautions: none        Daily Treatment Diary     Assessment          Eval/Reval             FOTO     **     **   HEP Issued              Manuals             Hip flexor stretch NV EOT 30\"x3 EOT 30\"x3 EOT 30\"x3         Lumbar ext mobs prn   NT L3,4,5 TE glides                                   Exercise Diary              bike  5' L1 6' L1 6' L1         Standing lumbar ext x10 x10           Prone press up x10 x10 x10          Press up w/exhale and sag x10 x10 x10          Quad stretch w/chair 30\"x3 30\"x3 30\" x3          TA  5\"x10 TA w/leg lift 3\"x10 TA w/leg lift 3\"x10         TA w/bridge  5\"2x10 5\" 2x10 5\" 2x10         LTR  5\"x10 5\"x10 5\"x10         90/90 HS   20\"x3 20\"x3         Pallof press  5\" 2x10 W3 /side steps 2x5 ea Green w/ side steps x10 ea         Piriformis stretch   20\"x3 20\"x3         TA w/ hip abd w/TB   NV Kiefer 5\"x10                                                             Modalities                                             "

## 2024-08-12 ENCOUNTER — OFFICE VISIT (OUTPATIENT)
Dept: PHYSICAL THERAPY | Facility: REHABILITATION | Age: 74
End: 2024-08-12
Payer: MEDICARE

## 2024-08-12 DIAGNOSIS — M54.50 BILATERAL LOW BACK PAIN WITHOUT SCIATICA, UNSPECIFIED CHRONICITY: Primary | ICD-10-CM

## 2024-08-12 PROCEDURE — 97112 NEUROMUSCULAR REEDUCATION: CPT | Performed by: PHYSICAL THERAPIST

## 2024-08-12 PROCEDURE — 97110 THERAPEUTIC EXERCISES: CPT | Performed by: PHYSICAL THERAPIST

## 2024-08-12 PROCEDURE — 97140 MANUAL THERAPY 1/> REGIONS: CPT | Performed by: PHYSICAL THERAPIST

## 2024-08-12 NOTE — PROGRESS NOTES
"Daily Note     Today's date: 2024  Patient name: Mauricio Chiang  : 1950  MRN: [id number]  Referring provider: Fredrick Jean Baptiste*  Dx:   Encounter Diagnosis     ICD-10-CM    1. Bilateral low back pain without sciatica, unspecified chronicity  M54.50                        Subjective: Mauricio notes that he still feels some discomfort in the morning      Objective: See treatment diary below      Assessment: Mauricio Chiang has been compliant with attending PT and home exercise program since initial eval.  Mauricio  has made improvements in objective data since initial evalulation and has achieved all goals.  Patient reports having returned to their prior level or function. Patient provided with updated Home Exercise Program, all questions answered, verbalized understanding and agreement to plan of care. Thus it was mutually decided to discontinue this episode of care and transition to Home Exercise Program.         Plan: Discharge       Precautions: none        Daily Treatment Diary     Assessment         Eval/Reval             FOTO     **     **   HEP Issued              Manuals             Hip flexor stretch NV EOT 30\"x3 EOT 30\"x3 EOT 30\"x3 EOT 45\"x2        Lumbar ext mobs prn   NT L3,4,5 TE glides NT L3,4,5                                  Exercise Diary              bike  5' L1 6' L1 6' L1 6' L1        Standing lumbar ext x10 x10           Prone press up x10 x10 x10          Press up w/exhale and sag x10 x10 x10  x10        Quad stretch w/chair 30\"x3 30\"x3 30\" x3          TA  5\"x10 TA w/leg lift 3\"x10 TA w/leg lift 3\"x10 Leg drop x10 ea        TA w/bridge  5\"2x10 5\" 2x10 5\" 2x10 5\" 2x10        LTR  5\"x10 5\"x10 5\"x10 10\"x10        90/90 HS   20\"x3 20\"x3 20\"x1 and manual        Pallof press  5\" 2x10 W3 /side steps 2x5 ea Green w/ side steps x10 ea         Piriformis stretch   20\"x3 20\"x3 20\"x3        TA w/ hip abd w/TB   NV Pink 5\"x10 PTB 5\"x20        Quadruped leg/arm lifts     " "5\"x10                                               Modalities                                             "

## 2024-08-21 ENCOUNTER — OFFICE VISIT (OUTPATIENT)
Dept: DERMATOLOGY | Facility: CLINIC | Age: 74
End: 2024-08-21
Payer: MEDICARE

## 2024-08-21 VITALS — TEMPERATURE: 97.3 F | WEIGHT: 187.9 LBS | BODY MASS INDEX: 29.43 KG/M2

## 2024-08-21 DIAGNOSIS — L82.1 SEBORRHEIC KERATOSIS: ICD-10-CM

## 2024-08-21 DIAGNOSIS — L81.4 SOLAR LENTIGO: ICD-10-CM

## 2024-08-21 DIAGNOSIS — B36.0 TINEA VERSICOLOR: Primary | ICD-10-CM

## 2024-08-21 DIAGNOSIS — D22.71 MULTIPLE BENIGN MELANOCYTIC NEVI OF UPPER AND LOWER EXTREMITIES AND TRUNK: ICD-10-CM

## 2024-08-21 DIAGNOSIS — D22.62 MULTIPLE BENIGN MELANOCYTIC NEVI OF UPPER AND LOWER EXTREMITIES AND TRUNK: ICD-10-CM

## 2024-08-21 DIAGNOSIS — Z85.828 HISTORY OF BASAL CELL CANCER: ICD-10-CM

## 2024-08-21 DIAGNOSIS — D22.61 MULTIPLE BENIGN MELANOCYTIC NEVI OF UPPER AND LOWER EXTREMITIES AND TRUNK: ICD-10-CM

## 2024-08-21 DIAGNOSIS — D22.5 MULTIPLE BENIGN MELANOCYTIC NEVI OF UPPER AND LOWER EXTREMITIES AND TRUNK: ICD-10-CM

## 2024-08-21 DIAGNOSIS — L57.8 OTHER SKIN CHANGES DUE TO CHRONIC EXPOSURE TO NONIONIZING RADIATION: ICD-10-CM

## 2024-08-21 DIAGNOSIS — D22.72 MULTIPLE BENIGN MELANOCYTIC NEVI OF UPPER AND LOWER EXTREMITIES AND TRUNK: ICD-10-CM

## 2024-08-21 PROCEDURE — 99204 OFFICE O/P NEW MOD 45 MIN: CPT | Performed by: DERMATOLOGY

## 2024-08-21 RX ORDER — KETOCONAZOLE 20 MG/G
CREAM TOPICAL DAILY
Qty: 60 G | Refills: 1 | Status: SHIPPED | OUTPATIENT
Start: 2024-08-21

## 2024-08-21 NOTE — PROGRESS NOTES
"[facility] Note     Patient Name: Mauricio Chiang  Encounter Date: 8/21/24     Have you been cared for by a [facility] Dermatologist in the last 3 years and, if so, which description applies to you?    NO.   I am considered a \"new\" patient and must complete all patient intake questions. I am MALE/not capable of bearing children.    REVIEW OF SYSTEMS:  Have you recently had or currently have any of the following? Recent fever or chills? No  Any non-healing wound? No   PAST MEDICAL HISTORY:  Have you personally ever had or currently have any of the following?  If \"YES,\" then please provide more detail. Skin cancer (such as Melanoma, Basal Cell Carcinoma, Squamous Cell Carcinoma?  YES, basal cell carcinoma eight months ago  Tuberculosis, HIV/AIDS, Hepatitis B or C: No  Radiation Treatment No   HISTORY OF IMMUNOSUPPRESSION:   Do you have a history of any of the following:  Systemic Immunosuppression such as Diabetes, Biologic or Immunotherapy, Chemotherapy, Organ Transplantation, Bone Marrow Transplantation?  No     Answering \"YES\" requires the addition of the dotphrase \"IMMUNOSUPPRESSED\" as the first diagnosis of the patient's visit.   FAMILY HISTORY:  Any \"first degree relatives\" (parent, brother, sister, or child) with the following?    Skin Cancer, Pancreatic or Other Cancer? YES, mom-ovarian   PATIENT EXPERIENCE:    Do you want the Dermatologist to perform a COMPLETE skin exam today including a clinical examination under the \"bra and underwear\" areas?  Yes  If necessary, do we have your permission to call and leave a detailed message on your Preferred Phone number that includes your specific medical information?  Yes      No Known Allergies   Current Outpatient Medications:     atorvastatin (LIPITOR) 20 mg tablet, TAKE 1 TABLET DAILY, Disp: 90 tablet, Rfl: 3    betamethasone dipropionate (DIPROSONE) 0.05 % cream, Apply topically 2 (two) times a day Until the rash is gone, Disp: 45 g, Rfl: 3    " "Cholecalciferol (VITAMIN D-3) 1000 units CAPS, 2 daily, Disp: , Rfl: 0    finasteride (PROSCAR) 5 mg tablet, Take 1 tablet (5 mg total) by mouth daily, Disp: 90 tablet, Rfl: 3    Glucosamine-Chondroitin (MOVE FREE PO), Take 2 tablets by mouth daily, Disp: , Rfl:     ibuprofen (MOTRIN) 200 mg tablet, Take 200 mg by mouth every 6 (six) hours as needed for mild pain Pt takes 2 tablets in the morning and 2 in the evening, Disp: , Rfl:     Multiple Vitamin (MULTIVITAMIN) tablet, Take 1 tablet by mouth daily, Disp: , Rfl:     tadalafil (CIALIS) 5 MG tablet, Take 1 tablet (5 mg total) by mouth daily at bedtime, Disp: 90 tablet, Rfl: 3          Whom besides the patient is providing clinical information about today's encounter?   NO ADDITIONAL HISTORIAN (patient alone provided history)    Physical Exam and Assessment/Plan by Diagnosis:     Patient has a personal history of basal cell cancer     SEBORRHEIC KERATOSIS; NON-INFLAMED     Physical Exam:  Anatomic Location Affected:  Trunk and extremities  Morphological Description:  Waxy, smooth to warty textured, yellow to brownish-grey to dark brown to blackish, discrete, \"stuck-on\" appearing papules.  Present for years. Denies pain, itch, bleeding.      Additional History of Present Condition:  Present constantly; no modifying factors which make it worse or better. No prior treatment.       Assessment and Plan:  Based on a thorough discussion of this condition and the management approach to it (including a comprehensive discussion of the known risks, side effects and potential benefits of treatment), the patient (family) agrees to implement the following specific plan:  Reassure benign  Use sun protection.  Apply SPF 30 or higher at least three times a day.  Wear sun protecting clothing and hats.        SOLAR LENTIGINES   OTHER SKIN CHANGES DUE TO CHRONIC EXPOSURE TO NONIONIZING RADIATION     Physical Exam:  Anatomic Location Affected:  Sun exposed areas of back, chest, arms, " "legs  Morphological Description:  Multiple scattered brown to tan evenly pigmented macules   Denies pain, itch, bleeding. No treatments tried. Present for months - years. Reports getting newer lesions with sun exposure.         Assessment and Plan:  Based on a thorough discussion of this condition and the management approach to it (including a comprehensive discussion of the known risks, side effects and potential benefits of treatment), the patient (family) agrees to implement the following specific plan:  Reassure benign  Use sun protection.  Apply SPF 30 or higher at least three times a day.  Wear sun protecting clothing and hats.         MULTIPLE MELANOCYTIC NEVI (\"Moles\")     Physical Exam:  Anatomic Location Affected: Trunk and extremities  Morphological Description:  Scattered, round to ovoid, symmetrical-appearing, even bordered, skin colored to dark brown macules/papules  Denies pain, itch, bleeding. No treatments tried. Present for years. Present constantly; no modifying factors which make it worse or better. Denies actively changing or growing moles.      Assessment and Plan:  Based on a thorough discussion of this condition and the management approach to it (including a comprehensive discussion of the known risks, side effects and potential benefits of treatment), the patient (family) agrees to implement the following specific plan:  Reassure benign  Monitor for changes  Use sun protection.  Apply SPF 30 or higher at least three times a day.  Wear sun protecting clothing and hats.       Worrisome signs of skin malignancy discussed, questions answered. Regular self-skin check discussed. Advised to call or return to office if patient notices any spots of concern, rapidly growing/changing lesions, bleeding lesions, non-healing lesions. Advised regular SPF use.        Tinea versicolor  Physical Exam:  Anatomic Location Affected:  neck  Morphological Description:  faint pink oval thin plaques with fine " scale  Pertinent Positives:  Pertinent Negatives:    Additional History of Present Condition:  present for past few months; started this summer     Assessment and Plan:  Based on a thorough discussion of this condition and the management approach to it (including a comprehensive discussion of the known risks, side effects and potential benefits of treatment), the patient (family) agrees to implement the following specific plan:  Apply ketoconazole cream BID x 1 month  Discussed etiology, course of condition, expectations, treatment options. Patient expressed understanding and ok with plan.       Scribe Attestation      I,:  Rema Biswas am acting as a scribe while in the presence of the attending physician.:       I,:  Kannan Moctezuma MD personally performed the services described in this documentation    as scribed in my presence.:

## 2024-09-10 ENCOUNTER — TELEPHONE (OUTPATIENT)
Age: 74
End: 2024-09-10

## 2024-09-10 NOTE — TELEPHONE ENCOUNTER
Patient is leaving to go [place] in a few days and while over there he is going to rent a car but he has to get a letter from his PCP that he is in good health and able to drive a vehicle.       He is requesting that he be able to  the letter between 9-10am on 9/11/24.      Please call patient when letter is ready.

## 2024-09-20 ENCOUNTER — NURSE TRIAGE (OUTPATIENT)
Age: 74
End: 2024-09-20

## 2024-09-20 NOTE — TELEPHONE ENCOUNTER
"Patient's girlfriend Yazmin, who is a nurse with [employer], called in report patient has covid. They are on vacation in [place] currently. He started with symptoms 9/15/24 and tested positive 9/16/24. Illness started with N/V and HA which have resolved. He still has non-productive cough and congestion. No SOB. She is concerned because he is still having fevers every afternoon. Currently 102.2 oral. They did not have success getting medical care in [place] due to their system. He has been taking Tylenol and Sudafed. She is asking for PCP opinion if this is normal to still be having fever (could there be secondary infection?) and if she can review for recommendation. Please call Yazmin [phone] (on communication consent).    Reason for Disposition   [1] COVID-19 infection suspected by caller or triager AND [2] mild symptoms (cough, fever, or others) AND [3] negative COVID-19 rapid test    Answer Assessment - Initial Assessment Questions  1. COVID-19 DIAGNOSIS: \"Who made your COVID-19 diagnosis?\" \"Was it confirmed by a positive lab test or self-test?\" If not diagnosed by a doctor (or NP/PA), ask \"Are there lots of cases (community spread) where you live?\" Note: See public health department website, if unsure.      Home test 9/16/24   2. COVID-19 EXPOSURE: \"Was there any known exposure to COVID before the symptoms began?\" CDC Definition of close contact: within 6 feet (2 meters) for a total of 15 minutes or more over a 24-hour period.       unknown  3. ONSET: \"When did the COVID-19 symptoms start?\"       9/15/24   4. WORST SYMPTOM: \"What is your worst symptom?\" (e.g., cough, fever, shortness of breath, muscle aches)      Temperature elevation with chills  5. COUGH: \"Do you have a cough?\" If Yes, ask: \"How bad is the cough?\"        Yes-non-productive  6. FEVER: \"Do you have a fever?\" If Yes, ask: \"What is your temperature, how was it measured, and when did it start?\"      yes  7. RESPIRATORY STATUS: \"Describe your " "breathing?\" (e.g., shortness of breath, wheezing, unable to speak)       Breathing is normal   8. BETTER-SAME-WORSE: \"Are you getting better, staying the same or getting worse compared to yesterday?\"  If getting worse, ask, \"In what way?\"      worse  9. HIGH RISK DISEASE: \"Do you have any chronic medical problems?\" (e.g., asthma, heart or lung disease, weak immune system, obesity, etc.)      Pre-diabetic, very healthy   10. VACCINE: \"Have you had the COVID-19 vaccine?\" If Yes, ask: \"Which one, how many shots, when did you get it?\"        yes  11. BOOSTER: \"Have you received your COVID-19 booster?\" If Yes, ask: \"Which one and when did you get it?\"        yes  13. OTHER SYMPTOMS: \"Do you have any other symptoms?\"  (e.g., chills, fatigue, headache, loss of smell or taste, muscle pain, sore throat)        Chills, fatigue  14. O2 SATURATION MONITOR:  \"Do you use an oxygen saturation monitor (pulse oximeter) at home?\" If Yes, ask \"What is your reading (oxygen level) today?\" \"What is your usual oxygen saturation reading?\" (e.g., 95%)        Monitor not available    Protocols used: Coronavirus (COVID-19) Diagnosed or Suspected-ADULT-[place]    "

## 2024-09-30 ENCOUNTER — OFFICE VISIT (OUTPATIENT)
Dept: FAMILY MEDICINE CLINIC | Facility: CLINIC | Age: 74
End: 2024-09-30
Payer: MEDICARE

## 2024-09-30 VITALS
DIASTOLIC BLOOD PRESSURE: 82 MMHG | TEMPERATURE: 98 F | WEIGHT: 175.5 LBS | HEART RATE: 74 BPM | BODY MASS INDEX: 27.55 KG/M2 | SYSTOLIC BLOOD PRESSURE: 120 MMHG | OXYGEN SATURATION: 98 % | HEIGHT: 67 IN | RESPIRATION RATE: 16 BRPM

## 2024-09-30 DIAGNOSIS — U07.1 COVID: ICD-10-CM

## 2024-09-30 DIAGNOSIS — R05.2 SUBACUTE COUGH: Primary | ICD-10-CM

## 2024-09-30 PROCEDURE — G2211 COMPLEX E/M VISIT ADD ON: HCPCS | Performed by: FAMILY MEDICINE

## 2024-09-30 PROCEDURE — 99214 OFFICE O/P EST MOD 30 MIN: CPT | Performed by: FAMILY MEDICINE

## 2024-09-30 NOTE — PROGRESS NOTES
Assessment/Plan:    Post COVID/cough  Doing well with NyQuil and DayQuil and can continue to treat his symptoms  Continue increase fluids  Currently patient looks good and other than occasional cough I can find nothing on his physical exam  He by his own admission is feeling better so we will continue to watch in this vein and hopefully his appetite comes back and he will slowly recover         Subjective:   Mauricio Chiang is a 74 y.o.male  Chief Complaint   Patient presents with    Cough    Fatigue     On September 14 of this year patient was in [place] and the next day he started to feel the rash and checked a COVID test which was positive  Fever will last about a week at a high of 103 and eventually broke  Accompanied by chills as well and fevers    Then started with a head cold with his cough  Had general anorexia and force himself to eat but is lost 9 pounds according to his scale, 13 by hours going back to just June of this year    He is feeling better but just weak  He has a cough that over-the-counter meds are helping    Cough  This is a new problem. The current episode started 1 to 4 weeks ago. The problem has been waxing and waning. The problem occurs every few minutes. The cough is Non-productive. Associated symptoms include nasal congestion, postnasal drip, rhinorrhea, sweats and weight loss. Pertinent negatives include no chest pain, chills, ear congestion, ear pain, fever, headaches, heartburn, hemoptysis, myalgias, rash, sore throat or shortness of breath. Nothing aggravates the symptoms. Risk factors for lung disease include travel.   Fatigue  Associated symptoms include coughing and fatigue. Pertinent negatives include no chest pain, chills, fever, headaches, myalgias, rash or sore throat.       Past medical history, social history, and family history reviewed as appropriate for the complaint of this patient.      MEDICATIONS REVIEWED AND UPDATED    10 point review of systems performed, the  remainder of the ROS is negative except for what is noted in the history of chief complaint    Objective:    Vitals:    09/30/24 1209   BP: 120/82   Pulse: 74   Resp: 16   Temp: 98 °F (36.7 °C)   SpO2: 98%     Body mass index is 27.9 kg/m².    Physical Exam    Constitutional  Appears healthy, Looks well, Appearance consistent with age    Mental Status  Alert, Oriented, Cooperative, Memory function normal , clean, and reasonable    HEENT  TMs bilateral cerumen turbinates are open and pink pharynx benign    Neck  No neck mass, No thyromegaly, Good carotid upstrokes bilaterally, trachea midline positive click    Respiratory  Breath sounds normal, No rales, No rhonchi, No wheezing, normal palpation    Cardiac  Regular rhythm without ectopy or murmur no S3-S4, no heave lift or thrill to palpation    Vascular  No leg edema, No pedal edema    Muscular skeletal  No clubbing cyanosis , muscle tone normal    Skin  No appreciable rashes or abnormal appearing lesions

## 2024-09-30 NOTE — PATIENT INSTRUCTIONS
Continue the NyQuil and DayQuil for your symptoms and gradually all of this will be distant memory

## 2024-12-11 ENCOUNTER — APPOINTMENT (OUTPATIENT)
Dept: LAB | Facility: IMAGING CENTER | Age: 74
End: 2024-12-11
Payer: MEDICARE

## 2024-12-11 DIAGNOSIS — R73.09 ELEVATED HEMOGLOBIN A1C: ICD-10-CM

## 2024-12-11 LAB
EST. AVERAGE GLUCOSE BLD GHB EST-MCNC: 134 MG/DL
HBA1C MFR BLD: 6.3 %

## 2024-12-11 PROCEDURE — 36415 COLL VENOUS BLD VENIPUNCTURE: CPT

## 2024-12-11 PROCEDURE — 83036 HEMOGLOBIN GLYCOSYLATED A1C: CPT

## 2024-12-16 ENCOUNTER — OFFICE VISIT (OUTPATIENT)
Dept: FAMILY MEDICINE CLINIC | Facility: CLINIC | Age: 74
End: 2024-12-16
Payer: MEDICARE

## 2024-12-16 VITALS
WEIGHT: 185.4 LBS | HEIGHT: 67 IN | TEMPERATURE: 97.7 F | SYSTOLIC BLOOD PRESSURE: 120 MMHG | HEART RATE: 69 BPM | DIASTOLIC BLOOD PRESSURE: 72 MMHG | OXYGEN SATURATION: 98 % | BODY MASS INDEX: 29.1 KG/M2 | RESPIRATION RATE: 16 BRPM

## 2024-12-16 DIAGNOSIS — E78.2 MIXED HYPERLIPIDEMIA: ICD-10-CM

## 2024-12-16 DIAGNOSIS — R76.8 RHEUMATOID FACTOR POSITIVE: ICD-10-CM

## 2024-12-16 DIAGNOSIS — L82.0 INFLAMED SEBORRHEIC KERATOSIS: ICD-10-CM

## 2024-12-16 DIAGNOSIS — L20.84 INTRINSIC ECZEMA: ICD-10-CM

## 2024-12-16 DIAGNOSIS — R73.09 ELEVATED HEMOGLOBIN A1C: Primary | ICD-10-CM

## 2024-12-16 DIAGNOSIS — Z23 ENCOUNTER FOR IMMUNIZATION: ICD-10-CM

## 2024-12-16 DIAGNOSIS — N40.1 BENIGN PROSTATIC HYPERPLASIA WITH URINARY FREQUENCY: ICD-10-CM

## 2024-12-16 DIAGNOSIS — Z79.899 ENCOUNTER FOR LONG-TERM (CURRENT) USE OF MEDICATIONS: ICD-10-CM

## 2024-12-16 DIAGNOSIS — R35.0 BENIGN PROSTATIC HYPERPLASIA WITH URINARY FREQUENCY: ICD-10-CM

## 2024-12-16 DIAGNOSIS — R91.1 PULMONARY NODULE SEEN ON IMAGING STUDY: ICD-10-CM

## 2024-12-16 DIAGNOSIS — K90.0 ADULT CELIAC DISEASE: ICD-10-CM

## 2024-12-16 DIAGNOSIS — E55.9 VITAMIN D DEFICIENCY: ICD-10-CM

## 2024-12-16 PROBLEM — Z87.898 HISTORY OF ELEVATED PSA: Status: RESOLVED | Noted: 2021-04-09 | Resolved: 2024-12-16

## 2024-12-16 PROCEDURE — 99214 OFFICE O/P EST MOD 30 MIN: CPT | Performed by: FAMILY MEDICINE

## 2024-12-16 PROCEDURE — 88342 IMHCHEM/IMCYTCHM 1ST ANTB: CPT | Performed by: STUDENT IN AN ORGANIZED HEALTH CARE EDUCATION/TRAINING PROGRAM

## 2024-12-16 PROCEDURE — G0009 ADMIN PNEUMOCOCCAL VACCINE: HCPCS

## 2024-12-16 PROCEDURE — G0439 PPPS, SUBSEQ VISIT: HCPCS | Performed by: FAMILY MEDICINE

## 2024-12-16 PROCEDURE — 88305 TISSUE EXAM BY PATHOLOGIST: CPT | Performed by: STUDENT IN AN ORGANIZED HEALTH CARE EDUCATION/TRAINING PROGRAM

## 2024-12-16 PROCEDURE — 11307 SHAVE SKIN LESION 1.1-2.0 CM: CPT | Performed by: FAMILY MEDICINE

## 2024-12-16 PROCEDURE — 11312 SHAVE SKIN LESION 1.1-2.0 CM: CPT | Performed by: FAMILY MEDICINE

## 2024-12-16 PROCEDURE — 90677 PCV20 VACCINE IM: CPT

## 2024-12-16 RX ORDER — ATORVASTATIN CALCIUM 20 MG/1
20 TABLET, FILM COATED ORAL DAILY
Qty: 90 TABLET | Refills: 3 | Status: SHIPPED | OUTPATIENT
Start: 2024-12-16

## 2024-12-16 RX ORDER — CHOLECALCIFEROL (VITAMIN D3) 25 MCG
CAPSULE ORAL
Start: 2024-12-16

## 2024-12-16 RX ORDER — BETAMETHASONE DIPROPIONATE 0.5 MG/G
CREAM TOPICAL 2 TIMES DAILY
Start: 2024-12-16

## 2024-12-16 NOTE — PROGRESS NOTES
ASSESSMENT/PLAN:    Inflamed seborrheic keratosis  Removed and pathology sent  Shave lesion    Date/Time: 12/16/2024 9:00 AM    Performed by: Negra Aleman DO  Authorized by: Negra Aleman DO  Universal Protocol:  procedure performed by consultantConsent: Verbal consent obtained. Written consent obtained.  Risks and benefits: risks, benefits and alternatives were discussed  Consent given by: patient  Patient understanding: patient states understanding of the procedure being performed  Patient identity confirmed: verbally with patient    Number of Lesions: 1  Lesion 1:     Body area: head/neck    Head/neck location: L cheek    Initial size (mm): 10    Final defect size (mm): 12    Malignancy: malignancy unknown      Destruction method: shave removal      Comments:  Pathology sent patient tolerated procedure well    Written and oral instructions given    Elevated A1c  Basically unchanged 6.3 from 6.2  Also had COVID during this time  Continue diet and exercise and recheck in 6 months     ED/BPH  Continue Cialis  Monitor patient 74 we do not need yearly PSAs      Celiac disease  Limits the amount of gluten when he eats     Hyperlipidemia  Continue atorvastatin  Check cholesterol levels before next visit     Elevated PSA  Biopsies negative years ago     Positive rheumatoid factor  No disease manifestations  Most likely from sarcoid history in the distant past      vitamin-D deficiency   Been off vitamin D for little bit  Patient will go back on vitamin D and continue with daily     History of lung nodule  CT scan with Pulmonary followed for years  Felt to be leftover sarcoid no need to follow anymore        Recheck in 6 months   A1c and screening labs  AWV that visit  Recheck sooner if needed          Health Maintenance   Topic Date Due    Annual Physical  Never done    DTaP,Tdap,and Td Vaccines (2 - Td or Tdap) 03/08/2023    PT PLAN OF CARE  08/23/2024    COVID-19 Vaccine (7 - 2024-25 season) 10/09/2024    Fall Risk   12/16/2025    Depression Screening  12/16/2025    Colorectal Cancer Screening  06/17/2032    Hepatitis C Screening  Completed    RSV Vaccine Age 60+ Years  Completed    Zoster Vaccine  Completed    Pneumococcal Vaccine: 65+ Years  Completed    Influenza Vaccine  Completed    RSV Vaccine age 0-20 Months  Aged Out    HIB Vaccine  Aged Out    IPV Vaccine  Aged Out    Hepatitis A Vaccine  Aged Out    Meningococcal ACWY Vaccine  Aged Out    HPV Vaccine  Aged Out         Problem List as of 12/16/2024 Reviewed: 9/30/2024 12:23 PM by Negra Aleman DO      Adult celiac disease    Last Assessment & Plan 6/20/2018 Office Visit Written 6/20/2018  9:02 AM by Deedee Ridley MD   On gluten free diet.  Off PPI.         Benign prostatic hyperplasia with urinary frequency    Last Assessment & Plan 6/20/2018 Office Visit Written 6/20/2018  9:01 AM by Deedee Ridley MD   Sees Urology yearly, on finasteride.         Elevated hemoglobin A1c    History of elevated PSA    Intrinsic eczema    Liver cyst    Mixed hyperlipidemia    Last Assessment & Plan 10/6/2021 Hospital Encounter Written 10/7/2021 11:22 AM by Francisco J Kate DO   Continue lipitor         Pulmonary nodule seen on imaging study    remote history of sarcoidosis    Last Assessment & Plan 6/22/2019 Hospital Encounter Written 6/25/2019 12:27 PM by Carmenza Ortez PA-C   Reports being diagnosed with sarcoidosis 40 years ago and seeing pulmonology at that time but having no additional follow-up since that time.  Last CT scan of the chest on file was from 2016 which showed stable lung nodules  Patient reports mild cough x3 days.  May need eventual CT chest/pulmonology follow-up         Rheumatoid factor positive    Right bundle branch block (RBBB)    Vitamin D deficiency    Last Assessment & Plan 6/20/2018 Office Visit Written 6/20/2018  9:01 AM by Deedee Ridley MD   Increase D3 during fall/winter months.              Subjective:   Chief Complaint   Patient presents  with    Hyperlipidemia     6 month recheck     Elevated hemoglobin A1c    Celiac Disease     Patient is here for his 6-month recheck and for his A1c to be reviewed    Has a complaint about a mole on his left cheek that is changing  Had seen dermatology a few months ago but it was fine at that time        patient ID: Mauricio Chiang is a 74 y.o. male.    Patient's past medical history, surgical history, family history, social history, and Tobacco history reviewed with patient.     MED LIST WAS REVIEWED AND UPDATED    ROS  As per HPI  Rest of 12 point review of systems negative     Objective:      VITALS:  Wt Readings from Last 3 Encounters:   12/16/24 84.1 kg (185 lb 6.4 oz)   09/30/24 79.6 kg (175 lb 8 oz)   08/21/24 85.2 kg (187 lb 14.4 oz)     BP Readings from Last 3 Encounters:   12/16/24 120/72   09/30/24 120/82   07/12/24 124/56     Pulse Readings from Last 3 Encounters:   12/16/24 69   09/30/24 74   06/04/24 55     Body mass index is 29.48 kg/m².    Laboratory Results:   All pertinent labs and studies were reviewed with patient during this office visit with highlights of the results contained in this note in the ASSESSMENT AND PLAN section       Physical Exam    Constitutional  Appears healthy, Looks well, Appearance consistent with age    Mental Status  Alert, Oriented, Cooperative, Memory function normal , clean, and reasonable    Neck  No neck mass, No thyromegaly, Good carotid upstrokes bilaterally, trachea midline positive click    Respiratory  Breath sounds normal, No rales, No rhonchi, No wheezing, normal palpation    Cardiac  Regular rhythm without ectopy or murmur no S3-S4, no heave lift or thrill to palpation    Vascular  No leg edema, No pedal edema    Muscular skeletal  No clubbing cyanosis , muscle tone normal    Skin  Left cheek is a bilobed hyperkeratotic waxy stuck on appearing lesion that is irritated on the side from shaving

## 2024-12-16 NOTE — PATIENT INSTRUCTIONS
Everything looks good continue along with your diet as you are and we will recheck your A1c in 6 months  You will have fasting blood work and urine 1 week prior to your next appointment    Keep the area clean and dry for the rest of today  Tomorrow take off the dressing and take a shower or wash as she normally would.  Please wash this area with soap and water being careful to the area.  Please put antibiotic or Vaseline on the pad of a Band-Aid and cover this area.  If you cannot reach your self, please ask someone to assist you  Please do this for at least 10 days or until it is totally healed    Please call if you see any signs of infection such as:  Redness  Discharge  You have pain  If the area swollen

## 2024-12-16 NOTE — PROGRESS NOTES
Name: Mauricio Chiang      : 1950      MRN: [id number]  Encounter Provider: Negra Aleman DO  Encounter Date: 2024   Encounter department: [facility]    Assessment & Plan       Preventive health issues were discussed with patient, and age appropriate screening tests were ordered as noted in patient's After Visit Summary. Personalized health advice and appropriate referrals for health education or preventive services given if needed, as noted in patient's After Visit Summary.    History of Present Illness     Hyperlipidemia       Patient Care Team:  Negra Aleman DO as PCP - General (Family Medicine)  MD Zoey Daniel MD as Endoscopist    Review of Systems  Medical History Reviewed by provider this encounter:       Annual Wellness Visit Questionnaire   Mauricio is here for his Initial Wellness visit.     Health Risk Assessment:   Patient rates overall health as good. Patient feels that their physical health rating is same. Patient is satisfied with their life. Eyesight was rated as same. Hearing was rated as same. Patient feels that their emotional and mental health rating is same. Patients states they are never, rarely angry. Patient states they are never, rarely unusually tired/fatigued. Pain experienced in the last 7 days has been none. Patient states that he has experienced weight loss or gain in last 6 months.     Depression Screening:   PHQ-2 Score: 0      Fall Risk Screening:   In the past year, patient has experienced: no history of falling in past year      Home Safety:  Patient does not have trouble with stairs inside or outside of their home. Patient has working smoke alarms and has working carbon monoxide detector. Home safety hazards include: none.     Nutrition:   Current diet is Regular and Limited junk food.     Medications:   Patient is currently taking over-the-counter supplements. OTC medications include: see medication list.  Patient is able to manage medications.     Activities of Daily Living (ADLs)/Instrumental Activities of Daily Living (IADLs):   Walk and transfer into and out of bed and chair?: Yes  Dress and groom yourself?: Yes    Bathe or shower yourself?: Yes    Feed yourself? Yes  Do your laundry/housekeeping?: Yes  Manage your money, pay your bills and track your expenses?: Yes  Make your own meals?: Yes    Do your own shopping?: Yes    Previous Hospitalizations:   Any hospitalizations or ED visits within the last 12 months?: No      Advance Care Planning:   Living will: Yes    Advanced directive: Yes      PREVENTIVE SCREENINGS      Cardiovascular Screening:    General: Screening Not Indicated and History Lipid Disorder      Diabetes Screening:     General: Screening Current      Colorectal Cancer Screening:     General: Screening Current      Prostate Cancer Screening:    General: Screening Not Indicated      Abdominal Aortic Aneurysm (AAA) Screening:    Risk factors include: age between 65-76 yo        Lung Cancer Screening:     General: Screening Not Indicated      Hepatitis C Screening:    General: Screening Current    Screening, Brief Intervention, and Referral to Treatment (SBIRT)    Screening    Typical number of drinks in a week: 2    Single Item Drug Screening:  How often have you used an illegal drug (including marijuana) or a prescription medication for non-medical reasons in the past year? never    Single Item Drug Screen Score: 0  Interpretation: Negative screen for possible drug use disorder    Social Drivers of Health     Financial Resource Strain: Low Risk  (5/18/2023)    Overall Financial Resource Strain (CARDIA)     Difficulty of Paying Living Expenses: Not hard at all   Food Insecurity: No Food Insecurity (6/3/2024)    Nursing - Inadequate Food Risk Classification     Worried About Running Out of Food in the Last Year: Never true     Ran Out of Food in the Last Year: Never true   Transportation Needs: No  "Transportation Needs (6/3/2024)    PRAPARE - Transportation     Lack of Transportation (Medical): No     Lack of Transportation (Non-Medical): No   Housing Stability: Low Risk  (6/3/2024)    Housing Stability Vital Sign     Unable to Pay for Housing in the Last Year: No     Number of Times Moved in the Last Year: 0     Homeless in the Last Year: No   Utilities: Not At Risk (6/3/2024)    [facility] Utilities     Threatened with loss of utilities: No     No results found.    Objective   /72 (Patient Position: Sitting, Cuff Size: Standard)   Pulse 69   Temp 97.7 °F (36.5 °C) (Temporal)   Resp 16   Ht 5' 6.5\" (1.689 m)   Wt 84.1 kg (185 lb 6.4 oz)   SpO2 98%   BMI 29.48 kg/m²     Physical Exam    "

## 2024-12-20 PROCEDURE — 88342 IMHCHEM/IMCYTCHM 1ST ANTB: CPT | Performed by: STUDENT IN AN ORGANIZED HEALTH CARE EDUCATION/TRAINING PROGRAM

## 2024-12-20 PROCEDURE — 88305 TISSUE EXAM BY PATHOLOGIST: CPT | Performed by: STUDENT IN AN ORGANIZED HEALTH CARE EDUCATION/TRAINING PROGRAM

## 2024-12-23 ENCOUNTER — RESULTS FOLLOW-UP (OUTPATIENT)
Dept: FAMILY MEDICINE CLINIC | Facility: CLINIC | Age: 74
End: 2024-12-23

## 2024-12-23 NOTE — TELEPHONE ENCOUNTER
----- Message from Negra Aleman DO sent at 12/23/2024  8:14 AM EST -----  Please inform patient that the lesion we took off of his left cheek was a wart and he is fine.      Left detailed message to patient.

## 2025-02-14 ENCOUNTER — TELEPHONE (OUTPATIENT)
Age: 75
End: 2025-02-14

## 2025-02-14 NOTE — TELEPHONE ENCOUNTER
"New Patient    Appointment Scheduling  What office location does the patient prefer?:[place]  What is the reason for the patient's appointment?: Hematospermia  Have patient records been requested?: N  If No, are the records showing in Epic: Y    Appointment Details  Date: 3/4  Time:    1:40 PM  Location:  [place]  Provider:  Constantine  Does the appointment need further review? No    HISTORY  Is the patient having active symptoms? If so, describe symptoms: Has been experiencing this for \"a couple of weeks\".  Has the patient had any previous Urologist(s)?: N  Was the patient seen in the ED?:N  Has the patient had any outside testing done?:N  Does patient have Imaging/Lab Results:Y  Does the patient have a personal history of any cancer?:N    INSURANCE   Have you confirmed Patient's insurance? Y  Is the insurance accepted?  Y  Is the insurance active?Y          "

## 2025-03-03 DIAGNOSIS — E78.2 MIXED HYPERLIPIDEMIA: ICD-10-CM

## 2025-03-03 NOTE — TELEPHONE ENCOUNTER
Not a duplicate      atorvastatin (LIPITOR) 20 mg tablet  -Take 1 tablet (20 mg total) by mouth daily      Authorizing Provider:  Negra Aleman [facility]         [facility]   90 day supply     Pt has enough for a few days

## 2025-03-04 ENCOUNTER — OFFICE VISIT (OUTPATIENT)
Dept: UROLOGY | Facility: AMBULATORY SURGERY CENTER | Age: 75
End: 2025-03-04
Payer: MEDICARE

## 2025-03-04 VITALS
WEIGHT: 192 LBS | SYSTOLIC BLOOD PRESSURE: 130 MMHG | HEIGHT: 66 IN | HEART RATE: 70 BPM | OXYGEN SATURATION: 99 % | DIASTOLIC BLOOD PRESSURE: 78 MMHG | BODY MASS INDEX: 30.86 KG/M2

## 2025-03-04 DIAGNOSIS — R97.20 ELEVATED PSA: ICD-10-CM

## 2025-03-04 DIAGNOSIS — R36.1 HEMATOSPERMIA: Primary | ICD-10-CM

## 2025-03-04 LAB
SL AMB  POCT GLUCOSE, UA: NORMAL
SL AMB LEUKOCYTE ESTERASE,UA: NORMAL
SL AMB POCT BILIRUBIN,UA: NORMAL
SL AMB POCT BLOOD,UA: NORMAL
SL AMB POCT CLARITY,UA: CLEAR
SL AMB POCT COLOR,UA: YELLOW
SL AMB POCT KETONES,UA: NORMAL
SL AMB POCT NITRITE,UA: NORMAL
SL AMB POCT PH,UA: 6
SL AMB POCT SPECIFIC GRAVITY,UA: 1.02
SL AMB POCT URINE PROTEIN: NORMAL
SL AMB POCT UROBILINOGEN: 0.2

## 2025-03-04 PROCEDURE — 81002 URINALYSIS NONAUTO W/O SCOPE: CPT

## 2025-03-04 PROCEDURE — 99204 OFFICE O/P NEW MOD 45 MIN: CPT

## 2025-03-04 RX ORDER — ATORVASTATIN CALCIUM 20 MG/1
20 TABLET, FILM COATED ORAL DAILY
Qty: 90 TABLET | Refills: 1 | Status: SHIPPED | OUTPATIENT
Start: 2025-03-04

## 2025-03-04 NOTE — ASSESSMENT & PLAN NOTE
Patient's underwent 2 prior transrectal ultrasound-guided prostate biopsies greater than 2 decades ago which returned negative for prostate malignancy  Patient reports an unknown family history for prostate cancer  Patient's most recent PSA was performed 5/21/2024 and was found to be 2.1 corrected to be 4.2 on finasteride.  Refer to PSA trend below.  ROX performed today.  Palpable nodule on the left side of the prostate measuring between 2 to 3 mm.  Refer to physical exam findings.  We discussed that with his episode of not aspermia, his elevated PSA, and physical exam findings that we should plan to pursue a multiparametric MRI of the prostate to further evaluate for any suspicious lesions that may be concerning for prostate cancer.  We reviewed the PI-RADS grading scale in detail.  Patient will undergo a multiparametric MRI of the prostate in office will call for results.  If the patient is graded as PI-RADS category 4 or 5 that my recommendation would be to proceed with an MRI fusion transperineal prostate biopsy.    PSA Trend:  2.1 (4.2 corrected for finasteride) - 5/21/24  2.0 (4.0 corrected for finasteride) - 5/16/23  2.0 (4.0 corrected for finasteride) - 4/21/22  2.3 (4.6 corrected for finasteride) - 4/7/21  1.7 (3.4 corrected for finasteride) - 4/3/20

## 2025-03-04 NOTE — PROGRESS NOTES
3/4/2025      Assessment and Plan    74 y.o. male new patient to [facility] for urology    Elevated PSA  Patient's underwent 2 prior transrectal ultrasound-guided prostate biopsies greater than 2 decades ago which returned negative for prostate malignancy  Patient reports an unknown family history for prostate cancer  Patient's most recent PSA was performed 5/21/2024 and was found to be 2.1 corrected to be 4.2 on finasteride.  Refer to PSA trend below.  ROX performed today.  Palpable nodule on the left side of the prostate measuring between 2 to 3 mm.  Refer to physical exam findings.  We discussed that with his episode of not aspermia, his elevated PSA, and physical exam findings that we should plan to pursue a multiparametric MRI of the prostate to further evaluate for any suspicious lesions that may be concerning for prostate cancer.  We reviewed the PI-RADS grading scale in detail.  Patient will undergo a multiparametric MRI of the prostate in office will call for results.  If the patient is graded as PI-RADS category 4 or 5 that my recommendation would be to proceed with an MRI fusion transperineal prostate biopsy.    PSA Trend:  2.1 (4.2 corrected for finasteride) - 5/21/24  2.0 (4.0 corrected for finasteride) - 5/16/23  2.0 (4.0 corrected for finasteride) - 4/21/22  2.3 (4.6 corrected for finasteride) - 4/7/21  1.7 (3.4 corrected for finasteride) - 4/3/20    Hematospermia  Patient reports his last episode of hematospermia about 2 weeks ago he was having intercourse with his partner in [place]  We discussed that hematospermia is typically benign and that it will resolve on its own.  We discussed conservative measures and for continued monitoring for recurrence of the hematospermia and the patient will return to the office in 3 months to discuss        History of Present Illness  Mauricio Chiang is a 74 y.o. male here for evaluation of hematospermia.  Patient has a significant past medical history for  celiac disease, BPH with obstruction/urinary frequency, mixed hyperlipidemia, history of sarcoidosis, and right bundle branch block.  Patient self scheduled himself to discuss recurrence of hematospermia.  Today, the patient reports that he has seen a prior urologist more than 2 decades ago.  Patient notes that he did have a prior history of an elevated PSA as high as 6.9 and underwent 2 prior in office transrectal ultrasound-guided prostate biopsies that returned negative for prostate cancer.  Patient notes that at that time he was initiated on finasteride and his PSA dropped back within normal limits around the 1.5 range.  Patient's lower urinary tract symptoms have been maintained on a combination of finasteride 5 mg and Cialis 5 mg daily.  Patient reports that over the last month he had experienced intermittent episodes of hematospermia.  Patient notes that the most recent episode was 2 weeks ago when he was on a trip in [place] and noticed that his entire ejaculate was bright red blood.  Patient otherwise denies any other lower urinary tract symptoms.  Patient reports that he may urinate about 4-5 times during the day, and 1-2 times at night.  Patient reports a strong urinary stream and is unbothered by his urinary frequency/urgency.  Patient denies dysuria, flank pain, or feelings of incomplete bladder emptying, or urinary incontinence.        Review of Systems   Constitutional:  Negative for chills and fever.   HENT:  Negative for ear pain and sore throat.    Eyes:  Negative for pain and visual disturbance.   Respiratory:  Negative for cough and shortness of breath.    Cardiovascular:  Negative for chest pain and palpitations.   Gastrointestinal:  Negative for abdominal pain and vomiting.   Genitourinary:  Negative for decreased urine volume, difficulty urinating, dysuria, flank pain, frequency, hematuria and urgency.   Musculoskeletal:  Negative for arthralgias and back pain.   Skin:  Negative for color  "change and rash.   Neurological:  Negative for seizures and syncope.   All other systems reviewed and are negative.          AUA SYMPTOM SCORE      Flowsheet Row Most Recent Value   AUA SYMPTOM SCORE    How often have you had a sensation of not emptying your bladder completely after you finished urinating? 1 (P)     How often have you had to urinate again less than two hours after you finished urinating? 1 (P)     How often have you found you stopped and started again several times when you urinate? 0 (P)     How often have you found it difficult to postpone urination? 0 (P)     How often have you had a weak urinary stream? 0 (P)     How often have you had to push or strain to begin urination? 1 (P)     How many times did you most typically get up to urinate from the time you went to bed at night until the time you got up in the morning? 5 (P)     Quality of Life: If you were to spend the rest of your life with your urinary condition just the way it is now, how would you feel about that? 3 (P)     AUA SYMPTOM SCORE 8 (P)               Vitals  Vitals:    03/04/25 1327   BP: 130/78   BP Location: Left arm   Patient Position: Sitting   Cuff Size: Standard   Pulse: 70   SpO2: 99%   Weight: 87.1 kg (192 lb)   Height: 5' 6\" (1.676 m)       Physical Exam  Vitals reviewed.   Constitutional:       General: He is not in acute distress.     Appearance: Normal appearance. He is not ill-appearing.   HENT:      Head: Normocephalic and atraumatic.      Nose: Nose normal.   Eyes:      General: No scleral icterus.  Pulmonary:      Effort: No respiratory distress.   Abdominal:      General: Abdomen is flat. There is no distension.      Palpations: Abdomen is soft.      Tenderness: There is no abdominal tenderness.   Genitourinary:     Comments: Normal phallus, testes descended bilaterally and smooth without nodularity.  ROX performed today.  A palpable 2 to 3 mm nodule was felt on the left lobe of the prostate.  The right lobe was " noted to be palpably benign without nodularity or induration.  No indurations were felt on the left lobe  Musculoskeletal:         General: Normal range of motion.      Cervical back: Normal range of motion.   Skin:     General: Skin is warm.      Coloration: Skin is not jaundiced.   Neurological:      Mental Status: He is alert and oriented to person, place, and time.      Gait: Gait normal.   Psychiatric:         Mood and Affect: Mood normal.         Behavior: Behavior normal.           Past History  Past Medical History:   Diagnosis Date    Acute pancreatitis     last assessed - [id number]    Arthritis 2017    BPH (benign prostatic hypertrophy)     Celiac disease     current GI w/u to r/o celiac    Cholelithiasis     Colon polyp     Diabetes mellitus (HCC) 2018    Elevated blood pressure reading     last assessed - 09Jan2017    Gallstones     last assessed - 14Feb2017    High cholesterol     History of elevated PSA 04/09/2021    Hx of mitral valve disorder     Hypertension     Sarcoidosis     1990's Diagnosed 15 years ago by biopsy, not active; last assessed - 15Nov2016     Social History     Socioeconomic History    Marital status:      Spouse name: None    Number of children: None    Years of education: None    Highest education level: None   Occupational History    Occupation: Retired   Tobacco Use    Smoking status: Never    Smokeless tobacco: Never   Vaping Use    Vaping status: Never Used   Substance and Sexual Activity    Alcohol use: Yes     Alcohol/week: 2.0 standard drinks of alcohol     Types: 2 Glasses of wine per week     Comment: wine,  social drinker    Drug use: No     Comment: Denied history of drug use    Sexual activity: Yes     Partners: Female     Birth control/protection: Male Sterilization   Other Topics Concern    None   Social History Narrative    Daily coffee consumption (2 Cups/Day)        retired     Social Drivers of Health     Financial Resource Strain: Low Risk   (2023)    Overall Financial Resource Strain (CARDIA)     Difficulty of Paying Living Expenses: Not hard at all   Food Insecurity: No Food Insecurity (2024)    Hunger Vital Sign     Worried About Running Out of Food in the Last Year: Never true     Ran Out of Food in the Last Year: Never true   Transportation Needs: No Transportation Needs (2024)    PRAPARE - Transportation     Lack of Transportation (Medical): No     Lack of Transportation (Non-Medical): No   Physical Activity: Not on file   Stress: Not on file   Social Connections: Not on file   Intimate Partner Violence: Not on file   Housing Stability: Low Risk  (2024)    Housing Stability Vital Sign     Unable to Pay for Housing in the Last Year: No     Number of Times Moved in the Last Year: 0     Homeless in the Last Year: No     Social History     Tobacco Use   Smoking Status Never   Smokeless Tobacco Never     Family History   Problem Relation Age of Onset    Diabetes Mother         Diabetes Mellitus    Hypertension Mother     Ovarian cancer Mother     Rheumatologic disease Mother     Arthritis Mother             Cancer Mother         Ovaries    Diabetes Father         Diabetes Mellitus    Heart disease Father     Hypertension Father     Alcohol abuse Father             Diabetes Sister         Diabetes Mellitus    Hypertension Sister     Substance Abuse Neg Hx     Mental illness Neg Hx     Depression Neg Hx        The following portions of the patient's history were reviewed and updated as appropriate: allergies, current medications, past medical history, past social history, past surgical history and problem list.    Results  No results found for this or any previous visit (from the past hour).  ]  Lab Results   Component Value Date    PSA 2.10 2024    PSA 2.0 2023    PSA 2.0 2022    PSA 2.3 2021     Lab Results   Component Value Date    GLUCOSE 139 2015    CALCIUM 9.7 2024      02/24/2015    K 4.7 05/21/2024    CO2 29 05/21/2024     05/21/2024    BUN 25 05/21/2024    CREATININE 0.85 05/21/2024     Lab Results   Component Value Date    WBC 5.63 05/21/2024    HGB 14.0 05/21/2024    HCT 41.8 05/21/2024    MCV 96 05/21/2024     05/21/2024

## 2025-03-04 NOTE — ASSESSMENT & PLAN NOTE
Patient reports his last episode of hematospermia about 2 weeks ago he was having intercourse with his partner in [place]  We discussed that hematospermia is typically benign and that it will resolve on its own.  We discussed conservative measures and for continued monitoring for recurrence of the hematospermia and the patient will return to the office in 3 months to discuss

## 2025-03-07 ENCOUNTER — APPOINTMENT (OUTPATIENT)
Dept: LAB | Facility: IMAGING CENTER | Age: 75
End: 2025-03-07
Payer: MEDICARE

## 2025-03-07 DIAGNOSIS — R97.20 ELEVATED PSA: ICD-10-CM

## 2025-03-07 LAB
ANION GAP SERPL CALCULATED.3IONS-SCNC: 8 MMOL/L (ref 4–13)
BUN SERPL-MCNC: 24 MG/DL (ref 5–25)
CALCIUM SERPL-MCNC: 9.3 MG/DL (ref 8.4–10.2)
CHLORIDE SERPL-SCNC: 103 MMOL/L (ref 96–108)
CO2 SERPL-SCNC: 30 MMOL/L (ref 21–32)
CREAT SERPL-MCNC: 0.87 MG/DL (ref 0.6–1.3)
GFR SERPL CREATININE-BSD FRML MDRD: 84 ML/MIN/1.73SQ M
GLUCOSE SERPL-MCNC: 165 MG/DL (ref 65–140)
POTASSIUM SERPL-SCNC: 4.9 MMOL/L (ref 3.5–5.3)
SODIUM SERPL-SCNC: 141 MMOL/L (ref 135–147)

## 2025-03-07 PROCEDURE — 80048 BASIC METABOLIC PNL TOTAL CA: CPT

## 2025-03-07 PROCEDURE — 36415 COLL VENOUS BLD VENIPUNCTURE: CPT

## 2025-04-02 ENCOUNTER — HOSPITAL ENCOUNTER (OUTPATIENT)
Dept: RADIOLOGY | Age: 75
Discharge: HOME | End: 2025-04-02
Payer: MEDICARE

## 2025-04-02 DIAGNOSIS — R97.20 ELEVATED PSA: ICD-10-CM

## 2025-04-02 PROCEDURE — A9585 GADOBUTROL INJECTION: HCPCS

## 2025-04-02 PROCEDURE — 76377 3D RENDER W/INTRP POSTPROCES: CPT

## 2025-04-02 PROCEDURE — 72197 MRI PELVIS W/O & W/DYE: CPT

## 2025-04-02 RX ORDER — GADOBUTROL 604.72 MG/ML
9 INJECTION INTRAVENOUS
Status: COMPLETED | OUTPATIENT
Start: 2025-04-02 | End: 2025-04-02

## 2025-04-02 RX ADMIN — GADOBUTROL 9 ML: 604.72 INJECTION INTRAVENOUS at 09:49

## 2025-04-07 ENCOUNTER — RESULTS FOLLOW-UP (OUTPATIENT)
Dept: UROLOGY | Facility: AMBULATORY SURGERY CENTER | Age: 75
End: 2025-04-07

## 2025-04-07 DIAGNOSIS — R97.20 ELEVATED PSA: Primary | ICD-10-CM

## 2025-04-07 NOTE — TELEPHONE ENCOUNTER
Hematospermia  typically resolves within 3 months.  I see that he did have an office visit about a month ago and had complaints of hematospermia.  Would recommend that if he is continuing with the blood in his semen for over 3 months he call back into the office and make a follow-up appointment with Murphy ALDRIDGE

## 2025-04-07 NOTE — TELEPHONE ENCOUNTER
Called patient and left DVM letting him know Hematospermia  typically resolves within 3 months. We did see that he did have an office visit about a month ago and had complaints of hematospermia.  Would recommend that if he is continuing with the blood in his semen for over 3 months he call back into the office and make a follow-up appointment with Murphy PHILLIPS. Left call back #,if patient calls back please relay LEEANN's note.

## 2025-04-07 NOTE — TELEPHONE ENCOUNTER
Patient returning call, states he is still getting blood in his semen. He is wondering when will this stop? Please advise. I scheduled patient a 6 month follow up appointment and mentioned to have his PSA completed 2 weeks prior. Patient verbalized understanding.

## 2025-04-07 NOTE — TELEPHONE ENCOUNTER
Called patient and left DVM letting him know JACQUELINE reviewed his most recent multiparametric MRI of the prostate and the patient was graded as PI-RADS category 2 with no suspicious lesion for prostate malignancy being seen on MRI.  No extraprostatic extension noted.  Patient's prostate was sized at 37.4 mL.  At this time provider  does not recommend proceeding with a MRI fusion transperineal prostate biopsy.  However, patient should plan to repeat PSA in 6 months with follow-up in the office at that time to continue to trend his PSA. JACQUELINE  placed a new PSA in the patient's chart to be completed in 6 months.If patient calls back please rrelay info above and assist in scheduling 6 month follow-up.

## 2025-04-07 NOTE — TELEPHONE ENCOUNTER
Please call the patient and advise him I reviewed his most recent multiparametric MRI of the prostate and the patient was graded as PI-RADS category 2 with no suspicious lesion for prostate malignancy being seen on MRI.  No extraprostatic extension noted.  Patient's prostate was sized at 37.4 mL.  At this time, I do not recommend proceeding with a MRI fusion transperineal prostate biopsy.  However, patient should plan to repeat PSA in 6 months with follow-up in the office at that time to continue to trend his PSA.  I placed a new PSA in the patient's chart to be completed in 6 months.

## 2025-05-12 ENCOUNTER — TELEPHONE (OUTPATIENT)
Dept: CARDIOLOGY CLINIC | Facility: CLINIC | Age: 75
End: 2025-05-12

## 2025-05-12 ENCOUNTER — HOSPITAL ENCOUNTER (EMERGENCY)
Facility: HOSPITAL | Age: 75
Discharge: HOME | End: 2025-05-12
Attending: EMERGENCY MEDICINE
Payer: MEDICARE

## 2025-05-12 ENCOUNTER — CONSULT (OUTPATIENT)
Dept: CARDIOLOGY CLINIC | Facility: CLINIC | Age: 75
End: 2025-05-12
Attending: EMERGENCY MEDICINE
Payer: MEDICARE

## 2025-05-12 ENCOUNTER — APPOINTMENT (EMERGENCY)
Dept: RADIOLOGY | Facility: HOSPITAL | Age: 75
End: 2025-05-12
Payer: MEDICARE

## 2025-05-12 VITALS
RESPIRATION RATE: 20 BRPM | TEMPERATURE: 98.9 F | SYSTOLIC BLOOD PRESSURE: 167 MMHG | OXYGEN SATURATION: 100 % | HEART RATE: 86 BPM | DIASTOLIC BLOOD PRESSURE: 75 MMHG

## 2025-05-12 VITALS
HEART RATE: 66 BPM | BODY MASS INDEX: 30.05 KG/M2 | SYSTOLIC BLOOD PRESSURE: 127 MMHG | HEIGHT: 66 IN | WEIGHT: 187 LBS | DIASTOLIC BLOOD PRESSURE: 69 MMHG

## 2025-05-12 DIAGNOSIS — R07.9 CHEST PAIN: ICD-10-CM

## 2025-05-12 DIAGNOSIS — R07.9 CHEST PAIN: Primary | ICD-10-CM

## 2025-05-12 LAB
2HR DELTA HS TROPONIN: -1 NG/L
ALBUMIN SERPL BCG-MCNC: 4.2 G/DL (ref 3.5–5)
ALP SERPL-CCNC: 64 U/L (ref 34–104)
ALT SERPL W P-5'-P-CCNC: 24 U/L (ref 7–52)
ANION GAP SERPL CALCULATED.3IONS-SCNC: 9 MMOL/L (ref 4–13)
AST SERPL W P-5'-P-CCNC: 22 U/L (ref 13–39)
ATRIAL RATE: 88 BPM
BASOPHILS # BLD AUTO: 0.03 THOUSANDS/ÂΜL (ref 0–0.1)
BASOPHILS NFR BLD AUTO: 0 % (ref 0–1)
BILIRUB SERPL-MCNC: 0.54 MG/DL (ref 0.2–1)
BUN SERPL-MCNC: 28 MG/DL (ref 5–25)
CALCIUM SERPL-MCNC: 9.3 MG/DL (ref 8.4–10.2)
CARDIAC TROPONIN I PNL SERPL HS: 5 NG/L (ref ?–50)
CARDIAC TROPONIN I PNL SERPL HS: 6 NG/L (ref ?–50)
CHLORIDE SERPL-SCNC: 102 MMOL/L (ref 96–108)
CO2 SERPL-SCNC: 28 MMOL/L (ref 21–32)
CREAT SERPL-MCNC: 0.94 MG/DL (ref 0.6–1.3)
EOSINOPHIL # BLD AUTO: 0.1 THOUSAND/ÂΜL (ref 0–0.61)
EOSINOPHIL NFR BLD AUTO: 1 % (ref 0–6)
ERYTHROCYTE [DISTWIDTH] IN BLOOD BY AUTOMATED COUNT: 12.7 % (ref 11.6–15.1)
GFR SERPL CREATININE-BSD FRML MDRD: 78 ML/MIN/1.73SQ M
GLUCOSE SERPL-MCNC: 121 MG/DL (ref 65–140)
HCT VFR BLD AUTO: 39.5 % (ref 36.5–49.3)
HGB BLD-MCNC: 13.7 G/DL (ref 12–17)
IMM GRANULOCYTES # BLD AUTO: 0.03 THOUSAND/UL (ref 0–0.2)
IMM GRANULOCYTES NFR BLD AUTO: 0 % (ref 0–2)
LYMPHOCYTES # BLD AUTO: 1.46 THOUSANDS/ÂΜL (ref 0.6–4.47)
LYMPHOCYTES NFR BLD AUTO: 13 % (ref 14–44)
MCH RBC QN AUTO: 33 PG (ref 26.8–34.3)
MCHC RBC AUTO-ENTMCNC: 34.7 G/DL (ref 31.4–37.4)
MCV RBC AUTO: 95 FL (ref 82–98)
MONOCYTES # BLD AUTO: 0.95 THOUSAND/ÂΜL (ref 0.17–1.22)
MONOCYTES NFR BLD AUTO: 8 % (ref 4–12)
NEUTROPHILS # BLD AUTO: 8.98 THOUSANDS/ÂΜL (ref 1.85–7.62)
NEUTS SEG NFR BLD AUTO: 78 % (ref 43–75)
NRBC BLD AUTO-RTO: 0 /100 WBCS
P AXIS: 41 DEGREES
PLATELET # BLD AUTO: 294 THOUSANDS/UL (ref 149–390)
PMV BLD AUTO: 9 FL (ref 8.9–12.7)
POTASSIUM SERPL-SCNC: 4 MMOL/L (ref 3.5–5.3)
PR INTERVAL: 160 MS
PROT SERPL-MCNC: 7.1 G/DL (ref 6.4–8.4)
QRS AXIS: 63 DEGREES
QRSD INTERVAL: 152 MS
QT INTERVAL: 412 MS
QTC INTERVAL: 499 MS
RBC # BLD AUTO: 4.15 MILLION/UL (ref 3.88–5.62)
SODIUM SERPL-SCNC: 139 MMOL/L (ref 135–147)
T WAVE AXIS: 23 DEGREES
VENTRICULAR RATE: 88 BPM
WBC # BLD AUTO: 11.55 THOUSAND/UL (ref 4.31–10.16)

## 2025-05-12 PROCEDURE — 36415 COLL VENOUS BLD VENIPUNCTURE: CPT

## 2025-05-12 PROCEDURE — 85025 COMPLETE CBC W/AUTO DIFF WBC: CPT

## 2025-05-12 PROCEDURE — 71046 X-RAY EXAM CHEST 2 VIEWS: CPT

## 2025-05-12 PROCEDURE — 80053 COMPREHEN METABOLIC PANEL: CPT

## 2025-05-12 PROCEDURE — 76775 US EXAM ABDO BACK WALL LIM: CPT | Performed by: EMERGENCY MEDICINE

## 2025-05-12 PROCEDURE — 93010 ELECTROCARDIOGRAM REPORT: CPT | Performed by: INTERNAL MEDICINE

## 2025-05-12 PROCEDURE — 99285 EMERGENCY DEPT VISIT HI MDM: CPT | Performed by: EMERGENCY MEDICINE

## 2025-05-12 PROCEDURE — 84484 ASSAY OF TROPONIN QUANT: CPT

## 2025-05-12 PROCEDURE — 93308 TTE F-UP OR LMTD: CPT | Performed by: EMERGENCY MEDICINE

## 2025-05-12 PROCEDURE — 99204 OFFICE O/P NEW MOD 45 MIN: CPT | Performed by: INTERNAL MEDICINE

## 2025-05-12 PROCEDURE — 99285 EMERGENCY DEPT VISIT HI MDM: CPT

## 2025-05-12 PROCEDURE — 93005 ELECTROCARDIOGRAM TRACING: CPT

## 2025-05-12 RX ORDER — PANTOPRAZOLE SODIUM 40 MG/1
40 TABLET, DELAYED RELEASE ORAL DAILY
Qty: 30 TABLET | Refills: 0 | Status: SHIPPED | OUTPATIENT
Start: 2025-05-12 | End: 2025-06-11

## 2025-05-12 RX ORDER — COLCHICINE 0.6 MG/1
0.6 TABLET ORAL DAILY
Qty: 90 TABLET | Refills: 0 | Status: SHIPPED | OUTPATIENT
Start: 2025-05-12 | End: 2025-08-10

## 2025-05-12 NOTE — PROGRESS NOTES
[facility] Cardiology  Office Consultation  Mauricio Chiang 75 y.o. male MRN: [id number]        Chief Complaint    Chief Complaint   Patient presents with    Chest Pain     Re-establish care. [facility] ER today for CP. Hx of pericarditis.Notes chest and back pain, now resolved. Notes cough. No prior episodes since 2021.       Referring Provider: Tsering Aly MD    Impression & Plan:    1. Chest pain  Symptoms sound typical for inflammatory pericarditis, he has history of the same  Start ibuprofen taper 600 mg TID x1 week, 400 mg TID x1 week, 200 mg TID x1 week  Start colchicine 0.6 mg PO BID x90 days  Check CRP q2 weeks until normalized  Check echo to r/o effusion  PPI to GI ppx while on NSAID          - Ambulatory Referral to Cardiology  - C-reactive protein; Standing  - colchicine (COLCRYS) 0.6 mg tablet; Take 1 tablet (0.6 mg total) by mouth daily  Dispense: 90 tablet; Refill: 0  - pantoprazole (PROTONIX) 40 mg tablet; Take 1 tablet (40 mg total) by mouth daily  Dispense: 30 tablet; Refill: 0  - Echo follow up/limited w/ contrast if indicated; Future  - Basic metabolic panel; Future        We will see Mauricio Chiang back in 1 month for follow-up.    HPI: Mauricio Chiang is a 75 y.o. year old male with RBBB, HLD, prior episode of pericarditis presenting for chest pain evaluation.     Chest pain started last night at 10 PM. The pain was exacerbated by lying down. His breathing became problematic and a deep breath made him more symptomatic.     He took ibuprofen 400 mg at AM and feels improvement.     He had been coughing the last few days.       Cardiac testing:     Echo 11/9/21  Interpretation Summary  Show Result Comparison     Left Ventricle: Left ventricular cavity size is normal. There is mild concentric hypertrophy. The left ventricular ejection fraction is 69% by visual estimation. Systolic function is normal. Wall motion is normal. Diastolic function is mildly abnormal, consistent with grade I (abnormal)  relaxation.    Atrial Septum: Interatrial septum appears aneurysmal without evidence of shunting by color-flow Doppler.    Mitral Valve: There is mild regurgitation.    Tricuspid Valve: There is trace regurgitation. Pulmonary artery systolic pressures are estimated 10-15 mm Hg.    Pericardium: There is no pericardial effusion. The pericardium is normal in appearance.          EKG reviewed personally:   5/12/2025--normal sinus rhythm, 80 bpm, RBBB, abnormal study.      Relevant Laboratory Studies:  (Laboratory studies personally reviewed)  CMP 5/12/2025--potassium 4.0, creatinine 0.94  High-sensitivity troponin 5/12/2025--6, 5  CBC 5/12/2025--WBC 11.6, Hgb 13.7 g/dL    Review of Systems      Past Medical History:   Diagnosis Date    Acute pancreatitis     last assessed - 11Apr2017    Arthritis 2017    BPH (benign prostatic hypertrophy)     Celiac disease     current GI w/u to r/o celiac    Cholelithiasis     Colon polyp     Diabetes mellitus (HCC) 2018    Elevated blood pressure reading     last assessed - 09Jan2017    Gallstones     last assessed - 14Feb2017    High cholesterol     History of elevated PSA 04/09/2021    Hx of mitral valve disorder     Hypertension     Sarcoidosis     1990's Diagnosed 15 years ago by biopsy, not active; last assessed - 15Nov2016     Past Surgical History:   Procedure Laterality Date    CATARACT EXTRACTION      CHOLECYSTECTOMY  2016    ERCP N/A 04/03/2017    Procedure: ENDOSCOPIC RETROGRADE CHOLANGIOPANCREATOGRAPHY (ERCP);  Surgeon: Justice Jama MD;  Location: [facility];  Service:     EYE SURGERY  11/4/20    Cataract both eyes    AK COLONOSCOPY FLX DX W/COLLJ SPEC WHEN PFRMD N/A 03/01/2017    Procedure: EGD AND COLONOSCOPY;  Surgeon: Zoey De La Cruz MD;  Location: [facility];  Service: Gastroenterology    AK ESOPHAGOGASTRODUODENOSCOPY TRANSORAL DIAGNOSTIC N/A 06/05/2017    Procedure: ESOPHAGOGASTRODUODENOSCOPY (EGD);  Surgeon: Justice Jama MD;  Location: [facility];  Service:  Gastroenterology    DC LAPAROSCOPY SURG CHOLECYSTECTOMY N/A 2017    Procedure: CHOLECYSTECTOMY LAPAROSCOPIC; POSSIBLE OPEN;  Surgeon: Alonso Medina MD;  Location: [facility];  Service: General; complicated by bile leak and pancreatitis s/p stent     ROTATOR CUFF REPAIR Right 2015    for rotator cuff tear, Onset:      Social History     Substance and Sexual Activity   Alcohol Use Yes    Alcohol/week: 2.0 standard drinks of alcohol    Types: 2 Glasses of wine per week    Comment: wine,  social drinker     Social History     Substance and Sexual Activity   Drug Use No    Comment: Denied history of drug use     Social History     Tobacco Use   Smoking Status Never   Smokeless Tobacco Never     Family History   Problem Relation Age of Onset    Diabetes Mother         Diabetes Mellitus    Hypertension Mother     Ovarian cancer Mother     Rheumatologic disease Mother     Arthritis Mother             Cancer Mother         Ovaries    Diabetes Father         Diabetes Mellitus    Heart disease Father     Hypertension Father     Alcohol abuse Father             Diabetes Sister         Diabetes Mellitus    Hypertension Sister     Substance Abuse Neg Hx     Mental illness Neg Hx     Depression Neg Hx        Allergies:  No Known Allergies    Medications (as of START of this encounter):   Outpatient Medications Prior to Visit   Medication Sig Dispense Refill    atorvastatin (LIPITOR) 20 mg tablet Take 1 tablet (20 mg total) by mouth daily 90 tablet 1    betamethasone dipropionate (DIPROSONE) 0.05 % cream Apply topically 2 (two) times a day Until the rash is gone  PRN      Cholecalciferol (Vitamin D-3) 25 MCG (1000 UT) CAPS 2 daily      finasteride (PROSCAR) 5 mg tablet Take 1 tablet (5 mg total) by mouth daily 90 tablet 3    Glucosamine-Chondroitin (MOVE FREE PO) Take 2 tablets by mouth daily      ketoconazole (NIZORAL) 2 % cream Apply topically daily Twice a day for one month 60 g 1    Multiple  "Vitamin (MULTIVITAMIN) tablet Take 1 tablet by mouth daily      tadalafil (CIALIS) 5 MG tablet Take 1 tablet (5 mg total) by mouth daily at bedtime 90 tablet 3     No facility-administered medications prior to visit.         Vitals:    05/12/25 1427   BP: 127/69   Pulse: 66     Weight (last 2 days)       Date/Time Weight    05/12/25 1427 84.8 (187)            General: Mauricio Chiang is a well appearing male, in no acute distress, sitting comfortably  HEENT: moist mucous membranes, EOMI  Neck:  No JVD, supple, trachea midline   Cardiovascular: unremarkable S1/S2, regular rate and rhythm, no murmurs, rubs or gallops   Pulmonary: normal respiratory effort, CTAB   Abdomen: soft and nondistended  Extremities: No lower extremity edema. Warm and well perfused extremities.   Neuro: no focal motor deficits, AAOx3 (person, place, time)  Psych: Normal mood and affect, cooperative          Iggy Hernandez MD    This note was completed in part utilizing [employer] recognition software. Grammatical errors, random word insertion, spelling mistakes, occasional wrong word or \"sound-alike\" substitutions and incomplete sentences may be an occasional consequence of the system secondary to software limitations, ambient noise and hardware issues. At the time of dictation, efforts were made to edit, clarify and /or correct errors.  Please read the chart carefully and recognize, using context, where substitutions have occurred.  If you have any questions or concerns about the context, text or information contained within the body of this dictation, please contact myself, the provider, for further clarification.    "

## 2025-05-12 NOTE — ED CARE HANDOFF
Emergency Department Sign Out Note        Sign out and transfer of care from Dr. Mckeon. See Separate Emergency Department note.     The patient, Mauricio Chiang, was evaluated by the previous provider for chest pain.    Workup Completed:  Labs, EKG, chest x-ray    ED Course / Workup Pending (followup):  Delta troponin      HEART Risk Score      Flowsheet Row Most Recent Value   Heart Score Risk Calculator    History 0 Filed at: 05/12/2025 0106   ECG 1 Filed at: 05/12/2025 0106   Age 2 Filed at: 05/12/2025 0106   Risk Factors 2 Filed at: 05/12/2025 0106   Troponin 0 Filed at: 05/12/2025 0106   HEART Score 5 Filed at: 05/12/2025 0106          No data recorded                          ED Course as of 05/12/25 0329   Mon May 12, 2025   0135 SO: central chest pain, worse with moving and laying flat, hx pericarditis but no ecg changes, cardiac work up. Will need delta trop and d/c.     Procedures  Medical Decision Making  Delta troponin of -1.  No acute concerns.  Patient felt significantly better.    Patient cleared for discharge with cardiology referral, NSAIDs for pain and return precautions.    Amount and/or Complexity of Data Reviewed  Labs: ordered.  Radiology: ordered and independent interpretation performed.            Disposition  Final diagnoses:   Chest pain     Time reflects when diagnosis was documented in both MDM as applicable and the Disposition within this note       Time User Action Codes Description Comment    5/12/2025  1:02 AM Aislinn Willoughby Add [R07.9] Chest pain           ED Disposition       ED Disposition   Discharge    Condition   Stable    Date/Time   Mon May 12, 2025 0310    Comment   Mauricio Chiang discharge to home/self care.                   Follow-up Information       Follow up With Specialties Details Why Contact Info Additional Information    Negra Aleman,  Family Medicine Schedule an appointment as soon as possible for a visit  As needed [address]  [facility] [address]  [phone]       [facility] Cardiology [facility] Schedule an appointment as soon as possible for a visit   [address] [facility] [address] [phone] [facility], [address] [facility], [place], [place], [address] [phone]          Patient's Medications   Discharge Prescriptions    No medications on file            ED Provider  Electronically Signed by     Dylan Bryant MD  05/12/25 0322

## 2025-05-12 NOTE — ED PROVIDER NOTES
"Time reflects when diagnosis was documented in both MDM as applicable and the Disposition within this note       Time User Action Codes Description Comment    5/12/2025  1:02 AM Aislinn Willoughby Add [R07.9] Chest pain           ED Disposition       None          Assessment & Plan       Medical Decision Making  Amount and/or Complexity of Data Reviewed  Labs: ordered. Decision-making details documented in ED Course.  Radiology: ordered and independent interpretation performed.    Patient is a 75 y.o. male with PMH of HLD, HTN, DM who presents to the ED with chest pain.    Vital signs elevated blood pressure otherwise stable. On exam patient well-appearing no acute distress, no rashes, no peripheral edema, lungs clear.    History and physical exam most consistent with musculoskeletal sprain/strain. However, differential diagnosis included but not limited to ACS, pneumonia, pleural effusion, CHF, pericarditis, arrhythmia, electrolyte abnormality.     Plan: CBC, CMP, troponin, EKG, chest x-ray    View ED course for further discussion on patient workup.     On review of previous records reviewed echocardiogram from 11/9/2021, patient with LVEF 69%, grade 1 diastolic dysfunction.    All labs reviewed and utilized in the medical decision making process  All radiology studies independently viewed by me and interpreted by the radiologist.  I reviewed all testing with the patient.     Upon re-evaluation patient resting comfortably no acute distress, discussed labs.  Patients disposition pending 2-hour delta troponin.    Disposition: Patient signed out to Dr. Bryant at 0130 at the end of my shift. Patient in stable condition pending 2 hour delta troponin. If stable then discharge home with cardiology follow up, if increasing significantly / patient with worsening pain then consider admission to the hospital.      Portions of the record may have been created with voice recognition software. Occasional wrong word or \"sound a " "like\" substitutions may have occurred due to the inherent limitations of voice recognition software. Read the chart carefully and recognize, using context, where substitutions have occurred.      ED Course as of 05/12/25 0130   Mon May 12, 2025   0104 Procedure Note: EKG  Date/Time: 05/12/25 1:04 AM   Interpreted by: Aislinn Willoughby  Indications / Diagnosis: Chest pain  ECG reviewed by me, the ED Provider: yes   The EKG demonstrates:  Rate: 88  Rhythm: NSR  Intervals: Regular  Axis: Regular  QRS/Blocks: Right bundle branch block  ST Changes: No acute ST Changes, no STD/AFSHIN.  Compared to prior EKG on 10/6/2021, further widening of QRS otherwise no acute ST-T changes.      0105 hs TnI 0hr: 6  Will obtain 2-hour delta troponin   0105 hs TnI 0hr: 6   0106 Creatinine: 0.94  Normal renal function       Medications - No data to display    ED Risk Strat Scores   HEART Risk Score      Flowsheet Row Most Recent Value   Heart Score Risk Calculator    History 0 Filed at: 05/12/2025 0106   ECG 1 Filed at: 05/12/2025 0106   Age 2 Filed at: 05/12/2025 0106   Risk Factors 2 Filed at: 05/12/2025 0106   Troponin 0 Filed at: 05/12/2025 0106   HEART Score 5 Filed at: 05/12/2025 0106          HEART Risk Score      Flowsheet Row Most Recent Value   Heart Score Risk Calculator    History 0 Filed at: 05/12/2025 0106   ECG 1 Filed at: 05/12/2025 0106   Age 2 Filed at: 05/12/2025 0106   Risk Factors 2 Filed at: 05/12/2025 0106   Troponin 0 Filed at: 05/12/2025 0106   HEART Score 5 Filed at: 05/12/2025 0106                      No data recorded                            History of Present Illness       Chief Complaint   Patient presents with    Chest Pain     Pt states since 10 pm he has been having chest pain that started when he was sitting watching TV. Pt states the chest pain worsens when he lays down. Pt states pain radiates to his back.        Past Medical History:   Diagnosis Date    Acute pancreatitis     last assessed - 11Apr2017    " Arthritis 2017    BPH (benign prostatic hypertrophy)     Celiac disease     current GI w/u to r/o celiac    Cholelithiasis     Colon polyp     Diabetes mellitus (HCC) 2018    Elevated blood pressure reading     last assessed - 2017    Gallstones     last assessed - 2017    High cholesterol     History of elevated PSA 2021    Hx of mitral valve disorder     Hypertension     Sarcoidosis      Diagnosed 15 years ago by biopsy, not active; last assessed - 2016      Past Surgical History:   Procedure Laterality Date    CATARACT EXTRACTION      CHOLECYSTECTOMY      ERCP N/A 2017    Procedure: ENDOSCOPIC RETROGRADE CHOLANGIOPANCREATOGRAPHY (ERCP);  Surgeon: Justice Jama MD;  Location: [facility];  Service:     EYE SURGERY  20    Cataract both eyes    CT COLONOSCOPY FLX DX W/COLLJ SPEC WHEN PFRMD N/A 2017    Procedure: EGD AND COLONOSCOPY;  Surgeon: Zoey De La Cruz MD;  Location: [facility];  Service: Gastroenterology    CT ESOPHAGOGASTRODUODENOSCOPY TRANSORAL DIAGNOSTIC N/A 2017    Procedure: ESOPHAGOGASTRODUODENOSCOPY (EGD);  Surgeon: Justice Jama MD;  Location: [facility];  Service: Gastroenterology    CT LAPAROSCOPY SURG CHOLECYSTECTOMY N/A 2017    Procedure: CHOLECYSTECTOMY LAPAROSCOPIC; POSSIBLE OPEN;  Surgeon: Alonso Medina MD;  Location: [facility];  Service: General; complicated by bile leak and pancreatitis s/p stent     ROTATOR CUFF REPAIR Right 2015    for rotator cuff tear, Onset:       Family History   Problem Relation Age of Onset    Diabetes Mother         Diabetes Mellitus    Hypertension Mother     Ovarian cancer Mother     Rheumatologic disease Mother     Arthritis Mother             Cancer Mother         Ovaries    Diabetes Father         Diabetes Mellitus    Heart disease Father     Hypertension Father     Alcohol abuse Father             Diabetes Sister         Diabetes Mellitus    Hypertension Sister     Substance  Abuse Neg Hx     Mental illness Neg Hx     Depression Neg Hx       Social History     Tobacco Use    Smoking status: Never    Smokeless tobacco: Never   Vaping Use    Vaping status: Never Used   Substance Use Topics    Alcohol use: Yes     Alcohol/week: 2.0 standard drinks of alcohol     Types: 2 Glasses of wine per week     Comment: wine,  social drinker    Drug use: No     Comment: Denied history of drug use      E-Cigarette/Vaping    E-Cigarette Use Never User       E-Cigarette/Vaping Substances    Nicotine No     THC No     CBD No     Flavoring No       I have reviewed and agree with the history as documented.     HPI  Patient is a 75 y.o. male with history of HTN, HLD, DM presenting to the emergency department for chest pain. Patient states that at 10:00 tonight he started having chest pain while he was at rest.  States the pain radiates into both of his shoulders and around into his back.  Denies having any nausea, vomiting.  Did have some shortness of breath and chills on his way to the emergency department.  Denies any recent falls or injuries.  Denies any recent illness.  States that he had pericarditis in the past and this sensation feels somewhat similar because his pain is worse when he lies flat and feels better with sitting upright.  Denies have any worsening of symptoms with exertion.     Review of Systems   Constitutional:  Negative for fever.   HENT:  Negative for congestion.    Eyes:  Negative for pain.   Respiratory:  Positive for cough.    Cardiovascular:  Positive for chest pain. Negative for leg swelling.   Gastrointestinal:  Negative for diarrhea and vomiting.   Genitourinary:  Negative for dysuria.   Musculoskeletal:  Negative for back pain.   Skin:  Negative for rash.   Neurological:  Negative for dizziness.   All other systems reviewed and are negative.          Objective       ED Triage Vitals [05/12/25 0011]   Temperature Pulse Blood Pressure Respirations SpO2 Patient Position -  Orthostatic VS   98.9 °F (37.2 °C) 86 167/75 20 100 % Sitting      Temp Source Heart Rate Source BP Location FiO2 (%) Pain Score    Tympanic -- -- -- 5      Vitals      Date and Time Temp Pulse SpO2 Resp BP Pain Score FACES Pain Rating User   05/12/25 0011 98.9 °F (37.2 °C) 86 100 % 20 167/75 5 -- RG            Physical Exam  Vitals and nursing note reviewed.   Constitutional:       Appearance: Normal appearance.   HENT:      Head: Normocephalic and atraumatic.      Mouth/Throat:      Mouth: Mucous membranes are moist.   Eyes:      Conjunctiva/sclera: Conjunctivae normal.   Cardiovascular:      Rate and Rhythm: Normal rate and regular rhythm.      Pulses: Normal pulses.      Heart sounds: Normal heart sounds.   Pulmonary:      Effort: Pulmonary effort is normal.      Breath sounds: Normal breath sounds.   Chest:      Chest wall: No tenderness, crepitus or edema.   Abdominal:      Palpations: Abdomen is soft.      Tenderness: There is no abdominal tenderness.   Musculoskeletal:         General: No tenderness.      Cervical back: Neck supple.      Right lower leg: No tenderness. No edema.      Left lower leg: No tenderness. No edema.   Skin:     General: Skin is warm and dry.      Capillary Refill: Capillary refill takes less than 2 seconds.   Neurological:      General: No focal deficit present.      Mental Status: He is alert. Mental status is at baseline.   Psychiatric:         Mood and Affect: Mood normal.         Results Reviewed       Procedure Component Value Units Date/Time    Comprehensive metabolic panel [[id number]]  (Abnormal) Collected: 05/12/25 0036    Lab Status: Final result Specimen: Blood from Arm, Left Updated: 05/12/25 0106     Sodium 139 mmol/L      Potassium 4.0 mmol/L      Chloride 102 mmol/L      CO2 28 mmol/L      ANION GAP 9 mmol/L      BUN 28 mg/dL      Creatinine 0.94 mg/dL      Glucose 121 mg/dL      Calcium 9.3 mg/dL      AST 22 U/L      ALT 24 U/L      Alkaline Phosphatase 64 U/L       Total Protein 7.1 g/dL      Albumin 4.2 g/dL      Total Bilirubin 0.54 mg/dL      eGFR 78 ml/min/1.73sq m     Narrative:      [employer] guidelines for Chronic Kidney Disease (CKD):     Stage 1 with normal or high GFR (GFR > 90 mL/min/1.73 square meters)    Stage 2 Mild CKD (GFR = 60-89 mL/min/1.73 square meters)    Stage 3A Moderate CKD (GFR = 45-59 mL/min/1.73 square meters)    Stage 3B Moderate CKD (GFR = 30-44 mL/min/1.73 square meters)    Stage 4 Severe CKD (GFR = 15-29 mL/min/1.73 square meters)    Stage 5 End Stage CKD (GFR <15 mL/min/1.73 square meters)  Note: GFR calculation is accurate only with a steady state creatinine    HS Troponin 0hr (reflex protocol) [[id number]]  (Normal) Collected: 05/12/25 0036    Lab Status: Final result Specimen: Blood from Arm, Left Updated: 05/12/25 0104     hs TnI 0hr 6 ng/L     HS Troponin I 2hr [[id number]]     Lab Status: No result Specimen: Blood     CBC and differential [[id number]]  (Abnormal) Collected: 05/12/25 0036    Lab Status: Final result Specimen: Blood from Arm, Left Updated: 05/12/25 0048     WBC 11.55 Thousand/uL      RBC 4.15 Million/uL      Hemoglobin 13.7 g/dL      Hematocrit 39.5 %      MCV 95 fL      MCH 33.0 pg      MCHC 34.7 g/dL      RDW 12.7 %      MPV 9.0 fL      Platelets 294 Thousands/uL      nRBC 0 /100 WBCs      Segmented % 78 %      Immature Grans % 0 %      Lymphocytes % 13 %      Monocytes % 8 %      Eosinophils Relative 1 %      Basophils Relative 0 %      Absolute Neutrophils 8.98 Thousands/µL      Absolute Immature Grans 0.03 Thousand/uL      Absolute Lymphocytes 1.46 Thousands/µL      Absolute Monocytes 0.95 Thousand/µL      Eosinophils Absolute 0.10 Thousand/µL      Basophils Absolute 0.03 Thousands/µL             XR chest 2 views   ED Interpretation by Aislinn Willoughby DO (05/12 0106)   No acute cardiopulmonary abnormality          POC Cardiac US    Date/Time: 5/12/2025 12:45 AM    Performed by: Aislinn Willoughby    Authorized by: Aislinn Willoughby DO    Patient location:  ED  Other Assisting Provider: No    Procedure details:     Exam Type:  Diagnostic    Indications: chest pain      Assessment / Evaluation for: cardiac function, pericardial effusion and inferior vena cava for fluid responsiveness      Exam Type: initial exam      Image quality: limited diagnostic      Image availability:  Images available in PACS  Patient Details:     Cardiac Rhythm:  Regular  Cardiac findings:     Views obtained: parasternal long axis, parasternal short axis, subcostal and apical    Interpretation:      Images difficult to obtain, no obvious pericardial effusion  POC AAA US    Date/Time: 2025 12:45 AM    Performed by: Aislinn Willoughby DO  Authorized by: Aislinn Willoughby DO    Patient location:  ED  Performing Provider:  Resident  Procedure details:     Exam Type:  Educational and diagnostic    Indications: chest pain      Views Obtained:  Transverse proximal and transverse distal view    Image quality: non-diagnostic      Image availability:  Images available in PACS  Interpretation:     Aortic ultrasound impression: indeterminate        ED Medication and Procedure Management   Prior to Admission Medications   Prescriptions Last Dose Informant Patient Reported? Taking?   Cholecalciferol (Vitamin D-3) 25 MCG (1000 UT) CAPS   No No   Si daily   Glucosamine-Chondroitin (MOVE FREE PO)  Self Yes No   Sig: Take 2 tablets by mouth daily   Multiple Vitamin (MULTIVITAMIN) tablet  Self Yes No   Sig: Take 1 tablet by mouth daily   atorvastatin (LIPITOR) 20 mg tablet   No No   Sig: Take 1 tablet (20 mg total) by mouth daily   betamethasone dipropionate (DIPROSONE) 0.05 % cream   No No   Sig: Apply topically 2 (two) times a day Until the rash is gone  PRN   finasteride (PROSCAR) 5 mg tablet   No No   Sig: Take 1 tablet (5 mg total) by mouth daily   ketoconazole (NIZORAL) 2 % cream   No No   Sig: Apply topically daily Twice a day for one month    Patient not taking: Reported on 3/4/2025   tadalafil (CIALIS) 5 MG tablet   No No   Sig: Take 1 tablet (5 mg total) by mouth daily at bedtime      Facility-Administered Medications: None     Patient's Medications   Discharge Prescriptions    No medications on file       ED SEPSIS DOCUMENTATION   Time reflects when diagnosis was documented in both MDM as applicable and the Disposition within this note       Time User Action Codes Description Comment    5/12/2025  1:02 AM Aislinn Willoughby Add [R07.9] Chest pain                  Aislinn Willoughby DO  05/12/25 0130

## 2025-05-12 NOTE — DISCHARGE INSTRUCTIONS
You were seen in the emergency department today for chest pain.     Follow up with your primary care provider as well as cardiology.     Take your normal medications as prescribed.     Return to the emergency department for any new or concerning symptoms including worsening chest pain especially if it is worse with exertion or associated with shortness of breath, nausea, sweating.     Thank you for choosing [facility] for your care today.     Please take 400 mg ibuprofen every 12 hours for 5 days.

## 2025-05-12 NOTE — LETTER
May 12, 2025     Negra Aleman DO  [address] [place]  [address]  [facility] [address]    Patient: Mauricio Chiang   YOB: 1950   Date of Visit: 5/12/2025       Dear DO Tsering Staples MD:    Thank you for referring Mauricio Chiang to me for evaluation. Below are my notes for this consultation.    If you have questions, please do not hesitate to call me. I look forward to following your patient along with you.         Sincerely,        Iggy Hernandez MD        CC: MD Iggy Rhodes MD  5/12/2025  2:58 PM  Incomplete  [facility] Cardiology  Office Consultation  Mauricio Chiang 75 y.o. male MRN: [id number]        Chief Complaint    Chief Complaint   Patient presents with    Chest Pain     Re-establish care. [facility] ER today for CP. Hx of pericarditis.Notes chest and back pain, now resolved. Notes cough. No prior episodes since 2021.       Referring Provider: Tsering Aly MD    Impression & Plan:    ***      We will see Mauricio Chiang back in *** months for *** routine follow-up.    HPI: Mauricio hCiang is a 75 y.o. year old male with RBBB, HLD, prior episode of pericarditis presenting for chest pain evaluation.     Chest pain started last night at 10 PM. The pain was exacerbated by lying down. His breathing became problematic and a deep breath made him more symptomatic.     He took ibuprofen 400 mg at AM and feels improvement.     He had been coughing the last few days.       Cardiac testing:     Echo 11/9/21  Interpretation Summary  Show Result Comparison     Left Ventricle: Left ventricular cavity size is normal. There is mild concentric hypertrophy. The left ventricular ejection fraction is 69% by visual estimation. Systolic function is normal. Wall motion is normal. Diastolic function is mildly abnormal, consistent with grade I (abnormal) relaxation.    Atrial Septum: Interatrial septum appears aneurysmal without evidence of shunting by color-flow  Doppler.    Mitral Valve: There is mild regurgitation.    Tricuspid Valve: There is trace regurgitation. Pulmonary artery systolic pressures are estimated 10-15 mm Hg.    Pericardium: There is no pericardial effusion. The pericardium is normal in appearance.          EKG reviewed personally:   5/12/2025--normal sinus rhythm, 80 bpm, RBBB, abnormal study.      Relevant Laboratory Studies:  (Laboratory studies personally reviewed)  CMP 5/12/2025--potassium 4.0, creatinine 0.94  High-sensitivity troponin 5/12/2025--6, 5  CBC 5/12/2025--WBC 11.6, Hgb 13.7 g/dL    Review of Systems      Past Medical History:   Diagnosis Date    Acute pancreatitis     last assessed - 11Apr2017    Arthritis 2017    BPH (benign prostatic hypertrophy)     Celiac disease     current GI w/u to r/o celiac    Cholelithiasis     Colon polyp     Diabetes mellitus (HCC) 2018    Elevated blood pressure reading     last assessed - 09Jan2017    Gallstones     last assessed - 14Feb2017    High cholesterol     History of elevated PSA 04/09/2021    Hx of mitral valve disorder     Hypertension     Sarcoidosis     1990's Diagnosed 15 years ago by biopsy, not active; last assessed - 15Nov2016     Past Surgical History:   Procedure Laterality Date    CATARACT EXTRACTION      CHOLECYSTECTOMY  2016    ERCP N/A 04/03/2017    Procedure: ENDOSCOPIC RETROGRADE CHOLANGIOPANCREATOGRAPHY (ERCP);  Surgeon: Justice Jama MD;  Location: [facility];  Service:     EYE SURGERY  11/4/20    Cataract both eyes    NH COLONOSCOPY FLX DX W/COLLJ SPEC WHEN PFRMD N/A 03/01/2017    Procedure: EGD AND COLONOSCOPY;  Surgeon: Zoey De La Cruz MD;  Location: [facility];  Service: Gastroenterology    NH ESOPHAGOGASTRODUODENOSCOPY TRANSORAL DIAGNOSTIC N/A 06/05/2017    Procedure: ESOPHAGOGASTRODUODENOSCOPY (EGD);  Surgeon: Justice Jama MD;  [facility]: [facility];  Service: Gastroenterology    NH LAPAROSCOPY SURG CHOLECYSTECTOMY N/A 03/29/2017    Procedure: CHOLECYSTECTOMY LAPAROSCOPIC;  POSSIBLE OPEN;  Surgeon: Alonso Medina MD;  [facility]: [facility];  Service: General; complicated by bile leak and pancreatitis s/p stent     ROTATOR CUFF REPAIR Right 2015    for rotator cuff tear, Onset:      Social History     Substance and Sexual Activity   Alcohol Use Yes    Alcohol/week: 2.0 standard drinks of alcohol    Types: 2 Glasses of wine per week    Comment: wine,  social drinker     Social History     Substance and Sexual Activity   Drug Use No    Comment: Denied history of drug use     Social History     Tobacco Use   Smoking Status Never   Smokeless Tobacco Never     Family History   Problem Relation Age of Onset    Diabetes Mother         Diabetes Mellitus    Hypertension Mother     Ovarian cancer Mother     Rheumatologic disease Mother     Arthritis Mother             Cancer Mother         Ovaries    Diabetes Father         Diabetes Mellitus    Heart disease Father     Hypertension Father     Alcohol abuse Father             Diabetes Sister         Diabetes Mellitus    Hypertension Sister     Substance Abuse Neg Hx     Mental illness Neg Hx     Depression Neg Hx        Allergies:  No Known Allergies    Medications (as of START of this encounter):   Outpatient Medications Prior to Visit   Medication Sig Dispense Refill    atorvastatin (LIPITOR) 20 mg tablet Take 1 tablet (20 mg total) by mouth daily 90 tablet 1    betamethasone dipropionate (DIPROSONE) 0.05 % cream Apply topically 2 (two) times a day Until the rash is gone  PRN      Cholecalciferol (Vitamin D-3) 25 MCG (1000 UT) CAPS 2 daily      finasteride (PROSCAR) 5 mg tablet Take 1 tablet (5 mg total) by mouth daily 90 tablet 3    Glucosamine-Chondroitin (MOVE FREE PO) Take 2 tablets by mouth daily      ketoconazole (NIZORAL) 2 % cream Apply topically daily Twice a day for one month 60 g 1    Multiple Vitamin (MULTIVITAMIN) tablet Take 1 tablet by mouth daily      tadalafil (CIALIS) 5 MG tablet Take 1 tablet (5 mg total)  "by mouth daily at bedtime 90 tablet 3     No facility-administered medications prior to visit.         Vitals:    05/12/25 1427   BP: 127/69   Pulse: 66     Weight (last 2 days)       Date/Time Weight    05/12/25 1427 84.8 (187)            General: Mauricio Chiang is a well appearing male, in no acute distress, sitting comfortably  HEENT: moist mucous membranes, EOMI  Neck:  No JVD, supple, trachea midline ***  Cardiovascular: unremarkable S1/S2, regular rate and rhythm, no murmurs, rubs or gallops ***  Pulmonary: normal respiratory effort, CTAB ***  Abdomen: soft and nondistended  Extremities: No lower extremity edema. Warm and well perfused extremities. ***  Neuro: no focal motor deficits, AAOx3 (person, place, time)  Psych: Normal mood and affect, cooperative          Iggy Hernandez MD    This note was completed in part utilizing [employer] recognition software. Grammatical errors, random word insertion, spelling mistakes, occasional wrong word or \"sound-alike\" substitutions and incomplete sentences may be an occasional consequence of the system secondary to software limitations, ambient noise and hardware issues. At the time of dictation, efforts were made to edit, clarify and /or correct errors.  Please read the chart carefully and recognize, using context, where substitutions have occurred.  If you have any questions or concerns about the context, text or information contained within the body of this dictation, please contact myself, the provider, for further clarification.      Iggy Hernandez MD  5/12/2025  2:51 PM  Sign when Signing Visit  [facility] Cardiology  Office Consultation  Mauricio Chiang 75 y.o. male MRN: [id number]        Chief Complaint    Chief Complaint   Patient presents with    Chest Pain     Re-establish care. [facility] ER today for CP. Hx of pericarditis.Notes chest and back pain, now resolved. Notes cough. No prior episodes since 2021.       Referring Provider: Tsering Aly, " MD    Impression & Plan:    ***      We will see Mauricio Chiang back in *** months for *** routine follow-up.    HPI: Mauricio Chiang is a 75 y.o. year old male with RBBB, HLD, presenting for chest pain evaluation.           Cardiac testing:     Echo 11/9/21  Interpretation Summary  Show Result Comparison     Left Ventricle: Left ventricular cavity size is normal. There is mild concentric hypertrophy. The left ventricular ejection fraction is 69% by visual estimation. Systolic function is normal. Wall motion is normal. Diastolic function is mildly abnormal, consistent with grade I (abnormal) relaxation.    Atrial Septum: Interatrial septum appears aneurysmal without evidence of shunting by color-flow Doppler.    Mitral Valve: There is mild regurgitation.    Tricuspid Valve: There is trace regurgitation. Pulmonary artery systolic pressures are estimated 10-15 mm Hg.    Pericardium: There is no pericardial effusion. The pericardium is normal in appearance.          EKG reviewed personally:   5/12/2025--normal sinus rhythm, 80 bpm, RBBB, abnormal study.      Relevant Laboratory Studies:  (Laboratory studies personally reviewed)  CMP 5/12/2025--potassium 4.0, creatinine 0.94  High-sensitivity troponin 5/12/2025--6, 5  CBC 5/12/2025--WBC 11.6, Hgb 13.7 g/dL    Review of Systems      Past Medical History:   Diagnosis Date    Acute pancreatitis     last assessed - 11Apr2017    Arthritis 2017    BPH (benign prostatic hypertrophy)     Celiac disease     current GI w/u to r/o celiac    Cholelithiasis     Colon polyp     Diabetes mellitus (HCC) 2018    Elevated blood pressure reading     last assessed - 09Jan2017    Gallstones     last assessed - [id number]    High cholesterol     History of elevated PSA 04/09/2021    Hx of mitral valve disorder     Hypertension     Sarcoidosis     1990's Diagnosed 15 years ago by biopsy, not active; last assessed - 15Nov2016     Past Surgical History:   Procedure Laterality Date    CATARACT  EXTRACTION      CHOLECYSTECTOMY  2016    ERCP N/A 2017    Procedure: ENDOSCOPIC RETROGRADE CHOLANGIOPANCREATOGRAPHY (ERCP);  Surgeon: Justice Jama MD;  [facility];  Service:     EYE SURGERY  20    Cataract both eyes    MA COLONOSCOPY FLX DX W/COLLJ SPEC WHEN PFRMD N/A 2017    Procedure: EGD AND COLONOSCOPY;  Surgeon: Zoey De La Cruz MD;  [facility];  Service: Gastroenterology    MA ESOPHAGOGASTRODUODENOSCOPY TRANSORAL DIAGNOSTIC N/A 2017    Procedure: ESOPHAGOGASTRODUODENOSCOPY (EGD);  Surgeon: Justice Jama MD;  [facility];  Service: Gastroenterology    MA LAPAROSCOPY SURG CHOLECYSTECTOMY N/A 2017    Procedure: CHOLECYSTECTOMY LAPAROSCOPIC; POSSIBLE OPEN;  Surgeon: Alonso Medina MD;  [facility];  Service: General; complicated by bile leak and pancreatitis s/p stent     ROTATOR CUFF REPAIR Right 2015    for rotator cuff tear, Onset:      Social History     Substance and Sexual Activity   Alcohol Use Yes    Alcohol/week: 2.0 standard drinks of alcohol    Types: 2 Glasses of wine per week    Comment: wine,  social drinker     Social History     Substance and Sexual Activity   Drug Use No    Comment: Denied history of drug use     Social History     Tobacco Use   Smoking Status Never   Smokeless Tobacco Never     Family History   Problem Relation Age of Onset    Diabetes Mother         Diabetes Mellitus    Hypertension Mother     Ovarian cancer Mother     Rheumatologic disease Mother     Arthritis Mother             Cancer Mother         Ovaries    Diabetes Father         Diabetes Mellitus    Heart disease Father     Hypertension Father     Alcohol abuse Father             Diabetes Sister         Diabetes Mellitus    Hypertension Sister     Substance Abuse Neg Hx     Mental illness Neg Hx     Depression Neg Hx        Allergies:  No Known Allergies    Medications (as of START of this encounter):   Outpatient Medications Prior to  "Visit   Medication Sig Dispense Refill    atorvastatin (LIPITOR) 20 mg tablet Take 1 tablet (20 mg total) by mouth daily 90 tablet 1    betamethasone dipropionate (DIPROSONE) 0.05 % cream Apply topically 2 (two) times a day Until the rash is gone  PRN      Cholecalciferol (Vitamin D-3) 25 MCG (1000 UT) CAPS 2 daily      finasteride (PROSCAR) 5 mg tablet Take 1 tablet (5 mg total) by mouth daily 90 tablet 3    Glucosamine-Chondroitin (MOVE FREE PO) Take 2 tablets by mouth daily      ketoconazole (NIZORAL) 2 % cream Apply topically daily Twice a day for one month 60 g 1    Multiple Vitamin (MULTIVITAMIN) tablet Take 1 tablet by mouth daily      tadalafil (CIALIS) 5 MG tablet Take 1 tablet (5 mg total) by mouth daily at bedtime 90 tablet 3     No facility-administered medications prior to visit.         Vitals:    05/12/25 1427   BP: 127/69   Pulse: 66     Weight (last 2 days)       Date/Time Weight    05/12/25 1427 84.8 (187)            General: Mauricio Chiang is a well appearing male, in no acute distress, sitting comfortably  HEENT: moist mucous membranes, EOMI  Neck:  No JVD, supple, trachea midline ***  Cardiovascular: unremarkable S1/S2, regular rate and rhythm, no murmurs, rubs or gallops ***  Pulmonary: normal respiratory effort, CTAB ***  Abdomen: soft and nondistended  Extremities: No lower extremity edema. Warm and well perfused extremities. ***  Neuro: no focal motor deficits, AAOx3 (person, place, time)  Psych: Normal mood and affect, cooperative          Iggy Hernandez MD    This note was completed in part utilizing [employer] recognition software. Grammatical errors, random word insertion, spelling mistakes, occasional wrong word or \"sound-alike\" substitutions and incomplete sentences may be an occasional consequence of the system secondary to software limitations, ambient noise and hardware issues. At the time of dictation, efforts were made to edit, clarify and /or correct errors.  Please " read the chart carefully and recognize, using context, where substitutions have occurred.  If you have any questions or concerns about the context, text or information contained within the body of this dictation, please contact myself, the provider, for further clarification.

## 2025-05-12 NOTE — TELEPHONE ENCOUNTER
Left message for patient to call back to schedule new patient hospital follow up from recent ED visit. Referral in chart.

## 2025-05-12 NOTE — PATIENT INSTRUCTIONS
Take ibuprofen 600 mg three times per day for the next week, then 400 mg three times per day for a week, then 200 mg three times per day for a week    Take pantoprazole while taking ibupfoen    Check CRP today  Check again in 2 weeks  Will also check kidney function in 2 weeks    Hold off on filling colchicine until your CRP results.  If CRP is high, you will take colchicine for 3 months

## 2025-05-13 ENCOUNTER — APPOINTMENT (OUTPATIENT)
Dept: LAB | Facility: IMAGING CENTER | Age: 75
End: 2025-05-13
Payer: MEDICARE

## 2025-05-13 ENCOUNTER — RESULTS FOLLOW-UP (OUTPATIENT)
Dept: CARDIOLOGY CLINIC | Facility: CLINIC | Age: 75
End: 2025-05-13

## 2025-05-13 DIAGNOSIS — E55.9 VITAMIN D DEFICIENCY: ICD-10-CM

## 2025-05-13 DIAGNOSIS — N40.1 BENIGN PROSTATIC HYPERPLASIA WITH URINARY FREQUENCY: ICD-10-CM

## 2025-05-13 DIAGNOSIS — E78.2 MIXED HYPERLIPIDEMIA: ICD-10-CM

## 2025-05-13 DIAGNOSIS — R73.09 ELEVATED HEMOGLOBIN A1C: ICD-10-CM

## 2025-05-13 DIAGNOSIS — R35.0 BENIGN PROSTATIC HYPERPLASIA WITH URINARY FREQUENCY: ICD-10-CM

## 2025-05-13 DIAGNOSIS — Z79.899 ENCOUNTER FOR LONG-TERM (CURRENT) USE OF MEDICATIONS: ICD-10-CM

## 2025-05-13 DIAGNOSIS — R07.9 CHEST PAIN: ICD-10-CM

## 2025-05-13 LAB
25(OH)D3 SERPL-MCNC: 32.7 NG/ML (ref 30–100)
ALBUMIN SERPL BCG-MCNC: 4 G/DL (ref 3.5–5)
ALP SERPL-CCNC: 58 U/L (ref 34–104)
ALT SERPL W P-5'-P-CCNC: 18 U/L (ref 7–52)
ANION GAP SERPL CALCULATED.3IONS-SCNC: 6 MMOL/L (ref 4–13)
AST SERPL W P-5'-P-CCNC: 17 U/L (ref 13–39)
BASOPHILS # BLD AUTO: 0.04 THOUSANDS/ÂΜL (ref 0–0.1)
BASOPHILS NFR BLD AUTO: 1 % (ref 0–1)
BILIRUB SERPL-MCNC: 0.53 MG/DL (ref 0.2–1)
BILIRUB UR QL STRIP: NEGATIVE
BUN SERPL-MCNC: 27 MG/DL (ref 5–25)
CALCIUM SERPL-MCNC: 8.9 MG/DL (ref 8.4–10.2)
CHLORIDE SERPL-SCNC: 106 MMOL/L (ref 96–108)
CHOLEST SERPL-MCNC: 119 MG/DL (ref ?–200)
CLARITY UR: CLEAR
CO2 SERPL-SCNC: 28 MMOL/L (ref 21–32)
COLOR UR: NORMAL
CREAT SERPL-MCNC: 0.86 MG/DL (ref 0.6–1.3)
CRP SERPL QL: 54.1 MG/L
EOSINOPHIL # BLD AUTO: 0.12 THOUSAND/ÂΜL (ref 0–0.61)
EOSINOPHIL NFR BLD AUTO: 2 % (ref 0–6)
ERYTHROCYTE [DISTWIDTH] IN BLOOD BY AUTOMATED COUNT: 13 % (ref 11.6–15.1)
EST. AVERAGE GLUCOSE BLD GHB EST-MCNC: 131 MG/DL
GFR SERPL CREATININE-BSD FRML MDRD: 84 ML/MIN/1.73SQ M
GLUCOSE P FAST SERPL-MCNC: 132 MG/DL (ref 65–99)
GLUCOSE UR STRIP-MCNC: NEGATIVE MG/DL
HBA1C MFR BLD: 6.2 %
HCT VFR BLD AUTO: 39.4 % (ref 36.5–49.3)
HDLC SERPL-MCNC: 49 MG/DL
HGB BLD-MCNC: 13.4 G/DL (ref 12–17)
HGB UR QL STRIP.AUTO: NEGATIVE
IMM GRANULOCYTES # BLD AUTO: 0.01 THOUSAND/UL (ref 0–0.2)
IMM GRANULOCYTES NFR BLD AUTO: 0 % (ref 0–2)
KETONES UR STRIP-MCNC: NEGATIVE MG/DL
LDLC SERPL CALC-MCNC: 54 MG/DL (ref 0–100)
LEUKOCYTE ESTERASE UR QL STRIP: NEGATIVE
LYMPHOCYTES # BLD AUTO: 1.66 THOUSANDS/ÂΜL (ref 0.6–4.47)
LYMPHOCYTES NFR BLD AUTO: 22 % (ref 14–44)
MCH RBC QN AUTO: 32.8 PG (ref 26.8–34.3)
MCHC RBC AUTO-ENTMCNC: 34 G/DL (ref 31.4–37.4)
MCV RBC AUTO: 96 FL (ref 82–98)
MONOCYTES # BLD AUTO: 0.74 THOUSAND/ÂΜL (ref 0.17–1.22)
MONOCYTES NFR BLD AUTO: 10 % (ref 4–12)
NEUTROPHILS # BLD AUTO: 4.84 THOUSANDS/ÂΜL (ref 1.85–7.62)
NEUTS SEG NFR BLD AUTO: 65 % (ref 43–75)
NITRITE UR QL STRIP: NEGATIVE
NONHDLC SERPL-MCNC: 70 MG/DL
NRBC BLD AUTO-RTO: 0 /100 WBCS
PH UR STRIP.AUTO: 6 [PH]
PLATELET # BLD AUTO: 285 THOUSANDS/UL (ref 149–390)
PMV BLD AUTO: 9.8 FL (ref 8.9–12.7)
POTASSIUM SERPL-SCNC: 4.6 MMOL/L (ref 3.5–5.3)
PROT SERPL-MCNC: 6.9 G/DL (ref 6.4–8.4)
PROT UR STRIP-MCNC: NEGATIVE MG/DL
RBC # BLD AUTO: 4.09 MILLION/UL (ref 3.88–5.62)
SODIUM SERPL-SCNC: 140 MMOL/L (ref 135–147)
SP GR UR STRIP.AUTO: 1.02 (ref 1–1.03)
TRIGL SERPL-MCNC: 80 MG/DL (ref ?–150)
TSH SERPL DL<=0.05 MIU/L-ACNC: 2.78 UIU/ML (ref 0.45–4.5)
UROBILINOGEN UR STRIP-ACNC: <2 MG/DL
WBC # BLD AUTO: 7.41 THOUSAND/UL (ref 4.31–10.16)

## 2025-05-13 PROCEDURE — 36415 COLL VENOUS BLD VENIPUNCTURE: CPT

## 2025-05-13 PROCEDURE — 86140 C-REACTIVE PROTEIN: CPT

## 2025-05-13 PROCEDURE — 80053 COMPREHEN METABOLIC PANEL: CPT

## 2025-05-13 PROCEDURE — 82306 VITAMIN D 25 HYDROXY: CPT

## 2025-05-13 PROCEDURE — 83036 HEMOGLOBIN GLYCOSYLATED A1C: CPT

## 2025-05-13 PROCEDURE — 80061 LIPID PANEL: CPT

## 2025-05-13 PROCEDURE — 85025 COMPLETE CBC W/AUTO DIFF WBC: CPT

## 2025-05-13 PROCEDURE — 84443 ASSAY THYROID STIM HORMONE: CPT

## 2025-05-13 PROCEDURE — 81003 URINALYSIS AUTO W/O SCOPE: CPT

## 2025-05-13 RX ORDER — TADALAFIL 5 MG/1
5 TABLET ORAL
Qty: 90 TABLET | Refills: 1 | Status: SHIPPED | OUTPATIENT
Start: 2025-05-13

## 2025-05-14 ENCOUNTER — TELEPHONE (OUTPATIENT)
Age: 75
End: 2025-05-14

## 2025-05-14 ENCOUNTER — HOSPITAL ENCOUNTER (OUTPATIENT)
Dept: NON INVASIVE DIAGNOSTICS | Facility: CLINIC | Age: 75
Discharge: HOME | End: 2025-05-14
Attending: INTERNAL MEDICINE
Payer: MEDICARE

## 2025-05-14 VITALS
DIASTOLIC BLOOD PRESSURE: 69 MMHG | HEIGHT: 66 IN | SYSTOLIC BLOOD PRESSURE: 127 MMHG | WEIGHT: 186.95 LBS | BODY MASS INDEX: 30.05 KG/M2 | HEART RATE: 66 BPM

## 2025-05-14 DIAGNOSIS — R07.9 CHEST PAIN: ICD-10-CM

## 2025-05-14 LAB
AORTIC ROOT: 3.4 CM
ASCENDING AORTA: 3.5 CM
BSA FOR ECHO PROCEDURE: 1.94 M2
DOP CALC LVOT AREA: 3.46 CM2
DOP CALC LVOT DIAMETER: 2.1 CM
E WAVE DECELERATION TIME: 255 MS
E/A RATIO: 0.73
FRACTIONAL SHORTENING: 40 (ref 28–44)
INTERVENTRICULAR SEPTUM IN DIASTOLE (PARASTERNAL SHORT AXIS VIEW): 1.3 CM
INTERVENTRICULAR SEPTUM: 1.3 CM (ref 0.6–1.1)
LEFT ATRIUM SIZE: 4.1 CM
LEFT INTERNAL DIMENSION IN SYSTOLE: 2.8 CM (ref 2.1–4)
LEFT VENTRICULAR INTERNAL DIMENSION IN DIASTOLE: 4.7 CM (ref 3.5–6)
LEFT VENTRICULAR POSTERIOR WALL IN END DIASTOLE: 1 CM
LEFT VENTRICULAR STROKE VOLUME: 73 ML
LV EF US.2D.A4C+ESTIMATED: 77 %
LVSV (TEICH): 73 ML
MV PEAK A VEL: 0.79 M/S
MV PEAK E VEL: 58 CM/S
SINOTUBULAR JUNCTION: 2.8 CM
SL CV LV EF: 65
SL CV PED ECHO LEFT VENTRICLE DIASTOLIC VOLUME (MOD BIPLANE) 2D: 101 ML
SL CV PED ECHO LEFT VENTRICLE SYSTOLIC VOLUME (MOD BIPLANE) 2D: 28 ML
SL CV SINUS OF VALSALVA 2D: 3.5 CM
STJ: 2.8 CM
TR MAX PG: 16 MMHG
TR PEAK VELOCITY: 2 M/S
TRICUSPID VALVE PEAK REGURGITATION VELOCITY: 1.99 M/S

## 2025-05-14 PROCEDURE — 93325 DOPPLER ECHO COLOR FLOW MAPG: CPT

## 2025-05-14 PROCEDURE — 93325 DOPPLER ECHO COLOR FLOW MAPG: CPT | Performed by: INTERNAL MEDICINE

## 2025-05-14 PROCEDURE — 93321 DOPPLER ECHO F-UP/LMTD STD: CPT | Performed by: INTERNAL MEDICINE

## 2025-05-14 PROCEDURE — 93308 TTE F-UP OR LMTD: CPT

## 2025-05-14 PROCEDURE — 93321 DOPPLER ECHO F-UP/LMTD STD: CPT

## 2025-05-14 PROCEDURE — 93308 TTE F-UP OR LMTD: CPT | Performed by: INTERNAL MEDICINE

## 2025-05-14 NOTE — ED ATTENDING ATTESTATION
5/12/2025   ITsering MD, saw and evaluated the patient. I have discussed the patient with the resident/non-physician practitioner and agree with the resident's/non-physician practitioner's findings, Plan of Care, and MDM as documented in the resident's/non-physician practitioner's note, except where noted. All available labs and Radiology studies were reviewed.  I was present for key portions of any procedure(s) performed by the resident/non-physician practitioner and I was immediately available to provide assistance.       At this point I agree with the current assessment done in the Emergency Department.  I have conducted an independent evaluation of this patient a history and physical is as follows:    Unit/Bed#: ED 28 Encounter: [id number]    Chief Complaint   Patient presents with    Chest Pain     Pt states since 10 pm he has been having chest pain that started when he was sitting watching TV. Pt states the chest pain worsens when he lays down. Pt states pain radiates to his back.      75-year-old male with past medical history of diabetes, hypertension, hyperlipidemia, prior episode of acute pericarditis, presenting with chest pain.  Patient was at rest when he developed central chest pain which radiates to both shoulders.  Pain is associated with some shortness of breath.  Pain is worsened with laying flat and feels better when sitting upright.  No recent illnesses, no fevers or chills.  No nausea, vomiting, abdominal pain, or diarrhea.  No leg swelling.    Physical Exam  ED Triage Vitals [05/12/25 0011]   Temperature Pulse Respirations Blood Pressure SpO2   98.9 °F (37.2 °C) 86 20 167/75 100 %      Temp Source Heart Rate Source Patient Position - Orthostatic VS BP Location FiO2 (%)   Tympanic -- Sitting -- --      Pain Score       5           Vital signs and nursing notes reviewed    CONSTITUTIONAL: male appearing stated age resting in bed, in no acute distress  HEENT: atraumatic, normocephalic.  Sclera anicteric, conjunctiva are not injected. Moist oral mucosa  CARDIOVASCULAR/CHEST: RRR, no M/R/G. 2+ radial pulses.  No tenderness with palpation over the chest wall.  PULMONARY: Breathing comfortably on RA. Breath sounds are equal and clear to auscultation  ABDOMEN: non-distended. BS present, normoactive. Non-tender  MSK: moves all extremities, no deformities, no peripheral edema, no calf asymmetry  NEURO: Awake, alert, and oriented x 3. Face symmetric. Moves all extremities spontaneously. No focal neurologic deficits  SKIN: Warm, appears well-perfused  MENTAL STATUS: Normal affect      Labs and Imaging  Labs Reviewed   CBC AND DIFFERENTIAL - Abnormal       Result Value Ref Range Status    WBC 11.55 (*) 4.31 - 10.16 Thousand/uL Final    RBC 4.15  3.88 - 5.62 Million/uL Final    Hemoglobin 13.7  12.0 - 17.0 g/dL Final    Hematocrit 39.5  36.5 - 49.3 % Final    MCV 95  82 - 98 fL Final    MCH 33.0  26.8 - 34.3 pg Final    MCHC 34.7  31.4 - 37.4 g/dL Final    RDW 12.7  11.6 - 15.1 % Final    MPV 9.0  8.9 - 12.7 fL Final    Platelets 294  149 - 390 Thousands/uL Final    nRBC 0  /100 WBCs Final    Segmented % 78 (*) 43 - 75 % Final    Immature Grans % 0  0 - 2 % Final    Lymphocytes % 13 (*) 14 - 44 % Final    Monocytes % 8  4 - 12 % Final    Eosinophils Relative 1  0 - 6 % Final    Basophils Relative 0  0 - 1 % Final    Absolute Neutrophils 8.98 (*) 1.85 - 7.62 Thousands/µL Final    Absolute Immature Grans 0.03  0.00 - 0.20 Thousand/uL Final    Absolute Lymphocytes 1.46  0.60 - 4.47 Thousands/µL Final    Absolute Monocytes 0.95  0.17 - 1.22 Thousand/µL Final    Eosinophils Absolute 0.10  0.00 - 0.61 Thousand/µL Final    Basophils Absolute 0.03  0.00 - 0.10 Thousands/µL Final   COMPREHENSIVE METABOLIC PANEL - Abnormal    Sodium 139  135 - 147 mmol/L Final    Potassium 4.0  3.5 - 5.3 mmol/L Final    Chloride 102  96 - 108 mmol/L Final    CO2 28  21 - 32 mmol/L Final    ANION GAP 9  4 - 13 mmol/L Final    BUN 28 (*)  "5 - 25 mg/dL Final    Creatinine 0.94  0.60 - 1.30 mg/dL Final    Comment: Standardized to IDMS reference method    Glucose 121  65 - 140 mg/dL Final    Comment: If the patient is fasting, the ADA then defines impaired fasting glucose as > 100 mg/dL and diabetes as > or equal to 123 mg/dL.    Calcium 9.3  8.4 - 10.2 mg/dL Final    AST 22  13 - 39 U/L Final    ALT 24  7 - 52 U/L Final    Comment: Specimen collection should occur prior to Sulfasalazine administration due to the potential for falsely depressed results.     Alkaline Phosphatase 64  34 - 104 U/L Final    Total Protein 7.1  6.4 - 8.4 g/dL Final    Albumin 4.2  3.5 - 5.0 g/dL Final    Total Bilirubin 0.54  0.20 - 1.00 mg/dL Final    Comment: Use of this assay is not recommended for patients undergoing treatment with eltrombopag due to the potential for falsely elevated results.  N-acetyl-p-benzoquinone imine (metabolite of Acetaminophen) will generate erroneously low results in samples for patients that have taken an overdose of Acetaminophen.    eGFR 78  ml/min/1.73sq m Final    Narrative:     National Kid[employer] guidelines for Chronic Kidney Disease (CKD):     Stage 1 with normal or high GFR (GFR > 90 mL/min/1.73 square meters)    Stage 2 Mild CKD (GFR = 60-89 mL/min/1.73 square meters)    Stage 3A Moderate CKD (GFR = 45-59 mL/min/1.73 square meters)    Stage 3B Moderate CKD (GFR = 30-44 mL/min/1.73 square meters)    Stage 4 Severe CKD (GFR = 15-29 mL/min/1.73 square meters)    Stage 5 End Stage CKD (GFR <15 mL/min/1.73 square meters)  Note: GFR calculation is accurate only with a steady state creatinine   HS TROPONIN I 0HR - Normal    hs TnI 0hr 6  \"Refer to ACS Flowchart\"- see link ng/L Final    Comment:                                              Initial (time 0) result  If >=50 ng/L, Myocardial injury suggested ;  Type of myocardial injury and treatment strategy  to be determined.  If 5-49 ng/L, a delta result at 2 hours and or 4 " "hours will be needed to further evaluate.  If <4 ng/L, and chest pain has been >3 hours since onset, patient may qualify for discharge based on the HEART score in the ED.  If <5 ng/L and <3hours since onset of chest pain, a delta result at 2 hours will be needed to further evaluate.    HS Troponin 99th Percentile URL of a Health Population=12 ng/L with a 95% Confidence Interval of 8-18 ng/L.    Second Troponin (time 2 hours)  If calculated delta >= 20 ng/L,  Myocardial injury suggested ; Type of myocardial injury and treatment strategy to be determined.  If 5-49 ng/L and the calculated delta is 5-19 ng/L, consult medical service for evaluation.  Continue evaluation for ischemia on ecg and other possible etiology and repeat hs troponin at 4 hours.  If delta is <5 ng/L at 2 hours, consider discharge based on risk stratification via the HEART score (if in ED), or ALVIN risk score in IP/Observation.    HS Troponin 99th Percentile URL of a Health Population=12 ng/L with a 95% Confidence Interval of 8-18 ng/L.   HS TROPONIN I 2HR - Normal    hs TnI 2hr 5  \"Refer to ACS Flowchart\"- see link ng/L Final    Comment:                                              Initial (time 0) result  If >=50 ng/L, Myocardial injury suggested ;  Type of myocardial injury and treatment strategy  to be determined.  If 5-49 ng/L, a delta result at 2 hours and or 4 hours will be needed to further evaluate.  If <4 ng/L, and chest pain has been >3 hours since onset, patient may qualify for discharge based on the HEART score in the ED.  If <5 ng/L and <3hours since onset of chest pain, a delta result at 2 hours will be needed to further evaluate.    HS Troponin 99th Percentile URL of a Health Population=12 ng/L with a 95% Confidence Interval of 8-18 ng/L.    Second Troponin (time 2 hours)  If calculated delta >= 20 ng/L,  Myocardial injury suggested ; Type of myocardial injury and treatment strategy to be determined.  If 5-49 ng/L and the calculated " delta is 5-19 ng/L, consult medical service for evaluation.  Continue evaluation for ischemia on ecg and other possible etiology and repeat hs troponin at 4 hours.  If delta is <5 ng/L at 2 hours, consider discharge based on risk stratification via the HEART score (if in ED), or ALVIN risk score in IP/Observation.    HS Troponin 99th Percentile URL of a Health Population=12 ng/L with a 95% Confidence Interval of 8-18 ng/L.    Delta 2hr hsTnI -1  <20 ng/L Final       XR chest 2 views   ED Interpretation   No acute cardiopulmonary abnormality      Final Result      No acute cardiopulmonary disease.            Workstation performed: [id number]               Procedures  ECG 12 Lead Documentation Only    Date/Time: 5/12/2025 12:18 AM    Performed by: Tsering Aly MD  Authorized by: Tsering Aly MD    Comments:      Normal sinus rhythm, ventricular rate 88, NM interval 160,  consistent with right bundle branch block, QTc 499, normal axis, no ST segment elevation or NM depression to suggest acute pericarditis, no STEMI, no significant change from prior EKG dated 10/6/2021.    HEART Risk Score      Flowsheet Row Most Recent Value   Heart Score Risk Calculator    History 0 Filed at: 05/12/2025 0106   ECG 1 Filed at: 05/12/2025 0106   Age 2 Filed at: 05/12/2025 0106   Risk Factors 2 Filed at: 05/12/2025 0106   Troponin 0 Filed at: 05/12/2025 0106   HEART Score 5 Filed at: 05/12/2025 0106              ED Course  Medications - No data to display     75 y.o. male presenting with chest pain. Vital Signs reviewed.  Differential diagnosis includes acute coronary syndrome, pericarditis, myocarditis, PE, pneumonia, pneumothorax, dissection, pleurisy, musculoskeletal pain, versus another etiology of symptoms.  EKG to my review is as above.  Chest x-ray to my review is without cardiomediastinal widening, infiltrates, or other acute intrathoracic abnormalities.  History, physical exam and data collected so far do not support  ACS, PE, pneumonia.  Recommend taking ibuprofen 400 mg every 12 hours for the next 5 days as an anti-inflammatory and for pain, and recommend close follow-up with cardiology given heart score of 5.    Patient discharged to home with recommendations for symptom control, return precautions, and plan for follow up.

## 2025-05-16 ENCOUNTER — OFFICE VISIT (OUTPATIENT)
Dept: FAMILY MEDICINE CLINIC | Facility: CLINIC | Age: 75
End: 2025-05-16
Payer: MEDICARE

## 2025-05-16 VITALS
SYSTOLIC BLOOD PRESSURE: 132 MMHG | TEMPERATURE: 97.8 F | BODY MASS INDEX: 30.6 KG/M2 | WEIGHT: 190.4 LBS | DIASTOLIC BLOOD PRESSURE: 68 MMHG | RESPIRATION RATE: 15 BRPM | HEIGHT: 66 IN | OXYGEN SATURATION: 98 % | HEART RATE: 58 BPM

## 2025-05-16 DIAGNOSIS — R07.9 CHEST PAIN, UNSPECIFIED TYPE: Primary | ICD-10-CM

## 2025-05-16 DIAGNOSIS — Z23 ENCOUNTER FOR IMMUNIZATION: ICD-10-CM
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
## 2025-05-16 DIAGNOSIS — S61.451A OPEN BITE OF RIGHT HAND, INITIAL ENCOUNTER: ICD-10-CM

## 2025-05-16 PROCEDURE — G2211 COMPLEX E/M VISIT ADD ON: HCPCS | Performed by: NURSE PRACTITIONER

## 2025-05-16 PROCEDURE — 90471 IMMUNIZATION ADMIN: CPT | Performed by: NURSE PRACTITIONER

## 2025-05-16 PROCEDURE — 99214 OFFICE O/P EST MOD 30 MIN: CPT | Performed by: NURSE PRACTITIONER

## 2025-05-16 PROCEDURE — 90714 TD VACC NO PRESV 7 YRS+ IM: CPT | Performed by: NURSE PRACTITIONER

## 2025-05-16 NOTE — PROGRESS NOTES
"Name: Mauricio Chiang      : 1950      MRN: [id number]  Encounter Provider: LEEANN Mattson  Encounter Date: 2025   Encounter department: [facility]  :  Assessment & Plan  Chest pain, unspecified type       Thought to be from pericarditis given CRP level was 54.1 and hx of pericarditis. Echo looks reassuring. Pt managed w/ Ibuprofen and Colchicine. Under care of Cardiology, Dr. Hernandez.  Cat bite, initial encounter    Orders:    amoxicillin-clavulanate (AUGMENTIN) 875-125 mg per tablet; Take 1 tablet by mouth every 12 (twelve) hours for 10 days    Pt has already take #5 pills of Augmentin since his cat bite on . We will have him take Augmentin BID for an additional 7 days. Script written for 10 days so that patient's girlfriend, who the #5 pills were used from by patient, can finish out her 7 day course. S/s of infection reviewed. If redness, swelling not resolved by end of antibiotics, can consider extending course or switching antibiotics if need be. Patient is encouraged to call our office for any questions/concerns, persistent or worsening symptoms. Patient states they understand and agree with treatment plan.     Open bite of right hand, initial encounter    Orders:    amoxicillin-clavulanate (AUGMENTIN) 875-125 mg per tablet; Take 1 tablet by mouth every 12 (twelve) hours for 10 days    TD VACCINE GREATER THAN OR EQUAL TO 6YO PRESERVATIVE FREE IM    Same as above. Boost TD given recent hx of cat bite on .  Encounter for immunization    Orders:    TD VACCINE GREATER THAN OR EQUAL TO 6YO PRESERVATIVE FREE IM      Pt to f/u PRN or as scheduled.     History of Present Illness   Pt presents today for ER follow up.  He presented to [facility] on  for chest pain.  Per EMR, pt w/ \"history of HTN, HLD, DM presenting to the emergency department for chest pain. Patient states that at 10:00 tonight he started having chest " "pain while he was at rest.  States the pain radiates into both of his shoulders and around into his back.  Denies having any nausea, vomiting.  Did have some shortness of breath and chills on his way to the emergency department.  Denies any recent falls or injuries.  Denies any recent illness.  States that he had pericarditis in the past and this sensation feels somewhat similar because his pain is worse when he lies flat and feels better with sitting upright.  Denies have any worsening of symptoms with exertion.\"  Pt has seen Cardiology, Dr Hernandez since his ER visit.  There was suspicion of pericarditis since patient had this back in 2021.  His CRP level was 54.1.  Pt was started on Ibuprofen 600 mg TID x 2 weeks, Colchicine and Pantoprazole for GI protection.  Pt notes he is feeling better.  He does note that on Tuesday 05/13, he was taking his cat to the vet and got bitten and scratched on his right hand.  Pt notes redness, swelling and heat of the site.  He started taking Augmentin that his girlfriend was prescribed for cat bite as well.  Pt notes he has had #5 pills total so far.  He notes the redness has gone down some.  Denies fever, chills, body aches.      Review of Systems  As noted per HPI.  Objective   /68   Pulse 58   Temp 97.8 °F (36.6 °C)   Resp 15   Ht 5' 6\" (1.676 m)   Wt 86.4 kg (190 lb 6.4 oz)   SpO2 98%   BMI 30.73 kg/m²      Physical Exam  Vitals reviewed.   Constitutional:       Appearance: Normal appearance.     Cardiovascular:      Rate and Rhythm: Normal rate and regular rhythm.      Pulses: Normal pulses.      Heart sounds: Normal heart sounds.     Musculoskeletal:      Right hand: Swelling present. No tenderness.      Comments: Dorsal aspect of right hand with minimal swelling, faint erythema. No heat noted. Bite wound scabbed over without evidence of discharge.     Neurological:      Mental Status: He is alert and oriented to person, place, and time. Mental status is at " baseline.     Psychiatric:         Mood and Affect: Mood normal.         Behavior: Behavior normal.         Thought Content: Thought content normal.         Judgment: Judgment normal.

## 2025-05-27 ENCOUNTER — APPOINTMENT (OUTPATIENT)
Dept: LAB | Age: 75
End: 2025-05-27
Payer: MEDICARE

## 2025-05-27 DIAGNOSIS — R07.9 CHEST PAIN: ICD-10-CM

## 2025-05-27 DIAGNOSIS — R07.9 CHEST PAIN, UNSPECIFIED TYPE: ICD-10-CM

## 2025-05-27 LAB — CRP SERPL QL: <1 MG/L

## 2025-05-27 PROCEDURE — 36415 COLL VENOUS BLD VENIPUNCTURE: CPT

## 2025-05-27 PROCEDURE — 86140 C-REACTIVE PROTEIN: CPT

## 2025-05-28 DIAGNOSIS — R07.9 CHEST PAIN: ICD-10-CM

## 2025-05-28 RX ORDER — PANTOPRAZOLE SODIUM 40 MG/1
40 TABLET, DELAYED RELEASE ORAL DAILY
Qty: 30 TABLET | Refills: 0 | Status: SHIPPED | OUTPATIENT
Start: 2025-05-28

## 2025-06-10 ENCOUNTER — OFFICE VISIT (OUTPATIENT)
Dept: FAMILY MEDICINE CLINIC | Facility: CLINIC | Age: 75
End: 2025-06-10
Payer: MEDICARE

## 2025-06-10 VITALS
TEMPERATURE: 98.2 F | RESPIRATION RATE: 16 BRPM | HEIGHT: 66 IN | DIASTOLIC BLOOD PRESSURE: 72 MMHG | WEIGHT: 188.5 LBS | OXYGEN SATURATION: 98 % | SYSTOLIC BLOOD PRESSURE: 122 MMHG | HEART RATE: 59 BPM | BODY MASS INDEX: 30.29 KG/M2

## 2025-06-10 DIAGNOSIS — R97.20 ELEVATED PSA: ICD-10-CM

## 2025-06-10 DIAGNOSIS — D86.9 SARCOIDOSIS: ICD-10-CM

## 2025-06-10 DIAGNOSIS — R91.1 PULMONARY NODULE SEEN ON IMAGING STUDY: ICD-10-CM

## 2025-06-10 DIAGNOSIS — E55.9 VITAMIN D DEFICIENCY: ICD-10-CM

## 2025-06-10 DIAGNOSIS — K90.0 ADULT CELIAC DISEASE: ICD-10-CM

## 2025-06-10 DIAGNOSIS — R73.09 ELEVATED HEMOGLOBIN A1C: ICD-10-CM

## 2025-06-10 DIAGNOSIS — R36.1 HEMATOSPERMIA: ICD-10-CM

## 2025-06-10 DIAGNOSIS — Z00.00 MEDICARE ANNUAL WELLNESS VISIT, SUBSEQUENT: ICD-10-CM

## 2025-06-10 DIAGNOSIS — E78.2 MIXED HYPERLIPIDEMIA: Primary | ICD-10-CM

## 2025-06-10 DIAGNOSIS — R07.9 CHEST PAIN: ICD-10-CM

## 2025-06-10 DIAGNOSIS — N40.1 BENIGN PROSTATIC HYPERPLASIA WITH URINARY FREQUENCY: ICD-10-CM

## 2025-06-10 DIAGNOSIS — I45.10 RIGHT BUNDLE BRANCH BLOCK: ICD-10-CM

## 2025-06-10 DIAGNOSIS — R35.0 BENIGN PROSTATIC HYPERPLASIA WITH URINARY FREQUENCY: ICD-10-CM

## 2025-06-10 PROBLEM — R76.8 RHEUMATOID FACTOR POSITIVE: Status: RESOLVED | Noted: 2021-10-26 | Resolved: 2025-06-10

## 2025-06-10 PROCEDURE — 99214 OFFICE O/P EST MOD 30 MIN: CPT | Performed by: FAMILY MEDICINE

## 2025-06-10 PROCEDURE — G2211 COMPLEX E/M VISIT ADD ON: HCPCS | Performed by: FAMILY MEDICINE

## 2025-06-10 PROCEDURE — G0439 PPPS, SUBSEQ VISIT: HCPCS | Performed by: FAMILY MEDICINE

## 2025-06-10 RX ORDER — FINASTERIDE 5 MG/1
5 TABLET, FILM COATED ORAL DAILY
Qty: 90 TABLET | Refills: 3 | Status: SHIPPED | OUTPATIENT
Start: 2025-06-10

## 2025-06-10 RX ORDER — ATORVASTATIN CALCIUM 20 MG/1
20 TABLET, FILM COATED ORAL DAILY
Qty: 90 TABLET | Refills: 3 | Status: SHIPPED | OUTPATIENT
Start: 2025-06-10

## 2025-06-10 NOTE — ASSESSMENT & PLAN NOTE
Patient also being followed and managed through urology  Is on finasteride as well as Cialis  Ultrasound of prostate and bladder reviewed with patient  Will let us know if he ever needs to really force himself to start urination but he has been on the above for at least 20 years  MRI shows low likelihood of any cancer  Biopsy years ago negative  Orders:    finasteride (PROSCAR) 5 mg tablet; Take 1 tablet (5 mg total) by mouth daily

## 2025-06-10 NOTE — ASSESSMENT & PLAN NOTE
More like a gluten sensitivity more than anything else because he could eat it once a week  Maintain with diet

## 2025-06-10 NOTE — PROGRESS NOTES
Name: Mauricio Chiang      : 1950      MRN: [id number]  Encounter Provider: Negra Aleman DO  Encounter Date: 6/10/2025   Encounter department: [facility]  :  Everything looks good especially now that you are done with the pericarditis  We can continue to see you once yearly  Just call 2 months ahead of time so we could order your blood work and go over when you come in as you have done and of course recheck sooner if ever needed    Assessment & Plan  Mixed hyperlipidemia  Cholesterol excellent at 119 with a LDL of 54  Continue atorvastatin and check yearly  Orders:    atorvastatin (LIPITOR) 20 mg tablet; Take 1 tablet (20 mg total) by mouth daily    Benign prostatic hyperplasia with urinary frequency  Patient also being followed and managed through urology  Is on finasteride as well as Cialis  Ultrasound of prostate and bladder reviewed with patient  Will let us know if he ever needs to really force himself to start urination but he has been on the above for at least 20 years  MRI shows low likelihood of any cancer  Biopsy years ago negative  Orders:    finasteride (PROSCAR) 5 mg tablet; Take 1 tablet (5 mg total) by mouth daily    Chest pain  Chest pain resolved secondary to pericarditis and this was the 2nd time he had it  We will not be on any preventative medicine because it is something that could come every 3 to 4 years       Adult celiac disease  More like a gluten sensitivity more than anything else because he could eat it once a week  Maintain with diet       Elevated hemoglobin A1c  A1c 6.2 from 6.3 which is about its average  Continue to check yearly and continue diet and exercise       Elevated PSA  Secondary to BPH and followed by urology       Hematospermia  Self-limiting usually in a couple weeks and also followed by urology       Pulmonary nodule seen on imaging study  This has been stable for years and no longer needs to be followed         Right  bundle branch block  On EKG but is of no consequence       Vitamin D deficiency  Vitamin D just normal at 32 continue vitamin D daily       remote history of sarcoidosis  Most likely responsible for a positive rheumatoid factor and calcified nodule in his lung that no longer needs to be followed       Medicare annual wellness visit, subsequent           Depression Screening and Follow-up Plan: Patient was screened for depression during today's encounter. They screened negative with a PHQ-2 score of 0.        Preventive health issues were discussed with patient, and age appropriate screening tests were ordered as noted in patient's After Visit Summary. Personalized health advice and appropriate referrals for health education or preventive services given if needed, as noted in patient's After Visit Summary.    History of Present Illness     Patient is here for his yearly recheck and his AWV and his physical  He pericarditis and was in the hospital but that quickly passed within 2 weeks  Followed and worked up by cardiology    Also saw urology regarding BPH and hematospermia  Followed with dermatology  Followed with Ortho for back pain    Labs here to review that are new    Reviewed all imaging studies since last visit including his MRI of the prostate chest x-ray and echo    Hyperlipidemia       Patient Care Team:  Negra Aleman DO as PCP - General (Family Medicine)  MD Zoey Daniel MD as Endoscopist    Review of Systems  Negative x 12  Medical History Reviewed by provider this encounter:  Tobacco  Allergies  Meds  Problems  Med Hx  Surg Hx  Fam Hx       Annual Wellness Visit Questionnaire   Mauricio is here for his Subsequent Wellness visit.     Health Risk Assessment:   Patient rates overall health as very good. Patient feels that their physical health rating is same. Patient is very satisfied with their life. Eyesight was rated as same. Hearing was rated as same. Patient feels that their  emotional and mental health rating is same. Patients states they are never, rarely angry. Patient states they are sometimes unusually tired/fatigued. Pain experienced in the last 7 days has been none. Patient states that he has experienced no weight loss or gain in last 6 months.     Depression Screening:   PHQ-2 Score: 0      Fall Risk Screening:   In the past year, patient has experienced: no history of falling in past year      Home Safety:  Patient does not have trouble with stairs inside or outside of their home. Patient has working smoke alarms and has no working carbon monoxide detector. Home safety hazards include: none.     Nutrition:   Current diet is Low Carb.     Medications:   Patient is currently taking over-the-counter supplements. OTC medications include: see medication list. Patient is able to manage medications.     Activities of Daily Living (ADLs)/Instrumental Activities of Daily Living (IADLs):   Walk and transfer into and out of bed and chair?: Yes  Dress and groom yourself?: Yes    Bathe or shower yourself?: Yes    Feed yourself? Yes  Do your laundry/housekeeping?: Yes  Manage your money, pay your bills and track your expenses?: Yes  Make your own meals?: Yes    Do your own shopping?: Yes    Previous Hospitalizations:   Any hospitalizations or ED visits within the last 12 months?: Yes    How many hospitalizations have you had in the last year?: 1-2    Hospitalization Comments: For pericarditis    Advance Care Planning:   Living will: Yes    Durable POA for healthcare: Yes    Advanced directive: Yes    End of Life Decisions reviewed with patient: Yes      Cognitive Screening:   Provider or family/friend/caregiver concerned regarding cognition?: No    Preventive Screenings      Cardiovascular Screening:    General: Screening Not Indicated and History Lipid Disorder      Diabetes Screening:     General: Screening Current      Colorectal Cancer Screening:     General: Screening Current       Prostate Cancer Screening:    General: Screening Not Indicated      Osteoporosis Screening:    General: Screening Not Indicated      Abdominal Aortic Aneurysm (AAA) Screening:        General: Screening Not Indicated      Lung Cancer Screening:     General: Screening Not Indicated      Hepatitis C Screening:    General: Screening Current    Screening, Brief Intervention, and Referral to Treatment (SBIRT)     Screening  Typical number of drinks in a day: 0  Typical number of drinks in a week: 2  Interpretation: Low risk drinking behavior.    AUDIT-C Screenin) How often did you have a drink containing alcohol in the past year? 2 to 3 times a week  2) How many drinks did you have on a typical day when you were drinking in the past year? 0  3) How often did you have 6 or more drinks on one occasion in the past year? never    AUDIT-C Score: 3  Interpretation: Score 0-3 (male): Negative screen for alcohol misuse    Single Item Drug Screening:  How often have you used an illegal drug (including marijuana) or a prescription medication for non-medical reasons in the past year? never    Single Item Drug Screen Score: 0  Interpretation: Negative screen for possible drug use disorder    Brief Intervention  Alcohol & drug use screenings were reviewed. No concerns regarding substance use disorder identified.     Other Counseling Topics:   Calcium and vitamin D intake and regular weightbearing exercise.     Social Drivers of Health     Financial Resource Strain: Low Risk  (2023)    Overall Financial Resource Strain (CARDIA)     Difficulty of Paying Living Expenses: Not hard at all   Food Insecurity: No Food Insecurity (2025)    Nursing - Inadequate Food Risk Classification     Worried About Running Out of Food in the Last Year: Never true     Ran Out of Food in the Last Year: Never true   Transportation Needs: No Transportation Needs (2025)    PRAPARE - Transportation     Lack of Transportation (Medical): No      "Lack of Transportation (Non-Medical): No   Housing Stability: Low Risk  (6/6/2025)    Housing Stability Vital Sign     Unable to Pay for Housing in the Last Year: No     Number of Times Moved in the Last Year: 0     Homeless in the Last Year: No   Utilities: Not At Risk (6/6/2025)    [facility] Utilities     Threatened with loss of utilities: No     No results found.    Objective   /72 (Patient Position: Sitting, Cuff Size: Standard)   Pulse 59   Temp 98.2 °F (36.8 °C) (Temporal)   Resp 16   Ht 5' 6.25\" (1.683 m)   Wt 85.5 kg (188 lb 8 oz)   SpO2 98%   BMI 30.20 kg/m²     Physical Exam    Gen.  No acute distress well-appearing well-nourished appears stated age    Mental status  Good judgment and insight oriented to time person and place, recent and remote memory intact mood and affect normal cooperative and patient is reasonable    HEENT  PERRLA 3 mm, EOMI without nystagmus, normocephalic atraumatic without facial weakness    Neck   supple no masses trachea midline positive click normal carotid upstrokes with no bruits    Cor  Regular rhythm without ectopy or murmur, no S3-S4, normal palpation that is no heave lift or thrill    Vascular  No edema, good pedal pulses    Lungs  CTA bilaterally in no respiratory distress no wheezes rhonchi or rales, normal to palpation no tactile fremitus    Abdomen  Soft, no palpable masses, no hepatosplenomegaly, normal bowel sounds, nontender    Lymphatics  No palpable nodes in the neck, supraclavicular area, axilla, or groin    Musculoskeletal  No clubbing cyanosis or edema muscle tone normal    Skin  no rashes or abnormal appearing lesions    Neuro  Normal ambulation, cranial nerves 2-12 grossly intact, higher functioning with reasoning intact.    "

## 2025-06-10 NOTE — ASSESSMENT & PLAN NOTE
Most likely responsible for a positive rheumatoid factor and calcified nodule in his lung that no longer needs to be followed

## 2025-06-10 NOTE — ASSESSMENT & PLAN NOTE
Cholesterol excellent at 119 with a LDL of 54  Continue atorvastatin and check yearly  Orders:    atorvastatin (LIPITOR) 20 mg tablet; Take 1 tablet (20 mg total) by mouth daily

## 2025-06-10 NOTE — PATIENT INSTRUCTIONS
Everything looks good especially now that you are done with the pericarditis  We can continue to see you once yearly  Just call 2 months ahead of time so we could order your blood work and go over when you come in as you have done and of course recheck sooner if ever needed    Medicare Preventive Visit Patient Instructions  Thank you for completing your Welcome to Medicare Visit or Medicare Annual Wellness Visit today. Your next wellness visit will be due in one year (6/11/2026).  The screening/preventive services that you may require over the next 5-10 years are detailed below. Some tests may not apply to you based off risk factors and/or age. Screening tests ordered at today's visit but not completed yet may show as past due. Also, please note that scanned in results may not display below.  Preventive Screenings:  Service Recommendations Previous Testing/Comments   Colorectal Cancer Screening  Colonoscopy    Fecal Occult Blood Test (FOBT)/Fecal Immunochemical Test (FIT)  Fecal DNA/Cologuard Test  Flexible Sigmoidoscopy Age: 45-75 years old   Colonoscopy: every 10 years (May be performed more frequently if at higher risk)  OR  FOBT/FIT: every 1 year  OR  Cologuard: every 3 years  OR  Sigmoidoscopy: every 5 years  Screening may be recommended earlier than age 45 if at higher risk for colorectal cancer. Also, an individualized decision between you and your healthcare provider will decide whether screening between the ages of 76-85 would be appropriate. Colonoscopy: 06/20/2022  FOBT/FIT: Not on file  Cologuard: Not on file  Sigmoidoscopy: Not on file    Screening Current     Prostate Cancer Screening Individualized decision between patient and health care provider in men between ages of 55-69   Medicare will cover every 12 months beginning on the day after your 50th birthday PSA: 2.10 ng/mL     Screening Not Indicated     Hepatitis C Screening Once for adults born between 1945 and 1965  More frequently in patients at  high risk for Hepatitis C Hep C Antibody: 09/05/2018    Screening Current   Diabetes Screening 1-2 times per year if you're at risk for diabetes or have pre-diabetes Fasting glucose: 132 mg/dL (5/13/2025)  A1C: 6.2 % (5/13/2025)  Screening Current   Cholesterol Screening Once every 5 years if you don't have a lipid disorder. May order more often based on risk factors. Lipid panel: 05/13/2025  Screening Not Indicated  History Lipid Disorder      Other Preventive Screenings Covered by Medicare:  Abdominal Aortic Aneurysm (AAA) Screening: covered once if your at risk. You're considered to be at risk if you have a family history of AAA or a male between the age of 65-75 who smoking at least 100 cigarettes in your lifetime.  Lung Cancer Screening: covers low dose CT scan once per year if you meet all of the following conditions: (1) Age 55-77; (2) No signs or symptoms of lung cancer; (3) Current smoker or have quit smoking within the last 15 years; (4) You have a tobacco smoking history of at least 20 pack years (packs per day x number of years you smoked); (5) You get a written order from a healthcare provider.  Glaucoma Screening: covered annually if you're considered high risk: (1) You have diabetes OR (2) Family history of glaucoma OR (3)  aged 50 and older OR (4)  American aged 65 and older  Osteoporosis Screening: covered every 2 years if you meet one of the following conditions: (1) Have a vertebral abnormality; (2) On glucocorticoid therapy for more than 3 months; (3) Have primary hyperparathyroidism; (4) On osteoporosis medications and need to assess response to drug therapy.  HIV Screening: covered annually if you're between the age of 15-65. Also covered annually if you are younger than 15 and older than 65 with risk factors for HIV infection. For pregnant patients, it is covered up to 3 times per pregnancy.    Immunizations:  Immunization Recommendations   Influenza Vaccine Annual  influenza vaccination during flu season is recommended for all persons aged >= 6 months who do not have contraindications   Pneumococcal Vaccine   * Pneumococcal conjugate vaccine = PCV13 (Prevnar 13), PCV15 (Vaxneuvance), PCV20 (Prevnar 20)  * Pneumococcal polysaccharide vaccine = PPSV23 (Pneumovax) Adults 19-63 yo with certain risk factors or if 65+ yo  If never received any pneumonia vaccine: recommend Prevnar 20 (PCV20)  Give PCV20 if previously received 1 dose of PCV13 or PPSV23   Hepatitis B Vaccine 3 dose series if at intermediate or high risk (ex: diabetes, end stage renal disease, liver disease)   Respiratory syncytial virus (RSV) Vaccine - COVERED BY MEDICARE PART D  * RSVPreF3 (Arexvy) CDC recommends that adults 60 years of age and older may receive a single dose of RSV vaccine using shared clinical decision-making (SCDM)   Tetanus (Td) Vaccine - COST NOT COVERED BY MEDICARE PART B Following completion of primary series, a booster dose should be given every 10 years to maintain immunity against tetanus. Td may also be given as tetanus wound prophylaxis.   Tdap Vaccine - COST NOT COVERED BY MEDICARE PART B Recommended at least once for all adults. For pregnant patients, recommended with each pregnancy.   Shingles Vaccine (Shingrix) - COST NOT COVERED BY MEDICARE PART B  2 shot series recommended in those 19 years and older who have or will have weakened immune systems or those 50 years and older     Health Maintenance Due:      Topic Date Due    Colorectal Cancer Screening  06/17/2032    Hepatitis C Screening  Completed     Immunizations Due:  There are no preventive care reminders to display for this patient.  Advance Directives   What are advance directives?  Advance directives are legal documents that state your wishes and plans for medical care. These plans are made ahead of time in case you lose your ability to make decisions for yourself. Advance directives can apply to any medical decision, such as  the treatments you want, and if you want to donate organs.   What are the types of advance directives?  There are many types of advance directives, and each state has rules about how to use them. You may choose a combination of any of the following:  Living will:  This is a written record of the treatment you want. You can also choose which treatments you do not want, which to limit, and which to stop at a certain time. This includes surgery, medicine, IV fluid, and tube feedings.   Durable power of  for healthcare (DPAHC):  This is a written record that states who you want to make healthcare choices for you when you are unable to make them for yourself. This person, called a proxy, is usually a family member or a friend. You may choose more than 1 proxy.  Do not resuscitate (DNR) order:  A DNR order is used in case your heart stops beating or you stop breathing. It is a request not to have certain forms of treatment, such as CPR. A DNR order may be included in other types of advance directives.  Medical directive:  This covers the care that you want if you are in a coma, near death, or unable to make decisions for yourself. You can list the treatments you want for each condition. Treatment may include pain medicine, surgery, blood transfusions, dialysis, IV or tube feedings, and a ventilator (breathing machine).  Values history:  This document has questions about your views, beliefs, and how you feel and think about life. This information can help others choose the care that you would choose.  Why are advance directives important?  An advance directive helps you control your care. Although spoken wishes may be used, it is better to have your wishes written down. Spoken wishes can be misunderstood, or not followed. Treatments may be given even if you do not want them. An advance directive may make it easier for your family to make difficult choices about your care.   Weight Management   Why it is important  to manage your weight:  Being overweight increases your risk of health conditions such as heart disease, high blood pressure, type 2 diabetes, and certain types of cancer. It can also increase your risk for osteoarthritis, sleep apnea, and other respiratory problems. Aim for a slow, steady weight loss. Even a small amount of weight loss can lower your risk of health problems.  How to lose weight safely:  A safe and healthy way to lose weight is to eat fewer calories and get regular exercise. You can lose up about 1 pound a week by decreasing the number of calories you eat by 500 calories each day.   Healthy meal plan for weight management:  A healthy meal plan includes a variety of foods, contains fewer calories, and helps you stay healthy. A healthy meal plan includes the following:  Eat whole-grain foods more often.  A healthy meal plan should contain fiber. Fiber is the part of grains, fruits, and vegetables that is not broken down by your body. Whole-grain foods are healthy and provide extra fiber in your diet. Some examples of whole-grain foods are whole-wheat breads and pastas, oatmeal, brown rice, and bulgur.  Eat a variety of vegetables every day.  Include dark, leafy greens such as spinach, kale, john greens, and mustard greens. Eat yellow and orange vegetables such as carrots, sweet potatoes, and winter squash.   Eat a variety of fruits every day.  Choose fresh or canned fruit (canned in its own juice or light syrup) instead of juice. Fruit juice has very little or no fiber.  Eat low-fat dairy foods.  Drink fat-free (skim) milk or 1% milk. Eat fat-free yogurt and low-fat cottage cheese. Try low-fat cheeses such as mozzarella and other reduced-fat cheeses.  Choose meat and other protein foods that are low in fat.  Choose beans or other legumes such as split peas or lentils. Choose fish, skinless poultry (chicken or turkey), or lean cuts of red meat (beef or pork). Before you cook meat or poultry, cut off  any visible fat.   Use less fat and oil.  Try baking foods instead of frying them. Add less fat, such as margarine, sour cream, regular salad dressing and mayonnaise to foods. Eat fewer high-fat foods. Some examples of high-fat foods include french fries, doughnuts, ice cream, and cakes.  Eat fewer sweets.  Limit foods and drinks that are high in sugar. This includes candy, cookies, regular soda, and sweetened drinks.  Exercise:  Exercise at least 30 minutes per day on most days of the week. Some examples of exercise include walking, biking, dancing, and swimming. You can also fit in more physical activity by taking the stairs instead of the elevator or parking farther away from stores. Ask your healthcare provider about the best exercise plan for you.    © Copyright [employer] 2018 Information is for End User's use only and may not be sold, redistributed or otherwise used for commercial purposes. All illustrations and images included in CareNotes® are the copyrighted property of [employer] or [employer]

## 2025-06-10 NOTE — ASSESSMENT & PLAN NOTE
A1c 6.2 from 6.3 which is about its average  Continue to check yearly and continue diet and exercise

## 2025-06-11 ENCOUNTER — OFFICE VISIT (OUTPATIENT)
Dept: CARDIOLOGY CLINIC | Facility: CLINIC | Age: 75
End: 2025-06-11
Payer: MEDICARE

## 2025-06-11 VITALS
DIASTOLIC BLOOD PRESSURE: 70 MMHG | SYSTOLIC BLOOD PRESSURE: 148 MMHG | HEIGHT: 66 IN | WEIGHT: 188 LBS | BODY MASS INDEX: 30.22 KG/M2 | HEART RATE: 61 BPM

## 2025-06-11 DIAGNOSIS — R07.89 OTHER CHEST PAIN: Primary | ICD-10-CM

## 2025-06-11 DIAGNOSIS — E78.2 MIXED HYPERLIPIDEMIA: ICD-10-CM

## 2025-06-11 PROCEDURE — 99213 OFFICE O/P EST LOW 20 MIN: CPT

## 2025-06-11 NOTE — PROGRESS NOTES
Cardiology Follow Up    Mauricio Chiang  1950  [id number]  [facility]  [address]  [facility]  [facility] [id number] [phone]    1. Other chest pain        2. Mixed hyperlipidemia        Discussion/Summary:  1.  Chest pain related to pericarditis  Patient seen by Dr. Hernandez on 5/12/2025 with complaints of chest pain which worsened with lying down lasting for a number of hours.  Seen in the emergency room with negative troponins, EKG with right bundle which is not new for patient, normal CBC and CMP chest x-ray.  CRP was 54.  Dr. Hernandez started patient on ibuprofen taper and colchicine with repeat CRP 2 weeks later of less than 1.  Echocardiogram on 5/14 demonstrates EF of 65% with normal systolic function, grade 1 diastolic dysfunction, normal RV size and function, no evidence of pericardial effusion.    Patient without chest pain or chest discomfort at this time, he states that he felt better after a couple days of treatment with ibuprofen and colchicine.  He is resumed regular activities without any chest discomfort with exertion.  Blood pressure a little elevated upon arrival, typically has a systolic in the 120s.  He was running late for his appointment and had to rush to get here.  Continue colchicine for total of 3 months    2.  Hyperlipidemia  Managed by his primary care.  Last lipid profile on 5/13/2025 demonstrates total cholesterol 119, HDL 49 and LDL of 54.  Patient maintained on atorvastatin 20 mg daily.    Patient will follow-up on an as-needed basis with cardiology    Interval History:   Mauricio Chiang is a 75 y.o. male with a past medical history of pericarditis, right bundle branch block who presents for routine follow-up for pericarditis patient was last seen in the office on 5/12/2025 by Dr. Hernandez.  At this appointment echocardiogram and CRP were ordered secondary to patient complaining of  chest discomfort worsening with lying down.  Patient went to the emergency department and was subsequently ruled out for MI and was able to see Dr. Hernandez that same day.  He was started on an ibuprofen taper, and colchicine.    Patient felt back to baseline after couple days of treatment with ibuprofen and colchicine.  He has resumed his normal activities without any chest pain, chest pressure, shortness of breath, lightheadedness or dizziness with activity or with rest.    Medical Problems       Problem List       Mixed hyperlipidemia    Vitamin D deficiency    Adult celiac disease    Overview Signed 6/15/2018 10:36 AM by Deedee Ridley MD   Transitioned From: Abnormal celiac antibody panel         Benign prostatic hyperplasia with urinary frequency    Liver cyst    Intrinsic eczema    remote history of sarcoidosis    Elevated hemoglobin A1c    Right bundle branch block    Elevated PSA    Hematospermia        Past Medical History[1]  Social History     Socioeconomic History    Marital status:      Spouse name: Not on file    Number of children: Not on file    Years of education: Not on file    Highest education level: Not on file   Occupational History    Occupation: Retired   Tobacco Use    Smoking status: Never    Smokeless tobacco: Never   Vaping Use    Vaping status: Never Used   Substance and Sexual Activity    Alcohol use: Yes     Alcohol/week: 2.0 standard drinks of alcohol     Types: 2 Glasses of wine per week     Comment: wine,  social drinker    Drug use: No     Comment: Denied history of drug use    Sexual activity: Yes     Partners: Female     Birth control/protection: Male Sterilization   Other Topics Concern    Not on file   Social History Narrative    Daily coffee consumption (2 Cups/Day)        retired     Social Drivers of Health     Financial Resource Strain: Low Risk  (5/18/2023)    Overall Financial Resource Strain (CARDIA)     Difficulty of Paying Living Expenses: Not  hard at all   Food Insecurity: No Food Insecurity (6/6/2025)    Nursing - Inadequate Food Risk Classification     Worried About Running Out of Food in the Last Year: Never true     Ran Out of Food in the Last Year: Never true     Ran Out of Food in the Last Year: Not on file   Transportation Needs: No Transportation Needs (6/6/2025)    PRAPARE - Transportation     Lack of Transportation (Medical): No     Lack of Transportation (Non-Medical): No   Physical Activity: Not on file   Stress: Not on file   Social Connections: Not on file   Intimate Partner Violence: Not on file   Housing Stability: Low Risk  (6/6/2025)    Housing Stability Vital Sign     Unable to Pay for Housing in the Last Year: No     Number of Times Moved in the Last Year: 0     Homeless in the Last Year: No      Family History[2]  Past Surgical History[3]  Current Medications[4]  No Known Allergies    Labs:     Chemistry        Component Value Date/Time     02/24/2015 0819    K 4.6 05/13/2025 0758    K 4.8 02/24/2015 0819     05/13/2025 0758     02/24/2015 0819    CO2 28 05/13/2025 0758    CO2 29 02/24/2015 0819    BUN 27 (H) 05/13/2025 0758    BUN 21 02/24/2015 0819    CREATININE 0.86 05/13/2025 0758    CREATININE 0.85 02/24/2015 0819        Component Value Date/Time    CALCIUM 8.9 05/13/2025 0758    CALCIUM 8.9 02/24/2015 0819    ALKPHOS 58 05/13/2025 0758    ALKPHOS 94 02/24/2015 0819    AST 17 05/13/2025 0758    AST 23 02/24/2015 0819    ALT 18 05/13/2025 0758    ALT 32 02/24/2015 0819    BILITOT 1.0 02/24/2015 0819            Lab Results   Component Value Date    CHOL 140 02/24/2015    CHOL 140 12/17/2013     Lab Results   Component Value Date    HDL 49 05/13/2025    HDL 52 05/21/2024    HDL 59 05/16/2023     Lab Results   Component Value Date    LDLCALC 54 05/13/2025    LDLCALC 66 05/21/2024    LDLCALC 65 05/16/2023     Lab Results   Component Value Date    TRIG 80 05/13/2025    TRIG 80 05/21/2024    TRIG 82 05/16/2023     No  "results found for: \"CHOLHDL\"    Imaging: Echo follow up/limited w/ contrast if indicated  Result Date: 5/14/2025  Narrative:   Left Ventricle: Left ventricular cavity size is normal. Wall thickness is increased. There is mild concentric hypertrophy. The left ventricular ejection fraction is 65%. Systolic function is normal. Wall motion is normal. Diastolic function is mildly abnormal, consistent with grade I (abnormal) relaxation.   Right Ventricle: Right ventricular cavity size is normal. Systolic function is normal.     Review of Systems   Constitutional: Negative for malaise/fatigue, weight gain and weight loss.   Cardiovascular:  Negative for chest pain, dyspnea on exertion, irregular heartbeat, leg swelling, near-syncope, orthopnea, palpitations, paroxysmal nocturnal dyspnea and syncope.   Respiratory:  Negative for shortness of breath.    Gastrointestinal:  Negative for melena.   Genitourinary:  Negative for hematuria.   Neurological:  Negative for dizziness, focal weakness, headaches, light-headedness and weakness.       Vitals:    06/11/25 1022   BP: 148/70   Pulse: 61     Vitals:    06/11/25 1022   Weight: 85.3 kg (188 lb)     Height: 5' 6.25\" (168.3 cm)   Body mass index is 30.12 kg/m².    Physical Exam  Constitutional:       Appearance: Normal appearance.   HENT:      Head: Normocephalic and atraumatic.      Nose: Nose normal.      Mouth/Throat:      Mouth: Mucous membranes are moist.     Cardiovascular:      Rate and Rhythm: Normal rate and regular rhythm.      Pulses: Normal pulses.      Heart sounds: Normal heart sounds.   Pulmonary:      Effort: Pulmonary effort is normal.      Breath sounds: Normal breath sounds.   Abdominal:      Palpations: Abdomen is soft.     Musculoskeletal:         General: Normal range of motion.      Cervical back: Normal range of motion.      Right lower leg: No edema.      Left lower leg: No edema.     Skin:     General: Skin is warm.      Capillary Refill: Capillary refill " takes less than 2 seconds.     Neurological:      General: No focal deficit present.      Mental Status: He is alert and oriented to person, place, and time. Mental status is at baseline.     Psychiatric:         Mood and Affect: Mood normal.         Behavior: Behavior normal.         Thought Content: Thought content normal.              [1]   Past Medical History:  Diagnosis Date    Acute pancreatitis     last assessed - 2017    Arthritis 2017    Basal cell carcinoma     Benign prostatic hyperplasia     BPH (benign prostatic hypertrophy)     Celiac disease     current GI w/u to r/o celiac    Cholelithiasis     Colon polyp     Diabetes mellitus (HCC) 2018    Eczema     Elevated blood pressure reading     last assessed - 2017    Gallstones     last assessed - [id number]    Heart disease 10/6/21    Pericarditis    High cholesterol     History of elevated PSA 2021    Hx of mitral valve disorder     Hypertension     Osteoarthritis     Pulmonary nodule seen on imaging study 2014    Description: no further imaging, thought to be scarring      Rheumatoid factor positive 10/26/2021    Likely positive because of remote history of sarcoid-      Right bundle branch block (RBBB) 2021    Sarcoidosis      Diagnosed 15 years ago by biopsy, not active; last assessed - 2016   [2]   Family History  Problem Relation Name Age of Onset    Diabetes Mother Sho         Diabetes Mellitus    Hypertension Mother Sho     Ovarian cancer Mother Sho     Rheumatologic disease Mother Sho     Arthritis Mother Sho             Cancer Mother Sho         Ovaries    Psoriasis Mother Sho     Rheum arthritis Mother Sho     Diabetes Father Aldo         Diabetes Mellitus    Heart disease Father Aldo     Hypertension Father Aldo     Alcohol abuse Father Aldo             Diabetes Sister N/a         Diabetes Mellitus    Hypertension Sister N/a     Hypertension Sister Dolores  Sunil     Hypertension Sister Jazmin     Substance Abuse Neg Hx      Mental illness Neg Hx      Depression Neg Hx     [3]   Past Surgical History:  Procedure Laterality Date    CATARACT EXTRACTION      CHOLECYSTECTOMY  2016    ERCP N/A 04/03/2017    Procedure: ENDOSCOPIC RETROGRADE CHOLANGIOPANCREATOGRAPHY (ERCP);  Surgeon: Justice Jama MD;  Location: [facility];  Service:     EYE SURGERY  11/4/20    Cataract both eyes    MOHS SURGERY  2023    NH COLONOSCOPY FLX DX W/COLLJ SPEC WHEN PFRMD N/A 03/01/2017    Procedure: EGD AND COLONOSCOPY;  Surgeon: Zoey De La Cruz MD;  Location: [facility];  Service: Gastroenterology    NH ESOPHAGOGASTRODUODENOSCOPY TRANSORAL DIAGNOSTIC N/A 06/05/2017    Procedure: ESOPHAGOGASTRODUODENOSCOPY (EGD);  Surgeon: Justice Jama MD;  Location: [facility];  Service: Gastroenterology    NH LAPAROSCOPY SURG CHOLECYSTECTOMY N/A 03/29/2017    Procedure: CHOLECYSTECTOMY LAPAROSCOPIC; POSSIBLE OPEN;  Surgeon: Alonso Medina MD;  Location: [facility];  Service: General; complicated by bile leak and pancreatitis s/p stent     ROTATOR CUFF REPAIR Right 04/29/2015    for rotator cuff tear, Onset: 4/14    SHOULDER SURGERY  2015    Rotator cuff    SKIN BIOPSY  2023    VASECTOMY  1983   [4]   Current Outpatient Medications:     atorvastatin (LIPITOR) 20 mg tablet, Take 1 tablet (20 mg total) by mouth daily, Disp: 90 tablet, Rfl: 3    betamethasone dipropionate (DIPROSONE) 0.05 % cream, Apply topically 2 (two) times a day Until the rash is gone PRN, Disp: , Rfl:     Cholecalciferol (Vitamin D-3) 25 MCG (1000 UT) CAPS, 2 daily, Disp: , Rfl:     colchicine (COLCRYS) 0.6 mg tablet, Take 1 tablet (0.6 mg total) by mouth daily, Disp: 90 tablet, Rfl: 0    finasteride (PROSCAR) 5 mg tablet, Take 1 tablet (5 mg total) by mouth daily, Disp: 90 tablet, Rfl: 3    Glucosamine-Chondroitin (MOVE FREE PO), Take 2 tablets by mouth in the morning., Disp: , Rfl:     ketoconazole (NIZORAL) 2 % cream, Apply topically daily  Twice a day for one month, Disp: 60 g, Rfl: 1    Multiple Vitamin (MULTIVITAMIN) tablet, Take 1 tablet by mouth in the morning., Disp: , Rfl:     tadalafil (CIALIS) 5 MG tablet, TAKE ONE TABLET BY MOUTH AT BEDTIME, Disp: 90 tablet, Rfl: 1

## 2025-07-07 ENCOUNTER — NURSE TRIAGE (OUTPATIENT)
Age: 75
End: 2025-07-07

## 2025-07-07 DIAGNOSIS — I30.0 IDIOPATHIC PERICARDITIS, UNSPECIFIED CHRONICITY: Primary | ICD-10-CM

## 2025-07-07 NOTE — TELEPHONE ENCOUNTER
"REASON FOR CONVERSATION: chest pain and sob    SYMPTOMS: Received call from pt stating starting this am, he has been having on/off dull chest pain that sometimes feels like pressure. He rates the pain a 3/10. Denies dizziness, n/v, fever, chills, cough. He does admit to some sob with exertion. Pt stated his s/s are the same as when he had pericarditis in the past. HE stated he went to the ED in the past for this but was told to follow up outpt. He was last seen on 6/11. He took an ibprofen taper and colchicine in the past for his pericarditis.     OTHER HEALTH INFORMATION: hx pericarditis. Vitals today: /74, 163/77; HR 66; oxygen 98    PROTOCOL DISPOSITION: Discuss with Provider and Call Back Patient    CARE ADVICE PROVIDED: Advised will send a high priority message to the provider to review and advise. Advised pt to be seen in the ED with any new or worsening s/s. An urgent appt was also made for this Friday, 7/11.    PRACTICE FOLLOW-UP: Please return call to pt with provider advice.       Answer Assessment - Initial Assessment Questions  1. LOCATION: \"Where does it hurt?\"        Center and left side of heart. Describes as a dull pain / pressure   2. RADIATION: \"Does the pain go anywhere else?\" (e.g., into neck, jaw, arms, back)      Denies   3. ONSET: \"When did the chest pain begin?\" (Minutes, hours or days)       This am  4. PATTERN: \"Does the pain come and go, or has it been constant since it started?\"  \"Does it get worse with exertion?\"       Comes and goes. Happens when taking a deep breath and with exertion   5. DURATION: \"How long does it last\" (e.g., seconds, minutes, hours)      A couple secs   6. SEVERITY: \"How bad is the pain?\"  (e.g., Scale 1-10; mild, moderate, or severe)      3/10  7. CARDIAC RISK FACTORS: \"Do you have any history of heart problems or risk factors for heart disease?\" (e.g., angina, prior heart attack; diabetes, high blood pressure, high cholesterol, smoker, or strong family " "history of heart disease)      Hx pericarditis   8. PULMONARY RISK FACTORS: \"Do you have any history of lung disease?\"  (e.g., blood clots in lung, asthma, emphysema, birth control pills)      denies  9. CAUSE: \"What do you think is causing the chest pain?\"      Pericarditis   10. OTHER SYMPTOMS: \"Do you have any other symptoms?\" (e.g., dizziness, nausea, vomiting, sweating, fever, difficulty breathing, cough)        Sob. Denies dizziness, n/v, fever, chills, cough    Answer Assessment - Initial Assessment Questions  1. RESPIRATORY STATUS: \"Describe your breathing?\" (e.g., wheezing, shortness of breath, unable to speak, severe coughing)       Sob with exertion   2. ONSET: \"When did this breathing problem begin?\"       This am   3. PATTERN \"Does the difficult breathing come and go, or has it been constant since it started?\"       With exertion   4. SEVERITY: \"How bad is your breathing?\" (e.g., mild, moderate, severe)       Aguirre snot interfere with normal activities   5. RECURRENT SYMPTOM: \"Have you had difficulty breathing before?\" If Yes, ask: \"When was the last time?\" and \"What happened that time?\"       Yes, hx pericarditis   6. CARDIAC HISTORY: \"Do you have any history of heart disease?\" (e.g., heart attack, angina, bypass surgery, angioplasty)       Pericarditis   7. LUNG HISTORY: \"Do you have any history of lung disease?\"  (e.g., pulmonary embolus, asthma, emphysema)      Denies  8. CAUSE: \"What do you think is causing the breathing problem?\"       Unsure   9. OTHER SYMPTOMS: \"Do you have any other symptoms?\" (e.g., chest pain, cough, dizziness, fever, runny nose)      Denies   10. O2 SATURATION MONITOR:  \"Do you use an oxygen saturation monitor (pulse oximeter) at home?\" If Yes, ask: \"What is your reading (oxygen level) today?\" \"What is your usual oxygen saturation reading?\" (e.g., 95%)        HR: 66, Oxygen: 98  12. TRAVEL: \"Have you traveled out of the country in the last month?\" (e.g., travel history, " exposures)        denies    Protocols used: Chest Pain-Adult-[place], Breathing Difficulty-Adult-[place]

## 2025-07-08 ENCOUNTER — APPOINTMENT (OUTPATIENT)
Dept: LAB | Age: 75
End: 2025-07-08
Payer: MEDICARE

## 2025-07-08 DIAGNOSIS — I30.0 IDIOPATHIC PERICARDITIS, UNSPECIFIED CHRONICITY: ICD-10-CM

## 2025-07-08 LAB
CARDIAC TROPONIN I PNL SERPL HS: 5 NG/L (ref 8–18)
CRP SERPL QL: <1 MG/L

## 2025-07-08 PROCEDURE — 86140 C-REACTIVE PROTEIN: CPT

## 2025-07-08 PROCEDURE — 84484 ASSAY OF TROPONIN QUANT: CPT

## 2025-07-08 PROCEDURE — 36415 COLL VENOUS BLD VENIPUNCTURE: CPT

## 2025-07-09 ENCOUNTER — CLINICAL SUPPORT (OUTPATIENT)
Dept: CARDIOLOGY CLINIC | Facility: CLINIC | Age: 75
End: 2025-07-09
Payer: MEDICARE

## 2025-07-09 VITALS — HEART RATE: 60 BPM | SYSTOLIC BLOOD PRESSURE: 106 MMHG | DIASTOLIC BLOOD PRESSURE: 60 MMHG | OXYGEN SATURATION: 98 %

## 2025-07-09 DIAGNOSIS — R07.9 CHEST PAIN, UNSPECIFIED TYPE: ICD-10-CM

## 2025-07-09 DIAGNOSIS — E78.2 MIXED HYPERLIPIDEMIA: ICD-10-CM

## 2025-07-09 DIAGNOSIS — R07.89 OTHER CHEST PAIN: Primary | ICD-10-CM

## 2025-07-09 LAB
ATRIAL RATE: 60 BPM
P AXIS: 37 DEGREES
PR INTERVAL: 162 MS
QRS AXIS: 54 DEGREES
QRSD INTERVAL: 152 MS
QT INTERVAL: 440 MS
QTC INTERVAL: 440 MS
T WAVE AXIS: 48 DEGREES
VENTRICULAR RATE: 60 BPM

## 2025-07-09 PROCEDURE — RECHECK

## 2025-07-09 PROCEDURE — 93000 ELECTROCARDIOGRAM COMPLETE: CPT

## 2025-07-09 NOTE — PROGRESS NOTES
Pt is here for an EKG check per Dr. Hernandez for chest pain. Pt denies cardiac complaints at this time. Took the pt's vitals and went over the med list with the pt. Dr. Hernandez reviewed the pt's EKG and stated it looked good. Dr. Hernandez also stated that the pt's labs were normal and does not look like the pt has Pericarditis. Dr. Hernandez advised that the pt keep his follow up appt in [place]. Relayed the information to the pt per Dr. Hernandez. Pt is aware and understood.

## 2025-07-11 ENCOUNTER — APPOINTMENT (OUTPATIENT)
Dept: LAB | Age: 75
End: 2025-07-11
Attending: PHYSICIAN ASSISTANT
Payer: MEDICARE

## 2025-07-11 ENCOUNTER — OFFICE VISIT (OUTPATIENT)
Dept: CARDIOLOGY CLINIC | Facility: CLINIC | Age: 75
End: 2025-07-11
Payer: MEDICARE

## 2025-07-11 VITALS
WEIGHT: 189 LBS | DIASTOLIC BLOOD PRESSURE: 60 MMHG | BODY MASS INDEX: 30.37 KG/M2 | HEART RATE: 58 BPM | HEIGHT: 66 IN | SYSTOLIC BLOOD PRESSURE: 138 MMHG

## 2025-07-11 DIAGNOSIS — R07.9 CHEST PAIN, UNSPECIFIED TYPE: Primary | ICD-10-CM

## 2025-07-11 LAB — ERYTHROCYTE [SEDIMENTATION RATE] IN BLOOD: 11 MM/HOUR (ref 0–19)

## 2025-07-11 PROCEDURE — 36415 COLL VENOUS BLD VENIPUNCTURE: CPT | Performed by: PHYSICIAN ASSISTANT

## 2025-07-11 PROCEDURE — 85652 RBC SED RATE AUTOMATED: CPT | Performed by: PHYSICIAN ASSISTANT

## 2025-07-11 PROCEDURE — 99214 OFFICE O/P EST MOD 30 MIN: CPT | Performed by: PHYSICIAN ASSISTANT

## 2025-07-11 RX ORDER — METOPROLOL TARTRATE 25 MG/1
25 TABLET, FILM COATED ORAL ONCE
Qty: 1 TABLET | Refills: 0 | Status: SHIPPED | OUTPATIENT
Start: 2025-07-11 | End: 2025-07-11

## 2025-07-11 NOTE — PROGRESS NOTES
Cardiology Office Follow Up  Mauricio Chiang  1950  [id number]      ASSESSMENT:  Chest pain, hx of pericarditis x 2  Hyperlipidemia    PLAN:  Check sed rate for completeness sake.  If sed rate positive will place back on ibuprofen taper  If sed rate negative, will refer for cardiac CTA for definitive assessment of his coronary anatomy.  Continue colchicine 0.6 mg twice daily  Will follow up on results, otherwise appropriate for annual or PRN follow up    Interval History/ HPI:   Mauricio Chiang is a 75-year-old male with past medical history of pericarditis, hyperlipidemia who presents for sick visit.  Reports that he is feeling intermittent chest discomfort, in the center of his chest and radiates to his back.  Similar to prior diagnosis of pericarditis however much less intense this time.  He continues to take colchicine every day.  He underwent recent CRP which unremarkable.  Patient made appointment with cardiology for further evaluation.  Reports that the chest discomfort started right around when he was golfing. Felt as if he may have injured himself on a swing and felt intense pain but not always able to exacerbate discomfort with arm/shoulder movement. Pain sharply improved after a week nut still present and constant. Denies exertional or positional component.     Past Medical History[1]  Social History     Socioeconomic History    Marital status:      Spouse name: Not on file    Number of children: Not on file    Years of education: Not on file    Highest education level: Not on file   Occupational History    Occupation: Retired   Tobacco Use    Smoking status: Never    Smokeless tobacco: Never   Vaping Use    Vaping status: Never Used   Substance and Sexual Activity    Alcohol use: Yes     Alcohol/week: 2.0 standard drinks of alcohol     Types: 2 Glasses of wine per week     Comment: wine,  social drinker    Drug use: No     Comment: Denied history of drug use    Sexual activity: Yes      Partners: Female     Birth control/protection: Male Sterilization   Other Topics Concern    Not on file   Social History Narrative    Daily coffee consumption (2 Cups/Day)        retired     Social Drivers of Health     Financial Resource Strain: Low Risk  (5/18/2023)    Overall Financial Resource Strain (CARDIA)     Difficulty of Paying Living Expenses: Not hard at all   Food Insecurity: No Food Insecurity (6/6/2025)    Nursing - Inadequate Food Risk Classification     Worried About Running Out of Food in the Last Year: Never true     Ran Out of Food in the Last Year: Never true     Ran Out of Food in the Last Year: Not on file   Transportation Needs: No Transportation Needs (6/6/2025)    PRAPARE - Transportation     Lack of Transportation (Medical): No     Lack of Transportation (Non-Medical): No   Physical Activity: Not on file   Stress: Not on file   Social Connections: Not on file   Intimate Partner Violence: Not on file   Housing Stability: Low Risk  (6/6/2025)    Housing Stability Vital Sign     Unable to Pay for Housing in the Last Year: No     Number of Times Moved in the Last Year: 0     Homeless in the Last Year: No      Family History[2]  Past Surgical History[3]  Current Medications[4]      Review of Systems:  Review of Systems   Constitutional:  Negative for appetite change, chills, diaphoresis, fatigue and fever.   Respiratory:  Negative for cough, chest tightness and shortness of breath.    Cardiovascular:  Negative for chest pain, palpitations and leg swelling.   Gastrointestinal:  Negative for diarrhea, nausea and vomiting.   Endocrine: Negative for cold intolerance and heat intolerance.   Genitourinary:  Negative for difficulty urinating, dysuria and enuresis.   Musculoskeletal:  Negative for arthralgias, back pain and gait problem.   Allergic/Immunologic: Negative for environmental allergies and food allergies.   Neurological:  Negative for dizziness, facial asymmetry and headaches.    Hematological:  Negative for adenopathy. Does not bruise/bleed easily.   Psychiatric/Behavioral:  Negative for agitation, behavioral problems and confusion.          Physical Exam:  Physical Exam  Constitutional:       Appearance: He is well-developed.   HENT:      Right Ear: External ear normal.      Left Ear: External ear normal.     Eyes:      Pupils: Pupils are equal, round, and reactive to light.       Cardiovascular:      Rate and Rhythm: Normal rate and regular rhythm.      Heart sounds: Normal heart sounds. No murmur heard.     No friction rub. No gallop.   Pulmonary:      Effort: Pulmonary effort is normal.      Breath sounds: Normal breath sounds.   Abdominal:      Palpations: Abdomen is soft.     Musculoskeletal:         General: Normal range of motion.      Cervical back: Normal range of motion.     Skin:     General: Skin is warm and dry.     Neurological:      Mental Status: He is alert and oriented to person, place, and time.      Deep Tendon Reflexes: Reflexes are normal and symmetric.     Psychiatric:         Behavior: Behavior normal.         Thought Content: Thought content normal.         Judgment: Judgment normal.         This note was completed in part utilizing M-Modal Fluency Direct Software.  Grammatical errors, random word insertions, spelling mistakes, and incomplete sentences can be an occasional consequence of this system secondary to software limitations, ambient noise, and hardware issues.  If you have any questions or concerns about the content, text, or information contained within the body of this dictation, please contact the provider for clarification.          [1]   Past Medical History:  Diagnosis Date    Acute pancreatitis     last assessed - [id number]    Arthritis 2017    Basal cell carcinoma 2023    Benign prostatic hyperplasia     BPH (benign prostatic hypertrophy)     Celiac disease     current GI w/u to r/o celiac    Cholelithiasis     Colon polyp     Diabetes mellitus  (formerly [facility]) 2018    Eczema     Elevated blood pressure reading     last assessed - 2017    Gallstones     last assessed - [id number]    Heart disease 10/6/21    Pericarditis    High cholesterol     History of elevated PSA 2021    Hx of mitral valve disorder     Hypertension     Osteoarthritis 2005    Pulmonary nodule seen on imaging study 2014    Description: no further imaging, thought to be scarring      Rheumatoid factor positive 10/26/2021    Likely positive because of remote history of sarcoid-      Right bundle branch block (RBBB) 2021    Sarcoidosis      Diagnosed 15 years ago by biopsy, not active; last assessed - 2016   [2]   Family History  Problem Relation Name Age of Onset    Diabetes Mother Sho         Diabetes Mellitus    Hypertension Mother Sho     Ovarian cancer Mother Sho     Rheumatologic disease Mother Sho     Arthritis Mother Sho             Cancer Mother Sho         Ovaries    Psoriasis Mother Sho     Rheum arthritis Mother Sho     Diabetes Father Aldo         Diabetes Mellitus    Heart disease Father Aldo     Hypertension Father Aldo     Alcohol abuse Father Aldo             Diabetes Sister N/a         Diabetes Mellitus    Hypertension Sister N/a     Hypertension Sister Dolores Barber     Hypertension Sister Jazmin     Substance Abuse Neg Hx      Mental illness Neg Hx      Depression Neg Hx     [3]   Past Surgical History:  Procedure Laterality Date    CATARACT EXTRACTION      CHOLECYSTECTOMY      ERCP N/A 2017    Procedure: ENDOSCOPIC RETROGRADE CHOLANGIOPANCREATOGRAPHY (ERCP);  Surgeon: Jusitce Jama MD;  Location: [facility];  Service:     EYE SURGERY  20    Cataract both eyes    MOHS SURGERY      AL COLONOSCOPY FLX DX W/COLLJ SPEC WHEN PFRMD N/A 2017    Procedure: EGD AND COLONOSCOPY;  Surgeon: Zoey De La Cruz MD;  Location: [facility];  Service: Gastroenterology    AL ESOPHAGOGASTRODUODENOSCOPY  TRANSORAL DIAGNOSTIC N/A 06/05/2017    Procedure: ESOPHAGOGASTRODUODENOSCOPY (EGD);  Surgeon: Justice Jama MD;  Location: [facility];  Service: Gastroenterology    UT LAPAROSCOPY SURG CHOLECYSTECTOMY N/A 03/29/2017    Procedure: CHOLECYSTECTOMY LAPAROSCOPIC; POSSIBLE OPEN;  Surgeon: Alonso Medina MD;  Location: [facility];  Service: General; complicated by bile leak and pancreatitis s/p stent     ROTATOR CUFF REPAIR Right 04/29/2015    for rotator cuff tear, Onset: 4/14    SHOULDER SURGERY  2015    Rotator cuff    SKIN BIOPSY  2023    VASECTOMY  1983   [4]   Current Outpatient Medications:     atorvastatin (LIPITOR) 20 mg tablet, Take 1 tablet (20 mg total) by mouth daily, Disp: 90 tablet, Rfl: 3    betamethasone dipropionate (DIPROSONE) 0.05 % cream, Apply topically 2 (two) times a day Until the rash is gone PRN, Disp: , Rfl:     Cholecalciferol (Vitamin D-3) 25 MCG (1000 UT) CAPS, 2 daily, Disp: , Rfl:     colchicine (COLCRYS) 0.6 mg tablet, Take 1 tablet (0.6 mg total) by mouth daily, Disp: 90 tablet, Rfl: 0    finasteride (PROSCAR) 5 mg tablet, Take 1 tablet (5 mg total) by mouth daily, Disp: 90 tablet, Rfl: 3    Glucosamine-Chondroitin (MOVE FREE PO), Take 2 tablets by mouth in the morning., Disp: , Rfl:     ketoconazole (NIZORAL) 2 % cream, Apply topically daily Twice a day for one month, Disp: 60 g, Rfl: 1    Multiple Vitamin (MULTIVITAMIN) tablet, Take 1 tablet by mouth in the morning., Disp: , Rfl:     tadalafil (CIALIS) 5 MG tablet, TAKE ONE TABLET BY MOUTH AT BEDTIME, Disp: 90 tablet, Rfl: 1

## 2025-07-27 DIAGNOSIS — R07.9 CHEST PAIN: ICD-10-CM

## 2025-07-28 RX ORDER — COLCHICINE 0.6 MG/1
0.6 TABLET ORAL DAILY
Qty: 90 TABLET | Refills: 1 | Status: SHIPPED | OUTPATIENT
Start: 2025-07-28

## 2025-08-04 ENCOUNTER — TELEPHONE (OUTPATIENT)
Age: 75
End: 2025-08-04

## (undated) DEVICE — SINGLE-USE BIOPSY FORCEPS: Brand: RADIAL JAW 4

## (undated) DEVICE — PACK PBDS LAP CHOLE RF

## (undated) DEVICE — ENDOPOUCH RETRIEVER SPECIMEN RETRIEVAL BAGS: Brand: ENDOPOUCH RETRIEVER

## (undated) DEVICE — 3000CC GUARDIAN II: Brand: GUARDIAN

## (undated) DEVICE — ADHESIVE SKN CLSR HISTOACRYL FLEX 0.5ML LF

## (undated) DEVICE — ENDOPATH XCEL UNIVERSAL TROCAR STABLILITY SLEEVES: Brand: ENDOPATH XCEL

## (undated) DEVICE — DELIVERY SYSTEM NAVFLX 10FR X 202.5CM

## (undated) DEVICE — GLOVE SRG BIOGEL ORTHOPEDIC 7.5

## (undated) DEVICE — CHLORAPREP HI-LITE 26ML ORANGE

## (undated) DEVICE — AEM CORD

## (undated) DEVICE — NEEDLE 25G X 1 1/2

## (undated) DEVICE — LIGAMAX 5 MM ENDOSCOPIC MULTIPLE CLIP APPLIER: Brand: LIGAMAX

## (undated) DEVICE — SPHINCTEROTOME: Brand: DREAMTOME™ RX 44

## (undated) DEVICE — ENDOPATH XCEL BLADELESS TROCARS WITH STABILITY SLEEVES: Brand: ENDOPATH XCEL

## (undated) DEVICE — INTENDED FOR TISSUE SEPARATION, AND OTHER PROCEDURES THAT REQUIRE A SHARP SURGICAL BLADE TO PUNCTURE OR CUT.: Brand: BARD-PARKER SAFETY BLADES SIZE 11, STERILE

## (undated) DEVICE — SCD SEQUENTIAL COMPRESSION COMFORT SLEEVE MEDIUM KNEE LENGTH: Brand: KENDALL SCD

## (undated) DEVICE — SUT VICRYL 0 UR-6 27 IN J603H

## (undated) DEVICE — GUIDEWIRE 0.035IN 270CM ANGL TIP VISIGLIDE

## (undated) DEVICE — SUT MONOCRYL 4-0 PS-2 18 IN Y496G